# Patient Record
Sex: MALE | Race: WHITE | NOT HISPANIC OR LATINO | Employment: UNEMPLOYED | URBAN - METROPOLITAN AREA
[De-identification: names, ages, dates, MRNs, and addresses within clinical notes are randomized per-mention and may not be internally consistent; named-entity substitution may affect disease eponyms.]

---

## 2017-08-30 ENCOUNTER — HISTORICAL (OUTPATIENT)
Dept: ADMINISTRATIVE | Facility: HOSPITAL | Age: 57
End: 2017-08-30

## 2017-12-05 ENCOUNTER — HISTORICAL (OUTPATIENT)
Dept: ORTHOPEDICS | Facility: CLINIC | Age: 57
End: 2017-12-05

## 2022-04-10 ENCOUNTER — HISTORICAL (OUTPATIENT)
Dept: ADMINISTRATIVE | Facility: HOSPITAL | Age: 62
End: 2022-04-10

## 2022-04-26 VITALS
DIASTOLIC BLOOD PRESSURE: 88 MMHG | WEIGHT: 166.88 LBS | SYSTOLIC BLOOD PRESSURE: 128 MMHG | HEIGHT: 67 IN | BODY MASS INDEX: 26.19 KG/M2

## 2023-04-03 ENCOUNTER — HOSPITAL ENCOUNTER (EMERGENCY)
Facility: HOSPITAL | Age: 63
Discharge: HOME | End: 2023-04-03
Attending: EMERGENCY MEDICINE
Payer: MEDICAID

## 2023-04-03 VITALS
HEIGHT: 67 IN | WEIGHT: 160 LBS | RESPIRATION RATE: 18 BRPM | BODY MASS INDEX: 25.11 KG/M2 | TEMPERATURE: 99 F | OXYGEN SATURATION: 98 % | SYSTOLIC BLOOD PRESSURE: 140 MMHG | HEART RATE: 102 BPM | DIASTOLIC BLOOD PRESSURE: 86 MMHG

## 2023-04-03 DIAGNOSIS — M25.462 EFFUSION OF LEFT KNEE JOINT: Primary | ICD-10-CM

## 2023-04-03 DIAGNOSIS — M25.562 KNEE PAIN, LEFT: ICD-10-CM

## 2023-04-03 DIAGNOSIS — I10 BENIGN ESSENTIAL HTN: ICD-10-CM

## 2023-04-03 PROCEDURE — 63600175 PHARM REV CODE 636 W HCPCS: Performed by: EMERGENCY MEDICINE

## 2023-04-03 PROCEDURE — 96374 THER/PROPH/DIAG INJ IV PUSH: CPT

## 2023-04-03 PROCEDURE — 96375 TX/PRO/DX INJ NEW DRUG ADDON: CPT

## 2023-04-03 PROCEDURE — 99284 EMERGENCY DEPT VISIT MOD MDM: CPT | Mod: 25

## 2023-04-03 RX ORDER — KETOROLAC TROMETHAMINE 30 MG/ML
30 INJECTION, SOLUTION INTRAMUSCULAR; INTRAVENOUS
Status: DISCONTINUED | OUTPATIENT
Start: 2023-04-03 | End: 2023-04-03

## 2023-04-03 RX ORDER — HYDROMORPHONE HYDROCHLORIDE 2 MG/ML
1 INJECTION, SOLUTION INTRAMUSCULAR; INTRAVENOUS; SUBCUTANEOUS
Status: DISCONTINUED | OUTPATIENT
Start: 2023-04-03 | End: 2023-04-03

## 2023-04-03 RX ORDER — KETOROLAC TROMETHAMINE 30 MG/ML
30 INJECTION, SOLUTION INTRAMUSCULAR; INTRAVENOUS
Status: COMPLETED | OUTPATIENT
Start: 2023-04-03 | End: 2023-04-03

## 2023-04-03 RX ORDER — HYDROMORPHONE HYDROCHLORIDE 2 MG/ML
1 INJECTION, SOLUTION INTRAMUSCULAR; INTRAVENOUS; SUBCUTANEOUS
Status: COMPLETED | OUTPATIENT
Start: 2023-04-03 | End: 2023-04-03

## 2023-04-03 RX ORDER — HYDROCODONE BITARTRATE AND ACETAMINOPHEN 10; 325 MG/1; MG/1
1 TABLET ORAL EVERY 6 HOURS PRN
Qty: 12 TABLET | Refills: 0 | Status: ON HOLD | OUTPATIENT
Start: 2023-04-03 | End: 2023-05-23 | Stop reason: HOSPADM

## 2023-04-03 RX ORDER — KETOROLAC TROMETHAMINE 10 MG/1
10 TABLET, FILM COATED ORAL EVERY 6 HOURS PRN
Qty: 20 TABLET | Refills: 0 | Status: SHIPPED | OUTPATIENT
Start: 2023-04-03 | End: 2023-04-03 | Stop reason: SDUPTHER

## 2023-04-03 RX ORDER — KETOROLAC TROMETHAMINE 10 MG/1
10 TABLET, FILM COATED ORAL EVERY 6 HOURS PRN
Qty: 20 TABLET | Refills: 0 | Status: SHIPPED | OUTPATIENT
Start: 2023-04-03 | End: 2023-04-08

## 2023-04-03 RX ADMIN — KETOROLAC TROMETHAMINE 30 MG: 30 INJECTION, SOLUTION INTRAMUSCULAR; INTRAVENOUS at 04:04

## 2023-04-03 RX ADMIN — HYDROMORPHONE HYDROCHLORIDE 1 MG: 2 INJECTION INTRAMUSCULAR; INTRAVENOUS; SUBCUTANEOUS at 04:04

## 2023-04-03 NOTE — DISCHARGE INSTRUCTIONS
I spoke with the orthopedic surgeon, repeat procedures to remove the fluid are not recommended. I have placed a referral to orthopedic surgery to follow up with them. Do not take over the counter ibuprofen or naproxen with the prescribed toradol and make sure you take the toradol with food. Until you can see the orthopedic surgeon, call your primary care provider to schedule follow up and to prescribe any further pain medications

## 2023-04-03 NOTE — ED PROVIDER NOTES
"Encounter Date: 4/3/2023    SCRIBE #1 NOTE: I, Prerna Herron, am scribing for, and in the presence of,  Alexandra Moreno MD. I have scribed the following portions of the note - Other sections scribed: HPI,ROS,PE.     History     Chief Complaint   Patient presents with    Joint Swelling     Pt reports he had his knee drained twice at [facility] and had it drained last April 1st. Pt reports swelling has returned. Denies fever.     62-year-old male with past medical history of DM and neuropathy presents to ED c/o left knee swelling and pain onset since Wednesday. Pt reports ~4x weeks ago he woke to left knee feeling as if "it needed to be cracked" - states on Wednesday "it just started to swell." He denies direct injury. Pt was seen on 3/30 and 4/1 at [facility] for symptom, with fluid drainage, notes without infection. Pt reports he is on 7.5 Norco, Ibuprofen, and Naproxen without relief - states dilaudid injection helped some, but pain eventually returned. Pt also reports fluid collection builds within 24-hour time frame.     The history is provided by the patient. No  was used.   Leg Pain   There was no injury mechanism. The incident occurred several days ago. The pain is present in the left leg. The pain has been Constant since onset. Associated symptoms include inability to bear weight. The symptoms are aggravated by activity.   Review of patient's allergies indicates:  No Known Allergies  History reviewed. No pertinent past medical history.  History reviewed. No pertinent surgical history.  History reviewed. No pertinent family history.     Review of Systems   Constitutional:  Negative for fever.   Musculoskeletal:  Positive for arthralgias and joint swelling.        Left knee swelling and pain      Physical Exam     Initial Vitals [04/03/23 1126]   BP Pulse Resp Temp SpO2   120/89 (!) 123 20 97.6 °F (36.4 °C) 99 %      MAP       --         Physical Exam    Nursing note and vitals " reviewed.  Constitutional: No distress.   Patient non-toxic appearing   HENT:   Head: Normocephalic and atraumatic.   Eyes: Conjunctivae and EOM are normal. Pupils are equal, round, and reactive to light.   Neck: Trachea normal. Neck supple.   Normal range of motion.  Cardiovascular:  Normal rate and regular rhythm.           No murmur heard.  Pulses:       Dorsalis pedis pulses are 2+ on the right side and 2+ on the left side.        Posterior tibial pulses are 2+ on the right side and 2+ on the left side.   Pulmonary/Chest: Breath sounds normal. No respiratory distress. He exhibits no tenderness.   Abdominal: Abdomen is soft. Bowel sounds are normal. There is no abdominal tenderness.   Musculoskeletal:         General: Edema present.      Cervical back: Normal range of motion and neck supple.      Lumbar back: Normal. No tenderness. Normal range of motion.      Comments: Left knee with effusion compared to right, there is no overlying erythema,induration, or warmth. Good pulses present.      Neurological: He is alert and oriented to person, place, and time. He has normal strength. No cranial nerve deficit or sensory deficit.   Skin: Skin is warm and dry. Capillary refill takes less than 2 seconds. No rash and no abscess noted. No erythema. No pallor.   Psychiatric: He has a normal mood and affect. His behavior is normal. Judgment and thought content normal.       ED Course   Procedures  Labs Reviewed - No data to display       Imaging Results              X-Ray Knee 3 View Left (Final result)  Result time 04/03/23 15:17:58      Final result by Chapo Polanco MD (04/03/23 15:17:58)                   Impression:      1. No acute osseous abnormality is identified.  2. Moderate to large joint effusion.      Electronically signed by: Chapo Polanco MD  Date:    04/03/2023  Time:    15:17               Narrative:    EXAMINATION:  XR KNEE 3 VIEW LEFT    CLINICAL HISTORY:  Left knee pain, swelling.    TECHNIQUE:  Three  views of the left knee.    COMPARISON:  None    FINDINGS:  No acute displaced fracture, subluxation, or dislocation is identified.  There is superior pole patellar spurring.  There is moderate to large joint effusion. No radiopaque foreign bodies identified. No significant soft tissue swelling is identified.                                    X-Rays:   Independently Interpreted Readings:   Other Readings:  Knee xr with joint effusion, no acute fracture or dislocation  Medications   HYDROmorphone (PF) injection 1 mg (1 mg Intravenous Given 4/3/23 1615)   ketorolac injection 30 mg (30 mg Intravenous Given 4/3/23 1615)     Medical Decision Making:   History:   Old Medical Records: I decided to obtain old medical records.  Old Records Summarized: records from another hospital.       <> Summary of Records: fiona  Initial Assessment:   See hpi  Independently Interpreted Test(s):   I have ordered and independently interpreted X-rays - see prior notes.  Clinical Tests:   Radiological Study: Reviewed  Other:   I have discussed this case with another health care provider.   Medical Decision Making  The differential diagnosis includes, but is not limited to: fracture, disclocation, knee effusion, baker cyst  Xr orderd and reviewed  Discussed with ortho  Not a septic joint. Pain controlled, referralto rtho. No indication for repeat arthrocentesis  Likely an inflammatory effusion; however, has recurred rapidly, repeat arthrocentesis not indicated. Pt comfortable with plan    Problems Addressed:  Benign essential HTN: chronic illness or injury  Effusion of left knee joint: acute illness or injury that poses a threat to life or bodily functions  Knee pain, left: acute illness or injury that poses a threat to life or bodily functions    Amount and/or Complexity of Data Reviewed  External Data Reviewed: notes.  Radiology: independent interpretation performed. Decision-making details documented in ED Course.    Risk  OTC  drugs.  Prescription drug management.          Scribe Attestation:   Scribe #1: I performed the above scribed service and the documentation accurately describes the services I performed. I attest to the accuracy of the note.  Comments: Attending:   Physician Attestation Statement for Scribe #1: Alexandra KERR MD, personally performed the services described in this documentation. All medical record entries made by the scribe were at my direction and in my presence.  I have reviewed the chart and agree that the record reflects my personal performance and is accurate and complete.        Attending Attestation:           Physician Attestation for Scribe:  Physician Attestation Statement for Scribe #1: Alexandra KERR MD, reviewed documentation, as scribed by Prerna Herron in my presence, and it is both accurate and complete.           ED Course as of 04/03/23 2331   Mon Apr 03, 2023   1549 Discussed with Dr. Pineda. Joint effusions will always recur, repeat arthrocentesis is no longer indicated for effusions, recommend referral to [facility] ortho for f/u [BS]      ED Course User Index  [BS] Alexandra Moreno MD                 Clinical Impression:   Final diagnoses:  [M25.562] Knee pain, left  [M25.462] Effusion of left knee joint (Primary)  [I10] Benign essential HTN        ED Disposition Condition    Discharge Stable          ED Prescriptions       Medication Sig Dispense Start Date End Date Auth. Provider    HYDROcodone-acetaminophen (NORCO)  mg per tablet Take 1 tablet by mouth every 6 (six) hours as needed for Pain (severe pain only). 12 tablet 4/3/2023 -- Alexandra Moreno MD    ketorolac (TORADOL) 10 mg tablet  (Status: Discontinued) Take 1 tablet (10 mg total) by mouth every 6 (six) hours as needed for Pain (take with food or milk for moderate pain). 20 tablet 4/3/2023 4/3/2023 Alexandra Moreno MD    ketorolac (TORADOL) 10 mg tablet Take 1 tablet (10 mg total) by mouth every 6 (six) hours as needed  for Pain (take with food or milk for moderate pain). 20 tablet 4/3/2023 4/8/2023 Alexandra Moreno MD          Follow-up Information       Follow up With Specialties Details Why Contact Info    [facility]   will call to follow up in the orthopedic clinic [address] [facility] [address] [id number] [phone]      your primary care provider  Schedule an appointment as soon as possible for a visit       [facility] - Emergency Dept Emergency Medicine  As needed, If symptoms worsen [address] [facility] [id number] [phone]             Alexandra Moreno MD  04/03/23 3387

## 2023-04-10 DIAGNOSIS — M25.569 KNEE PAIN, UNSPECIFIED CHRONICITY, UNSPECIFIED LATERALITY: Primary | ICD-10-CM

## 2023-05-05 ENCOUNTER — HOSPITAL ENCOUNTER (INPATIENT)
Facility: HOSPITAL | Age: 63
LOS: 18 days | Discharge: HOSPICE | DRG: 853 | End: 2023-05-23
Attending: STUDENT IN AN ORGANIZED HEALTH CARE EDUCATION/TRAINING PROGRAM | Admitting: INTERNAL MEDICINE
Payer: MEDICAID

## 2023-05-05 DIAGNOSIS — I38 ENDOCARDITIS: ICD-10-CM

## 2023-05-05 DIAGNOSIS — I48.91 ATRIAL FIBRILLATION: ICD-10-CM

## 2023-05-05 DIAGNOSIS — R07.9 CHEST PAIN: ICD-10-CM

## 2023-05-05 DIAGNOSIS — R60.9 SWELLING: ICD-10-CM

## 2023-05-05 DIAGNOSIS — K92.1 HEMATOCHEZIA: ICD-10-CM

## 2023-05-05 DIAGNOSIS — A41.9 SEPSIS: ICD-10-CM

## 2023-05-05 DIAGNOSIS — M46.20 SPINAL ABSCESS: Primary | ICD-10-CM

## 2023-05-05 DIAGNOSIS — B95.61 STAPHYLOCOCCUS AUREUS BACTEREMIA: ICD-10-CM

## 2023-05-05 DIAGNOSIS — R00.0 TACHYCARDIA: ICD-10-CM

## 2023-05-05 DIAGNOSIS — I49.9 ABNORMAL HEART RHYTHM: ICD-10-CM

## 2023-05-05 DIAGNOSIS — R78.81 BACTEREMIA: ICD-10-CM

## 2023-05-05 DIAGNOSIS — K81.9 CHOLECYSTITIS: ICD-10-CM

## 2023-05-05 DIAGNOSIS — D62 ACUTE BLOOD LOSS ANEMIA: ICD-10-CM

## 2023-05-05 DIAGNOSIS — R78.81 STAPHYLOCOCCUS AUREUS BACTEREMIA: ICD-10-CM

## 2023-05-05 DIAGNOSIS — S31.000A WOUND OF SACRAL REGION, INITIAL ENCOUNTER: ICD-10-CM

## 2023-05-05 LAB
ALBUMIN SERPL-MCNC: 1.3 G/DL (ref 3.4–4.8)
ALBUMIN/GLOB SERPL: 0.4 RATIO (ref 1.1–2)
ALP SERPL-CCNC: 98 UNIT/L (ref 40–150)
ALT SERPL-CCNC: 11 UNIT/L (ref 0–55)
AST SERPL-CCNC: 19 UNIT/L (ref 5–34)
BASOPHILS # BLD AUTO: 0.07 X10(3)/MCL
BASOPHILS NFR BLD AUTO: 0.4 %
BILIRUBIN DIRECT+TOT PNL SERPL-MCNC: 1.3 MG/DL
BUN SERPL-MCNC: 32.9 MG/DL (ref 8.4–25.7)
CALCIUM SERPL-MCNC: 7.7 MG/DL (ref 8.8–10)
CHLORIDE SERPL-SCNC: 98 MMOL/L (ref 98–107)
CK SERPL-CCNC: 22 U/L (ref 30–200)
CO2 SERPL-SCNC: 26 MMOL/L (ref 23–31)
CREAT SERPL-MCNC: 0.75 MG/DL (ref 0.73–1.18)
EOSINOPHIL # BLD AUTO: 0.01 X10(3)/MCL (ref 0–0.9)
EOSINOPHIL NFR BLD AUTO: 0.1 %
ERYTHROCYTE [DISTWIDTH] IN BLOOD BY AUTOMATED COUNT: 16.8 % (ref 11.5–17)
GFR SERPLBLD CREATININE-BSD FMLA CKD-EPI: >60 MLS/MIN/1.73/M2
GLOBULIN SER-MCNC: 3.7 GM/DL (ref 2.4–3.5)
GLUCOSE SERPL-MCNC: 201 MG/DL (ref 82–115)
HCT VFR BLD AUTO: 26.7 % (ref 42–52)
HGB BLD-MCNC: 8.7 G/DL (ref 14–18)
IMM GRANULOCYTES # BLD AUTO: 0.12 X10(3)/MCL (ref 0–0.04)
IMM GRANULOCYTES NFR BLD AUTO: 0.7 %
LACTATE SERPL-SCNC: 2.2 MMOL/L (ref 0.5–2.2)
LYMPHOCYTES # BLD AUTO: 1.43 X10(3)/MCL (ref 0.6–4.6)
LYMPHOCYTES NFR BLD AUTO: 8.3 %
MCH RBC QN AUTO: 30.3 PG (ref 27–31)
MCHC RBC AUTO-ENTMCNC: 32.6 G/DL (ref 33–36)
MCV RBC AUTO: 93 FL (ref 80–94)
MONOCYTES # BLD AUTO: 0.56 X10(3)/MCL (ref 0.1–1.3)
MONOCYTES NFR BLD AUTO: 3.3 %
NEUTROPHILS # BLD AUTO: 14.96 X10(3)/MCL (ref 2.1–9.2)
NEUTROPHILS NFR BLD AUTO: 87.2 %
NRBC BLD AUTO-RTO: 0 %
PLATELET # BLD AUTO: 212 X10(3)/MCL (ref 130–400)
PMV BLD AUTO: 10.5 FL (ref 7.4–10.4)
POCT GLUCOSE: 229 MG/DL (ref 70–110)
POTASSIUM SERPL-SCNC: 3.8 MMOL/L (ref 3.5–5.1)
PROT SERPL-MCNC: 5 GM/DL (ref 5.8–7.6)
RBC # BLD AUTO: 2.87 X10(6)/MCL (ref 4.7–6.1)
SODIUM SERPL-SCNC: 133 MMOL/L (ref 136–145)
TROPONIN I SERPL-MCNC: <0.01 NG/ML (ref 0–0.04)
WBC # SPEC AUTO: 17.15 X10(3)/MCL (ref 4.5–11.5)

## 2023-05-05 PROCEDURE — 85025 COMPLETE CBC W/AUTO DIFF WBC: CPT | Performed by: PHYSICIAN ASSISTANT

## 2023-05-05 PROCEDURE — 96375 TX/PRO/DX INJ NEW DRUG ADDON: CPT

## 2023-05-05 PROCEDURE — 96374 THER/PROPH/DIAG INJ IV PUSH: CPT

## 2023-05-05 PROCEDURE — 93010 EKG 12-LEAD: ICD-10-PCS | Mod: ,,, | Performed by: INTERNAL MEDICINE

## 2023-05-05 PROCEDURE — 84484 ASSAY OF TROPONIN QUANT: CPT | Performed by: PHYSICIAN ASSISTANT

## 2023-05-05 PROCEDURE — 83605 ASSAY OF LACTIC ACID: CPT | Performed by: PHYSICIAN ASSISTANT

## 2023-05-05 PROCEDURE — 82550 ASSAY OF CK (CPK): CPT | Performed by: STUDENT IN AN ORGANIZED HEALTH CARE EDUCATION/TRAINING PROGRAM

## 2023-05-05 PROCEDURE — 93010 ELECTROCARDIOGRAM REPORT: CPT | Mod: ,,, | Performed by: INTERNAL MEDICINE

## 2023-05-05 PROCEDURE — 87184 SC STD DISK METHOD PER PLATE: CPT | Performed by: PHYSICIAN ASSISTANT

## 2023-05-05 PROCEDURE — 96361 HYDRATE IV INFUSION ADD-ON: CPT

## 2023-05-05 PROCEDURE — 25000003 PHARM REV CODE 250: Performed by: STUDENT IN AN ORGANIZED HEALTH CARE EDUCATION/TRAINING PROGRAM

## 2023-05-05 PROCEDURE — 80053 COMPREHEN METABOLIC PANEL: CPT | Performed by: PHYSICIAN ASSISTANT

## 2023-05-05 PROCEDURE — 63600175 PHARM REV CODE 636 W HCPCS: Performed by: STUDENT IN AN ORGANIZED HEALTH CARE EDUCATION/TRAINING PROGRAM

## 2023-05-05 PROCEDURE — 11000001 HC ACUTE MED/SURG PRIVATE ROOM

## 2023-05-05 PROCEDURE — 93005 ELECTROCARDIOGRAM TRACING: CPT

## 2023-05-05 PROCEDURE — 87154 CUL TYP ID BLD PTHGN 6+ TRGT: CPT | Performed by: PHYSICIAN ASSISTANT

## 2023-05-05 PROCEDURE — 99285 EMERGENCY DEPT VISIT HI MDM: CPT | Mod: 25

## 2023-05-05 PROCEDURE — 63600175 PHARM REV CODE 636 W HCPCS: Performed by: PHYSICIAN ASSISTANT

## 2023-05-05 RX ORDER — TALC
6 POWDER (GRAM) TOPICAL NIGHTLY PRN
Status: DISCONTINUED | OUTPATIENT
Start: 2023-05-06 | End: 2023-05-18

## 2023-05-05 RX ORDER — PROCHLORPERAZINE EDISYLATE 5 MG/ML
5 INJECTION INTRAMUSCULAR; INTRAVENOUS EVERY 6 HOURS PRN
Status: DISCONTINUED | OUTPATIENT
Start: 2023-05-06 | End: 2023-05-23 | Stop reason: HOSPADM

## 2023-05-05 RX ORDER — TIZANIDINE 4 MG/1
4 TABLET ORAL EVERY 8 HOURS PRN
Status: DISCONTINUED | OUTPATIENT
Start: 2023-05-06 | End: 2023-05-18

## 2023-05-05 RX ORDER — ALBUMIN HUMAN 250 G/1000ML
50 SOLUTION INTRAVENOUS ONCE
Status: COMPLETED | OUTPATIENT
Start: 2023-05-05 | End: 2023-05-06

## 2023-05-05 RX ORDER — LINEZOLID 2 MG/ML
600 INJECTION, SOLUTION INTRAVENOUS
Status: DISCONTINUED | OUTPATIENT
Start: 2023-05-05 | End: 2023-05-06

## 2023-05-05 RX ORDER — SODIUM CHLORIDE 0.9 % (FLUSH) 0.9 %
10 SYRINGE (ML) INJECTION
Status: DISCONTINUED | OUTPATIENT
Start: 2023-05-06 | End: 2023-05-23 | Stop reason: HOSPADM

## 2023-05-05 RX ORDER — ACETAMINOPHEN 500 MG
1000 TABLET ORAL EVERY 6 HOURS PRN
Status: DISCONTINUED | OUTPATIENT
Start: 2023-05-06 | End: 2023-05-08

## 2023-05-05 RX ORDER — AMOXICILLIN 250 MG
2 CAPSULE ORAL 2 TIMES DAILY PRN
Status: DISCONTINUED | OUTPATIENT
Start: 2023-05-06 | End: 2023-05-18

## 2023-05-05 RX ORDER — ENOXAPARIN SODIUM 100 MG/ML
40 INJECTION SUBCUTANEOUS EVERY 24 HOURS
Status: DISCONTINUED | OUTPATIENT
Start: 2023-05-06 | End: 2023-05-13

## 2023-05-05 RX ORDER — ONDANSETRON 2 MG/ML
4 INJECTION INTRAMUSCULAR; INTRAVENOUS
Status: COMPLETED | OUTPATIENT
Start: 2023-05-05 | End: 2023-05-05

## 2023-05-05 RX ORDER — SODIUM CHLORIDE, SODIUM LACTATE, POTASSIUM CHLORIDE, CALCIUM CHLORIDE 600; 310; 30; 20 MG/100ML; MG/100ML; MG/100ML; MG/100ML
INJECTION, SOLUTION INTRAVENOUS CONTINUOUS
Status: DISCONTINUED | OUTPATIENT
Start: 2023-05-06 | End: 2023-05-06

## 2023-05-05 RX ORDER — MORPHINE SULFATE 4 MG/ML
4 INJECTION, SOLUTION INTRAMUSCULAR; INTRAVENOUS
Status: COMPLETED | OUTPATIENT
Start: 2023-05-05 | End: 2023-05-05

## 2023-05-05 RX ORDER — OXYCODONE HYDROCHLORIDE 5 MG/1
5 TABLET ORAL EVERY 6 HOURS PRN
Status: DISCONTINUED | OUTPATIENT
Start: 2023-05-06 | End: 2023-05-06

## 2023-05-05 RX ORDER — LACTOBACILLUS ACIDOPHILUS 500MM CELL
1 CAPSULE ORAL
Status: DISCONTINUED | OUTPATIENT
Start: 2023-05-06 | End: 2023-05-18

## 2023-05-05 RX ORDER — ONDANSETRON 2 MG/ML
4 INJECTION INTRAMUSCULAR; INTRAVENOUS EVERY 4 HOURS PRN
Status: DISCONTINUED | OUTPATIENT
Start: 2023-05-06 | End: 2023-05-23 | Stop reason: HOSPADM

## 2023-05-05 RX ORDER — MAG HYDROX/ALUMINUM HYD/SIMETH 200-200-20
30 SUSPENSION, ORAL (FINAL DOSE FORM) ORAL 4 TIMES DAILY PRN
Status: DISCONTINUED | OUTPATIENT
Start: 2023-05-06 | End: 2023-05-18

## 2023-05-05 RX ORDER — ACETAMINOPHEN 325 MG/1
650 TABLET ORAL EVERY 4 HOURS PRN
Status: DISCONTINUED | OUTPATIENT
Start: 2023-05-06 | End: 2023-05-08

## 2023-05-05 RX ORDER — POLYETHYLENE GLYCOL 3350 17 G/17G
17 POWDER, FOR SOLUTION ORAL 2 TIMES DAILY PRN
Status: DISCONTINUED | OUTPATIENT
Start: 2023-05-06 | End: 2023-05-23 | Stop reason: HOSPADM

## 2023-05-05 RX ADMIN — ONDANSETRON 4 MG: 2 INJECTION INTRAMUSCULAR; INTRAVENOUS at 11:05

## 2023-05-05 RX ADMIN — SODIUM CHLORIDE, POTASSIUM CHLORIDE, SODIUM LACTATE AND CALCIUM CHLORIDE 1000 ML: 600; 310; 30; 20 INJECTION, SOLUTION INTRAVENOUS at 09:05

## 2023-05-05 RX ADMIN — MORPHINE SULFATE 4 MG: 4 INJECTION INTRAVENOUS at 11:05

## 2023-05-05 RX ADMIN — MEROPENEM 1 G: 1 INJECTION, POWDER, FOR SOLUTION INTRAVENOUS at 11:05

## 2023-05-05 RX ADMIN — LINEZOLID 600 MG: 600 INJECTION, SOLUTION INTRAVENOUS at 11:05

## 2023-05-05 NOTE — Clinical Note
Diagnosis: Tachycardia [470510]   Admitting Provider:: JOHNATHAN NEWTON [[id number]]   Future Attending Provider: JOHNATHAN NEWTON [[id number]]   Reason for IP Medical Treatment  (Clinical interventions that can only be accomplished in the IP setting? ) :: IV abx   I certify that Inpatient services for greater than or equal to 2 midnights are medically necessary:: Yes   Plans for Post-Acute care--if anticipated (pick the single best option):: A. No post acute care anticipated at this time

## 2023-05-06 LAB
ALBUMIN SERPL-MCNC: 2 G/DL (ref 3.4–4.8)
ALBUMIN/GLOB SERPL: 0.8 RATIO (ref 1.1–2)
ALP SERPL-CCNC: 80 UNIT/L (ref 40–150)
ALT SERPL-CCNC: 10 UNIT/L (ref 0–55)
AMPHET UR QL SCN: POSITIVE
APPEARANCE UR: ABNORMAL
AST SERPL-CCNC: 16 UNIT/L (ref 5–34)
AV INDEX (PROSTH): 0.9
AV MEAN GRADIENT: 3 MMHG
AV PEAK GRADIENT: 5 MMHG
AV VALVE AREA: 2.83 CM2
AV VELOCITY RATIO: 0.89
BACTERIA #/AREA URNS AUTO: ABNORMAL /HPF
BARBITURATE SCN PRESENT UR: NEGATIVE
BASOPHILS # BLD AUTO: 0.04 X10(3)/MCL
BASOPHILS NFR BLD AUTO: 0.4 %
BENZODIAZ UR QL SCN: NEGATIVE
BILIRUB UR QL STRIP.AUTO: NEGATIVE MG/DL
BILIRUBIN DIRECT+TOT PNL SERPL-MCNC: 1 MG/DL
BSA FOR ECHO PROCEDURE: 1.85 M2
BUN SERPL-MCNC: 30.2 MG/DL (ref 8.4–25.7)
CALCIUM SERPL-MCNC: 7.6 MG/DL (ref 8.8–10)
CANNABINOIDS UR QL SCN: NEGATIVE
CHLORIDE SERPL-SCNC: 98 MMOL/L (ref 98–107)
CO2 SERPL-SCNC: 27 MMOL/L (ref 23–31)
COCAINE UR QL SCN: NEGATIVE
COLOR UR AUTO: ABNORMAL
CREAT SERPL-MCNC: 0.76 MG/DL (ref 0.73–1.18)
CRP SERPL-MCNC: 76 MG/L
CV ECHO LV RWT: 0.41 CM
DOP CALC AO PEAK VEL: 1.08 M/S
DOP CALC AO VTI: 15.4 CM
DOP CALC LVOT AREA: 3.1 CM2
DOP CALC LVOT DIAMETER: 2 CM
DOP CALC LVOT PEAK VEL: 0.96 M/S
DOP CALC LVOT STROKE VOLUME: 43.65 CM3
DOP CALC MV VTI: 17 CM
DOP CALCLVOT PEAK VEL VTI: 13.9 CM
E WAVE DECELERATION TIME: 127 MSEC
E/A RATIO: 0.64
E/E' RATIO: 5.68 M/S
ECHO LV POSTERIOR WALL: 0.93 CM (ref 0.6–1.1)
EJECTION FRACTION: 52 %
EOSINOPHIL # BLD AUTO: 0.04 X10(3)/MCL (ref 0–0.9)
EOSINOPHIL NFR BLD AUTO: 0.4 %
ERYTHROCYTE [DISTWIDTH] IN BLOOD BY AUTOMATED COUNT: 17.1 % (ref 11.5–17)
ERYTHROCYTE [SEDIMENTATION RATE] IN BLOOD: 7 MM/HR (ref 0–15)
EST. AVERAGE GLUCOSE BLD GHB EST-MCNC: 151.3 MG/DL
FENTANYL UR QL SCN: NEGATIVE
FRACTIONAL SHORTENING: 31 % (ref 28–44)
GFR SERPLBLD CREATININE-BSD FMLA CKD-EPI: >60 MLS/MIN/1.73/M2
GLOBULIN SER-MCNC: 2.6 GM/DL (ref 2.4–3.5)
GLUCOSE SERPL-MCNC: 214 MG/DL (ref 82–115)
GLUCOSE UR QL STRIP.AUTO: NEGATIVE MG/DL
HBA1C MFR BLD: 6.9 %
HCT VFR BLD AUTO: 26.6 % (ref 42–52)
HGB BLD-MCNC: 8.4 G/DL (ref 14–18)
IMM GRANULOCYTES # BLD AUTO: 0.07 X10(3)/MCL (ref 0–0.04)
IMM GRANULOCYTES NFR BLD AUTO: 0.7 %
INTERVENTRICULAR SEPTUM: 0.9 CM (ref 0.6–1.1)
IRON SATN MFR SERPL: 21 % (ref 20–50)
IRON SERPL-MCNC: 16 UG/DL (ref 65–175)
KETONES UR QL STRIP.AUTO: NEGATIVE MG/DL
LACTATE SERPL-SCNC: 2.2 MMOL/L (ref 0.5–2.2)
LEFT ATRIUM SIZE: 3.2 CM
LEFT INTERNAL DIMENSION IN SYSTOLE: 3.15 CM (ref 2.1–4)
LEFT VENTRICLE DIASTOLIC VOLUME INDEX: 52.12 ML/M2
LEFT VENTRICLE DIASTOLIC VOLUME: 95.9 ML
LEFT VENTRICLE MASS INDEX: 76 G/M2
LEFT VENTRICLE SYSTOLIC VOLUME INDEX: 21.4 ML/M2
LEFT VENTRICLE SYSTOLIC VOLUME: 39.4 ML
LEFT VENTRICULAR INTERNAL DIMENSION IN DIASTOLE: 4.57 CM (ref 3.5–6)
LEFT VENTRICULAR MASS: 139.29 G
LEUKOCYTE ESTERASE UR QL STRIP.AUTO: ABNORMAL UNIT/L
LV LATERAL E/E' RATIO: 6.75 M/S
LV SEPTAL E/E' RATIO: 4.91 M/S
LVOT MG: 2 MMHG
LVOT MV: 0.62 CM/S
LYMPHOCYTES # BLD AUTO: 1.02 X10(3)/MCL (ref 0.6–4.6)
LYMPHOCYTES NFR BLD AUTO: 10.3 %
MAGNESIUM SERPL-MCNC: 2 MG/DL (ref 1.6–2.6)
MCH RBC QN AUTO: 30 PG (ref 27–31)
MCHC RBC AUTO-ENTMCNC: 31.6 G/DL (ref 33–36)
MCV RBC AUTO: 95 FL (ref 80–94)
MDMA UR QL SCN: NEGATIVE
MONOCYTES # BLD AUTO: 0.29 X10(3)/MCL (ref 0.1–1.3)
MONOCYTES NFR BLD AUTO: 2.9 %
MV MEAN GRADIENT: 3 MMHG
MV PEAK A VEL: 0.84 M/S
MV PEAK E VEL: 0.54 M/S
MV PEAK GRADIENT: 5 MMHG
MV STENOSIS PRESSURE HALF TIME: 40 MS
MV VALVE AREA BY CONTINUITY EQUATION: 2.57 CM2
MV VALVE AREA P 1/2 METHOD: 5.5 CM2
NEUTROPHILS # BLD AUTO: 8.43 X10(3)/MCL (ref 2.1–9.2)
NEUTROPHILS NFR BLD AUTO: 85.3 %
NITRITE UR QL STRIP.AUTO: NEGATIVE
NRBC BLD AUTO-RTO: 0 %
OPIATES UR QL SCN: POSITIVE
PCP UR QL: NEGATIVE
PH UR STRIP.AUTO: 5.5 [PH]
PH UR: 5.5 [PH] (ref 3–11)
PHOSPHATE SERPL-MCNC: 3.1 MG/DL (ref 2.3–4.7)
PISA TR MAX VEL: 1.65 M/S
PLATELET # BLD AUTO: 147 X10(3)/MCL (ref 130–400)
PMV BLD AUTO: 10.3 FL (ref 7.4–10.4)
POCT GLUCOSE: 221 MG/DL (ref 70–110)
POCT GLUCOSE: 233 MG/DL (ref 70–110)
POTASSIUM SERPL-SCNC: 3.6 MMOL/L (ref 3.5–5.1)
PROT SERPL-MCNC: 4.6 GM/DL (ref 5.8–7.6)
PROT UR QL STRIP.AUTO: ABNORMAL MG/DL
PV PEAK VELOCITY: 1.01 CM/S
RA PRESSURE: 8 MMHG
RBC # BLD AUTO: 2.8 X10(6)/MCL (ref 4.7–6.1)
RBC #/AREA URNS AUTO: ABNORMAL /HPF
RBC UR QL AUTO: ABNORMAL UNIT/L
SODIUM SERPL-SCNC: 135 MMOL/L (ref 136–145)
SP GR UR STRIP.AUTO: 1.01 (ref 1–1.03)
SPECIFIC GRAVITY, URINE AUTO (.000) (OHS): 1.01 (ref 1–1.03)
SQUAMOUS #/AREA URNS AUTO: <5 /HPF
TDI LATERAL: 0.08 M/S
TDI SEPTAL: 0.11 M/S
TDI: 0.1 M/S
TIBC SERPL-MCNC: 60 UG/DL (ref 69–240)
TIBC SERPL-MCNC: 76 UG/DL (ref 250–450)
TR MAX PG: 11 MMHG
TRANSFERRIN SERPL-MCNC: 58 MG/DL (ref 174–364)
TRICUSPID ANNULAR PLANE SYSTOLIC EXCURSION: 1.94 CM
TV REST PULMONARY ARTERY PRESSURE: 19 MMHG
UROBILINOGEN UR STRIP-ACNC: 0.2 MG/DL
WBC # SPEC AUTO: 9.89 X10(3)/MCL (ref 4.5–11.5)
WBC #/AREA URNS AUTO: ABNORMAL /HPF

## 2023-05-06 PROCEDURE — 63600175 PHARM REV CODE 636 W HCPCS: Performed by: STUDENT IN AN ORGANIZED HEALTH CARE EDUCATION/TRAINING PROGRAM

## 2023-05-06 PROCEDURE — P9047 ALBUMIN (HUMAN), 25%, 50ML: HCPCS | Mod: JZ,JG | Performed by: INTERNAL MEDICINE

## 2023-05-06 PROCEDURE — 84100 ASSAY OF PHOSPHORUS: CPT | Performed by: INTERNAL MEDICINE

## 2023-05-06 PROCEDURE — 83036 HEMOGLOBIN GLYCOSYLATED A1C: CPT | Performed by: INTERNAL MEDICINE

## 2023-05-06 PROCEDURE — 63600175 PHARM REV CODE 636 W HCPCS: Performed by: INTERNAL MEDICINE

## 2023-05-06 PROCEDURE — 80053 COMPREHEN METABOLIC PANEL: CPT | Performed by: INTERNAL MEDICINE

## 2023-05-06 PROCEDURE — 25500020 PHARM REV CODE 255: Performed by: INTERNAL MEDICINE

## 2023-05-06 PROCEDURE — 83550 IRON BINDING TEST: CPT | Performed by: INTERNAL MEDICINE

## 2023-05-06 PROCEDURE — 86140 C-REACTIVE PROTEIN: CPT | Performed by: INTERNAL MEDICINE

## 2023-05-06 PROCEDURE — 21400001 HC TELEMETRY ROOM

## 2023-05-06 PROCEDURE — 25000003 PHARM REV CODE 250: Performed by: INTERNAL MEDICINE

## 2023-05-06 PROCEDURE — 80307 DRUG TEST PRSMV CHEM ANLYZR: CPT | Performed by: NURSE PRACTITIONER

## 2023-05-06 PROCEDURE — 11000001 HC ACUTE MED/SURG PRIVATE ROOM

## 2023-05-06 PROCEDURE — 63600175 PHARM REV CODE 636 W HCPCS: Performed by: NURSE PRACTITIONER

## 2023-05-06 PROCEDURE — 25000003 PHARM REV CODE 250: Performed by: STUDENT IN AN ORGANIZED HEALTH CARE EDUCATION/TRAINING PROGRAM

## 2023-05-06 PROCEDURE — 87077 CULTURE AEROBIC IDENTIFY: CPT | Performed by: PHYSICIAN ASSISTANT

## 2023-05-06 PROCEDURE — 81001 URINALYSIS AUTO W/SCOPE: CPT | Performed by: PHYSICIAN ASSISTANT

## 2023-05-06 PROCEDURE — 85025 COMPLETE CBC W/AUTO DIFF WBC: CPT | Performed by: INTERNAL MEDICINE

## 2023-05-06 PROCEDURE — 83735 ASSAY OF MAGNESIUM: CPT | Performed by: INTERNAL MEDICINE

## 2023-05-06 PROCEDURE — 85651 RBC SED RATE NONAUTOMATED: CPT | Performed by: INTERNAL MEDICINE

## 2023-05-06 PROCEDURE — 87088 URINE BACTERIA CULTURE: CPT | Performed by: PHYSICIAN ASSISTANT

## 2023-05-06 RX ORDER — SPIRONOLACTONE 25 MG/1
25 TABLET ORAL 2 TIMES DAILY
Status: DISCONTINUED | OUTPATIENT
Start: 2023-05-06 | End: 2023-05-11

## 2023-05-06 RX ORDER — DEXTROSE 40 %
30 GEL (GRAM) ORAL
Status: DISCONTINUED | OUTPATIENT
Start: 2023-05-06 | End: 2023-05-06

## 2023-05-06 RX ORDER — INSULIN ASPART 100 [IU]/ML
1-10 INJECTION, SOLUTION INTRAVENOUS; SUBCUTANEOUS
Status: DISCONTINUED | OUTPATIENT
Start: 2023-05-06 | End: 2023-05-23 | Stop reason: HOSPADM

## 2023-05-06 RX ORDER — DEXTROSE 40 %
15 GEL (GRAM) ORAL
Status: DISCONTINUED | OUTPATIENT
Start: 2023-05-06 | End: 2023-05-06

## 2023-05-06 RX ORDER — IBUPROFEN 200 MG
16 TABLET ORAL
Status: DISCONTINUED | OUTPATIENT
Start: 2023-05-06 | End: 2023-05-18

## 2023-05-06 RX ORDER — IBUPROFEN 200 MG
24 TABLET ORAL
Status: DISCONTINUED | OUTPATIENT
Start: 2023-05-06 | End: 2023-05-18

## 2023-05-06 RX ORDER — GLUCAGON 1 MG
1 KIT INJECTION
Status: DISCONTINUED | OUTPATIENT
Start: 2023-05-06 | End: 2023-05-23 | Stop reason: HOSPADM

## 2023-05-06 RX ORDER — OXYCODONE HYDROCHLORIDE 5 MG/1
5 TABLET ORAL EVERY 4 HOURS PRN
Status: DISCONTINUED | OUTPATIENT
Start: 2023-05-06 | End: 2023-05-08

## 2023-05-06 RX ORDER — FUROSEMIDE 10 MG/ML
20 INJECTION INTRAMUSCULAR; INTRAVENOUS
Status: DISCONTINUED | OUTPATIENT
Start: 2023-05-06 | End: 2023-05-10

## 2023-05-06 RX ORDER — RIFAMPIN 300 MG/1
300 CAPSULE ORAL EVERY 12 HOURS
Status: DISCONTINUED | OUTPATIENT
Start: 2023-05-06 | End: 2023-05-16

## 2023-05-06 RX ADMIN — OXYCODONE HYDROCHLORIDE 5 MG: 5 TABLET ORAL at 04:05

## 2023-05-06 RX ADMIN — Medication 1 CAPSULE: at 11:05

## 2023-05-06 RX ADMIN — MEROPENEM 1 G: 1 INJECTION, POWDER, FOR SOLUTION INTRAVENOUS at 03:05

## 2023-05-06 RX ADMIN — OXYCODONE HYDROCHLORIDE 5 MG: 5 TABLET ORAL at 08:05

## 2023-05-06 RX ADMIN — RIFAMPIN 300 MG: 300 CAPSULE ORAL at 09:05

## 2023-05-06 RX ADMIN — INSULIN DETEMIR 12 UNITS: 100 INJECTION, SOLUTION SUBCUTANEOUS at 09:05

## 2023-05-06 RX ADMIN — ACETAMINOPHEN 325MG 650 MG: 325 TABLET ORAL at 04:05

## 2023-05-06 RX ADMIN — PIPERACILLIN AND TAZOBACTAM 4.5 G: 4; .5 INJECTION, POWDER, LYOPHILIZED, FOR SOLUTION INTRAVENOUS; PARENTERAL at 06:05

## 2023-05-06 RX ADMIN — ONDANSETRON 4 MG: 2 INJECTION INTRAMUSCULAR; INTRAVENOUS at 04:05

## 2023-05-06 RX ADMIN — Medication 1 CAPSULE: at 08:05

## 2023-05-06 RX ADMIN — OXYCODONE HYDROCHLORIDE 5 MG: 5 TABLET ORAL at 11:05

## 2023-05-06 RX ADMIN — IOPAMIDOL 100 ML: 755 INJECTION, SOLUTION INTRAVENOUS at 12:05

## 2023-05-06 RX ADMIN — Medication 1 CAPSULE: at 06:05

## 2023-05-06 RX ADMIN — SPIRONOLACTONE 25 MG: 25 TABLET ORAL at 09:05

## 2023-05-06 RX ADMIN — FUROSEMIDE 20 MG: 10 INJECTION, SOLUTION INTRAMUSCULAR; INTRAVENOUS at 06:05

## 2023-05-06 RX ADMIN — ENOXAPARIN SODIUM 40 MG: 40 INJECTION SUBCUTANEOUS at 06:05

## 2023-05-06 RX ADMIN — DAPTOMYCIN 435 MG: 500 INJECTION, POWDER, LYOPHILIZED, FOR SOLUTION INTRAVENOUS at 07:05

## 2023-05-06 RX ADMIN — INSULIN ASPART 2 UNITS: 100 INJECTION, SOLUTION INTRAVENOUS; SUBCUTANEOUS at 09:05

## 2023-05-06 RX ADMIN — MEROPENEM 1 G: 1 INJECTION, POWDER, FOR SOLUTION INTRAVENOUS at 08:05

## 2023-05-06 RX ADMIN — SODIUM CHLORIDE, POTASSIUM CHLORIDE, SODIUM LACTATE AND CALCIUM CHLORIDE: 600; 310; 30; 20 INJECTION, SOLUTION INTRAVENOUS at 12:05

## 2023-05-06 RX ADMIN — OXYCODONE HYDROCHLORIDE 5 MG: 5 TABLET ORAL at 03:05

## 2023-05-06 RX ADMIN — THERA TABS 1 TABLET: TAB at 11:05

## 2023-05-06 RX ADMIN — ALBUMIN (HUMAN) 50 G: 12.5 SOLUTION INTRAVENOUS at 12:05

## 2023-05-06 RX ADMIN — LINEZOLID 600 MG: 600 INJECTION, SOLUTION INTRAVENOUS at 11:05

## 2023-05-06 NOTE — H&P
[facility]  Hospital Medicine History & Physical Examination       Patient Name: Reji Plasencia  MRN: [id number]  Patient Class: IP- Inpatient   Admission Date: 05/06/2023   Admitting Service: Hospital Medicine   Length of Stay: 1  Attending Physician: Dr. Naidu  Primary Care Provider: Primary Doctor No  Face-to-Face encounter date: 05/06/2023  Code Status: Full  Chief Complaint: Back Pain ([place] from [facility] after being admitted for 3 wks for IV abx after washout for infection in L-spine. Dr. Chang did sx)    Source of Information: Patient. Medical Records      HISTORY OF PRESENT ILLNESS:   Reji Plasencia is a 62 y.o. 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 Of note, patient was hospitalized x3 weeks at [facility] with MRSA bacteremia, MRSA C-spine/L Jin osteomyelitis with bilateral psoas and paraspinous muscle abscesses as well as lumbar epidural abscess and underwent lumbar laminectomy with drainage of an epidural abscess on 04/21/2023 by Dr. Chang.  His hospital stay was complicated by development of cholecystitis for which he underwent cholecystostomy tube placement.  He felt he was not receiving adequate care and left [place] to come to [facility] for admission on 05/05/2023.  Per chart review he was being followed by ID and was on daptomycin day 2 and rifampin day 21 prior to leaving [place]; it was also noted the patient had a UDS on 4/27/23 that was positive for amphetamines suggestive that he was using while hospitalized. Although, patient stated he has been using IV drugs his whole life and last used prior to being hospitalized at [facility].     Initial ED VS include /78, , RR 18, SpO2 94%, temperature 99° F.  Labs were notable for hemoglobin 8.7,  hematocrit 26.7, WBC 17.15, sodium 133, BUN 32.9, glucose 201, CRP 76.  Urinalysis with 2+ protein, 2+ occult blood, 2+ leukocytes and 11-20 WBCs.  Blood cultures x2 and urine cultures pending.  ID was consulted in ED and recommended meropenem and Zyvox which the patient was started on.  Admitted to hospital medicine services for further medical management.    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  Occasional alcohol use.  IV drug use    ALLERGIES:   Patient has no known allergies.    HOME MEDICATIONS:     Prior to Admission medications    Medication Sig Start Date End Date Taking? Authorizing Provider   HYDROcodone-acetaminophen (NORCO)  mg per tablet Take 1 tablet by mouth every 6 (six) hours as needed for Pain (severe pain only). 4/3/23   Alexandra Moreno MD     ________________________________________________________________________  INPATIENT LIST OF MEDICATIONS     Current Facility-Administered Medications:     acetaminophen tablet 1,000 mg, 1,000 mg, Oral, Q6H PRN, Nery Sepulveda MD    acetaminophen tablet 650 mg, 650 mg, Oral, Q4H PRN, Nery Sepulveda MD, 650 mg at 05/06/23 0430    aluminum-magnesium hydroxide-simethicone 200-200-20 mg/5 mL suspension 30 mL, 30 mL, Oral, QID PRN, Nery Sepulveda MD    enoxaparin injection 40 mg, 40 mg, Subcutaneous, Daily, Nery Sepulveda MD    lactated ringers infusion, , Intravenous, Continuous, Nery Sepulveda MD, Last Rate: 100 mL/hr at 05/06/23 0038, New Bag at 05/06/23 0038    Lactobacillus  acidophilus capsule 1 capsule, 1 capsule, Oral, TID WM, Nery Sepulveda MD    linezolid 600 mg/300 mL IVPB 600 mg, 600 mg, Intravenous, Q12H, Nathan B Katiet MD CHRISTOPHER, Stopped at 05/06/23 0019    melatonin tablet 6 mg, 6 mg, Oral, Nightly PRN, Nery Sepulveda MD    meropenem (MERREM) 1 g in sodium chloride 0.9 % 100 mL IVPB (MB+), 1 g, Intravenous, Q8H, Nathan B Curepete WHITE MD, Last Rate: 100 mL/hr at 05/05/23 2345, 1 g at 05/05/23 2345    multivitamin tablet, 1 tablet, Oral, Daily, Nery Sepulveda MD    ondansetron injection 4 mg, 4 mg, Intravenous, Q4H PRN, Nery Sepulveda MD, 4 mg at 05/06/23 0434    oxyCODONE immediate release tablet 5 mg, 5 mg, Oral, Q6H PRN, Nery Sepulveda MD, 5 mg at 05/06/23 0431    polyethylene glycol packet 17 g, 17 g, Oral, BID PRN, Nery Sepulveda MD    prochlorperazine injection Soln 5 mg, 5 mg, Intravenous, Q6H PRN, Nery Sepulveda MD    senna-docusate 8.6-50 mg per tablet 2 tablet, 2 tablet, Oral, BID PRN, Nery Sepulveda MD    sodium chloride 0.9% flush 10 mL, 10 mL, Intravenous, PRN, Nery Sepulveda MD    tiZANidine tablet 4 mg, 4 mg, Oral, Q8H PRN, eNry Sepulveda MD    Current Outpatient Medications:     HYDROcodone-acetaminophen (NORCO)  mg per tablet, Take 1 tablet by mouth every 6 (six) hours as needed for Pain (severe pain only)., Disp: 12 tablet, Rfl: 0    Scheduled Meds:   enoxaparin  40 mg Subcutaneous Daily    Lactobacillus acidophilus  1 capsule Oral TID WM    linezolid  600 mg Intravenous Q12H    meropenem (MERREM) IVPB  1 g Intravenous Q8H    multivitamin  1 tablet Oral Daily     Continuous Infusions:   lactated ringers 100 mL/hr at 05/06/23 0038     PRN Meds:.acetaminophen, acetaminophen, aluminum-magnesium hydroxide-simethicone, melatonin, ondansetron, oxyCODONE, polyethylene glycol, prochlorperazine, senna-docusate 8.6-50 mg, sodium chloride 0.9%, tiZANidine    PHYSICAL EXAM:     VITAL SIGNS: 24 HRS MIN & MAX LAST   Temp  Min: 99 °F (37.2 °C)  Max: 99 °F (37.2 °C) 99 °F (37.2 °C)   BP  Min:  100/69  Max: 121/86 111/64   Pulse  Min: 108  Max: 140  110   Resp  Min: 14  Max: 34 (!) 34   SpO2  Min: 92 %  Max: 96 % 96 %       General appearance: Thin, chronically ill-appearing  male in no apparent distress.  HENT: Atraumatic head. Moist mucous membranes of oral cavity.  Eyes: Normal extraocular movements.   Neck: Supple.   Lungs: Clear to auscultation bilaterally. Tachypnea   Heart: Regular rate and rhythm. S1 and S2 present. No pedal edema.  Abdomen: Soft, non-distended, non-tender. RUQ Drain  Extremities:  1+ pitting pedal edema   Skin: No Rash.   Neuro:  Moving upper extremity without any difficulty, patient reports decreased range of motion lower extremity secondary to swelling   Psych/mental status: Appropriate mood and affect. Responds appropriately to questions.     LABS AND IMAGING:     Recent Labs   Lab 05/05/23 2109 05/06/23 0631   WBC 17.15* 9.89   RBC 2.87* 2.80*   HGB 8.7* 8.4*   HCT 26.7* 26.6*   MCV 93.0 95.0*   MCH 30.3 30.0   MCHC 32.6* 31.6*   RDW 16.8 17.1*    147   MPV 10.5* 10.3       Recent Labs   Lab 05/05/23 2109 05/06/23  0630   * 135*   K 3.8 3.6   CO2 26 27   BUN 32.9* 30.2*   CREATININE 0.75 0.76   CALCIUM 7.7* 7.6*   MG  --  2.00   ALBUMIN 1.3* 2.0*   ALKPHOS 98 80   ALT 11 10   AST 19 16   BILITOT 1.3 1.0       Microbiology Results (last 7 days)       Procedure Component Value Units Date/Time    Urine culture [[id number]] Collected: 05/06/23 0605    Order Status: Sent Specimen: Urine Updated: 05/06/23 0646    Blood culture #2 **CANNOT BE ORDERED STAT** [[id number]] Collected: 05/05/23 2109    Order Status: Resulted Specimen: Blood Updated: 05/05/23 2115    Blood culture #1 **CANNOT BE ORDERED STAT** [[id number]] Collected: 05/05/23 2109    Order Status: Resulted Specimen: Blood Updated: 05/05/23 2115             X-Ray Knee 3 View Left  Narrative: EXAMINATION:  XR KNEE 3 VIEW LEFT    CLINICAL HISTORY:  Left knee pain, swelling.    TECHNIQUE:  Three views of  the left knee.    COMPARISON:  None    FINDINGS:  No acute displaced fracture, subluxation, or dislocation is identified.  There is superior pole patellar spurring.  There is moderate to large joint effusion. No radiopaque foreign bodies identified. No significant soft tissue swelling is identified.  Impression: 1. No acute osseous abnormality is identified.  2. Moderate to large joint effusion.    Electronically signed by: Chapo Polanco MD  Date:    04/03/2023  Time:    15:17        ASSESSMENT & PLAN:     MRSA bacteremia  MRSA C-spine/L-spine osteomyelitis with bilateral psoas and paraspinal muscle abscesses and epidural abscess status post lumbar laminectomy and drainage of epidural abscess on 04/21/2023.  Acute cholecystitis status post paracentesis and cholecystostomy tube 4/19/23  DM2 with acute hyperglycemia  IV drug abuse   Normocytic anemia  Hyponatremia   Untreated hepatitis-C     Plan:  ID consulted in ED, appreciate assistance and recommendations   Continue IV antibiotics per ID recs   General Surgery consulted, appreciate assistance and recommendations   Follow blood cultures and urine culture   PRN analgesics   Accu-Cheks with insulin sliding scale   Labs in a.m.    VTE Prophylaxis: Lovenox     Discharge Planning and Disposition: TBD    I, Melissa Mohan, NP have reviewed and discussed the case with Dr. Naidu.  Please see the attending MD's addendum for further assessment and plan.    Melissa Mohan, AGACNP-BC  05/06/2023    _______________________________________________________________________________  MD Addendum:  IDr. Naidu assumed care of this patient today at around 10:30 a.m.  For the patient encounter, I performed the substantive portion of the visit, I reviewed the NP/PA documentation, treatment plan, and medical decision making.  I had face to face time with this patient     A. History:  62-year-old male with significant history of polysubstance abuse/IV drug abuse, type 2 diabetes  mellitus, untreated chronic hep C/cirrhosis.  Patient had a recent hospitalization to [facility] for MRSA bacteremia with MRSA C and L-spine osteomyelitis, diskitis, bilateral psoas and paraspinous muscle abscess as well as lumbar epidural abscess.  Patient was being treated in [facility] with close follow-up with neurosurgery, Infectious Disease.  Hospital stay also was even full for cholecystitis for which he underwent cholecystostomy tube placement.  Patient signed out AMA from [facility] on 05/05, went home, called ambulance and presented to the hospital.  Per chart review patient was being treated with daptomycin and rifampin in [facility].  While hospitalized his urine drug screen was positive for amphetamines and there were concerns that he was using it while hospitalized.  Patient was tachycardic in the ED.  Otherwise hemodynamics stable.  Complaining of bilateral lower extremity swelling which is interfering with movement.  Lab significant for leukocytosis, anemia, hyperglycemia.  UDS was positive for opiates, amphetamines.  Hospitalist team was consulted for admission.    B. Physical exam:  As above    C. Medical decision making:    Recent MRSA bacteremia   Recent MRSA cervical, lumbar spine osteomyelitis with bilateral psoas, paraspinal muscle abscess as well as lumbar epidural abscess status post drainage, laminectomy  Recent cholecystitis status post cholecystostomy tube placement   Sepsis secondary to all the above   Medical noncompliance   Polysubstance abuse/IV drug abuse   Decompensated cirrhosis with volume overload   Untreated chronic hep C   Type 2 diabetes mellitus with hyperglycemia  Anemia of chronic disease  Prophylaxis    Patient was receiving daptomycin, rifampin in [facility] for MRSA bacteremia with lumbar spinal infection   I will restart the same   Consult infectious disease   Consult Neurosurgery   I ordered CT L-spine-postoperative changes with  seroma versus new fluid collection   Await Neurosurgery recommendations  Patient has cholecystostomy tube in place   Consult General surgery for management   Zosyn pending General surgery recommendations  Repeated ultrasound abdomen-no acute events noted   Concern for volume overload related to decompensated cirrhosis   I will also obtain echocardiogram   Venous ultrasound bilateral lower extremity ordered   IV Lasix 20 mg b.i.d.  Aldactone 25 mg b.i.d.  Monitor strict Is&Os  Levemir, sliding scale for diabetes mellitus  Check A1c  Adjust Levemir doses based on CBG/A1c   Continue probiotic, multivitamin  Anemic, hemoglobin is more than 8   No overt bleeding   Check iron panel  DVT prophylaxis-Lovenox      All diagnosis and differential diagnosis have been reviewed; assessment and plan has been documented; I have personally reviewed the labs and test results that are presently available; I have reviewed the patients medication list; I have reviewed the consulting providers response and recommendations. I have reviewed or attempted to review medical records based upon their availability.    All of the patient and family questions have been addressed and answered. Patient's is agreeable to the above stated plan. I will continue to monitor closely and make adjustments to medical management as needed.      05/06/2023

## 2023-05-06 NOTE — NURSING
Nurses Note -- 4 Eyes      5/6/2023   5:16 PM      Skin assessed during: Admit      [] No Altered Skin Integrity Present    []Prevention Measures Documented      [x] Yes- Altered Skin Integrity Present or Discovered   [] LDA Added if Not in Epic (Describe Wound)   [x] New Altered Skin Integrity was Present on Admit and Documented in LDA   [] Wound Image Taken    Wound Care Consulted? No    Attending Nurse:  Yanet Lantigua RN     Second RN/Staff Member:  Charlene Richardson RN

## 2023-05-06 NOTE — ED PROVIDER NOTES
Encounter Date: 5/5/2023    SCRIBE #1 NOTE: I, Birdie Mcqueen am scribing for, and in the presence of,  Nathan Tafoya IV, MD. I have scribed the following portions of the note - Other sections scribed: HPI, ROS, PE.     History     Chief Complaint   Patient presents with    Back Pain     Orient'd from [facility] after being admitted for 3 wks for IV abx after washout for infection in L-spine. Dr. Chang did sx     62 year old male presents to the ED after leaving [facility] AMA for spinal infection. Pt was admitted for 3 weeks with IV antibiotics. He states that he left AMA because he was not receiving proper care from his nurses. He notes that he has a drain from his gallbladder and no others. He was supposed to have a PICC line placed but left before that happened.     Per chart review:  This is a 62-year-old male who is presenting here for evaluation after leav AMA today from [facility] .  Patient left after a three-week admission per chart review he has a history of hep C IV drug use.  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 Chang on 04/21.  Hospital stay was also complicated by cholecystitis patient is now status post cholecystostomy tube.  Patient was followed by infectious disease General surgery GI neurosurgery inpatient at [facility].  Patient did not feel like he was receiving adequate care so he left AMA and came here    The history is provided by the patient. No  was used.   Review of patient's allergies indicates:  No Known Allergies  No past medical history on file.  No past surgical history on file.  No family history on file.     Review of Systems   Constitutional:  Negative for chills and fever.   HENT:  Negative for congestion, rhinorrhea and sore throat.    Eyes:  Negative for visual disturbance.   Respiratory:  Negative for cough and shortness of breath.     Cardiovascular:  Negative for chest pain.   Gastrointestinal:  Negative for abdominal pain, nausea and vomiting.   Genitourinary:  Negative for dysuria and hematuria.   Musculoskeletal:  Negative for joint swelling.   Skin:  Negative for rash.   Neurological:  Negative for weakness.   Psychiatric/Behavioral:  Negative for confusion.    All other systems reviewed and are negative.    Physical Exam     Initial Vitals [05/05/23 1907]   BP Pulse Resp Temp SpO2   121/78 (!) 140 18 99 °F (37.2 °C) (!) 94 %      MAP       --         Physical Exam    Nursing note and vitals reviewed.  Constitutional: He is not diaphoretic. No distress.   Chronically ill-appearing    Cardiovascular:  Regular rhythm.   Tachycardia present.         Pulmonary/Chest: No respiratory distress.   Abdominal: Abdomen is soft. There is no abdominal tenderness.     Neurological: He is alert.   Skin:   Surgical drain to RUQ with serosanguinous output. Dressing in place to lower spine; clean, dry, and intact.    Psychiatric: He has a normal mood and affect.       ED Course   Procedures  Labs Reviewed   COMPREHENSIVE METABOLIC PANEL - Abnormal; Notable for the following components:       Result Value    Sodium Level 133 (*)     Glucose Level 201 (*)     Blood Urea Nitrogen 32.9 (*)     Calcium Level Total 7.7 (*)     Protein Total 5.0 (*)     Albumin Level 1.3 (*)     Globulin 3.7 (*)     Albumin/Globulin Ratio 0.4 (*)     All other components within normal limits   CBC WITH DIFFERENTIAL - Abnormal; Notable for the following components:    WBC 17.15 (*)     RBC 2.87 (*)     Hgb 8.7 (*)     Hct 26.7 (*)     MCHC 32.6 (*)     MPV 10.5 (*)     Neut # 14.96 (*)     IG# 0.12 (*)     All other components within normal limits   CK - Abnormal; Notable for the following components:    Creatine Kinase 22 (*)     All other components within normal limits   POCT GLUCOSE - Abnormal; Notable for the following components:    POCT Glucose 229 (*)     All other  components within normal limits   LACTIC ACID, PLASMA - Normal   TROPONIN I - Normal   LACTIC ACID, PLASMA - Normal   BLOOD CULTURE OLG   BLOOD CULTURE OLG   CBC W/ AUTO DIFFERENTIAL    Narrative:     The following orders were created for panel order CBC auto differential.  Procedure                               Abnormality         Status                     ---------                               -----------         ------                     CBC with Differential[[id number]]        Abnormal            Final result                 Please view results for these tests on the individual orders.   URINALYSIS, REFLEX TO URINE CULTURE   CBC W/ AUTO DIFFERENTIAL    Narrative:     The following orders were created for panel order CBC Auto Differential.  Procedure                               Abnormality         Status                     ---------                               -----------         ------                     CBC with Differential[[id number]]                                                         Please view results for these tests on the individual orders.   COMPREHENSIVE METABOLIC PANEL   MAGNESIUM   PHOSPHORUS   C-REACTIVE PROTEIN   SEDIMENTATION RATE   HEMOGLOBIN A1C   CBC WITH DIFFERENTIAL   POCT GLUCOSE MONITORING CONTINUOUS     EKG Readings: (Independently Interpreted)   Initial Reading: No STEMI. Rhythm: Sinus Tachycardia. Heart Rate: 134. Ectopy: PVCs (occasional). Conduction: Normal. ST Segments: Normal ST Segments. T Waves: Normal. Clinical Impression: Sinus Tachycardia   EKG performed at 21:07 on 5/5/23     Imaging Results    None          Medications   meropenem (MERREM) 1 g in sodium chloride 0.9 % 100 mL IVPB (MB+) (has no administration in time range)   linezolid 600 mg/300 mL IVPB 600 mg (0 mg Intravenous Stopped 5/6/23 0019)   sodium chloride 0.9% flush 10 mL (has no administration in time range)   melatonin tablet 6 mg (has no administration in time range)   ondansetron injection 4 mg (has  no administration in time range)   prochlorperazine injection Soln 5 mg (has no administration in time range)   polyethylene glycol packet 17 g (has no administration in time range)   senna-docusate 8.6-50 mg per tablet 2 tablet (has no administration in time range)   acetaminophen tablet 650 mg (has no administration in time range)   aluminum-magnesium hydroxide-simethicone 200-200-20 mg/5 mL suspension 30 mL (has no administration in time range)   acetaminophen tablet 1,000 mg (has no administration in time range)   lactated ringers infusion ( Intravenous New Bag 5/6/23 0038)   enoxaparin injection 40 mg (has no administration in time range)   Lactobacillus acidophilus capsule 1 capsule (has no administration in time range)   multivitamin tablet (has no administration in time range)   oxyCODONE immediate release tablet 5 mg (has no administration in time range)   tiZANidine tablet 4 mg (has no administration in time range)   lactated ringers bolus 1,000 mL (0 mLs Intravenous Stopped 5/5/23 2225)   lactated ringers bolus 1,000 mL (0 mLs Intravenous Stopped 5/5/23 2245)   morphine injection 4 mg (4 mg Intravenous Given 5/5/23 2303)   ondansetron injection 4 mg (4 mg Intravenous Given 5/5/23 2304)   albumin human 25% bottle 50 g (50 g Intravenous New Bag 5/6/23 0038)                Attending Attestation:           Physician Attestation for Scribe:  Physician Attestation Statement for Scribe #1: I, Nathan Tafoya IV, MD, reviewed documentation, as scribed by Birdie Mcqueen in my presence, and it is both accurate and complete.       Medical Decision Making  Problems Addressed:  Spinal abscess: chronic illness or injury  Tachycardia: acute illness or injury      ED assessment:    63 yo with recent admission to [facility] for spinal abscesses, bacteremia   Left AMA today  Was being follow by infectious disease Dr. Berman there - patient tachycardic here with leukocytosis - will resume abx per ID recommendations admit to  hospitalist  Patient stable, non toxic     ED management:   IV abx  IVF  Admission       Amount and/or Complexity of Data Reviewed  External data reviewed: notes from previous admissions, notes from previous ED visits, and prior labs  Summary of data reviewed: Per chart review:  This is a 62-year-old male who is presenting here for evaluation after leav AMA today from [facility] .  Patient left after a three-week admission per chart review he has a history of hep C IV drug use.  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 Chang on 04/21.  Hospital stay was also complicated by cholecystitis patient is now status post cholecystostomy tube.  Patient was followed by infectious disease General surgery GI neurosurgery inpatient at [facility].  Patient did not feel like he was receiving adequate care so he left AMA and came here  Risk and benefits of testing: discussed   Labs: ordered and reviewed  ECG/medicine tests: ordered and independent interpretation performed (see above or ED course)  Discussion of management or test interpretation with external provider(s): discussed with hospitalist physician and discussed with infectious disease consultant   Summary of discussion: see ED course     Risk  Decision regarding hospitalization     Critical Care  30-74 minutes     INathan MD personally performed the history, PE, MDM, and procedures as documented above and agree with the scribe's documentation.         ED Course as of 05/06/23 0354   Fri May 05, 2023   2029 Per chart review:  This is a 62-year-old male who is presenting here for evaluation after leav AMA today from [facility] .  Patient left after a three-week admission per chart review he has a history of hep C IV drug use.  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 Chang on 04/21.  Hospital stay was also complicated by cholecystitis patient is now status post cholecystostomy tube.  Patient was followed by infectious disease General surgery GI neurosurgery inpatient at [facility].  Patient did not feel like he was receiving adequate care so he left AMA and came here   [AC]   2230 Dr. Kaiser with infectious disease recommends Mirapenem and Zyvox [AC]   2300 Admitted to Dr. Sepulveda.  [TB]      ED Course User Index  [AC] Nathan Tafoya IV, MD  [TB] Birdie Mcqueen                 Clinical Impression:   Final diagnoses:  [R00.0] Tachycardia  [M46.20] Spinal abscess (Primary)  [R78.81] Bacteremia        ED Disposition Condition    Admit Stable                Nathan Tafoya IV, MD  05/06/23 8563

## 2023-05-06 NOTE — NURSING
Admission profile complete. Medication reconciliation complete; patient states that he does not take any medications at home and refuses to take anything. Audit C complete; no brief intervention needed. 4 Eyes will need to be completed by the admitting floor nurse.

## 2023-05-06 NOTE — FIRST PROVIDER EVALUATION
Medical screening examination initiated.  I have conducted a focused provider triage encounter, findings are as follows:    Brief history of present illness:  62-year-old male presents to ED for mind lesion of back pain.  Patient reports that he was recently admitted inpatient with surgery after an epidural abscess on 04/21/2023.  States that he left [facility] today at 11 a.m. Patient states feels like was not getting adequate care.     Vitals:    05/05/23 1907   BP: 121/78   Pulse: (!) 140   Resp: 18   Temp: 99 °F (37.2 °C)   TempSrc: Oral   SpO2: (!) 94%       Pertinent physical exam:  Patient awake and alert sitting on stretcher.     Brief workup plan:  labs, UA, IVF, EKG, lactic, blood cultures    Preliminary workup initiated; this workup will be continued and followed by the physician or advanced practice provider that is assigned to the patient when roomed.

## 2023-05-06 NOTE — PROGRESS NOTES
Pharmacist Renal Dose Adjustment Note    Reji Plasencia is a 62 y.o. male being treated with the medication zosyn    Patient Data:    Vital Signs (Most Recent):  Temp: 98.2 °F (36.8 °C) (05/06/23 1543)  Pulse: 101 (05/06/23 1543)  Resp: 16 (05/06/23 1552)  BP: 121/71 (05/06/23 1543)  SpO2: (!) 94 % (05/06/23 1543) Vital Signs (72h Range):  Temp:  [98.2 °F (36.8 °C)-99 °F (37.2 °C)]   Pulse:  [101-140]   Resp:  [14-34]   BP: ()/(60-86)   SpO2:  [92 %-96 %]      Recent Labs   Lab 05/05/23 2109 05/06/23  0630   CREATININE 0.75 0.76     Serum creatinine: 0.76 mg/dL 05/06/23 0630  Estimated creatinine clearance: 96.6 mL/min    Medication:zosyn dose: 3.375g frequency q8h will be changed to medication:zosyn dose:4.5g frequency:q8h over 4 hours.    Pharmacist's Name: Cari Hoyt  Pharmacist's Extension: [phone]

## 2023-05-07 LAB
ACINETOBACTER CALCOACETICUS-BAUMANNII COMPLEX (OHS): NOT DETECTED
BACTEROIDES FRAGILIS (OHS): NOT DETECTED
C AURIS DNA BLD POS QL NAA+NON-PROBE: NOT DETECTED
C GATTII+NEOFOR DNA CSF QL NAA+NON-PROBE: NOT DETECTED
CANDIDA ALBICANS (OHS): NOT DETECTED
CANDIDA GLABRATA (OHS): NOT DETECTED
CANDIDA KRUSEI (OHS): NOT DETECTED
CANDIDA PARAPSILOSIS (OHS): NOT DETECTED
CANDIDA TROPICALIS (OHS): NOT DETECTED
CTX-M (OHS): ABNORMAL
ENTEROBACTER CLOACAE COMPLEX (OHS): NOT DETECTED
ENTEROBACTERALES (OHS): NOT DETECTED
ENTEROCOCCUS FAECALIS (OHS): NOT DETECTED
ENTEROCOCCUS FAECIUM (OHS): NOT DETECTED
ESCHERICHIA COLI (OHS): NOT DETECTED
EST. AVERAGE GLUCOSE BLD GHB EST-MCNC: 205.9 MG/DL
GP B STREP DNA CSF QL NAA+NON-PROBE: NOT DETECTED
HAEM INFLU DNA CSF QL NAA+NON-PROBE: NOT DETECTED
HBA1C MFR BLD: 8.8 %
IMP (OHS): ABNORMAL
KLEBSIELLA AEROGENES (OHS): NOT DETECTED
KLEBSIELLA OXYTOCA (OHS): NOT DETECTED
KLEBSIELLA PNEUMONIAE GROUP (OHS): NOT DETECTED
KPC (OHS): ABNORMAL
L MONOCYTOG DNA CSF QL NAA+NON-PROBE: NOT DETECTED
MCR-1 (OHS): ABNORMAL
MECA/C (OHS): ABNORMAL
MECA/C AND MREJ (MRSA)(OHS): DETECTED
N MEN DNA CSF QL NAA+NON-PROBE: NOT DETECTED
NDM (OHS): ABNORMAL
OXA-48-LIKE (OHS): ABNORMAL
POCT GLUCOSE: 114 MG/DL (ref 70–110)
POCT GLUCOSE: 119 MG/DL (ref 70–110)
POCT GLUCOSE: 153 MG/DL (ref 70–110)
POCT GLUCOSE: 234 MG/DL (ref 70–110)
PROTEUS SPP. (OHS): NOT DETECTED
PSEUDOMONAS AERUGINOSA (OHS): NOT DETECTED
S ENT+BONG DNA STL QL NAA+NON-PROBE: NOT DETECTED
S PNEUM DNA CSF QL NAA+NON-PROBE: NOT DETECTED
SERRATIA MARCESCENS (OHS): NOT DETECTED
STAPHYLOCOCCUS AUREUS (OHS): DETECTED
STAPHYLOCOCCUS EPIDERMIDIS (OHS): NOT DETECTED
STAPHYLOCOCCUS LUGDUNENSIS (OHS): NOT DETECTED
STAPHYLOCOCCUS SPP. (OHS): DETECTED
STENOTROPHOMONAS MALTOPHILIA (OHS): NOT DETECTED
STREPTOCOCCUS PYOGENES (GROUP A)(OHS): NOT DETECTED
STREPTOCOCCUS SPP. (OHS): NOT DETECTED
VANA/B (OHS): ABNORMAL
VIM (OHS): ABNORMAL

## 2023-05-07 PROCEDURE — 25000003 PHARM REV CODE 250: Performed by: STUDENT IN AN ORGANIZED HEALTH CARE EDUCATION/TRAINING PROGRAM

## 2023-05-07 PROCEDURE — 25000003 PHARM REV CODE 250: Performed by: INTERNAL MEDICINE

## 2023-05-07 PROCEDURE — 63600175 PHARM REV CODE 636 W HCPCS: Performed by: STUDENT IN AN ORGANIZED HEALTH CARE EDUCATION/TRAINING PROGRAM

## 2023-05-07 PROCEDURE — 63600175 PHARM REV CODE 636 W HCPCS: Performed by: INTERNAL MEDICINE

## 2023-05-07 PROCEDURE — 21400001 HC TELEMETRY ROOM

## 2023-05-07 PROCEDURE — 83036 HEMOGLOBIN GLYCOSYLATED A1C: CPT | Performed by: INTERNAL MEDICINE

## 2023-05-07 PROCEDURE — 63600175 PHARM REV CODE 636 W HCPCS: Performed by: NURSE PRACTITIONER

## 2023-05-07 RX ORDER — THIAMINE HCL 100 MG
100 TABLET ORAL DAILY
Status: DISCONTINUED | OUTPATIENT
Start: 2023-05-07 | End: 2023-05-18

## 2023-05-07 RX ADMIN — OXYCODONE HYDROCHLORIDE 5 MG: 5 TABLET ORAL at 10:05

## 2023-05-07 RX ADMIN — OXYCODONE HYDROCHLORIDE 5 MG: 5 TABLET ORAL at 01:05

## 2023-05-07 RX ADMIN — Medication 1 CAPSULE: at 09:05

## 2023-05-07 RX ADMIN — DAPTOMYCIN 435 MG: 500 INJECTION, POWDER, LYOPHILIZED, FOR SOLUTION INTRAVENOUS at 06:05

## 2023-05-07 RX ADMIN — Medication 1 CAPSULE: at 11:05

## 2023-05-07 RX ADMIN — ACETAMINOPHEN 325MG 650 MG: 325 TABLET ORAL at 10:05

## 2023-05-07 RX ADMIN — FUROSEMIDE 20 MG: 10 INJECTION, SOLUTION INTRAMUSCULAR; INTRAVENOUS at 06:05

## 2023-05-07 RX ADMIN — ACETAMINOPHEN 1000 MG: 500 TABLET, FILM COATED ORAL at 01:05

## 2023-05-07 RX ADMIN — RIFAMPIN 300 MG: 300 CAPSULE ORAL at 09:05

## 2023-05-07 RX ADMIN — SPIRONOLACTONE 25 MG: 25 TABLET ORAL at 09:05

## 2023-05-07 RX ADMIN — OXYCODONE HYDROCHLORIDE 5 MG: 5 TABLET ORAL at 04:05

## 2023-05-07 RX ADMIN — OXYCODONE HYDROCHLORIDE 5 MG: 5 TABLET ORAL at 08:05

## 2023-05-07 RX ADMIN — OXYCODONE HYDROCHLORIDE 5 MG: 5 TABLET ORAL at 05:05

## 2023-05-07 RX ADMIN — THIAMINE HCL TAB 100 MG 100 MG: 100 TAB at 04:05

## 2023-05-07 RX ADMIN — INSULIN ASPART 2 UNITS: 100 INJECTION, SOLUTION INTRAVENOUS; SUBCUTANEOUS at 12:05

## 2023-05-07 RX ADMIN — PIPERACILLIN AND TAZOBACTAM 4.5 G: 4; .5 INJECTION, POWDER, LYOPHILIZED, FOR SOLUTION INTRAVENOUS; PARENTERAL at 01:05

## 2023-05-07 RX ADMIN — PIPERACILLIN AND TAZOBACTAM 4.5 G: 4; .5 INJECTION, POWDER, LYOPHILIZED, FOR SOLUTION INTRAVENOUS; PARENTERAL at 06:05

## 2023-05-07 RX ADMIN — ACETAMINOPHEN 325MG 650 MG: 325 TABLET ORAL at 04:05

## 2023-05-07 RX ADMIN — PIPERACILLIN AND TAZOBACTAM 4.5 G: 4; .5 INJECTION, POWDER, LYOPHILIZED, FOR SOLUTION INTRAVENOUS; PARENTERAL at 11:05

## 2023-05-07 RX ADMIN — Medication 1 CAPSULE: at 04:05

## 2023-05-07 RX ADMIN — RIFAMPIN 300 MG: 300 CAPSULE ORAL at 08:05

## 2023-05-07 RX ADMIN — ENOXAPARIN SODIUM 40 MG: 40 INJECTION SUBCUTANEOUS at 06:05

## 2023-05-07 RX ADMIN — FUROSEMIDE 20 MG: 10 INJECTION, SOLUTION INTRAMUSCULAR; INTRAVENOUS at 09:05

## 2023-05-07 RX ADMIN — SPIRONOLACTONE 25 MG: 25 TABLET ORAL at 08:05

## 2023-05-07 RX ADMIN — INSULIN DETEMIR 18 UNITS: 100 INJECTION, SOLUTION SUBCUTANEOUS at 08:05

## 2023-05-07 RX ADMIN — THERA TABS 1 TABLET: TAB at 09:05

## 2023-05-07 NOTE — PROGRESS NOTES
[facility] Hospital Medicine Progress Note        Chief Complaint: Inpatient Follow-up for sepsis    Subjective:  History:  62-year-old male with significant history of polysubstance abuse/IV drug abuse, type 2 diabetes mellitus, untreated chronic hep C/cirrhosis.  Patient had a recent hospitalization to [facility] for MRSA bacteremia with MRSA C and L-spine osteomyelitis, diskitis, bilateral psoas and paraspinous muscle abscess as well as lumbar epidural abscess.  Patient was being treated in [facility] with close follow-up with neurosurgery, Infectious Disease.  Hospital stay also was even full for cholecystitis for which he underwent cholecystostomy tube placement.  Patient signed out AMA from [facility] on 05/05, went home, called ambulance and presented to the hospital.  Per chart review patient was being treated with daptomycin and rifampin in [facility].  While hospitalized his urine drug screen was positive for amphetamines and there were concerns that he was using it while hospitalized.  Patient was tachycardic in the ED.  Otherwise hemodynamics stable.  Complaining of bilateral lower extremity swelling which is interfering with movement.  Lab significant for leukocytosis, anemia, hyperglycemia.  UDS was positive for opiates, amphetamines.  Hospitalist team was consulted for admission.    Patient does not have any acute complaints, he is afebrile vitals stable cultures are growing staph.       General appearance: Thin, chronically ill-appearing  male in no apparent distress.  HENT: Atraumatic head. Moist mucous membranes of oral cavity.  Eyes: Normal extraocular movements.   Neck: Supple.   Lungs: Clear to auscultation bilaterally. Tachypnea   Heart: Regular rate and rhythm. S1 and S2 present. No pedal edema.  Abdomen: Soft, non-distended, non-tender. RUQ Drain  Extremities:  1+ pitting pedal edema   Skin: No Rash.   Neuro:  Moving upper  extremity without any difficulty, patient reports decreased range of motion lower extremity secondary to swelling   Psych/mental status: Appropriate mood and affect. Responds appropriately to questions    Recent MRSA bacteremia   Recent MRSA cervical, lumbar spine osteomyelitis with bilateral psoas, paraspinal muscle abscess as well as lumbar epidural abscess status post drainage, laminectomy  Recent cholecystitis status post cholecystostomy tube placement   Sepsis secondary to all the above   Medical noncompliance   Polysubstance abuse/IV drug abuse   Decompensated cirrhosis with volume overload   Untreated chronic hep C   Type 2 diabetes mellitus with hyperglycemia  Anemia of chronic disease  Prophylaxis     Patient was receiving daptomycin, rifampin in outlying facility for MRSA bacteremia with lumbar spinal infection   Consult infectious disease   Consult Neurosurgery   MRI was  ordered by Neurosurgery however since we do not have MRI tech in the hospital will not be happening today.  Patient has cholecystostomy tube in place   Consult General surgery  Zosyn pending General surgery recommendations  Repeated ultrasound abdomen-no acute events noted   Concern for volume overload related to decompensated cirrhosis   Pending echocardiogram   Venous ultrasound bilateral lower extremity.  IV Lasix 20 mg b.i.d.  Aldactone 25 mg b.i.d.  Monitor strict Is&Os  Levemir, sliding scale for diabetes mellitus  Continue probiotic, multivitami ,  Anemic, hemoglobin is more than 8   No overt bleeding   Check iron panel  DVT prophylaxis-Lovenox    VITAL SIGNS: 24 HRS MIN & MAX LAST   Temp  Min: 98 °F (36.7 °C)  Max: 98.4 °F (36.9 °C) 98.4 °F (36.9 °C)   BP  Min: 111/68  Max: 135/72 135/72   Pulse  Min: 94  Max: 115  94   Resp  Min: 16  Max: 20 20   SpO2  Min: 92 %  Max: 97 % 97 %       Labs, Microbiology and Imaging were Reviewed.      Microbiology Results (last 7 days)       Procedure Component Value Units Date/Time    Blood  culture #2 **CANNOT BE ORDERED STAT** [[id number]]  (Abnormal) Collected: 05/05/23 2109    Order Status: Completed Specimen: Blood Updated: 05/07/23 0626     GRAM STAIN Gram Positive Cocci, probable Staphylococcus      Seen in gram stain of broth only      1 of 2 Anaerobic bottles positive    Blood culture #1 **CANNOT BE ORDERED STAT** [[id number]]  (Abnormal) Collected: 05/05/23 2109    Order Status: Completed Specimen: Blood Updated: 05/07/23 0320     CULTURE, BLOOD ([facility]) No Growth At 24 Hours     GRAM STAIN Gram Positive Cocci, probable Staphylococcus      Seen in gram stain of broth only      2 of 2 bottles positive    BCID2 Panel [[id number]]  (Abnormal) Collected: 05/05/23 2109    Order Status: Completed Specimen: Blood Updated: 05/07/23 0142     CTX-M (ESBL ) N/A     Comment: Note: Antimicrobial resistance can occur via multiple mechanisms. A Not Detected result for antimicrobial resistance gene(s) does not indicate antimicrobial susceptibility. Subculturing is required for species identification and susceptibility testing of   isolates.        IMP (Cabapenemase ) N/A     Comment: Note: Antimicrobial resistance can occur via multiple mechanisms. A Not Detected result for antimicrobial resistance gene(s) does not indicate antimicrobial susceptibility. Subculturing is required for species identification and susceptibility testing of   isolates.        KPC resistance gene (Carbapenemase ) N/A     Comment: Note: Antimicrobial resistance can occur via multiple mechanisms. A Not Detected result for antimicrobial resistance gene(s) does not indicate antimicrobial susceptibility. Subculturing is required for species identification and susceptibility testing of   isolates.        mcr-1 N/A     Comment: Note: Antimicrobial resistance can occur via multiple mechanisms. A Not Detected result for antimicrobial resistance gene(s) does not indicate antimicrobial susceptibility. Subculturing is required  for species identification and susceptibility testing of   isolates.        mecA ID N/A     Comment: Note: Antimicrobial resistance can occur via multiple mechanisms. A Not Detected result for antimicrobial resistance gene(s) does not indicate antimicrobial susceptibility. Subculturing is required for species identification and susceptibility testing of   isolates.        mecA/C and MREJ (MRSA) gene Detected     Comment: Note: Antimicrobial resistance can occur via multiple mechanisms. A Not Detected result for antimicrobial resistance gene(s) does not indicate antimicrobial susceptibility. Subculturing is required for species identification and susceptibility testing of   isolates.        NDM (Carbapenemase ) N/A     Comment: Note: Antimicrobial resistance can occur via multiple mechanisms. A Not Detected result for antimicrobial resistance gene(s) does not indicate antimicrobial susceptibility. Subculturing is required for species identification and susceptibility testing of   isolates.        OXA-48-like (Carbapenemase ) N/A     Comment: Note: Antimicrobial resistance can occur via multiple mechanisms. A Not Detected result for antimicrobial resistance gene(s) does not indicate antimicrobial susceptibility. Subculturing is required for species identification and susceptibility testing of   isolates.        Kenia/B (VRE gene) N/A     Comment: Note: Antimicrobial resistance can occur via multiple mechanisms. A Not Detected result for antimicrobial resistance gene(s) does not indicate antimicrobial susceptibility. Subculturing is required for species identification and susceptibility testing of   isolates.        VIM (Carbapenemase ) N/A     Comment: Note: Antimicrobial resistance can occur via multiple mechanisms. A Not Detected result for antimicrobial resistance gene(s) does not indicate antimicrobial susceptibility. Subculturing is required for species identification and susceptibility  testing of   isolates.        Enterococcus faecalis Not Detected     Enterococcus faecium Not Detected     Listeria monocytogenes Not Detected     Staphylococcus spp. Detected     Staphylococcus aureus Detected     Staphylococcus epidermidis Not Detected     Staphylococcus lugdunensis Not Detected     Streptococcus spp. Not Detected     Streptococcus agalactiae (Group B) Not Detected     Streptococcus pneumoniae Not Detected     Streptococcus pyogenes (Group A) Not Detected     Acinetobacter calcoaceticus/baumannii complex Not Detected     Bacteroides fragilis Not Detected     Enterobacterales Not Detected     Enterobacter cloacae complex Not Detected     Escherichia coli Not Detected     Klebsiella aerogenes Not Detected     Klebsiella oxytoca Not Detected     Klebsiella pneumoniae group Not Detected     Proteus spp. Not Detected     Salmonella spp. Not Detected     Serratia marcescens Not Detected     Haemophilus influenzae Not Detected     Neisseria meningitidis Not Detected     Pseudomonas aeruginosa Not Detected     Stenotrophomonas maltophilia Not Detected     Candida albicans Not Detected     Candida auris Not Detected     Candida glabrata Not Detected     Candida krusei Not Detected     Candida parapsilosis Not Detected     Candida tropicalis Not Detected     Cryptococcus neoformans/gattii Not Detected    Narrative:      The [employer] BCID2 Panel is a multiplexed nucleic acid test intended for the use with [employer]® 2.0 or [employer]® [employer] for the simultaneous qualitative detection and identification of multiple bacterial and yeast nucleic acids and select genetic determinants associated with antimicrobial resistance.  The [employer] BCID2 Panel test is performed directly on blood culture samples identified as positive by a continuous monitoring blood culture system.  Results are intended to be interpreted in conjunction with Gram stain results.    Urine culture [[id number]] Collected:  05/06/23 0605    Order Status: Sent Specimen: Urine Updated: 05/06/23 0646               Medications   DAPTOmycin (CUBICIN) IV (PEDS and ADULTS)  6 mg/kg Intravenous Q24H    enoxaparin  40 mg Subcutaneous Daily    furosemide (LASIX) injection  20 mg Intravenous Q12H    insulin detemir U-100  12 Units Subcutaneous QHS    Lactobacillus acidophilus  1 capsule Oral TID WM    multivitamin  1 tablet Oral Daily    piperacillin-tazobactam (ZOSYN) IVPB  4.5 g Intravenous Q8H    rifAMpin  300 mg Oral Q12H    spironolactone  25 mg Oral BID        acetaminophen, acetaminophen, aluminum-magnesium hydroxide-simethicone, dextrose 10%, dextrose 10%, glucagon (human recombinant), glucose, glucose, insulin aspart U-100, melatonin, ondansetron, oxyCODONE, polyethylene glycol, prochlorperazine, senna-docusate 8.6-50 mg, sodium chloride 0.9%, tiZANidine     Radiology:  Echo  · TDS due to lung interference.  · The left ventricle is normal in size with low normal systolic function.  · The estimated ejection fraction is 50-55%.  · Indeterminate left ventricular diastolic function.  · Mild right ventricular enlargement with low normal right ventricular   systolic function.  · Mild mitral regurgitation.  · Intermediate central venous pressure (8 mmHg).  · The estimated PA systolic pressure is 19 mmHg.     X-Ray Chest 1 View  Narrative: EXAMINATION:  XR CHEST 1 VIEW    CLINICAL HISTORY:  leukocytosis;    TECHNIQUE:  One view    COMPARISON:  None available.    FINDINGS:  Cardiopericardial silhouette is within normal limits.  There is right mid to lower lung zone oblong configuration opacity which may represent a mass lesion versus fluid loculation within the interlobar fissure.  There are right mid to lower lung zone infiltrates.  Left lower lung atelectatic changes.  There also bilateral dependent small amount of pleural effusions.  No pulmonary edema.  No pneumothorax.  Impression: As above.    Electronically signed by: Roland  Pierce  Date:    05/06/2023  Time:    18:22  CT Lumbar Spine W Wo Contrast  Narrative: EXAMINATION:  CT LUMBAR SPINE W WO CONTRAST    CLINICAL HISTORY:  Recent lumbar spine infection status post surgery;    TECHNIQUE:  Multidetector axial images were performed of the lumbar spine without contrast and the images were reformatted.    Dose length product of 2716 mGycm. Automated exposure control was utilized to minimize radiation dose.    COMPARISON:  None available    FINDINGS:  Patient is status post wide decompression laminectomies from L1 to L5.  There are extensive posterior paraspinal soft inflammations and fluid collection containing several air locules.  The fluid collection with peripheral thin enhancement broadly abuts the thecal sac is from the superior endplate of L2 the inferior endplate of L5 with craniocaudal span of 8.5 cm and shows maximum anterior-posterior diameter of 2.5 cm and transverse diameter of 3.8 cm.  There is also left intrathecal small air locule on image 83 series 3.  These findings may represent postsurgical seroma or hematoma without exclusion of infected collection.  There are no lumbar vertebrae disc spaces irregularity or osteolytic destructive changes.  Several Schmorl nodes are seen involving lumbar vertebrae and the most conspicuous is of L3.  No acute fracture or malalignment.  Disc segmental analysis is given below:    At L1-L2, disc height is preserved.  Bilateral facet arthropathy without significant central canal stenosis.  There are no narrowings of the neural foramen.    At L2-L3, there is osteophyte disc complex which indents the ventral thecal sac.  Thecal sac is patent with decompression laminectomies.  There is mild narrowing of the right neural foramen caused by uncinate arthropathy and there is moderate narrowing of the left neural foramen.    At L3-L4, there is broad osteophyte disc complex which indents the ventral thecal sac.  Right paracentral spur like growth  from vertebral body effaces the right lateral recess with compression upon the right exiting nerve root.  There also bilateral moderate spondylotic narrowings of the neural foramen.    At L4-L5, there is broad disc bulge which indents the ventral thecal sac.  Central canal is not stenosed with decompression laminectomies.  There is bilateral uncovertebral and facet arthropathy which encroaches onto the neural foramen.  There is mild narrowing of the right neural foramen and moderate narrowing of the left neural canal.    At L5-S1, there is broad posterior vertebral spondylosis.  Central canal is not stenosed.  There are no significant narrowings of the neural foramen  Impression: 1. Multiple posterior lumbar decompression laminectomies.  Posterior paraspinal fluid collection.  This may represent postsurgical seroma or hematoma without exclusion of infected collection.    2. Lumbar degenerative disc disease and spondylosis level by level discussed above.    Electronically signed by: Roland Pierce  Date:    05/06/2023  Time:    13:31  US Abdomen Limited  Narrative: EXAMINATION:  US ABDOMEN LIMITED    CLINICAL HISTORY:  Cholecystostomy tube placement April 19, 2023    TECHNIQUE:  Multiple real-time transverse and longitudinal sections were performed of the right abdomen by the sonographer. Select images were submitted for review.    COMPARISON:  None available    FINDINGS:  Liver shows lobular contour and coarsened parenchymal texture.  There is no delineation of discrete hepatic cystic or solid mass. Hepatic maximum diameter of 14.3 cm.  There is small amount of free fluid around the liver consistent with ascites.  There is unremarkable hepatopedal flow within the portal vein.  Pancreas is diffusely echogenic.  Incidental note is made of right-sided pleural effusion.    Gallbladder could not be visualized.  A cholecystostomy tube is in place.  Common bile duct caliber of 2.0 mm is within normal limits for the age.  Sonographer reported negative Yusuf's sign.    Normally located right kidney length measures 11.2 x 5.0 x 5.6 cm. Right renal corticomedullary differentiation is unremarkable. No evidence of hydronephrosis.  Impression: 1.  Hepatic lobular contour and coarsened parenchyma.    2.  Gallbladder could not be visualized with a cholecystostomy tube in place.    3.  Small ascites and right pleural effusion.    Electronically signed by: Roland Pierce  Date:    05/06/2023  Time:    11:31          Assessment/Plan:      All diagnosis and differential diagnosis have been reviewed; assessment and plan has been documented; I have personally reviewed the labs and test results that are presently available; I have reviewed the patients medication list; I have reviewed the consulting providers response and recommendations. I have reviewed or attempted to review medical records based upon their availability.       Darnell Franco MD   05/07/2023

## 2023-05-07 NOTE — CONSULTS
Infectious Diseases Consultation       Inpatient consult to Infectious Diseases  Consult performed by: Katlyn Berman MD  Consult ordered by: Nery Sepulveda MD      HPI:   62-year-old male with past medical history of DM type 2, untreated hepatitis-C, decompensated liver cirrhosis, IV drug use, known to my service from his hospitalization at our [facility] before he left [place] on 05/05/2023 and presented to this facility [facility] later the same day, reporting not being satisfied with the care over at our [facility].  While he was there we were treating him for MRSA bacteremia, with associated C-spine-L-spine osteomyelitis/diskitis/bilateral psoas, paraspinal muscle and epidural abscesses.  He is status post lumbar laminectomy with drainage of epidural abscess by Neurosurgery on 04/21.  He also was found to have acute cholecystitis and status post cholecystostomy tube placement.  He was on antibiotic coverage with vancomycin, rifampin for MRSA and had completed a course of Zosyn which was to cover for cholecystitis.  He however continued to have persistent bacteremia with multiple repeated positive blood cultures, and hence was placed on daptomycin with discontinuation of vancomycin.  On presentation he is noted to have no fevers but did have leukocytosis of 17.1 which is normalized.  CRP 76, ESR 7, anemic with low albumin.  Urine toxicology test positive for opiates and amphetamines.  Blood cultures from 05/05 with MRSA per BCID panel.  Urine culture with [phone] colonies of yeast.  He is currently on antibiotic coverage with daptomycin, rifampin and Zosyn.    Past Medical and Surgical History  Allergies :   Patient has no known allergies.    Medical :  DM2, untreated hepatitis-C, cirrhosis, IV drug use, MRSA bacteremia, MRSA C-Spine / L-Spine osteomyelitis/discitis with B/L psoas and paraspinous muscle abscesses as well as lumbar epidural  abscess    Surgical :  Lumbar laminectomy and drainage of epidural abscess  Cholecystostomy tube   MRCP  Paracentesis     Family History  Reviewed and noncontributory    Social History  He  smokes half a pack of cigarettes.  Occasional alcohol use.  IV drug use    Review of Systems   Constitutional:  Positive for malaise/fatigue.   HENT: Negative.     Respiratory: Negative.     Gastrointestinal:  Positive for abdominal pain.   Genitourinary: Negative.    Musculoskeletal:  Positive for back pain.   Neurological:  Positive for weakness.   Endo/Heme/Allergies: Negative.    Psychiatric/Behavioral: Negative.     All other Systems review done and negative.    Objective   Physical Exam  Vitals reviewed.   Constitutional:       General: He is not in acute distress.     Appearance: He is not toxic-appearing.      Comments: Significant other at the bedside   HENT:      Head: Normocephalic and atraumatic.   Eyes:      Pupils: Pupils are equal, round, and reactive to light.   Cardiovascular:      Rate and Rhythm: Normal rate and regular rhythm.      Heart sounds: Normal heart sounds.   Pulmonary:      Effort: Pulmonary effort is normal. No respiratory distress.      Breath sounds: Normal breath sounds.   Abdominal:      General: Bowel sounds are normal. There is no distension.      Palpations: Abdomen is soft.      Tenderness: There is no abdominal tenderness.      Comments: RUQ cholecystotomy tube noted with bilious content   Genitourinary:     Comments: No suprapubic tenderness  Musculoskeletal:         General: No deformity.      Cervical back: Neck supple.      Right lower leg: Edema present.      Left lower leg: Edema present.   Skin:     Findings: No erythema or rash.   Neurological:      Mental Status: He is alert and oriented to person, place, and time.   Psychiatric:      Comments: Calm and cooperative     VITAL SIGNS: 24 HR MIN & MAX LAST    Temp  Min: 98 °F (36.7 °C)  Max: 98.4 °F (36.9 °C)  98 °F (36.7 °C)        BP  " Min: 111/68  Max: 135/72  112/62     Pulse  Min: 94  Max: 117  (!) 117     Resp  Min: 16  Max: 20  16    SpO2  Min: 92 %  Max: 97 %  95 %      HT: 5' 7" (170.2 cm)  WT: 72.6 kg (160 lb)  BMI: 25.1     Recent Results (from the past 24 hour(s))   Echo    Collection Time: 05/06/23  5:22 PM   Result Value Ref Range    BSA 1.85 m2    TDI SEPTAL 0.11 m/s    LV LATERAL E/E' RATIO 6.75 m/s    LV SEPTAL E/E' RATIO 4.91 m/s    Right Atrial Pressure (from IVC) 8 mmHg    EF 52 %    Left Ventricular Outflow Tract Mean Velocity 0.62 cm/s    Left Ventricular Outflow Tract Mean Gradient 2.00 mmHg    TDI LATERAL 0.08 m/s    PV PEAK VELOCITY 1.01 cm/s    LVIDd 4.57 3.5 - 6.0 cm    IVS 0.90 0.6 - 1.1 cm    Posterior Wall 0.93 0.6 - 1.1 cm    LVIDs 3.15 2.1 - 4.0 cm    FS 31 28 - 44 %    LV mass 139.29 g    LA size 3.20 cm    TAPSE 1.94 cm    Left Ventricle Relative Wall Thickness 0.41 cm    AV mean gradient 3 mmHg    AV valve area 2.83 cm2    AV Velocity Ratio 0.89     AV index (prosthetic) 0.90     MV mean gradient 3 mmHg    MV valve area p 1/2 method 5.50 cm2    MV valve area by continuity eq 2.57 cm2    E/A ratio 0.64     Mean e' 0.10 m/s    E wave deceleration time 127.00 msec    LVOT diameter 2.00 cm    LVOT area 3.1 cm2    LVOT peak judah 0.96 m/s    LVOT peak VTI 13.90 cm    Ao peak judah 1.08 m/s    Ao VTI 15.4 cm    LVOT stroke volume 43.65 cm3    AV peak gradient 5 mmHg    MV peak gradient 5 mmHg    TV rest pulmonary artery pressure 19 mmHg    E/E' ratio 5.68 m/s    MV Peak E Judah 0.54 m/s    TR Max Judah 1.65 m/s    MV VTI 17.0 cm    MV stenosis pressure 1/2 time 40.00 ms    MV Peak A Judah 0.84 m/s    LV Systolic Volume 39.40 mL    LV Systolic Volume Index 21.4 mL/m2    LV Diastolic Volume 95.90 mL    LV Diastolic Volume Index 52.12 mL/m2    LV Mass Index 76 g/m2    Triscuspid Valve Regurgitation Peak Gradient 11 mmHg   POCT glucose    Collection Time: 05/06/23  9:21 PM   Result Value Ref Range    POCT Glucose 234 (H) 70 - 110 " mg/dL   Hemoglobin A1C    Collection Time: 05/07/23  3:45 AM   Result Value Ref Range    Hemoglobin A1c 8.8 (H) <=7.0 %    Estimated Average Glucose 205.9 mg/dL   POCT glucose    Collection Time: 05/07/23 11:32 AM   Result Value Ref Range    POCT Glucose 153 (H) 70 - 110 mg/dL       Imaging  Imaging Results              CT Lumbar Spine W Wo Contrast (Final result)  Result time 05/06/23 13:31:53      Final result by Roland Pierce MD (05/06/23 13:31:53)                   Impression:      1. Multiple posterior lumbar decompression laminectomies.  Posterior paraspinal fluid collection.  This may represent postsurgical seroma or hematoma without exclusion of infected collection.    2. Lumbar degenerative disc disease and spondylosis level by level discussed above.      Electronically signed by: Roland Pierce  Date:    05/06/2023  Time:    13:31               Narrative:    EXAMINATION:  CT LUMBAR SPINE W WO CONTRAST    CLINICAL HISTORY:  Recent lumbar spine infection status post surgery;    TECHNIQUE:  Multidetector axial images were performed of the lumbar spine without contrast and the images were reformatted.    Dose length product of 2716 mGycm. Automated exposure control was utilized to minimize radiation dose.    COMPARISON:  None available    FINDINGS:  Patient is status post wide decompression laminectomies from L1 to L5.  There are extensive posterior paraspinal soft inflammations and fluid collection containing several air locules.  The fluid collection with peripheral thin enhancement broadly abuts the thecal sac is from the superior endplate of L2 the inferior endplate of L5 with craniocaudal span of 8.5 cm and shows maximum anterior-posterior diameter of 2.5 cm and transverse diameter of 3.8 cm.  There is also left intrathecal small air locule on image 83 series 3.  These findings may represent postsurgical seroma or hematoma without exclusion of infected collection.  There are no lumbar vertebrae disc  spaces irregularity or osteolytic destructive changes.  Several Schmorl nodes are seen involving lumbar vertebrae and the most conspicuous is of L3.  No acute fracture or malalignment.  Disc segmental analysis is given below:    At L1-L2, disc height is preserved.  Bilateral facet arthropathy without significant central canal stenosis.  There are no narrowings of the neural foramen.    At L2-L3, there is osteophyte disc complex which indents the ventral thecal sac.  Thecal sac is patent with decompression laminectomies.  There is mild narrowing of the right neural foramen caused by uncinate arthropathy and there is moderate narrowing of the left neural foramen.    At L3-L4, there is broad osteophyte disc complex which indents the ventral thecal sac.  Right paracentral spur like growth from vertebral body effaces the right lateral recess with compression upon the right exiting nerve root.  There also bilateral moderate spondylotic narrowings of the neural foramen.    At L4-L5, there is broad disc bulge which indents the ventral thecal sac.  Central canal is not stenosed with decompression laminectomies.  There is bilateral uncovertebral and facet arthropathy which encroaches onto the neural foramen.  There is mild narrowing of the right neural foramen and moderate narrowing of the left neural canal.    At L5-S1, there is broad posterior vertebral spondylosis.  Central canal is not stenosed.  There are no significant narrowings of the neural foramen                                       US Abdomen Limited (Final result)  Result time 05/06/23 11:31:12      Final result by Roland Pierce MD (05/06/23 11:31:12)                   Impression:      1.  Hepatic lobular contour and coarsened parenchyma.    2.  Gallbladder could not be visualized with a cholecystostomy tube in place.    3.  Small ascites and right pleural effusion.      Electronically signed by: Roland Pierce  Date:    05/06/2023  Time:    11:31                Narrative:    EXAMINATION:  US ABDOMEN LIMITED    CLINICAL HISTORY:  Cholecystostomy tube placement April 19, 2023    TECHNIQUE:  Multiple real-time transverse and longitudinal sections were performed of the right abdomen by the sonographer. Select images were submitted for review.    COMPARISON:  None available    FINDINGS:  Liver shows lobular contour and coarsened parenchymal texture.  There is no delineation of discrete hepatic cystic or solid mass. Hepatic maximum diameter of 14.3 cm.  There is small amount of free fluid around the liver consistent with ascites.  There is unremarkable hepatopedal flow within the portal vein.  Pancreas is diffusely echogenic.  Incidental note is made of right-sided pleural effusion.    Gallbladder could not be visualized.  A cholecystostomy tube is in place.  Common bile duct caliber of 2.0 mm is within normal limits for the age. Sonographer reported negative Yusuf's sign.    Normally located right kidney length measures 11.2 x 5.0 x 5.6 cm. Right renal corticomedullary differentiation is unremarkable. No evidence of hydronephrosis.                                       Impression  1. MRSA bacteremia  2. L-spine/C-spine MRSA osteomyelitis/diskitis/psoas/paraspinal/epidural abscesses  3.  Cholecystitis status post cholecystotomy tube   4. Candiduria  5. Elevated CRP  6.  Chronic hepatitis-C   7.  Decompensated liver cirrhosis   8.  Polysubstance abuse/IVDU   9.  Diabetes type 2  10.  Anemia   11. Protein calorie malnutrition      Recommendations  I agree with the management of this patient.  History of IVDU with chronic hepatitis-C and decompensated liver cirrhosis, as well as with pain management issues and medical noncompliance.  He does have MRSA bacteremia which is risk factor being IVDU complicated with seeding L-spine and C-spine, with resultant multifocal abscesses including psoas muscle, paraspinal muscle and epidural abscesses as well as diskitis and osteomyelitis.  We  will continue current antibiotic coverage with daptomycin and rifampin.  We will continue serial blood cultures to document clearance of bacteremia. He will need long-term IV antibiotics, about 6-8 weeks with likely subsequent transitioning to oral anti MRSA coverage, following inflammatory markers.  He has been seen by surgery team with inputs noted including recommendations to keep cholecystotomy tube in place for 4-6 weeks with outpatient surgery follow-up.  Leukocytosis have resolved and no fevers.  We probably can discontinue Zosyn,  if continues to do well, has gotten a reasonable course.  Guarded long-term prognosis.  He has been counseled on need to quit polysubstance abuse.  Case is discussed with patient and significant other at the bedside, questions solicited and answered.  I have also discussed the case with nursing staff.  I would like to thank the team very much for the opportunity to assist in the care of this patient.

## 2023-05-07 NOTE — CONSULTS
Acute Care Surgery   History and Physical    Patient Name: Reji Plasencia  YOB: 1960  Date: 05/07/2023 11:56 AM  Date of Admission: 5/5/2023  HD#2  POD#* No surgery found *    PRESENTING HISTORY   Chief Complaint/Reason for Admission: Cholecystostomy tube     History of Present Illness:Reji Plasencia is a 62 y.o. male with a PMHx of DM2, untreated hepatitis-C, cirrhosis, IV drug use, MRSA bacteremia, MRSA C-Spine / L-Spine osteomyelitis/discitis with B/L psoas and paraspinous muscle abscesses as well as lumbar epidural abscess who presented to [facility] on 5/5/2023 after leaving AMA from [facility]. While at [facility] he developed cholecystitis and had a percutaneous cholecystostomy tube placed. General surgery was consulted upon admission here to [facility] for recommendations regarding cholecystostomy tube.     Upon evaluation the patient is awake, alert, NAD. He cholecystotomy tube is draining bile. He has no abdominal pain       Review of Systems:  12 point ROS negative except as stated in HPI    PAST HISTORY:   Past medical history:  No past medical history on file.    Past surgical history:  No past surgical history on file.    Family history:  No family history on file.    Social history:  Social History     Socioeconomic History    Marital status: Single     Social History     Tobacco Use   Smoking Status Not on file   Smokeless Tobacco Not on file      Social History     Substance and Sexual Activity   Alcohol Use None        MEDICATIONS & ALLERGIES:     No current facility-administered medications on file prior to encounter.     Current Outpatient Medications on File Prior to Encounter   Medication Sig    HYDROcodone-acetaminophen (NORCO)  mg per tablet Take 1 tablet by mouth every 6 (six) hours as needed for Pain (severe pain only).       Allergies: Review of patient's allergies indicates:  No Known Allergies    Scheduled Meds:   DAPTOmycin (CUBICIN) IV (PEDS and ADULTS)  6 mg/kg Intravenous Q24H     "enoxaparin  40 mg Subcutaneous Daily    furosemide (LASIX) injection  20 mg Intravenous Q12H    insulin detemir U-100  12 Units Subcutaneous QHS    Lactobacillus acidophilus  1 capsule Oral TID WM    multivitamin  1 tablet Oral Daily    piperacillin-tazobactam (ZOSYN) IVPB  4.5 g Intravenous Q8H    rifAMpin  300 mg Oral Q12H    spironolactone  25 mg Oral BID       Continuous Infusions:    PRN Meds:acetaminophen, acetaminophen, aluminum-magnesium hydroxide-simethicone, dextrose 10%, dextrose 10%, glucagon (human recombinant), glucose, glucose, insulin aspart U-100, melatonin, ondansetron, oxyCODONE, polyethylene glycol, prochlorperazine, senna-docusate 8.6-50 mg, sodium chloride 0.9%, tiZANidine    OBJECTIVE:   Vital Signs:  VITAL SIGNS: 24 HR MIN & MAX LAST   Temp  Min: 98 °F (36.7 °C)  Max: 98.3 °F (36.8 °C)  98.1 °F (36.7 °C)   BP  Min: 111/68  Max: 129/74  128/76    Pulse  Min: 101  Max: 115  (!) 115    Resp  Min: 16  Max: 20  16    SpO2  Min: 92 %  Max: 97 %  (!) 93 %      HT: 5' 7" (170.2 cm)  WT: 72.6 kg (160 lb)  BMI: 25.1     Intake/output:  Intake/Output - Last 3 Shifts         05/05 0700  05/06 0659 05/06 0700  05/07 0659 05/07 0700  05/08 0659    P.O.  240     Total Intake(mL/kg)  240 (3.3)     Urine (mL/kg/hr)  1600 (0.9)     Other  100     Stool  0     Total Output  1700     Net  -1460            Stool Occurrence  1 x             Intake/Output Summary (Last 24 hours) at 5/7/2023 1156  Last data filed at 5/7/2023 0514  Gross per 24 hour   Intake 240 ml   Output 1700 ml   Net -1460 ml         Physical Exam:  General: Well developed, well nourished, no acute distress  HEENT: Normocephalic, atraumatic, PERRL  CV: RRR  Resp: NWOB  GI:  Abdomen soft, non-tender, non-distended, no guarding. Cholecystostomy tube in place with bilious fluid in bag   :  Deferred  MSK: No muscle atrophy, cyanosis, peripheral edema, moving all extremities spontaneously  Skin/wounds:  No rashes, ulcers, erythema  Neuro:  CNII-XII " grossly intact, alert and oriented to person, place, and time    Labs:  Troponin:  Recent Labs     05/05/23 2109   TROPONINI <0.010     CBC:  Recent Labs     05/05/23 2109 05/06/23 0631   WBC 17.15* 9.89   RBC 2.87* 2.80*   HGB 8.7* 8.4*   HCT 26.7* 26.6*    147   MCV 93.0 95.0*   MCH 30.3 30.0   MCHC 32.6* 31.6*     CMP:  Recent Labs     05/05/23 2109 05/06/23 0630   CALCIUM 7.7* 7.6*   ALBUMIN 1.3* 2.0*   * 135*   K 3.8 3.6   CO2 26 27   BUN 32.9* 30.2*   CREATININE 0.75 0.76   ALKPHOS 98 80   ALT 11 10   AST 19 16   BILITOT 1.3 1.0     Lactic Acid:  No results for input(s): LACTATE in the last 72 hours.  ETOH:  No results for input(s): ETHANOL in the last 72 hours.   Urine Drug Screen:  Recent Labs     05/06/23 0605   COCAINE Negative   OPIATE Positive*   FENTANYL Negative   MDMA Negative      ABG:  No results for input(s): PH, PO2, PCO2, HCO3, BE in the last 168 hours.     Diagnostic Results:  X-Ray Chest 1 View   Final Result      As above.         Electronically signed by: Roland Pierce   Date:    05/06/2023   Time:    18:22      CT Lumbar Spine W Wo Contrast   Final Result      1. Multiple posterior lumbar decompression laminectomies.  Posterior paraspinal fluid collection.  This may represent postsurgical seroma or hematoma without exclusion of infected collection.      2. Lumbar degenerative disc disease and spondylosis level by level discussed above.         Electronically signed by: Roland Pierce   Date:    05/06/2023   Time:    13:31      US Abdomen Limited   Final Result      1.  Hepatic lobular contour and coarsened parenchyma.      2.  Gallbladder could not be visualized with a cholecystostomy tube in place.      3.  Small ascites and right pleural effusion.         Electronically signed by: Roland Pierce   Date:    05/06/2023   Time:    11:31      MRI Lumbar Spine W WO Cont    (Results Pending)       ASSESSMENT & PLAN:    Reji Plasencia is a 62 y.o. male with a PMHx of DM2, untreated  hepatitis-C, cirrhosis, IV drug use, MRSA bacteremia, MRSA C-Spine / L-Spine osteomyelitis/discitis with B/L psoas and paraspinous muscle abscesses as well as lumbar epidural abscess who presented to [facility] on 5/5/2023 after leaving AMA from [facility]. While at [facility] he developed cholecystitis and had a percutaneous cholecystostomy tube placed. General surgery was consulted upon admission here to [facility] for recommendations regarding cholecystostomy tube.     - vitals, las, and images reviewed   - Cholecystotomy tube in place and patent with bile drainage in bag.   - Bilirubin and LFTs within normal limits   - Would recommend having nursing staff flush tube q shift with 10cc sterile saline   - Patient can follow up with general surgery team in 4-6 weeks for evaluation with cholangiogram. Determination of tube removal can be made at that time.   - Antibiotic therapy per primary team     Shilpi Persaud MD   [employer] General Surgery PGY4

## 2023-05-07 NOTE — CONSULTS
[facility]                       [address] [place] [id number]    PATIENT NAME:       REJI LPASENCIA  YOB: 1960  CSN:                [id number]   MRN:                [id number]  ADMIT DATE:         05/05/2023 19:57:00  PHYSICIAN:          Ramón Rosa MD                            CONSULTATION    DATE OF CONSULT:      Thank you Dr. Sepulveda for letting me see this complicated patient, patient of Dr. Chang, who did a surgery about 10 days ago.  The patient did not like the   hospital, so he left.  In fact the patient felt like his legs improved after   surgery, subsequently went home by himself and called another ambulance, came to   general and got admitted.  I am asked to see him for the previous surgery of   lumbar issues.  When I saw him, he was awake, alert, appropriate.  He could not   move his legs well.  I turned him around and took the dressing off his wound.    Dr. Chang had closed it with nylon.  There was a bed sore lower down.    I looked at the CT that shows fluid collection, postop changes.  He has not had   an MRI done.    His lab work shows elevated white count.  His sed rate does not seem elevated   though.    IMPRESSION:  Reji Plasencia is status post surgery by Dr. Chang.  Infectious   Disease has been consulted.  We will follow lab work.  We will also get an MRI   of his back and proceed from there.  I will continue to follow.  I let also Dr. Chang know about him and also he needs wound care for his bed sore.        ______________________________  Ramón Rosa MD    IM/AQS  DD:  05/07/2023  Time:  07:34AM  DT:  05/07/2023  Time:  08:30AM  Job #:  [id number]      CONSULTATION

## 2023-05-08 LAB
ALBUMIN SERPL-MCNC: 1.4 G/DL (ref 3.4–4.8)
ALBUMIN/GLOB SERPL: 0.4 RATIO (ref 1.1–2)
ALP SERPL-CCNC: 83 UNIT/L (ref 40–150)
ALT SERPL-CCNC: 17 UNIT/L (ref 0–55)
AST SERPL-CCNC: 59 UNIT/L (ref 5–34)
BACTERIA UR CULT: ABNORMAL
BASOPHILS # BLD AUTO: 0.07 X10(3)/MCL
BASOPHILS NFR BLD AUTO: 0.5 %
BILIRUBIN DIRECT+TOT PNL SERPL-MCNC: 0.8 MG/DL
BUN SERPL-MCNC: 18.6 MG/DL (ref 8.4–25.7)
CALCIUM SERPL-MCNC: 7 MG/DL (ref 8.8–10)
CHLORIDE SERPL-SCNC: 100 MMOL/L (ref 98–107)
CO2 SERPL-SCNC: 29 MMOL/L (ref 23–31)
CREAT SERPL-MCNC: 0.76 MG/DL (ref 0.73–1.18)
EOSINOPHIL # BLD AUTO: 0.04 X10(3)/MCL (ref 0–0.9)
EOSINOPHIL NFR BLD AUTO: 0.3 %
ERYTHROCYTE [DISTWIDTH] IN BLOOD BY AUTOMATED COUNT: 16.6 % (ref 11.5–17)
FERRITIN SERPL-MCNC: 889.37 NG/ML (ref 21.81–274.66)
GFR SERPLBLD CREATININE-BSD FMLA CKD-EPI: >60 MLS/MIN/1.73/M2
GLOBULIN SER-MCNC: 3.3 GM/DL (ref 2.4–3.5)
GLUCOSE SERPL-MCNC: 48 MG/DL (ref 82–115)
HCT VFR BLD AUTO: 27.9 % (ref 42–52)
HGB BLD-MCNC: 8.8 G/DL (ref 14–18)
IMM GRANULOCYTES # BLD AUTO: 0.08 X10(3)/MCL (ref 0–0.04)
IMM GRANULOCYTES NFR BLD AUTO: 0.6 %
LYMPHOCYTES # BLD AUTO: 0.88 X10(3)/MCL (ref 0.6–4.6)
LYMPHOCYTES NFR BLD AUTO: 6.3 %
MCH RBC QN AUTO: 30.3 PG (ref 27–31)
MCHC RBC AUTO-ENTMCNC: 31.5 G/DL (ref 33–36)
MCV RBC AUTO: 96.2 FL (ref 80–94)
MONOCYTES # BLD AUTO: 0.47 X10(3)/MCL (ref 0.1–1.3)
MONOCYTES NFR BLD AUTO: 3.3 %
NEUTROPHILS # BLD AUTO: 12.52 X10(3)/MCL (ref 2.1–9.2)
NEUTROPHILS NFR BLD AUTO: 89 %
NRBC BLD AUTO-RTO: 0 %
PLATELET # BLD AUTO: 177 X10(3)/MCL (ref 130–400)
PMV BLD AUTO: 10.2 FL (ref 7.4–10.4)
POCT GLUCOSE: 57 MG/DL (ref 70–110)
POCT GLUCOSE: 62 MG/DL (ref 70–110)
POCT GLUCOSE: 79 MG/DL (ref 70–110)
POCT GLUCOSE: 79 MG/DL (ref 70–110)
POCT GLUCOSE: 97 MG/DL (ref 70–110)
POTASSIUM SERPL-SCNC: 4.8 MMOL/L (ref 3.5–5.1)
PROT SERPL-MCNC: 4.7 GM/DL (ref 5.8–7.6)
RBC # BLD AUTO: 2.9 X10(6)/MCL (ref 4.7–6.1)
SODIUM SERPL-SCNC: 135 MMOL/L (ref 136–145)
WBC # SPEC AUTO: 14.06 X10(3)/MCL (ref 4.5–11.5)

## 2023-05-08 PROCEDURE — 25000003 PHARM REV CODE 250: Performed by: INTERNAL MEDICINE

## 2023-05-08 PROCEDURE — 63600175 PHARM REV CODE 636 W HCPCS: Performed by: INTERNAL MEDICINE

## 2023-05-08 PROCEDURE — 27000207 HC ISOLATION

## 2023-05-08 PROCEDURE — 25000003 PHARM REV CODE 250: Performed by: STUDENT IN AN ORGANIZED HEALTH CARE EDUCATION/TRAINING PROGRAM

## 2023-05-08 PROCEDURE — 80053 COMPREHEN METABOLIC PANEL: CPT | Performed by: STUDENT IN AN ORGANIZED HEALTH CARE EDUCATION/TRAINING PROGRAM

## 2023-05-08 PROCEDURE — 85025 COMPLETE CBC W/AUTO DIFF WBC: CPT | Performed by: STUDENT IN AN ORGANIZED HEALTH CARE EDUCATION/TRAINING PROGRAM

## 2023-05-08 PROCEDURE — 21400001 HC TELEMETRY ROOM

## 2023-05-08 PROCEDURE — 82728 ASSAY OF FERRITIN: CPT | Performed by: STUDENT IN AN ORGANIZED HEALTH CARE EDUCATION/TRAINING PROGRAM

## 2023-05-08 RX ORDER — HYDROCODONE BITARTRATE AND ACETAMINOPHEN 7.5; 325 MG/1; MG/1
1 TABLET ORAL EVERY 4 HOURS PRN
Status: DISCONTINUED | OUTPATIENT
Start: 2023-05-08 | End: 2023-05-15

## 2023-05-08 RX ADMIN — HYDROCODONE BITARTRATE AND ACETAMINOPHEN 1 TABLET: 7.5; 325 TABLET ORAL at 03:05

## 2023-05-08 RX ADMIN — PIPERACILLIN AND TAZOBACTAM 4.5 G: 4; .5 INJECTION, POWDER, LYOPHILIZED, FOR SOLUTION INTRAVENOUS; PARENTERAL at 10:05

## 2023-05-08 RX ADMIN — OXYCODONE HYDROCHLORIDE 5 MG: 5 TABLET ORAL at 10:05

## 2023-05-08 RX ADMIN — HYDROCODONE BITARTRATE AND ACETAMINOPHEN 1 TABLET: 7.5; 325 TABLET ORAL at 07:05

## 2023-05-08 RX ADMIN — SPIRONOLACTONE 25 MG: 25 TABLET ORAL at 10:05

## 2023-05-08 RX ADMIN — Medication 1 CAPSULE: at 10:05

## 2023-05-08 RX ADMIN — PIPERACILLIN AND TAZOBACTAM 4.5 G: 4; .5 INJECTION, POWDER, LYOPHILIZED, FOR SOLUTION INTRAVENOUS; PARENTERAL at 06:05

## 2023-05-08 RX ADMIN — FUROSEMIDE 20 MG: 10 INJECTION, SOLUTION INTRAMUSCULAR; INTRAVENOUS at 06:05

## 2023-05-08 RX ADMIN — OXYCODONE HYDROCHLORIDE 5 MG: 5 TABLET ORAL at 06:05

## 2023-05-08 RX ADMIN — ENOXAPARIN SODIUM 40 MG: 40 INJECTION SUBCUTANEOUS at 06:05

## 2023-05-08 RX ADMIN — OXYCODONE HYDROCHLORIDE 5 MG: 5 TABLET ORAL at 02:05

## 2023-05-08 RX ADMIN — Medication 24 G: at 05:05

## 2023-05-08 RX ADMIN — PIPERACILLIN AND TAZOBACTAM 4.5 G: 4; .5 INJECTION, POWDER, LYOPHILIZED, FOR SOLUTION INTRAVENOUS; PARENTERAL at 02:05

## 2023-05-08 RX ADMIN — FUROSEMIDE 20 MG: 10 INJECTION, SOLUTION INTRAMUSCULAR; INTRAVENOUS at 05:05

## 2023-05-08 RX ADMIN — RIFAMPIN 300 MG: 300 CAPSULE ORAL at 10:05

## 2023-05-08 RX ADMIN — THERA TABS 1 TABLET: TAB at 10:05

## 2023-05-08 RX ADMIN — DAPTOMYCIN 435 MG: 500 INJECTION, POWDER, LYOPHILIZED, FOR SOLUTION INTRAVENOUS at 08:05

## 2023-05-08 RX ADMIN — Medication 1 CAPSULE: at 11:05

## 2023-05-08 RX ADMIN — THIAMINE HCL TAB 100 MG 100 MG: 100 TAB at 10:05

## 2023-05-08 RX ADMIN — Medication 1 CAPSULE: at 06:05

## 2023-05-08 NOTE — PLAN OF CARE
05/08/23 1351   Discharge Assessment   Assessment Type Discharge Planning Assessment   Confirmed/corrected address, phone number and insurance Yes   Confirmed Demographics Correct on Facesheet   Source of Information patient   When was your last doctors appointment?   (Pt does not have a PCP)   Reason For Admission sepsis   People in Home child(brooke), dependent   Do you expect to return to your current living situation? Yes   Do you have help at home or someone to help you manage your care at home? No   Current cognitive status: Alert/Oriented;No Deficits   Walking or Climbing Stairs ambulation difficulty, requires equipment   Mobility Management uses wheelchair   Equipment Currently Used at Home wheelchair   Do you currently have service(s) that help you manage your care at home? No   Do you take prescription medications? No   Who is going to help you get home at discharge? Pt states he has family members that can assist   How do you get to doctors appointments? car, drives self   Are you on dialysis? No   Do you take coumadin? No   Discharge Plan A Long-term acute care facility (LTAC)   Discharge Plan B Long-term acute care facility (LTAC)   Discharge Plan discussed with: Patient   Discharge Barriers Identified Substance Abuse     Pt states he lives in a mobile home with 6 steps to enter with no railings present. He lives with his 2 sons ages 15 and 12. He states he has a cousin who is taking care of his 2 dependent children. He states he uses a wheelchair to get around. He states he is able to drive. He is not active with a home health agency. He states he has a lot of family members to help him. Will follow for discharge needs.

## 2023-05-08 NOTE — PROGRESS NOTES
[facility] - 8th Floor Med Surg  Wound Care    Patient Name:  Rjei Plasencia   MRN:  [id number]  Date: 5/8/2023  Diagnosis: Sepsis    History:     No past medical history on file.    Social History     Socioeconomic History    Marital status: Single       Precautions:     Allergies as of 05/05/2023    (No Known Allergies)       WO Assessment Details/Treatment        05/08/23 1042        Altered Skin Integrity 05/06/23 1450 medial Sacral spine   Date First Assessed/Time First Assessed: 05/06/23 1450   Altered Skin Integrity Present on Admission - Did Patient arrive to the hospital with altered skin?: yes  Orientation: medial  Location: Sacral spine   Wound Image    Description of Altered Skin Integrity Full thickness tissue loss. Base is covered by slough and/or eschar in the wound bed   Dressing Appearance Dry;Intact;Clean   Drainage Amount Small   Drainage Characteristics/Odor Serosanguineous   Appearance Eschar;Slough   Tissue loss description Full thickness   Black (%), Wound Tissue Color 100 %   Periwound Area Redness;Warm   Wound Edges Defined   Wound Length (cm) 4 cm   Wound Width (cm) 3.5 cm   Wound Surface Area (cm^2) 14 cm^2   Care Cleansed with:;Antimicrobial agent   Dressing Applied;Gauze, wet to moist        Incision/Site 05/06/23 0450 Thoracic spine medial   Date First Assessed/Time First Assessed: 05/06/23 0450   Present Prior to Hospital Arrival?: Yes  Location: Thoracic spine  Orientation: medial  Additional Comments: from lumbar laminectomy at [facility]   Wound Image    Dressing Appearance Dry;Intact;Clean   Drainage Amount Small   Drainage Characteristics/Odor Serous     WOCN consulted for sacral. Treatment recommendations for surgery eval of sacral ulcer discussed with Nurse Cooley and put into place. Sacrum: Cleanse with vashe. Apply vashe moistened 4 x 4, cover with abd pad, secure with medipore tape. Change BID.  Keep areas clean and dry, no adult briefs while in bed. Nursing to  continue with turning every two hours, wedge and floating heels. Repositioned on left side with wedge, head of bed elevated, bed in lowest position, call bell within reach. ALISON mattress ordered. Will follow up.    05/08/2023

## 2023-05-08 NOTE — PLAN OF CARE
Problem: Adult Inpatient Plan of Care  Goal: Plan of Care Review  Outcome: Ongoing, Progressing  Goal: Patient-Specific Goal (Individualized)  Outcome: Ongoing, Progressing  Goal: Absence of Hospital-Acquired Illness or Injury  Outcome: Ongoing, Progressing  Goal: Optimal Comfort and Wellbeing  Outcome: Ongoing, Progressing  Goal: Readiness for Transition of Care  Outcome: Ongoing, Progressing     Problem: Skin Injury Risk Increased  Goal: Skin Health and Integrity  Outcome: Ongoing, Progressing     Problem: Adjustment to Illness (Sepsis/Septic Shock)  Goal: Optimal Coping  Outcome: Ongoing, Progressing     Problem: Bleeding (Sepsis/Septic Shock)  Goal: Absence of Bleeding  Outcome: Ongoing, Progressing     Problem: Glycemic Control Impaired (Sepsis/Septic Shock)  Goal: Blood Glucose Level Within Desired Range  Outcome: Ongoing, Progressing     Problem: Infection Progression (Sepsis/Septic Shock)  Goal: Absence of Infection Signs and Symptoms  Outcome: Ongoing, Progressing     Problem: Nutrition Impaired (Sepsis/Septic Shock)  Goal: Optimal Nutrition Intake  Outcome: Ongoing, Progressing     Problem: Impaired Wound Healing  Goal: Optimal Wound Healing  Outcome: Ongoing, Progressing     Problem: Fall Injury Risk  Goal: Absence of Fall and Fall-Related Injury  Outcome: Ongoing, Progressing

## 2023-05-08 NOTE — PROGRESS NOTES
[facility] - 8th Floor Med Surg  Neurosurgery  Progress Note    Subjective:     Interval History: No acute events overnight. His leg complaints are about the same. His weakness is unchanged. Awaiting completion of MRI. ID has not documented recommendations so far.    History of Present Illness: 62-year-old male with significant history of polysubstance abuse/IV drug abuse, type 2 diabetes mellitus, untreated chronic hep C/cirrhosis.  Patient had a recent hospitalization to [facility] for MRSA bacteremia with MRSA C and L-spine osteomyelitis, diskitis, bilateral psoas and paraspinous muscle abscess as well as lumbar epidural abscess.  Patient was being treated in [facility] with close follow-up with neurosurgery, Infectious Disease. Patient underwent lumbar laminectomy with I&D with Dr. Chang on 4/21. Hospital stay also was eventful for cholecystitis for which he underwent cholecystostomy tube placement.  Patient signed out AMA from [facility] on 05/05, went home, called ambulance and presented to the hospital.  Per chart review patient was being treated with daptomycin and rifampin in [facility].  While hospitalized his urine drug screen was positive for amphetamines and there were concerns that he was using it while hospitalized.  Patient was tachycardic in the ED.  Otherwise hemodynamics stable.  Complaining of bilateral lower extremity swelling which is interfering with movement.  Lab significant for leukocytosis, anemia, hyperglycemia.  UDS was positive for opiates, amphetamines.  Hospitalist team was consulted for admission. Dr. Rosa was consulted for evaluation and treatment recommendations regarding his recent lumbar surgery.    Post-Op Info:  * No surgery found *          Medications:  Continuous Infusions:  Scheduled Meds:   DAPTOmycin (CUBICIN) IV (PEDS and ADULTS)  6 mg/kg Intravenous Q24H    enoxaparin  40 mg Subcutaneous Daily    furosemide (LASIX) injection   20 mg Intravenous Q12H    insulin detemir U-100  12 Units Subcutaneous QHS    Lactobacillus acidophilus  1 capsule Oral TID WM    multivitamin  1 tablet Oral Daily    piperacillin-tazobactam (ZOSYN) IVPB  4.5 g Intravenous Q8H    rifAMpin  300 mg Oral Q12H    spironolactone  25 mg Oral BID    thiamine  100 mg Oral Daily     PRN Meds:aluminum-magnesium hydroxide-simethicone, dextrose 10%, dextrose 10%, glucagon (human recombinant), glucose, glucose, HYDROcodone-acetaminophen, insulin aspart U-100, melatonin, ondansetron, polyethylene glycol, prochlorperazine, senna-docusate 8.6-50 mg, sodium chloride 0.9%, tiZANidine     Review of Systems  Objective:     Weight: 72.6 kg (160 lb)  Body mass index is 25.06 kg/m².  Vital Signs (Most Recent):  Temp: 98.5 °F (36.9 °C) (05/08/23 1105)  Pulse: (!) 113 (05/08/23 1105)  Resp: 17 (05/08/23 1105)  BP: 114/69 (05/08/23 1105)  SpO2: (!) 93 % (05/08/23 1105) Vital Signs (24h Range):  Temp:  [98 °F (36.7 °C)-98.9 °F (37.2 °C)] 98.5 °F (36.9 °C)  Pulse:  [102-133] 113  Resp:  [14-18] 17  SpO2:  [90 %-95 %] 93 %  BP: (112-131)/(58-80) 114/69                              Drain/Device    Right lower flank (Active)   Insertion Site clean and dry 05/07/23 2054   Drainage Characteristics/Odor Brown 05/07/23 2054   Drainage Amount Large 05/07/23 2054   General Output (mL) 80 05/08/23 0623       Neurosurgery Physical Exam  AFVSS  PERRL, EOMI  Full strength bilateral UE  LE weakness unchanged from previous  Notable serosanguineous drainage from lumbar incision.  Sacral wound unchanged    Significant Labs:  Recent Labs   Lab 05/08/23  0355   *   K 4.8   CO2 29   BUN 18.6   CREATININE 0.76   CALCIUM 7.0*     Recent Labs   Lab 05/08/23  0625   WBC 14.06*   HGB 8.8*   HCT 27.9*        No results for input(s): LABPT, INR, APTT in the last 48 hours.  Microbiology Results (last 7 days)       Procedure Component Value Units Date/Time    Urine culture [[id number]]  (Abnormal) Collected:  05/06/23 0605    Order Status: Completed Specimen: Urine Updated: 05/08/23 0910     Urine Culture 10,000 - 25,000 colonies/ml Candida albicans    Blood culture #2 **CANNOT BE ORDERED STAT** [[id number]]  (Abnormal) Collected: 05/05/23 2109    Order Status: Completed Specimen: Blood Updated: 05/08/23 0808     CULTURE, BLOOD ([facility]) Staphylococcus aureus     GRAM STAIN Gram Positive Cocci, probable Staphylococcus      Seen in gram stain of broth only      1 of 2 Anaerobic bottles positive    Blood culture #1 **CANNOT BE ORDERED STAT** [[id number]]  (Abnormal) Collected: 05/05/23 2109    Order Status: Completed Specimen: Blood Updated: 05/08/23 0806     CULTURE, BLOOD ([facility]) Staphylococcus aureus     GRAM STAIN Gram Positive Cocci, probable Staphylococcus      Seen in gram stain of broth only      2 of 2 bottles positive    BCID2 Panel [[id number]]  (Abnormal) Collected: 05/05/23 2109    Order Status: Completed Specimen: Blood Updated: 05/07/23 0142     CTX-M (ESBL ) N/A     Comment: Note: Antimicrobial resistance can occur via multiple mechanisms. A Not Detected result for antimicrobial resistance gene(s) does not indicate antimicrobial susceptibility. Subculturing is required for species identification and susceptibility testing of   isolates.        IMP (Cabapenemase ) N/A     Comment: Note: Antimicrobial resistance can occur via multiple mechanisms. A Not Detected result for antimicrobial resistance gene(s) does not indicate antimicrobial susceptibility. Subculturing is required for species identification and susceptibility testing of   isolates.        KPC resistance gene (Carbapenemase ) N/A     Comment: Note: Antimicrobial resistance can occur via multiple mechanisms. A Not Detected result for antimicrobial resistance gene(s) does not indicate antimicrobial susceptibility. Subculturing is required for species identification and susceptibility testing of   isolates.        mcr-1 N/A     Comment:  Note: Antimicrobial resistance can occur via multiple mechanisms. A Not Detected result for antimicrobial resistance gene(s) does not indicate antimicrobial susceptibility. Subculturing is required for species identification and susceptibility testing of   isolates.        mecA ID N/A     Comment: Note: Antimicrobial resistance can occur via multiple mechanisms. A Not Detected result for antimicrobial resistance gene(s) does not indicate antimicrobial susceptibility. Subculturing is required for species identification and susceptibility testing of   isolates.        mecA/C and MREJ (MRSA) gene Detected     Comment: Note: Antimicrobial resistance can occur via multiple mechanisms. A Not Detected result for antimicrobial resistance gene(s) does not indicate antimicrobial susceptibility. Subculturing is required for species identification and susceptibility testing of   isolates.        NDM (Carbapenemase ) N/A     Comment: Note: Antimicrobial resistance can occur via multiple mechanisms. A Not Detected result for antimicrobial resistance gene(s) does not indicate antimicrobial susceptibility. Subculturing is required for species identification and susceptibility testing of   isolates.        OXA-48-like (Carbapenemase ) N/A     Comment: Note: Antimicrobial resistance can occur via multiple mechanisms. A Not Detected result for antimicrobial resistance gene(s) does not indicate antimicrobial susceptibility. Subculturing is required for species identification and susceptibility testing of   isolates.        Kenia/B (VRE gene) N/A     Comment: Note: Antimicrobial resistance can occur via multiple mechanisms. A Not Detected result for antimicrobial resistance gene(s) does not indicate antimicrobial susceptibility. Subculturing is required for species identification and susceptibility testing of   isolates.        VIM (Carbapenemase ) N/A     Comment: Note: Antimicrobial resistance can occur via  multiple mechanisms. A Not Detected result for antimicrobial resistance gene(s) does not indicate antimicrobial susceptibility. Subculturing is required for species identification and susceptibility testing of   isolates.        Enterococcus faecalis Not Detected     Enterococcus faecium Not Detected     Listeria monocytogenes Not Detected     Staphylococcus spp. Detected     Staphylococcus aureus Detected     Staphylococcus epidermidis Not Detected     Staphylococcus lugdunensis Not Detected     Streptococcus spp. Not Detected     Streptococcus agalactiae (Group B) Not Detected     Streptococcus pneumoniae Not Detected     Streptococcus pyogenes (Group A) Not Detected     Acinetobacter calcoaceticus/baumannii complex Not Detected     Bacteroides fragilis Not Detected     Enterobacterales Not Detected     Enterobacter cloacae complex Not Detected     Escherichia coli Not Detected     Klebsiella aerogenes Not Detected     Klebsiella oxytoca Not Detected     Klebsiella pneumoniae group Not Detected     Proteus spp. Not Detected     Salmonella spp. Not Detected     Serratia marcescens Not Detected     Haemophilus influenzae Not Detected     Neisseria meningitidis Not Detected     Pseudomonas aeruginosa Not Detected     Stenotrophomonas maltophilia Not Detected     Candida albicans Not Detected     Candida auris Not Detected     Candida glabrata Not Detected     Candida krusei Not Detected     Candida parapsilosis Not Detected     Candida tropicalis Not Detected     Cryptococcus neoformans/gattii Not Detected    Narrative:      The [employer] BCID2 Panel is a multiplexed nucleic acid test intended for the use with [employer]® 2.0 or [employer]® [employer] for the simultaneous qualitative detection and identification of multiple bacterial and yeast nucleic acids and select genetic determinants associated with antimicrobial resistance.  The [employer] BCID2 Panel test is performed directly on blood culture  samples identified as positive by a continuous monitoring blood culture system.  Results are intended to be interpreted in conjunction with Gram stain results.            Significant Diagnostics:      Assessment/Plan:     Active Diagnoses:    Diagnosis Date Noted POA    PRINCIPAL PROBLEM:  Sepsis [A41.9] 05/05/2023 Yes    Staphylococcus aureus bacteremia [R78.81, B95.61] 05/05/2023 Unknown      Problems Resolved During this Admission:     Motor exam unchanged. Some drainage from lumbar incision.  I will order a wound vac for his lumbar incision.  MRI pending  ID consult pending  Continue PT/OT  Wound care for sacral wound and application of wound vac  Further recs to follow per Dr. Rosa once MRI complete    DILCIA Nolasco  Neurosurgery  [facility] - 8th Floor Med Surg

## 2023-05-08 NOTE — PROGRESS NOTES
[facility] Hospital Medicine Progress Note        Chief Complaint: Inpatient Follow-up for sepsis    Subjective:  History:  62-year-old male with significant history of polysubstance abuse/IV drug abuse, type 2 diabetes mellitus, untreated chronic hep C/cirrhosis.  Patient had a recent hospitalization to [facility] for MRSA bacteremia with MRSA C and L-spine osteomyelitis, diskitis, bilateral psoas and paraspinous muscle abscess as well as lumbar epidural abscess.  Patient was being treated in [facility] with close follow-up with neurosurgery, Infectious Disease.  Hospital stay also was even full for cholecystitis for which he underwent cholecystostomy tube placement.  Patient signed out AMA from [facility] on 05/05, went home, called ambulance and presented to the hospital.  Per chart review patient was being treated with daptomycin and rifampin in [facility].  While hospitalized his urine drug screen was positive for amphetamines and there were concerns that he was using it while hospitalized.  Patient was tachycardic in the ED.  Otherwise hemodynamics stable.  Complaining of bilateral lower extremity swelling which is interfering with movement.  Lab significant for leukocytosis, anemia, hyperglycemia.  UDS was positive for opiates, amphetamines.  Hospitalist team was consulted for admission.    Seen and examined the patient.  Afebrile vitals stable and hemodynamically stable,   He was having hypoglycemia in the a.m. was given apple juice and sugar tablets.    General appearance: Thin, chronically ill-appearing  male in no apparent distress.  HENT: Atraumatic head. Moist mucous membranes of oral cavity.  Eyes: Normal extraocular movements.   Neck: Supple.   Lungs: Clear to auscultation bilaterally. Tachypnea   Heart: Regular rate and rhythm. S1 and S2 present. No pedal edema.  Abdomen: Soft, non-distended, non-tender. RUQ Drain  Extremities:  1+  pitting pedal edema   Skin: No Rash.   Neuro:  Moving upper extremity without any difficulty, patient reports decreased range of motion lower extremity secondary to swelling   Psych/mental status: Appropriate mood and affect. Responds appropriately to questions    Recent MRSA bacteremia   Recent MRSA cervical, lumbar spine osteomyelitis with bilateral psoas, paraspinal muscle abscess as well as lumbar epidural abscess status post drainage, laminectomy  Recent cholecystitis status post cholecystostomy tube placement   Sepsis secondary to all the above   Medical noncompliance   Polysubstance abuse/IV drug abuse   Decompensated cirrhosis with volume overload   Untreated chronic hep C   Type 2 diabetes mellitus with hyperglycemia  Anemia of chronic disease  Prophylaxis       - will decrease the insulin detemir to 12 units again giving that he could not tolerate increase.    -waiting for MRI.  Patient was receiving daptomycin, rifampin in outlying facility for MRSA bacteremia with lumbar spinal infection   Consult infectious disease   Consult Neurosurgery   Patient has cholecystostomy tube in place   Consult General surgery  Zosyn pending General surgery recommendations  Repeated ultrasound abdomen-no acute events noted   Concern for volume overload related to decompensated cirrhosis   Pending echocardiogram   Venous ultrasound bilateral lower extremity.  IV Lasix 20 mg b.i.d.  Aldactone 25 mg b.i.d.  Monitor strict Is&Os  Levemir, sliding scale for diabetes mellitus  Continue probiotic, multivitami ,  Anemic, hemoglobin is more than 8   No overt bleeding   Check iron panel  DVT prophylaxis-Lovenox    VITAL SIGNS: 24 HRS MIN & MAX LAST   Temp  Min: 98 °F (36.7 °C)  Max: 98.9 °F (37.2 °C) 98.5 °F (36.9 °C)   BP  Min: 112/62  Max: 135/72 114/69   Pulse  Min: 94  Max: 133  (!) 113   Resp  Min: 14  Max: 20 17   SpO2  Min: 90 %  Max: 97 % (!) 93 %       Labs, Microbiology and Imaging were Reviewed.      Microbiology Results  (last 7 days)       Procedure Component Value Units Date/Time    Urine culture [[id number]]  (Abnormal) Collected: 05/06/23 0605    Order Status: Completed Specimen: Urine Updated: 05/08/23 0910     Urine Culture 10,000 - 25,000 colonies/ml Candida albicans    Blood culture #2 **CANNOT BE ORDERED STAT** [[id number]]  (Abnormal) Collected: 05/05/23 2109    Order Status: Completed Specimen: Blood Updated: 05/08/23 0808     CULTURE, BLOOD ([facility]) Staphylococcus aureus     GRAM STAIN Gram Positive Cocci, probable Staphylococcus      Seen in gram stain of broth only      1 of 2 Anaerobic bottles positive    Blood culture #1 **CANNOT BE ORDERED STAT** [[id number]]  (Abnormal) Collected: 05/05/23 2109    Order Status: Completed Specimen: Blood Updated: 05/08/23 0806     CULTURE, BLOOD ([facility]) Staphylococcus aureus     GRAM STAIN Gram Positive Cocci, probable Staphylococcus      Seen in gram stain of broth only      2 of 2 bottles positive    BCID2 Panel [[id number]]  (Abnormal) Collected: 05/05/23 2109    Order Status: Completed Specimen: Blood Updated: 05/07/23 0142     CTX-M (ESBL ) N/A     Comment: Note: Antimicrobial resistance can occur via multiple mechanisms. A Not Detected result for antimicrobial resistance gene(s) does not indicate antimicrobial susceptibility. Subculturing is required for species identification and susceptibility testing of   isolates.        IMP (Cabapenemase ) N/A     Comment: Note: Antimicrobial resistance can occur via multiple mechanisms. A Not Detected result for antimicrobial resistance gene(s) does not indicate antimicrobial susceptibility. Subculturing is required for species identification and susceptibility testing of   isolates.        KPC resistance gene (Carbapenemase ) N/A     Comment: Note: Antimicrobial resistance can occur via multiple mechanisms. A Not Detected result for antimicrobial resistance gene(s) does not indicate antimicrobial susceptibility.  Subculturing is required for species identification and susceptibility testing of   isolates.        mcr-1 N/A     Comment: Note: Antimicrobial resistance can occur via multiple mechanisms. A Not Detected result for antimicrobial resistance gene(s) does not indicate antimicrobial susceptibility. Subculturing is required for species identification and susceptibility testing of   isolates.        mecA ID N/A     Comment: Note: Antimicrobial resistance can occur via multiple mechanisms. A Not Detected result for antimicrobial resistance gene(s) does not indicate antimicrobial susceptibility. Subculturing is required for species identification and susceptibility testing of   isolates.        mecA/C and MREJ (MRSA) gene Detected     Comment: Note: Antimicrobial resistance can occur via multiple mechanisms. A Not Detected result for antimicrobial resistance gene(s) does not indicate antimicrobial susceptibility. Subculturing is required for species identification and susceptibility testing of   isolates.        NDM (Carbapenemase ) N/A     Comment: Note: Antimicrobial resistance can occur via multiple mechanisms. A Not Detected result for antimicrobial resistance gene(s) does not indicate antimicrobial susceptibility. Subculturing is required for species identification and susceptibility testing of   isolates.        OXA-48-like (Carbapenemase ) N/A     Comment: Note: Antimicrobial resistance can occur via multiple mechanisms. A Not Detected result for antimicrobial resistance gene(s) does not indicate antimicrobial susceptibility. Subculturing is required for species identification and susceptibility testing of   isolates.        Kenia/B (VRE gene) N/A     Comment: Note: Antimicrobial resistance can occur via multiple mechanisms. A Not Detected result for antimicrobial resistance gene(s) does not indicate antimicrobial susceptibility. Subculturing is required for species identification and susceptibility  testing of   isolates.        VIM (Carbapenemase ) N/A     Comment: Note: Antimicrobial resistance can occur via multiple mechanisms. A Not Detected result for antimicrobial resistance gene(s) does not indicate antimicrobial susceptibility. Subculturing is required for species identification and susceptibility testing of   isolates.        Enterococcus faecalis Not Detected     Enterococcus faecium Not Detected     Listeria monocytogenes Not Detected     Staphylococcus spp. Detected     Staphylococcus aureus Detected     Staphylococcus epidermidis Not Detected     Staphylococcus lugdunensis Not Detected     Streptococcus spp. Not Detected     Streptococcus agalactiae (Group B) Not Detected     Streptococcus pneumoniae Not Detected     Streptococcus pyogenes (Group A) Not Detected     Acinetobacter calcoaceticus/baumannii complex Not Detected     Bacteroides fragilis Not Detected     Enterobacterales Not Detected     Enterobacter cloacae complex Not Detected     Escherichia coli Not Detected     Klebsiella aerogenes Not Detected     Klebsiella oxytoca Not Detected     Klebsiella pneumoniae group Not Detected     Proteus spp. Not Detected     Salmonella spp. Not Detected     Serratia marcescens Not Detected     Haemophilus influenzae Not Detected     Neisseria meningitidis Not Detected     Pseudomonas aeruginosa Not Detected     Stenotrophomonas maltophilia Not Detected     Candida albicans Not Detected     Candida auris Not Detected     Candida glabrata Not Detected     Candida krusei Not Detected     Candida parapsilosis Not Detected     Candida tropicalis Not Detected     Cryptococcus neoformans/gattii Not Detected    Narrative:      The [email or web address] BCID2 Panel is a multiplexed nucleic acid test intended for the use with [employer]® 2.0 or [employer]® [employer] Systems for the simultaneous qualitative detection and identification of multiple bacterial and yeast nucleic acids and select genetic  determinants associated with antimicrobial resistance.  The [employer] BCID2 Panel test is performed directly on blood culture samples identified as positive by a continuous monitoring blood culture system.  Results are intended to be interpreted in conjunction with Gram stain results.               Medications   DAPTOmycin (CUBICIN) IV (PEDS and ADULTS)  6 mg/kg Intravenous Q24H    enoxaparin  40 mg Subcutaneous Daily    furosemide (LASIX) injection  20 mg Intravenous Q12H    insulin detemir U-100  18 Units Subcutaneous QHS    Lactobacillus acidophilus  1 capsule Oral TID WM    multivitamin  1 tablet Oral Daily    piperacillin-tazobactam (ZOSYN) IVPB  4.5 g Intravenous Q8H    rifAMpin  300 mg Oral Q12H    spironolactone  25 mg Oral BID    thiamine  100 mg Oral Daily        acetaminophen, acetaminophen, aluminum-magnesium hydroxide-simethicone, dextrose 10%, dextrose 10%, glucagon (human recombinant), glucose, glucose, insulin aspart U-100, melatonin, ondansetron, oxyCODONE, polyethylene glycol, prochlorperazine, senna-docusate 8.6-50 mg, sodium chloride 0.9%, tiZANidine     Radiology:  CV Ultrasound doppler venous legs bilat  There was no evidence of deep or superficial vein thrombosis in bilateral   lower extremities.  A fluid collection measuring approximately 9 x 4 x 2 cm was identified in   the left proximal through mid calf.  There was no venous or arterial flow to this structure.          Assessment/Plan:      All diagnosis and differential diagnosis have been reviewed; assessment and plan has been documented; I have personally reviewed the labs and test results that are presently available; I have reviewed the patients medication list; I have reviewed the consulting providers response and recommendations. I have reviewed or attempted to review medical records based upon their availability.       Darnell Franco MD   05/08/2023

## 2023-05-09 LAB
ALBUMIN SERPL-MCNC: 1.3 G/DL (ref 3.4–4.8)
ALBUMIN/GLOB SERPL: 0.4 RATIO (ref 1.1–2)
ALP SERPL-CCNC: 82 UNIT/L (ref 40–150)
ALT SERPL-CCNC: 11 UNIT/L (ref 0–55)
AST SERPL-CCNC: 29 UNIT/L (ref 5–34)
BACTERIA BLD CULT: ABNORMAL
BASOPHILS # BLD AUTO: 0.09 X10(3)/MCL
BASOPHILS NFR BLD AUTO: 0.7 %
BILIRUBIN DIRECT+TOT PNL SERPL-MCNC: 1.1 MG/DL
BUN SERPL-MCNC: 16.4 MG/DL (ref 8.4–25.7)
CALCIUM SERPL-MCNC: 7.3 MG/DL (ref 8.8–10)
CHLORIDE SERPL-SCNC: 99 MMOL/L (ref 98–107)
CO2 SERPL-SCNC: 27 MMOL/L (ref 23–31)
CREAT SERPL-MCNC: 0.75 MG/DL (ref 0.73–1.18)
EOSINOPHIL # BLD AUTO: 0.08 X10(3)/MCL (ref 0–0.9)
EOSINOPHIL NFR BLD AUTO: 0.6 %
ERYTHROCYTE [DISTWIDTH] IN BLOOD BY AUTOMATED COUNT: 16.2 % (ref 11.5–17)
GFR SERPLBLD CREATININE-BSD FMLA CKD-EPI: >60 MLS/MIN/1.73/M2
GLOBULIN SER-MCNC: 3.5 GM/DL (ref 2.4–3.5)
GLUCOSE SERPL-MCNC: 103 MG/DL (ref 82–115)
GRAM STN SPEC: ABNORMAL
HCT VFR BLD AUTO: 27.8 % (ref 42–52)
HGB BLD-MCNC: 8.7 G/DL (ref 14–18)
IMM GRANULOCYTES # BLD AUTO: 0.09 X10(3)/MCL (ref 0–0.04)
IMM GRANULOCYTES NFR BLD AUTO: 0.7 %
LYMPHOCYTES # BLD AUTO: 1.28 X10(3)/MCL (ref 0.6–4.6)
LYMPHOCYTES NFR BLD AUTO: 9.3 %
MAGNESIUM SERPL-MCNC: 1.8 MG/DL (ref 1.6–2.6)
MCH RBC QN AUTO: 29.5 PG (ref 27–31)
MCHC RBC AUTO-ENTMCNC: 31.3 G/DL (ref 33–36)
MCV RBC AUTO: 94.2 FL (ref 80–94)
MONOCYTES # BLD AUTO: 0.41 X10(3)/MCL (ref 0.1–1.3)
MONOCYTES NFR BLD AUTO: 3 %
NEUTROPHILS # BLD AUTO: 11.81 X10(3)/MCL (ref 2.1–9.2)
NEUTROPHILS NFR BLD AUTO: 85.7 %
NRBC BLD AUTO-RTO: 0 %
PLATELET # BLD AUTO: 161 X10(3)/MCL (ref 130–400)
PMV BLD AUTO: 10.4 FL (ref 7.4–10.4)
POCT GLUCOSE: 103 MG/DL (ref 70–110)
POCT GLUCOSE: 172 MG/DL (ref 70–110)
POCT GLUCOSE: 267 MG/DL (ref 70–110)
POTASSIUM SERPL-SCNC: 3.6 MMOL/L (ref 3.5–5.1)
PROT SERPL-MCNC: 4.8 GM/DL (ref 5.8–7.6)
RBC # BLD AUTO: 2.95 X10(6)/MCL (ref 4.7–6.1)
SODIUM SERPL-SCNC: 135 MMOL/L (ref 136–145)
WBC # SPEC AUTO: 13.76 X10(3)/MCL (ref 4.5–11.5)

## 2023-05-09 PROCEDURE — 93010 ELECTROCARDIOGRAM REPORT: CPT | Mod: ,,, | Performed by: STUDENT IN AN ORGANIZED HEALTH CARE EDUCATION/TRAINING PROGRAM

## 2023-05-09 PROCEDURE — 97605 NEG PRS WND THER DME<=50SQCM: CPT

## 2023-05-09 PROCEDURE — 87040 BLOOD CULTURE FOR BACTERIA: CPT | Performed by: NURSE PRACTITIONER

## 2023-05-09 PROCEDURE — 25000003 PHARM REV CODE 250: Performed by: INTERNAL MEDICINE

## 2023-05-09 PROCEDURE — 27000207 HC ISOLATION

## 2023-05-09 PROCEDURE — 63600175 PHARM REV CODE 636 W HCPCS: Performed by: INTERNAL MEDICINE

## 2023-05-09 PROCEDURE — 93010 EKG 12-LEAD: ICD-10-PCS | Mod: ,,, | Performed by: STUDENT IN AN ORGANIZED HEALTH CARE EDUCATION/TRAINING PROGRAM

## 2023-05-09 PROCEDURE — 25000003 PHARM REV CODE 250: Performed by: STUDENT IN AN ORGANIZED HEALTH CARE EDUCATION/TRAINING PROGRAM

## 2023-05-09 PROCEDURE — 93005 ELECTROCARDIOGRAM TRACING: CPT

## 2023-05-09 PROCEDURE — 80053 COMPREHEN METABOLIC PANEL: CPT | Performed by: STUDENT IN AN ORGANIZED HEALTH CARE EDUCATION/TRAINING PROGRAM

## 2023-05-09 PROCEDURE — 21400001 HC TELEMETRY ROOM

## 2023-05-09 PROCEDURE — 85025 COMPLETE CBC W/AUTO DIFF WBC: CPT | Performed by: STUDENT IN AN ORGANIZED HEALTH CARE EDUCATION/TRAINING PROGRAM

## 2023-05-09 PROCEDURE — 83735 ASSAY OF MAGNESIUM: CPT | Performed by: STUDENT IN AN ORGANIZED HEALTH CARE EDUCATION/TRAINING PROGRAM

## 2023-05-09 PROCEDURE — 63600175 PHARM REV CODE 636 W HCPCS: Performed by: STUDENT IN AN ORGANIZED HEALTH CARE EDUCATION/TRAINING PROGRAM

## 2023-05-09 RX ADMIN — HYDROCODONE BITARTRATE AND ACETAMINOPHEN 1 TABLET: 7.5; 325 TABLET ORAL at 01:05

## 2023-05-09 RX ADMIN — INSULIN DETEMIR 12 UNITS: 100 INJECTION, SOLUTION SUBCUTANEOUS at 09:05

## 2023-05-09 RX ADMIN — FUROSEMIDE 20 MG: 10 INJECTION, SOLUTION INTRAMUSCULAR; INTRAVENOUS at 05:05

## 2023-05-09 RX ADMIN — THERA TABS 1 TABLET: TAB at 09:05

## 2023-05-09 RX ADMIN — SPIRONOLACTONE 25 MG: 25 TABLET ORAL at 09:05

## 2023-05-09 RX ADMIN — HYDROCODONE BITARTRATE AND ACETAMINOPHEN 1 TABLET: 7.5; 325 TABLET ORAL at 11:05

## 2023-05-09 RX ADMIN — RIFAMPIN 300 MG: 300 CAPSULE ORAL at 09:05

## 2023-05-09 RX ADMIN — Medication 1 CAPSULE: at 09:05

## 2023-05-09 RX ADMIN — FUROSEMIDE 20 MG: 10 INJECTION, SOLUTION INTRAMUSCULAR; INTRAVENOUS at 06:05

## 2023-05-09 RX ADMIN — PIPERACILLIN AND TAZOBACTAM 4.5 G: 4; .5 INJECTION, POWDER, LYOPHILIZED, FOR SOLUTION INTRAVENOUS; PARENTERAL at 09:05

## 2023-05-09 RX ADMIN — HYDROCODONE BITARTRATE AND ACETAMINOPHEN 1 TABLET: 7.5; 325 TABLET ORAL at 09:05

## 2023-05-09 RX ADMIN — DAPTOMYCIN 435 MG: 500 INJECTION, POWDER, LYOPHILIZED, FOR SOLUTION INTRAVENOUS at 05:05

## 2023-05-09 RX ADMIN — HYDROCODONE BITARTRATE AND ACETAMINOPHEN 1 TABLET: 7.5; 325 TABLET ORAL at 02:05

## 2023-05-09 RX ADMIN — PIPERACILLIN AND TAZOBACTAM 4.5 G: 4; .5 INJECTION, POWDER, LYOPHILIZED, FOR SOLUTION INTRAVENOUS; PARENTERAL at 05:05

## 2023-05-09 RX ADMIN — THIAMINE HCL TAB 100 MG 100 MG: 100 TAB at 09:05

## 2023-05-09 RX ADMIN — HYDROCODONE BITARTRATE AND ACETAMINOPHEN 1 TABLET: 7.5; 325 TABLET ORAL at 06:05

## 2023-05-09 RX ADMIN — HYDROCODONE BITARTRATE AND ACETAMINOPHEN 1 TABLET: 7.5; 325 TABLET ORAL at 05:05

## 2023-05-09 RX ADMIN — PIPERACILLIN AND TAZOBACTAM 4.5 G: 4; .5 INJECTION, POWDER, LYOPHILIZED, FOR SOLUTION INTRAVENOUS; PARENTERAL at 01:05

## 2023-05-09 RX ADMIN — ENOXAPARIN SODIUM 40 MG: 40 INJECTION SUBCUTANEOUS at 05:05

## 2023-05-09 RX ADMIN — Medication 1 CAPSULE: at 05:05

## 2023-05-09 NOTE — PROGRESS NOTES
[facility] - 8th Floor Med Surg  Neurosurgery  Progress Note    Subjective:     Interval History: No acute events overnight.  Receiving a bed bath at this time.  Unchanged exam.  No complaints of new weakness.  Awaiting completion of MRI and Infectious Disease recommendations.    62-year-old male with significant history of polysubstance abuse/IV drug abuse, type 2 diabetes mellitus, untreated chronic hep C/cirrhosis.  Patient had a recent hospitalization to [facility] for MRSA bacteremia with MRSA C and L-spine osteomyelitis, diskitis, bilateral psoas and paraspinous muscle abscess as well as lumbar epidural abscess.  Patient was being treated in [facility] with close follow-up with neurosurgery, Infectious Disease. Patient underwent lumbar laminectomy with I&D with Dr. Chang on 4/21. Hospital stay also was eventful for cholecystitis for which he underwent cholecystostomy tube placement.  Patient signed out AMA from [facility] on 05/05, went home, called ambulance and presented to the hospital.  Per chart review patient was being treated with daptomycin and rifampin in [facility].  While hospitalized his urine drug screen was positive for amphetamines and there were concerns that he was using it while hospitalized.  Patient was tachycardic in the ED.  Otherwise hemodynamics stable.  Complaining of bilateral lower extremity swelling which is interfering with movement.  Lab significant for leukocytosis, anemia, hyperglycemia.  UDS was positive for opiates, amphetamines.  Hospitalist team was consulted for admission. Dr. Rosa was consulted for evaluation and treatment recommendations regarding his recent lumbar surgery.    CT lumbar spine with and without contrast 05/06/2023:  FINDINGS:   Patient is status post wide decompression laminectomies from L1 to L5.  There are extensive posterior paraspinal soft inflammations and fluid collection containing several air locules.   The fluid collection with peripheral thin enhancement broadly abuts the thecal sac is from the superior endplate of L2 the inferior endplate of L5 with craniocaudal span of 8.5 cm and shows maximum anterior-posterior diameter of 2.5 cm and transverse diameter of 3.8 cm.  There is also left intrathecal small air locule on image 83 series 3.  These findings may represent postsurgical seroma or hematoma without exclusion of infected collection.  There are no lumbar vertebrae disc spaces irregularity or osteolytic destructive changes.  Several Schmorl nodes are seen involving lumbar vertebrae and the most conspicuous is of L3.  No acute fracture or malalignment.  Disc segmental analysis is given below:     At L1-L2, disc height is preserved.  Bilateral facet arthropathy without significant central canal stenosis.  There are no narrowings of the neural foramen.     At L2-L3, there is osteophyte disc complex which indents the ventral thecal sac.  Thecal sac is patent with decompression laminectomies.  There is mild narrowing of the right neural foramen caused by uncinate arthropathy and there is moderate narrowing of the left neural foramen.     At L3-L4, there is broad osteophyte disc complex which indents the ventral thecal sac.  Right paracentral spur like growth from vertebral body effaces the right lateral recess with compression upon the right exiting nerve root.  There also bilateral moderate spondylotic narrowings of the neural foramen.     At L4-L5, there is broad disc bulge which indents the ventral thecal sac.  Central canal is not stenosed with decompression laminectomies.  There is bilateral uncovertebral and facet arthropathy which encroaches onto the neural foramen.  There is mild narrowing of the right neural foramen and moderate narrowing of the left neural canal.     At L5-S1, there is broad posterior vertebral spondylosis.  Central canal is not stenosed.  There are no significant narrowings of the  neural foramen    Impression:       1. Multiple posterior lumbar decompression laminectomies.  Posterior paraspinal fluid collection.  This may represent postsurgical seroma or hematoma without exclusion of infected collection.     2. Lumbar degenerative disc disease and spondylosis level by level discussed above.          Post-Op Info:  Procedure(s) (LRB):  DEBRIDEMENT, WOUND, SACRUM (N/A)          Medications:  Continuous Infusions:  Scheduled Meds:   DAPTOmycin (CUBICIN) IV (PEDS and ADULTS)  6 mg/kg Intravenous Q24H    enoxaparin  40 mg Subcutaneous Daily    furosemide (LASIX) injection  20 mg Intravenous Q12H    insulin detemir U-100  12 Units Subcutaneous QHS    Lactobacillus acidophilus  1 capsule Oral TID WM    multivitamin  1 tablet Oral Daily    piperacillin-tazobactam (ZOSYN) IVPB  4.5 g Intravenous Q8H    rifAMpin  300 mg Oral Q12H    spironolactone  25 mg Oral BID    thiamine  100 mg Oral Daily     PRN Meds:aluminum-magnesium hydroxide-simethicone, dextrose 10%, dextrose 10%, glucagon (human recombinant), glucose, glucose, HYDROcodone-acetaminophen, insulin aspart U-100, melatonin, ondansetron, polyethylene glycol, prochlorperazine, senna-docusate 8.6-50 mg, sodium chloride 0.9%, tiZANidine     Review of Systems  Objective:     Weight: 72.6 kg (160 lb)  Body mass index is 25.06 kg/m².  Vital Signs (Most Recent):  Temp: 98.3 °F (36.8 °C) (05/09/23 0753)  Pulse: (!) 114 (05/09/23 0753)  Resp: 18 (05/09/23 0954)  BP: 122/80 (05/09/23 0954)  SpO2: (!) 91 % (05/09/23 0753) Vital Signs (24h Range):  Temp:  [98.1 °F (36.7 °C)-100.6 °F (38.1 °C)] 98.3 °F (36.8 °C)  Pulse:  [108-116] 114  Resp:  [16-27] 18  SpO2:  [91 %-94 %] 91 %  BP: (122-133)/(75-83) 122/80                              Drain/Device    Right lower flank (Active)   Insertion Site clean and dry 05/07/23 2054   Drainage Characteristics/Odor Brown 05/07/23 2054   Drainage Amount Large 05/07/23 2054   General Output (mL) 80 05/08/23 0623        Neurosurgery Physical Exam  AFVSS  PERRLA bilateral.  No focal neurological deficits.  Full strength bilateral UE  LE weakness unchanged from previous  Serous drainage from lumbar incision.  Sacral wound unchanged    Significant Labs:  Recent Labs   Lab 05/08/23  0355 05/09/23 0358   * 135*   K 4.8 3.6   CO2 29 27   BUN 18.6 16.4   CREATININE 0.76 0.75   CALCIUM 7.0* 7.3*   MG  --  1.80       Recent Labs   Lab 05/08/23 0625 05/09/23 0358   WBC 14.06* 13.76*   HGB 8.8* 8.7*   HCT 27.9* 27.8*    161       No results for input(s): LABPT, INR, APTT in the last 48 hours.  Microbiology Results (last 7 days)       Procedure Component Value Units Date/Time    Blood culture #1 **CANNOT BE ORDERED STAT** [[id number]]  (Abnormal)  (Susceptibility) Collected: 05/05/23 2109    Order Status: Resulted Specimen: Blood Updated: 05/09/23 0717     CULTURE, BLOOD ([facility]) Methicillin resistant Staphylococcus aureus     GRAM STAIN Gram Positive Cocci, probable Staphylococcus      Seen in gram stain of broth only      2 of 2 bottles positive    Blood culture #2 **CANNOT BE ORDERED STAT** [[id number]]  (Abnormal)  (Susceptibility) Collected: 05/05/23 2109    Order Status: Completed Specimen: Blood Updated: 05/09/23 0716     CULTURE, BLOOD ([facility]) Methicillin resistant Staphylococcus aureus     GRAM STAIN Gram Positive Cocci, probable Staphylococcus      Seen in gram stain of broth only      1 of 2 Anaerobic bottles positive    Urine culture [[id number]]  (Abnormal) Collected: 05/06/23 0605    Order Status: Completed Specimen: Urine Updated: 05/08/23 0910     Urine Culture 10,000 - 25,000 colonies/ml Candida albicans    BCID2 Panel [[id number]]  (Abnormal) Collected: 05/05/23 2109    Order Status: Completed Specimen: Blood Updated: 05/07/23 0142     CTX-M (ESBL ) N/A     Comment: Note: Antimicrobial resistance can occur via multiple mechanisms. A Not Detected result for antimicrobial resistance gene(s) does not  indicate antimicrobial susceptibility. Subculturing is required for species identification and susceptibility testing of   isolates.        IMP (Cabapenemase ) N/A     Comment: Note: Antimicrobial resistance can occur via multiple mechanisms. A Not Detected result for antimicrobial resistance gene(s) does not indicate antimicrobial susceptibility. Subculturing is required for species identification and susceptibility testing of   isolates.        KPC resistance gene (Carbapenemase ) N/A     Comment: Note: Antimicrobial resistance can occur via multiple mechanisms. A Not Detected result for antimicrobial resistance gene(s) does not indicate antimicrobial susceptibility. Subculturing is required for species identification and susceptibility testing of   isolates.        mcr-1 N/A     Comment: Note: Antimicrobial resistance can occur via multiple mechanisms. A Not Detected result for antimicrobial resistance gene(s) does not indicate antimicrobial susceptibility. Subculturing is required for species identification and susceptibility testing of   isolates.        mecA ID N/A     Comment: Note: Antimicrobial resistance can occur via multiple mechanisms. A Not Detected result for antimicrobial resistance gene(s) does not indicate antimicrobial susceptibility. Subculturing is required for species identification and susceptibility testing of   isolates.        mecA/C and MREJ (MRSA) gene Detected     Comment: Note: Antimicrobial resistance can occur via multiple mechanisms. A Not Detected result for antimicrobial resistance gene(s) does not indicate antimicrobial susceptibility. Subculturing is required for species identification and susceptibility testing of   isolates.        NDM (Carbapenemase ) N/A     Comment: Note: Antimicrobial resistance can occur via multiple mechanisms. A Not Detected result for antimicrobial resistance gene(s) does not indicate antimicrobial susceptibility. Subculturing is  required for species identification and susceptibility testing of   isolates.        OXA-48-like (Carbapenemase ) N/A     Comment: Note: Antimicrobial resistance can occur via multiple mechanisms. A Not Detected result for antimicrobial resistance gene(s) does not indicate antimicrobial susceptibility. Subculturing is required for species identification and susceptibility testing of   isolates.        Kenia/B (VRE gene) N/A     Comment: Note: Antimicrobial resistance can occur via multiple mechanisms. A Not Detected result for antimicrobial resistance gene(s) does not indicate antimicrobial susceptibility. Subculturing is required for species identification and susceptibility testing of   isolates.        VIM (Carbapenemase ) N/A     Comment: Note: Antimicrobial resistance can occur via multiple mechanisms. A Not Detected result for antimicrobial resistance gene(s) does not indicate antimicrobial susceptibility. Subculturing is required for species identification and susceptibility testing of   isolates.        Enterococcus faecalis Not Detected     Enterococcus faecium Not Detected     Listeria monocytogenes Not Detected     Staphylococcus spp. Detected     Staphylococcus aureus Detected     Staphylococcus epidermidis Not Detected     Staphylococcus lugdunensis Not Detected     Streptococcus spp. Not Detected     Streptococcus agalactiae (Group B) Not Detected     Streptococcus pneumoniae Not Detected     Streptococcus pyogenes (Group A) Not Detected     Acinetobacter calcoaceticus/baumannii complex Not Detected     Bacteroides fragilis Not Detected     Enterobacterales Not Detected     Enterobacter cloacae complex Not Detected     Escherichia coli Not Detected     Klebsiella aerogenes Not Detected     Klebsiella oxytoca Not Detected     Klebsiella pneumoniae group Not Detected     Proteus spp. Not Detected     Salmonella spp. Not Detected     Serratia marcescens Not Detected     Haemophilus  influenzae Not Detected     Neisseria meningitidis Not Detected     Pseudomonas aeruginosa Not Detected     Stenotrophomonas maltophilia Not Detected     Candida albicans Not Detected     Candida auris Not Detected     Candida glabrata Not Detected     Candida krusei Not Detected     Candida parapsilosis Not Detected     Candida tropicalis Not Detected     Cryptococcus neoformans/gattii Not Detected    Narrative:      The [employer] BCID2 Panel is a multiplexed nucleic acid test intended for the use with BlueYield® 2.0 or BlueYield® [employer] Systems for the simultaneous qualitative detection and identification of multiple bacterial and yeast nucleic acids and select genetic determinants associated with antimicrobial resistance.  The [employer] BCID2 Panel test is performed directly on blood culture samples identified as positive by a continuous monitoring blood culture system.  Results are intended to be interpreted in conjunction with Gram stain results.            Significant Diagnostics:      Assessment/Plan:    Await MRI   Await ID consultation   PTOT  Pain control  SCDs   Debridement of sacral wound in the operating room on Wednesday.\  Continue supportive measures   Further recommendations once MRI complete.       Active Diagnoses:    Diagnosis Date Noted POA    PRINCIPAL PROBLEM:  Sepsis [A41.9] 05/05/2023 Yes    Staphylococcus aureus bacteremia [R78.81, B95.61] 05/05/2023 Unknown      Problems Resolved During this Admission:         Cheryl Aly [facility]-BC  Neurosurgery  [facility] - 8th Floor Med Surg

## 2023-05-09 NOTE — PROGRESS NOTES
[facility] Hospital Medicine Progress Note        Chief Complaint: Inpatient Follow-up for sepsis    Subjective:  History:  62-year-old male with significant history of polysubstance abuse/IV drug abuse, type 2 diabetes mellitus, untreated chronic hep C/cirrhosis.  Patient had a recent hospitalization to [facility] for MRSA bacteremia with MRSA C and L-spine osteomyelitis, diskitis, bilateral psoas and paraspinous muscle abscess as well as lumbar epidural abscess.  Patient was being treated in [facility] with close follow-up with neurosurgery, Infectious Disease.  Hospital stay also was even full for cholecystitis for which he underwent cholecystostomy tube placement.  Patient signed out AMA from [facility] on 05/05, went home, called ambulance and presented to the hospital.  Per chart review patient was being treated with daptomycin and rifampin in [facility].  While hospitalized his urine drug screen was positive for amphetamines and there were concerns that he was using it while hospitalized.  Patient was tachycardic in the ED.  Otherwise hemodynamics stable.  Complaining of bilateral lower extremity swelling which is interfering with movement.  Lab significant for leukocytosis, anemia, hyperglycemia.  UDS was positive for opiates, amphetamines.  Hospitalist team was consulted for admission.    Sugars are better today, patient is doing well on antibiotics pending debridement.    General appearance: Thin, chronically ill-appearing  male in no apparent distress.  HENT: Atraumatic head. Moist mucous membranes of oral cavity.  Eyes: Normal extraocular movements.   Neck: Supple.   Lungs: Clear to auscultation bilaterally. Tachypnea   Heart: Regular rate and rhythm. S1 and S2 present. No pedal edema.  Abdomen: Soft, non-distended, non-tender. RUQ Drain  Extremities:  1+ pitting pedal edema   Skin: No Rash.   Neuro:  Moving upper extremity without any  difficulty, patient reports decreased range of motion lower extremity secondary to swelling   Psych/mental status: Appropriate mood and affect. Responds appropriately to questions    Recent MRSA bacteremia   Recent MRSA cervical, lumbar spine osteomyelitis with bilateral psoas, paraspinal muscle abscess as well as lumbar epidural abscess status post drainage, laminectomy  Recent cholecystitis status post cholecystostomy tube placement   Sepsis secondary to all the above   Medical noncompliance   Polysubstance abuse/IV drug abuse   Decompensated cirrhosis with volume overload   Untreated chronic hep C   Type 2 diabetes mellitus with hyperglycemia  Anemia of chronic disease  Prophylaxis       -Will decrease the insulin detemir to 12 units again giving that he could not tolerate increase.    -Waiting for MRI.  Patient was receiving daptomycin, rifampin in outlying facility for MRSA bacteremia with lumbar spinal infection   - Plan for debridement in Wednesday by surgical team.  Patient has cholecystostomy tube in place   Zosyn pending General surgery recommendations  Repeated ultrasound abdomen-no acute events noted   Concern for volume overload related to decompensated cirrhosis   Pending echocardiogram   Venous ultrasound bilateral lower extremity.  IV Lasix 20 mg b.i.d.  Aldactone 25 mg b.i.d.  Monitor strict Is&Os  Levemir, sliding scale for diabetes mellitus  Continue probiotic, multivitami ,  Anemic, hemoglobin is more than 8   No overt bleeding   Check iron panel  DVT prophylaxis-Lovenox    VITAL SIGNS: 24 HRS MIN & MAX LAST   Temp  Min: 98.1 °F (36.7 °C)  Max: 100.6 °F (38.1 °C) 98.4 °F (36.9 °C)   BP  Min: 122/80  Max: 133/81 131/88   Pulse  Min: 108  Max: 122  (!) 122   Resp  Min: 16  Max: 27 20   SpO2  Min: 91 %  Max: 95 % 95 %       Labs, Microbiology and Imaging were Reviewed.      Microbiology Results (last 7 days)       Procedure Component Value Units Date/Time    Blood culture #1 **CANNOT BE ORDERED  STAT** [[id number]]  (Abnormal)  (Susceptibility) Collected: 05/05/23 2109    Order Status: Resulted Specimen: Blood Updated: 05/09/23 0717     CULTURE, BLOOD ([facility]) Methicillin resistant Staphylococcus aureus     GRAM STAIN Gram Positive Cocci, probable Staphylococcus      Seen in gram stain of broth only      2 of 2 bottles positive    Blood culture #2 **CANNOT BE ORDERED STAT** [[id number]]  (Abnormal)  (Susceptibility) Collected: 05/05/23 2109    Order Status: Completed Specimen: Blood Updated: 05/09/23 0716     CULTURE, BLOOD ([facility]) Methicillin resistant Staphylococcus aureus     GRAM STAIN Gram Positive Cocci, probable Staphylococcus      Seen in gram stain of broth only      1 of 2 Anaerobic bottles positive    Urine culture [[id number]]  (Abnormal) Collected: 05/06/23 0605    Order Status: Completed Specimen: Urine Updated: 05/08/23 0910     Urine Culture 10,000 - 25,000 colonies/ml Candida albicans    BCID2 Panel [[id number]]  (Abnormal) Collected: 05/05/23 2109    Order Status: Completed Specimen: Blood Updated: 05/07/23 0142     CTX-M (ESBL ) N/A     Comment: Note: Antimicrobial resistance can occur via multiple mechanisms. A Not Detected result for antimicrobial resistance gene(s) does not indicate antimicrobial susceptibility. Subculturing is required for species identification and susceptibility testing of   isolates.        IMP (Cabapenemase ) N/A     Comment: Note: Antimicrobial resistance can occur via multiple mechanisms. A Not Detected result for antimicrobial resistance gene(s) does not indicate antimicrobial susceptibility. Subculturing is required for species identification and susceptibility testing of   isolates.        KPC resistance gene (Carbapenemase ) N/A     Comment: Note: Antimicrobial resistance can occur via multiple mechanisms. A Not Detected result for antimicrobial resistance gene(s) does not indicate antimicrobial susceptibility. Subculturing is required for  species identification and susceptibility testing of   isolates.        mcr-1 N/A     Comment: Note: Antimicrobial resistance can occur via multiple mechanisms. A Not Detected result for antimicrobial resistance gene(s) does not indicate antimicrobial susceptibility. Subculturing is required for species identification and susceptibility testing of   isolates.        mecA ID N/A     Comment: Note: Antimicrobial resistance can occur via multiple mechanisms. A Not Detected result for antimicrobial resistance gene(s) does not indicate antimicrobial susceptibility. Subculturing is required for species identification and susceptibility testing of   isolates.        mecA/C and MREJ (MRSA) gene Detected     Comment: Note: Antimicrobial resistance can occur via multiple mechanisms. A Not Detected result for antimicrobial resistance gene(s) does not indicate antimicrobial susceptibility. Subculturing is required for species identification and susceptibility testing of   isolates.        NDM (Carbapenemase ) N/A     Comment: Note: Antimicrobial resistance can occur via multiple mechanisms. A Not Detected result for antimicrobial resistance gene(s) does not indicate antimicrobial susceptibility. Subculturing is required for species identification and susceptibility testing of   isolates.        OXA-48-like (Carbapenemase ) N/A     Comment: Note: Antimicrobial resistance can occur via multiple mechanisms. A Not Detected result for antimicrobial resistance gene(s) does not indicate antimicrobial susceptibility. Subculturing is required for species identification and susceptibility testing of   isolates.        Kenia/B (VRE gene) N/A     Comment: Note: Antimicrobial resistance can occur via multiple mechanisms. A Not Detected result for antimicrobial resistance gene(s) does not indicate antimicrobial susceptibility. Subculturing is required for species identification and susceptibility testing of   isolates.         VIM (Carbapenemase ) N/A     Comment: Note: Antimicrobial resistance can occur via multiple mechanisms. A Not Detected result for antimicrobial resistance gene(s) does not indicate antimicrobial susceptibility. Subculturing is required for species identification and susceptibility testing of   isolates.        Enterococcus faecalis Not Detected     Enterococcus faecium Not Detected     Listeria monocytogenes Not Detected     Staphylococcus spp. Detected     Staphylococcus aureus Detected     Staphylococcus epidermidis Not Detected     Staphylococcus lugdunensis Not Detected     Streptococcus spp. Not Detected     Streptococcus agalactiae (Group B) Not Detected     Streptococcus pneumoniae Not Detected     Streptococcus pyogenes (Group A) Not Detected     Acinetobacter calcoaceticus/baumannii complex Not Detected     Bacteroides fragilis Not Detected     Enterobacterales Not Detected     Enterobacter cloacae complex Not Detected     Escherichia coli Not Detected     Klebsiella aerogenes Not Detected     Klebsiella oxytoca Not Detected     Klebsiella pneumoniae group Not Detected     Proteus spp. Not Detected     Salmonella spp. Not Detected     Serratia marcescens Not Detected     Haemophilus influenzae Not Detected     Neisseria meningitidis Not Detected     Pseudomonas aeruginosa Not Detected     Stenotrophomonas maltophilia Not Detected     Candida albicans Not Detected     Candida auris Not Detected     Candida glabrata Not Detected     Candida krusei Not Detected     Candida parapsilosis Not Detected     Candida tropicalis Not Detected     Cryptococcus neoformans/gattii Not Detected    Narrative:      The [employer] BCID2 Panel is a multiplexed nucleic acid test intended for the use with [employer]® 2.0 or [employer]ol[employer]® [employer] Systems for the simultaneous qualitative detection and identification of multiple bacterial and yeast nucleic acids and select genetic determinants associated with  antimicrobial resistance.  The [employer] BCID2 Panel test is performed directly on blood culture samples identified as positive by a continuous monitoring blood culture system.  Results are intended to be interpreted in conjunction with Gram stain results.               Medications   DAPTOmycin (CUBICIN) IV (PEDS and ADULTS)  6 mg/kg Intravenous Q24H    enoxaparin  40 mg Subcutaneous Daily    furosemide (LASIX) injection  20 mg Intravenous Q12H    insulin detemir U-100  12 Units Subcutaneous QHS    Lactobacillus acidophilus  1 capsule Oral TID WM    multivitamin  1 tablet Oral Daily    piperacillin-tazobactam (ZOSYN) IVPB  4.5 g Intravenous Q8H    rifAMpin  300 mg Oral Q12H    spironolactone  25 mg Oral BID    thiamine  100 mg Oral Daily        aluminum-magnesium hydroxide-simethicone, dextrose 10%, dextrose 10%, glucagon (human recombinant), glucose, glucose, HYDROcodone-acetaminophen, insulin aspart U-100, melatonin, ondansetron, polyethylene glycol, prochlorperazine, senna-docusate 8.6-50 mg, sodium chloride 0.9%, tiZANidine     Radiology:  CV Ultrasound doppler venous legs bilat  There was no evidence of deep or superficial vein thrombosis in bilateral   lower extremities.  A fluid collection measuring approximately 9 x 4 x 2 cm was identified in   the left proximal through mid calf.  There was no venous or arterial flow to this structure.          Assessment/Plan:      All diagnosis and differential diagnosis have been reviewed; assessment and plan has been documented; I have personally reviewed the labs and test results that are presently available; I have reviewed the patients medication list; I have reviewed the consulting providers response and recommendations. I have reviewed or attempted to review medical records based upon their availability.       Darnell Franco MD   05/09/2023

## 2023-05-09 NOTE — PROGRESS NOTES
[facility] - 8th Floor Med Surg  Wound Care    Patient Name:  Reji Plasencia   MRN:  [id number]  Date: 5/9/2023  Diagnosis: Sepsis    History:     No past medical history on file.    Social History     Socioeconomic History    Marital status: Single       Precautions:     Allergies as of 05/05/2023    (No Known Allergies)       WO Assessment Details/Treatment        05/09/23 1113        Incision/Site 05/06/23 0450 Thoracic spine medial   Date First Assessed/Time First Assessed: 05/06/23 0450   Present Prior to Hospital Arrival?: Yes  Location: Thoracic spine  Orientation: medial  Additional Comments: from lumbar laminectomy at [facility]   Wound Image    Dressing Appearance Dry;Intact;Clean   Drainage Amount Small   Drainage Characteristics/Odor Serous   Appearance Sutures intact;Moist   Periwound Area Redness   Wound Edges Irregular   Wound Length (cm) 16 cm   Wound Width (cm) 0.2 cm   Wound Surface Area (cm^2) 3.2 cm^2   Care Cleansed with:;Antimicrobial agent   Dressing Applied        Negative Pressure Wound Therapy  05/09/23 1000   Placement Date/Time: 05/09/23 1000   Location: Thoracic spine   NPWT Type Incision Management   Therapy Setting NPWT Continuous therapy   Pressure Setting NPWT 125 mmHg   Sponges Inserted NPWT Black     WOCN consulted for wound vac placement to thoracic incision by Neuro. Patient tolerated well no distress noted. Excellent seal noted at 125 mm /hg continuous. Will follow up.     05/09/2023

## 2023-05-09 NOTE — PLAN OF CARE
Problem: Adult Inpatient Plan of Care  Goal: Plan of Care Review  Outcome: Ongoing, Progressing  Goal: Patient-Specific Goal (Individualized)  Outcome: Ongoing, Progressing  Goal: Absence of Hospital-Acquired Illness or Injury  Outcome: Ongoing, Progressing  Goal: Optimal Comfort and Wellbeing  Outcome: Ongoing, Progressing  Goal: Readiness for Transition of Care  Outcome: Ongoing, Progressing     Problem: Skin Injury Risk Increased  Goal: Skin Health and Integrity  Outcome: Ongoing, Progressing     Problem: Adjustment to Illness (Sepsis/Septic Shock)  Goal: Optimal Coping  Outcome: Ongoing, Progressing     Problem: Bleeding (Sepsis/Septic Shock)  Goal: Absence of Bleeding  Outcome: Ongoing, Progressing     Problem: Glycemic Control Impaired (Sepsis/Septic Shock)  Goal: Blood Glucose Level Within Desired Range  Outcome: Ongoing, Progressing     Problem: Infection Progression (Sepsis/Septic Shock)  Goal: Absence of Infection Signs and Symptoms  Outcome: Ongoing, Progressing     Problem: Nutrition Impaired (Sepsis/Septic Shock)  Goal: Optimal Nutrition Intake  Outcome: Ongoing, Progressing     Problem: Impaired Wound Healing  Goal: Optimal Wound Healing  Outcome: Ongoing, Progressing     Problem: Fall Injury Risk  Goal: Absence of Fall and Fall-Related Injury  Outcome: Ongoing, Progressing     Problem: Infection  Goal: Absence of Infection Signs and Symptoms  Outcome: Ongoing, Progressing

## 2023-05-09 NOTE — PROGRESS NOTES
Plan for sacral decubitus debridement on 5/10  Continue lovenox perioperatively  Reviewed Meenu tube issue.    Needs to keep in place for 4 more weeks.  Can follow up with physicians from [facility] regarding that upon discharge.    NPO p MN for surgery

## 2023-05-09 NOTE — CONSULTS
Acute Care Surgery   History and Physical    Patient Name: Reji Plasencia  YOB: 1960  Date: 05/08/2023 7:17 PM  Date of Admission: 5/5/2023  HD#3  POD#* No surgery date entered *    PRESENTING HISTORY   Chief Complaint/Reason for Admission: Sepsis    History of Present Illness:Reji Plasencia is a 62 y.o. male with a PMHx of DM2, untreated hepatitis-C, cirrhosis, IV drug use, MRSA bacteremia, MRSA C-Spine / L-Spine osteomyelitis/discitis with B/L psoas and paraspinous muscle abscesses as well as lumbar epidural abscess who presented to [facility] on 5/5/2023 after leaving AMA from [facility]. While at [facility] he developed cholecystitis and had a percutaneous cholecystostomy tube placed. General surgery consulted yesterday for cholecystostomy tube recs and now consulted today for sacral decubitus wound. Per patient he developed the wound while at [facility]. He does have sensation in the area.        Review of Systems:  12 point ROS negative except as stated in HPI    PAST HISTORY:     Social history:  Social History     Socioeconomic History    Marital status: Single     Social History     Tobacco Use   Smoking Status Not on file   Smokeless Tobacco Not on file      Social History     Substance and Sexual Activity   Alcohol Use None        MEDICATIONS & ALLERGIES:     No current facility-administered medications on file prior to encounter.     Current Outpatient Medications on File Prior to Encounter   Medication Sig    HYDROcodone-acetaminophen (NORCO)  mg per tablet Take 1 tablet by mouth every 6 (six) hours as needed for Pain (severe pain only).       Allergies: Review of patient's allergies indicates:  No Known Allergies    Scheduled Meds:   DAPTOmycin (CUBICIN) IV (PEDS and ADULTS)  6 mg/kg Intravenous Q24H    enoxaparin  40 mg Subcutaneous Daily    furosemide (LASIX) injection  20 mg Intravenous Q12H    insulin detemir U-100  12 Units Subcutaneous QHS    Lactobacillus acidophilus  1 capsule Oral TID  "WM    multivitamin  1 tablet Oral Daily    piperacillin-tazobactam (ZOSYN) IVPB  4.5 g Intravenous Q8H    rifAMpin  300 mg Oral Q12H    spironolactone  25 mg Oral BID    thiamine  100 mg Oral Daily       Continuous Infusions:    PRN Meds:aluminum-magnesium hydroxide-simethicone, dextrose 10%, dextrose 10%, glucagon (human recombinant), glucose, glucose, HYDROcodone-acetaminophen, insulin aspart U-100, melatonin, ondansetron, polyethylene glycol, prochlorperazine, senna-docusate 8.6-50 mg, sodium chloride 0.9%, tiZANidine    OBJECTIVE:   Vital Signs:  VITAL SIGNS: 24 HR MIN & MAX LAST   Temp  Min: 98.1 °F (36.7 °C)  Max: 100.6 °F (38.1 °C)  (!) 100.6 °F (38.1 °C)   BP  Min: 114/69  Max: 131/81  131/81    Pulse  Min: 102  Max: 133  110    Resp  Min: 14  Max: 27  (!) 27    SpO2  Min: 90 %  Max: 95 %  (!) 92 %      HT: 5' 7" (170.2 cm)  WT: 72.6 kg (160 lb)  BMI: 25.1     Intake/output:  Intake/Output - Last 3 Shifts         05/06 0700  05/07 0659 05/07 0700  05/08 0659 05/08 0700  05/09 0659    P.O. 240 1080     Total Intake(mL/kg) 240 (3.3) 1080 (14.9)     Urine (mL/kg/hr) 1600 (0.9) 1550 (0.9)     Other 100 180     Stool 0 0     Total Output 1700 1730     Net -1460 -650            Urine Occurrence  2 x     Stool Occurrence 1 x 1 x             Intake/Output Summary (Last 24 hours) at 5/8/2023 1917  Last data filed at 5/8/2023 0623  Gross per 24 hour   Intake 480 ml   Output 180 ml   Net 300 ml       General: Well developed, well nourished, no acute distress  HEENT: Normocephalic, atraumatic, PERRL  CV: RRR  Resp: NWOB  GI:  Abdomen soft, non-tender, non-distended, no guarding. Cholecystostomy tube in place with bilious fluid in bag   :  Deferred  MSK: No muscle atrophy, cyanosis, peripheral edema, moving all extremities spontaneously  Skin/wounds:  there is approximately a 4cm x 3cm sacral wound with eschar   Neuro:  CNII-XII grossly intact, alert and oriented to person, place, and time   "       Labs:  Troponin:  Recent Labs     05/05/23 2109   TROPONINI <0.010     CBC:  Recent Labs     05/05/23 2109 05/06/23  0631 05/08/23  0625   WBC 17.15* 9.89 14.06*   RBC 2.87* 2.80* 2.90*   HGB 8.7* 8.4* 8.8*   HCT 26.7* 26.6* 27.9*    147 177   MCV 93.0 95.0* 96.2*   MCH 30.3 30.0 30.3   MCHC 32.6* 31.6* 31.5*     CMP:  Recent Labs     05/05/23 2109 05/06/23 0630 05/08/23  0355   CALCIUM 7.7* 7.6* 7.0*   ALBUMIN 1.3* 2.0* 1.4*   * 135* 135*   K 3.8 3.6 4.8   CO2 26 27 29   BUN 32.9* 30.2* 18.6   CREATININE 0.75 0.76 0.76   ALKPHOS 98 80 83   ALT 11 10 17   AST 19 16 59*   BILITOT 1.3 1.0 0.8     Lactic Acid:  No results for input(s): LACTATE in the last 72 hours.  ETOH:  No results for input(s): ETHANOL in the last 72 hours.   Urine Drug Screen:  Recent Labs     05/06/23 0605   COCAINE Negative   OPIATE Positive*   FENTANYL Negative   MDMA Negative      ABG:  No results for input(s): PH, PO2, PCO2, HCO3, BE in the last 168 hours.     Diagnostic Results:  X-Ray Chest 1 View   Final Result      As above.         Electronically signed by: Roland Pierce   Date:    05/06/2023   Time:    18:22      CT Lumbar Spine W Wo Contrast   Final Result      1. Multiple posterior lumbar decompression laminectomies.  Posterior paraspinal fluid collection.  This may represent postsurgical seroma or hematoma without exclusion of infected collection.      2. Lumbar degenerative disc disease and spondylosis level by level discussed above.         Electronically signed by: Roland Piecre   Date:    05/06/2023   Time:    13:31      US Abdomen Limited   Final Result      1.  Hepatic lobular contour and coarsened parenchyma.      2.  Gallbladder could not be visualized with a cholecystostomy tube in place.      3.  Small ascites and right pleural effusion.         Electronically signed by: Roland Pierce   Date:    05/06/2023   Time:    11:31      MRI Lumbar Spine W WO Cont    (Results Pending)       ASSESSMENT & PLAN:     Reji Plasencia is a 62 y.o. male with a PMHx of DM2, untreated hepatitis-C, cirrhosis, IV drug use, MRSA bacteremia, MRSA C-Spine / L-Spine osteomyelitis/discitis with B/L psoas and paraspinous muscle abscesses as well as lumbar epidural abscess who presented to [facility] on 5/5/2023 after leaving AMA from [facility]. While at [facility] he developed cholecystitis and had a percutaneous cholecystostomy tube placed. General surgery consulted yesterday for cholecystostomy tube recs and now consulted today for sacral decubitus wound.     - vitals, labs, and images reviewed   - Will plan for debridement of sacral wound in the operating room Wednesday   - NPO at midnight Tuesday night   - remainder of care per primary team     Shilpi Persaud MD   [employer] General Surgery PGY4

## 2023-05-10 ENCOUNTER — ANESTHESIA (OUTPATIENT)
Dept: SURGERY | Facility: HOSPITAL | Age: 63
DRG: 853 | End: 2023-05-10
Payer: MEDICAID

## 2023-05-10 ENCOUNTER — ANESTHESIA EVENT (OUTPATIENT)
Dept: SURGERY | Facility: HOSPITAL | Age: 63
DRG: 853 | End: 2023-05-10
Payer: MEDICAID

## 2023-05-10 LAB
ALBUMIN SERPL-MCNC: 1.4 G/DL (ref 3.4–4.8)
ALBUMIN/GLOB SERPL: 0.4 RATIO (ref 1.1–2)
ALP SERPL-CCNC: 86 UNIT/L (ref 40–150)
ALT SERPL-CCNC: 10 UNIT/L (ref 0–55)
AST SERPL-CCNC: 23 UNIT/L (ref 5–34)
BACTERIA BLD CULT: ABNORMAL
BASOPHILS # BLD AUTO: 0.08 X10(3)/MCL
BASOPHILS NFR BLD AUTO: 0.6 %
BILIRUBIN DIRECT+TOT PNL SERPL-MCNC: 0.9 MG/DL
BUN SERPL-MCNC: 19.5 MG/DL (ref 8.4–25.7)
CALCIUM SERPL-MCNC: 7.3 MG/DL (ref 8.8–10)
CHLORIDE SERPL-SCNC: 100 MMOL/L (ref 98–107)
CO2 SERPL-SCNC: 29 MMOL/L (ref 23–31)
CREAT SERPL-MCNC: 1.07 MG/DL (ref 0.73–1.18)
EOSINOPHIL # BLD AUTO: 0.2 X10(3)/MCL (ref 0–0.9)
EOSINOPHIL NFR BLD AUTO: 1.6 %
ERYTHROCYTE [DISTWIDTH] IN BLOOD BY AUTOMATED COUNT: 16.1 % (ref 11.5–17)
GFR SERPLBLD CREATININE-BSD FMLA CKD-EPI: >60 MLS/MIN/1.73/M2
GLOBULIN SER-MCNC: 3.3 GM/DL (ref 2.4–3.5)
GLUCOSE SERPL-MCNC: 85 MG/DL (ref 82–115)
GRAM STN SPEC: ABNORMAL
HCT VFR BLD AUTO: 27.7 % (ref 42–52)
HGB BLD-MCNC: 8.7 G/DL (ref 14–18)
IMM GRANULOCYTES # BLD AUTO: 0.11 X10(3)/MCL (ref 0–0.04)
IMM GRANULOCYTES NFR BLD AUTO: 0.9 %
INR BLD: 1.22 (ref 0–1.3)
LYMPHOCYTES # BLD AUTO: 1.53 X10(3)/MCL (ref 0.6–4.6)
LYMPHOCYTES NFR BLD AUTO: 12.4 %
MCH RBC QN AUTO: 29.8 PG (ref 27–31)
MCHC RBC AUTO-ENTMCNC: 31.4 G/DL (ref 33–36)
MCV RBC AUTO: 94.9 FL (ref 80–94)
MONOCYTES # BLD AUTO: 0.47 X10(3)/MCL (ref 0.1–1.3)
MONOCYTES NFR BLD AUTO: 3.8 %
NEUTROPHILS # BLD AUTO: 9.94 X10(3)/MCL (ref 2.1–9.2)
NEUTROPHILS NFR BLD AUTO: 80.7 %
NRBC BLD AUTO-RTO: 0 %
PLATELET # BLD AUTO: 198 X10(3)/MCL (ref 130–400)
PMV BLD AUTO: 10.2 FL (ref 7.4–10.4)
POCT GLUCOSE: 289 MG/DL (ref 70–110)
POCT GLUCOSE: 62 MG/DL (ref 70–110)
POCT GLUCOSE: 78 MG/DL (ref 70–110)
POCT GLUCOSE: 83 MG/DL (ref 70–110)
POCT GLUCOSE: 92 MG/DL (ref 70–110)
POCT GLUCOSE: 93 MG/DL (ref 70–110)
POTASSIUM SERPL-SCNC: 3.4 MMOL/L (ref 3.5–5.1)
PROT SERPL-MCNC: 4.7 GM/DL (ref 5.8–7.6)
PROTHROMBIN TIME: 15.3 SECONDS (ref 12.5–14.5)
RBC # BLD AUTO: 2.92 X10(6)/MCL (ref 4.7–6.1)
SODIUM SERPL-SCNC: 137 MMOL/L (ref 136–145)
WBC # SPEC AUTO: 12.33 X10(3)/MCL (ref 4.5–11.5)

## 2023-05-10 PROCEDURE — D9220A PRA ANESTHESIA: ICD-10-PCS | Mod: ANES,,, | Performed by: ANESTHESIOLOGY

## 2023-05-10 PROCEDURE — 25000003 PHARM REV CODE 250: Performed by: INTERNAL MEDICINE

## 2023-05-10 PROCEDURE — 27000221 HC OXYGEN, UP TO 24 HOURS

## 2023-05-10 PROCEDURE — 21400001 HC TELEMETRY ROOM

## 2023-05-10 PROCEDURE — 36000707: Performed by: SURGERY

## 2023-05-10 PROCEDURE — 37000009 HC ANESTHESIA EA ADD 15 MINS: Performed by: SURGERY

## 2023-05-10 PROCEDURE — A4216 STERILE WATER/SALINE, 10 ML: HCPCS | Performed by: STUDENT IN AN ORGANIZED HEALTH CARE EDUCATION/TRAINING PROGRAM

## 2023-05-10 PROCEDURE — 25000003 PHARM REV CODE 250: Performed by: STUDENT IN AN ORGANIZED HEALTH CARE EDUCATION/TRAINING PROGRAM

## 2023-05-10 PROCEDURE — 63600175 PHARM REV CODE 636 W HCPCS

## 2023-05-10 PROCEDURE — 80053 COMPREHEN METABOLIC PANEL: CPT | Performed by: STUDENT IN AN ORGANIZED HEALTH CARE EDUCATION/TRAINING PROGRAM

## 2023-05-10 PROCEDURE — 25000003 PHARM REV CODE 250

## 2023-05-10 PROCEDURE — 85025 COMPLETE CBC W/AUTO DIFF WBC: CPT | Performed by: STUDENT IN AN ORGANIZED HEALTH CARE EDUCATION/TRAINING PROGRAM

## 2023-05-10 PROCEDURE — D9220A PRA ANESTHESIA: ICD-10-PCS | Mod: CRNA,,,

## 2023-05-10 PROCEDURE — D9220A PRA ANESTHESIA: Mod: ANES,,, | Performed by: ANESTHESIOLOGY

## 2023-05-10 PROCEDURE — 63600175 PHARM REV CODE 636 W HCPCS: Performed by: STUDENT IN AN ORGANIZED HEALTH CARE EDUCATION/TRAINING PROGRAM

## 2023-05-10 PROCEDURE — 71000033 HC RECOVERY, INTIAL HOUR: Performed by: SURGERY

## 2023-05-10 PROCEDURE — 88304 TISSUE EXAM BY PATHOLOGIST: CPT | Performed by: SURGERY

## 2023-05-10 PROCEDURE — C1751 CATH, INF, PER/CENT/MIDLINE: HCPCS

## 2023-05-10 PROCEDURE — 63600175 PHARM REV CODE 636 W HCPCS: Performed by: INTERNAL MEDICINE

## 2023-05-10 PROCEDURE — 85610 PROTHROMBIN TIME: CPT | Performed by: STUDENT IN AN ORGANIZED HEALTH CARE EDUCATION/TRAINING PROGRAM

## 2023-05-10 PROCEDURE — 36000706: Performed by: SURGERY

## 2023-05-10 PROCEDURE — 63600175 PHARM REV CODE 636 W HCPCS: Performed by: ANESTHESIOLOGY

## 2023-05-10 PROCEDURE — 37000008 HC ANESTHESIA 1ST 15 MINUTES: Performed by: SURGERY

## 2023-05-10 PROCEDURE — D9220A PRA ANESTHESIA: Mod: CRNA,,,

## 2023-05-10 PROCEDURE — 36569 INSJ PICC 5 YR+ W/O IMAGING: CPT

## 2023-05-10 PROCEDURE — 27000207 HC ISOLATION

## 2023-05-10 RX ORDER — FUROSEMIDE 40 MG/1
40 TABLET ORAL DAILY
Status: DISCONTINUED | OUTPATIENT
Start: 2023-05-10 | End: 2023-05-11

## 2023-05-10 RX ORDER — PROPOFOL 10 MG/ML
VIAL (ML) INTRAVENOUS
Status: DISCONTINUED | OUTPATIENT
Start: 2023-05-10 | End: 2023-05-10

## 2023-05-10 RX ORDER — HYDROMORPHONE HYDROCHLORIDE 2 MG/ML
0.4 INJECTION, SOLUTION INTRAMUSCULAR; INTRAVENOUS; SUBCUTANEOUS EVERY 5 MIN PRN
Status: DISCONTINUED | OUTPATIENT
Start: 2023-05-10 | End: 2023-05-18

## 2023-05-10 RX ORDER — KETOROLAC TROMETHAMINE 30 MG/ML
INJECTION, SOLUTION INTRAMUSCULAR; INTRAVENOUS
Status: DISCONTINUED | OUTPATIENT
Start: 2023-05-10 | End: 2023-05-10

## 2023-05-10 RX ORDER — SODIUM CHLORIDE, SODIUM GLUCONATE, SODIUM ACETATE, POTASSIUM CHLORIDE AND MAGNESIUM CHLORIDE 30; 37; 368; 526; 502 MG/100ML; MG/100ML; MG/100ML; MG/100ML; MG/100ML
INJECTION, SOLUTION INTRAVENOUS CONTINUOUS
Status: CANCELLED | OUTPATIENT
Start: 2023-05-10 | End: 2023-06-09

## 2023-05-10 RX ORDER — LIDOCAINE HYDROCHLORIDE 20 MG/ML
INJECTION, SOLUTION EPIDURAL; INFILTRATION; INTRACAUDAL; PERINEURAL
Status: DISCONTINUED | OUTPATIENT
Start: 2023-05-10 | End: 2023-05-10

## 2023-05-10 RX ORDER — FENTANYL CITRATE 50 UG/ML
INJECTION, SOLUTION INTRAMUSCULAR; INTRAVENOUS
Status: DISCONTINUED | OUTPATIENT
Start: 2023-05-10 | End: 2023-05-10

## 2023-05-10 RX ORDER — ONDANSETRON 2 MG/ML
4 INJECTION INTRAMUSCULAR; INTRAVENOUS DAILY PRN
Status: DISCONTINUED | OUTPATIENT
Start: 2023-05-10 | End: 2023-05-18

## 2023-05-10 RX ORDER — PROCHLORPERAZINE EDISYLATE 5 MG/ML
5 INJECTION INTRAMUSCULAR; INTRAVENOUS EVERY 30 MIN PRN
Status: DISCONTINUED | OUTPATIENT
Start: 2023-05-10 | End: 2023-05-18

## 2023-05-10 RX ORDER — SODIUM CHLORIDE 0.9 % (FLUSH) 0.9 %
10 SYRINGE (ML) INJECTION EVERY 6 HOURS
Status: DISCONTINUED | OUTPATIENT
Start: 2023-05-10 | End: 2023-05-23 | Stop reason: HOSPADM

## 2023-05-10 RX ORDER — SODIUM CHLORIDE, SODIUM LACTATE, POTASSIUM CHLORIDE, CALCIUM CHLORIDE 600; 310; 30; 20 MG/100ML; MG/100ML; MG/100ML; MG/100ML
INJECTION, SOLUTION INTRAVENOUS CONTINUOUS
Status: CANCELLED | OUTPATIENT
Start: 2023-05-10

## 2023-05-10 RX ORDER — PHENYLEPHRINE HYDROCHLORIDE 10 MG/ML
INJECTION INTRAVENOUS
Status: DISCONTINUED | OUTPATIENT
Start: 2023-05-10 | End: 2023-05-10

## 2023-05-10 RX ORDER — SODIUM CHLORIDE 0.9 % (FLUSH) 0.9 %
10 SYRINGE (ML) INJECTION
Status: DISCONTINUED | OUTPATIENT
Start: 2023-05-10 | End: 2023-05-23 | Stop reason: HOSPADM

## 2023-05-10 RX ORDER — OXYCODONE HYDROCHLORIDE 5 MG/1
5 TABLET ORAL EVERY 4 HOURS PRN
Status: CANCELLED | OUTPATIENT
Start: 2023-05-10

## 2023-05-10 RX ORDER — MIDAZOLAM HYDROCHLORIDE 1 MG/ML
INJECTION INTRAMUSCULAR; INTRAVENOUS
Status: DISCONTINUED | OUTPATIENT
Start: 2023-05-10 | End: 2023-05-10

## 2023-05-10 RX ORDER — ONDANSETRON 2 MG/ML
INJECTION INTRAMUSCULAR; INTRAVENOUS
Status: DISCONTINUED | OUTPATIENT
Start: 2023-05-10 | End: 2023-05-10

## 2023-05-10 RX ORDER — ROCURONIUM BROMIDE 10 MG/ML
INJECTION, SOLUTION INTRAVENOUS
Status: DISCONTINUED | OUTPATIENT
Start: 2023-05-10 | End: 2023-05-10

## 2023-05-10 RX ORDER — HYDROCODONE BITARTRATE AND ACETAMINOPHEN 5; 325 MG/1; MG/1
1 TABLET ORAL
Status: CANCELLED | OUTPATIENT
Start: 2023-05-10

## 2023-05-10 RX ORDER — MIDAZOLAM HYDROCHLORIDE 1 MG/ML
2 INJECTION INTRAMUSCULAR; INTRAVENOUS ONCE AS NEEDED
Status: CANCELLED | OUTPATIENT
Start: 2023-05-10 | End: 2034-10-06

## 2023-05-10 RX ORDER — MEPERIDINE HYDROCHLORIDE 25 MG/ML
6.25 INJECTION INTRAMUSCULAR; INTRAVENOUS; SUBCUTANEOUS ONCE AS NEEDED
Status: ACTIVE | OUTPATIENT
Start: 2023-05-10 | End: 2023-05-11

## 2023-05-10 RX ADMIN — PHENYLEPHRINE HYDROCHLORIDE 100 MCG: 10 INJECTION INTRAVENOUS at 03:05

## 2023-05-10 RX ADMIN — FENTANYL CITRATE 50 MCG: 50 INJECTION, SOLUTION INTRAMUSCULAR; INTRAVENOUS at 03:05

## 2023-05-10 RX ADMIN — RIFAMPIN 300 MG: 300 CAPSULE ORAL at 08:05

## 2023-05-10 RX ADMIN — HYDROCODONE BITARTRATE AND ACETAMINOPHEN 1 TABLET: 7.5; 325 TABLET ORAL at 04:05

## 2023-05-10 RX ADMIN — ONDANSETRON 4 MG: 2 INJECTION INTRAMUSCULAR; INTRAVENOUS at 03:05

## 2023-05-10 RX ADMIN — PIPERACILLIN AND TAZOBACTAM 4.5 G: 4; .5 INJECTION, POWDER, LYOPHILIZED, FOR SOLUTION INTRAVENOUS; PARENTERAL at 10:05

## 2023-05-10 RX ADMIN — HYDROCODONE BITARTRATE AND ACETAMINOPHEN 1 TABLET: 7.5; 325 TABLET ORAL at 10:05

## 2023-05-10 RX ADMIN — HYDROCODONE BITARTRATE AND ACETAMINOPHEN 1 TABLET: 7.5; 325 TABLET ORAL at 05:05

## 2023-05-10 RX ADMIN — SPIRONOLACTONE 25 MG: 25 TABLET ORAL at 08:05

## 2023-05-10 RX ADMIN — FENTANYL CITRATE 50 MCG: 50 INJECTION, SOLUTION INTRAMUSCULAR; INTRAVENOUS at 02:05

## 2023-05-10 RX ADMIN — THERA TABS 1 TABLET: TAB at 08:05

## 2023-05-10 RX ADMIN — Medication 1 CAPSULE: at 05:05

## 2023-05-10 RX ADMIN — DAPTOMYCIN 435 MG: 500 INJECTION, POWDER, LYOPHILIZED, FOR SOLUTION INTRAVENOUS at 06:05

## 2023-05-10 RX ADMIN — KETOROLAC TROMETHAMINE 30 MG: 30 INJECTION, SOLUTION INTRAMUSCULAR; INTRAVENOUS at 03:05

## 2023-05-10 RX ADMIN — FUROSEMIDE 40 MG: 40 TABLET ORAL at 10:05

## 2023-05-10 RX ADMIN — PIPERACILLIN AND TAZOBACTAM 4.5 G: 4; .5 INJECTION, POWDER, LYOPHILIZED, FOR SOLUTION INTRAVENOUS; PARENTERAL at 01:05

## 2023-05-10 RX ADMIN — HYDROCODONE BITARTRATE AND ACETAMINOPHEN 1 TABLET: 7.5; 325 TABLET ORAL at 09:05

## 2023-05-10 RX ADMIN — PHENYLEPHRINE HYDROCHLORIDE 100 MCG: 10 INJECTION INTRAVENOUS at 02:05

## 2023-05-10 RX ADMIN — SUGAMMADEX 200 MG: 100 INJECTION, SOLUTION INTRAVENOUS at 03:05

## 2023-05-10 RX ADMIN — INSULIN DETEMIR 12 UNITS: 100 INJECTION, SOLUTION SUBCUTANEOUS at 10:05

## 2023-05-10 RX ADMIN — ENOXAPARIN SODIUM 40 MG: 40 INJECTION SUBCUTANEOUS at 05:05

## 2023-05-10 RX ADMIN — MIDAZOLAM HYDROCHLORIDE 2 MG: 1 INJECTION, SOLUTION INTRAMUSCULAR; INTRAVENOUS at 02:05

## 2023-05-10 RX ADMIN — SODIUM CHLORIDE: 0.9 INJECTION, SOLUTION INTRAVENOUS at 02:05

## 2023-05-10 RX ADMIN — SODIUM CHLORIDE, PRESERVATIVE FREE 10 ML: 5 INJECTION INTRAVENOUS at 06:05

## 2023-05-10 RX ADMIN — LIDOCAINE HYDROCHLORIDE 80 MG: 20 INJECTION, SOLUTION EPIDURAL; INFILTRATION; INTRACAUDAL; PERINEURAL at 02:05

## 2023-05-10 RX ADMIN — PROPOFOL 100 MG: 10 INJECTION, EMULSION INTRAVENOUS at 02:05

## 2023-05-10 RX ADMIN — FUROSEMIDE 20 MG: 10 INJECTION, SOLUTION INTRAMUSCULAR; INTRAVENOUS at 08:05

## 2023-05-10 RX ADMIN — SODIUM CHLORIDE, PRESERVATIVE FREE 10 ML: 5 INJECTION INTRAVENOUS at 08:05

## 2023-05-10 RX ADMIN — THIAMINE HCL TAB 100 MG 100 MG: 100 TAB at 08:05

## 2023-05-10 RX ADMIN — PIPERACILLIN AND TAZOBACTAM 4.5 G: 4; .5 INJECTION, POWDER, LYOPHILIZED, FOR SOLUTION INTRAVENOUS; PARENTERAL at 06:05

## 2023-05-10 RX ADMIN — Medication 1 CAPSULE: at 08:05

## 2023-05-10 RX ADMIN — HYDROMORPHONE HYDROCHLORIDE 0.4 MG: 2 INJECTION, SOLUTION INTRAMUSCULAR; INTRAVENOUS; SUBCUTANEOUS at 04:05

## 2023-05-10 RX ADMIN — ROCURONIUM BROMIDE 50 MG: 10 SOLUTION INTRAVENOUS at 02:05

## 2023-05-10 NOTE — TRANSFER OF CARE
"Anesthesia Transfer of Care Note    Patient: Reji Plasencia    Procedure(s) Performed: Procedure(s) (LRB):  DEBRIDEMENT, WOUND, SACRUM (N/A)    Patient location: PACU    Anesthesia Type: general    Transport from OR: Transported from OR on room air with adequate spontaneous ventilation    Post pain: adequate analgesia    Post assessment: no apparent anesthetic complications    Post vital signs: stable    Level of consciousness: awake    Nausea/Vomiting: no nausea/vomiting    Complications: none    Transfer of care protocol was followed      Last vitals:   Visit Vitals  /79 (BP Location: Left arm, Patient Position: Lying)   Pulse 102   Temp 36.7 °C (98.1 °F)   Resp 20   Ht 5' 7" (1.702 m)   Wt 72.6 kg (160 lb)   SpO2 100%   BMI 25.06 kg/m²     "

## 2023-05-10 NOTE — PLAN OF CARE
Problem: Adult Inpatient Plan of Care  Goal: Plan of Care Review  Outcome: Ongoing, Progressing  Goal: Patient-Specific Goal (Individualized)  Outcome: Ongoing, Progressing  Goal: Absence of Hospital-Acquired Illness or Injury  Outcome: Ongoing, Progressing  Goal: Optimal Comfort and Wellbeing  Outcome: Ongoing, Progressing  Goal: Readiness for Transition of Care  Outcome: Ongoing, Progressing     Problem: Skin Injury Risk Increased  Goal: Skin Health and Integrity  Outcome: Ongoing, Progressing     Problem: Adjustment to Illness (Sepsis/Septic Shock)  Goal: Optimal Coping  Outcome: Ongoing, Progressing     Problem: Bleeding (Sepsis/Septic Shock)  Goal: Absence of Bleeding  Outcome: Ongoing, Progressing     Problem: Glycemic Control Impaired (Sepsis/Septic Shock)  Goal: Blood Glucose Level Within Desired Range  Outcome: Ongoing, Progressing     Problem: Infection Progression (Sepsis/Septic Shock)  Goal: Absence of Infection Signs and Symptoms  Outcome: Ongoing, Progressing     Problem: Nutrition Impaired (Sepsis/Septic Shock)  Goal: Optimal Nutrition Intake  Outcome: Ongoing, Progressing     Problem: Impaired Wound Healing  Goal: Optimal Wound Healing  Outcome: Ongoing, Progressing     Problem: Fall Injury Risk  Goal: Absence of Fall and Fall-Related Injury  Outcome: Ongoing, Progressing     Problem: Infection  Goal: Absence of Infection Signs and Symptoms  Outcome: Ongoing, Progressing      Problem: Patient Care Overview  Goal: Team Discussion  Team Plan:   BEHAVIORAL TEAM DISCUSSION    Participants: Beth Spencer CNP; Fadia Hoang [facility]  Progress: pt is being discharged today to treatment, residential  Continued Stay Criteria/Rationale: pt is dually committed with court requirements  Medical/Physical: see chart  Precautions:   Behavioral Orders   Procedures     Assault precautions     Code 1 - Restrict to Unit     Routine Programming     As clinically indicated     Status 15     Every 15 minutes.     Plan: discharge today.  Pt signed provisional discharge paperwork  Rationale for change in precautions or plan: no change.

## 2023-05-10 NOTE — ANESTHESIA PREPROCEDURE EVALUATION
"                                                                                                             05/10/2023  Reji Plasencia is a 62 y.o., male   "  ASSESSMENT & PLAN:    Reji Plasencia is a 62 y.o. 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 While at [facility] he developed cholecystitis and had a percutaneous cholecystostomy tube placed. General surgery consulted yesterday for cholecystostomy tube recs and now consulted today for sacral decubitus wound.      - vitals, labs, and images reviewed   - Will plan for debridement of sacral wound in the operating room Wednesday   - NPO at midnight Tuesday night   - remainder of care per primary team      Shilpi Persaud MD   [facility] General Surgery PGY4      Cosigned by: Reggie Castañeda MD at 5/9/2023  1:49 PM     History by ID  "HPI:   62-year-old male with past medical history of DM type 2, untreated hepatitis-C, decompensated liver cirrhosis, IV drug use, known to my service from his hospitalization at our [facility] before he left [place] on 05/05/2023 and presented to this facility [facility] later the same day, reporting not being satisfied with the care over at our [facility].  While he was there we were treating him for MRSA bacteremia, with associated C-spine-L-spine osteomyelitis/diskitis/bilateral psoas, paraspinal muscle and epidural abscesses.  He is status post lumbar laminectomy with drainage of epidural abscess by Neurosurgery on 04/21.  He also was found to have acute cholecystitis and status post cholecystostomy tube placement.  He was on antibiotic coverage with vancomycin, rifampin for MRSA and had completed a course of Zosyn which was to cover for " "cholecystitis.  He however continued to have persistent bacteremia with multiple repeated positive blood cultures, and hence was placed on daptomycin with discontinuation of vancomycin.  On presentation he is noted to have no fevers but did have leukocytosis of 17.1 which is normalized.  CRP 76, ESR 7, anemic with low albumin.  Urine toxicology test positive for opiates and amphetamines.  Blood cultures from 05/05 with MRSA per BCID panel.  Urine culture with [phone] colonies of yeast.  He is currently on antibiotic coverage with daptomycin, rifampin and Zosyn."      Latest Reference Range & Units 05/10/23 04:03   WBC 4.50 - 11.50 x10(3)/mcL 12.33 (H)   Hemoglobin 14.0 - 18.0 g/dL 8.7 (L)   Hematocrit 42.0 - 52.0 % 27.7 (L)   Platelets 130 - 400 x10(3)/mcL 198   Sodium 136 - 145 mmol/L 137   Potassium 3.5 - 5.1 mmol/L 3.4 (L)   Chloride 98 - 107 mmol/L 100   CO2 23 - 31 mmol/L 29   BUN 8.4 - 25.7 mg/dL 19.5   Creatinine 0.73 - 1.18 mg/dL 1.07   eGFR mls/min/1.73/m2 >60   Glucose 82 - 115 mg/dL 85   Calcium 8.8 - 10.0 mg/dL 7.3 (L)   Alkaline Phosphatase 40 - 150 unit/L 86   (H): Data is abnormally high  (L): Data is abnormally low    Pre-op Assessment    I have reviewed the NPO Status.      Review of Systems      Physical Exam  General: Well nourished, Cooperative, Alert and Oriented  Appears chronically ill and debilitated, thin and wasted appearance  Airway:  Mallampati: II   Mouth Opening: Normal  TM Distance: Normal  Tongue: Normal  Neck ROM: Normal ROM    Chest/Lungs:  Clear to auscultation, Normal Respiratory Rate    Heart:  Rate: Normal  Rhythm: Regular Rhythm        Anesthesia Plan  Type of Anesthesia, risks & benefits discussed:    Anesthesia Type: Gen ETT  Intra-op Monitoring Plan: Standard ASA Monitors  Post Op Pain Control Plan: IV/PO Opioids PRN and multimodal analgesia  Induction:  IV  Airway Plan: Direct  Informed Consent: Informed consent signed with the Patient and all parties understand the risks " and agree with anesthesia plan.  All questions answered. Patient consented to blood products? No  ASA Score: 4  Day of Surgery Review of History & Physical: H&P Update referred to the surgeon/provider.  Anesthesia Plan Notes: Premedication with Midazolam  Technique: GETA   PONV Prophylaxis   PACU Postop       Ready For Surgery From Anesthesia Perspective.     .

## 2023-05-10 NOTE — PROCEDURES
"Reji Plasencia is a 62 y.o. male patient.    Temp: 98.1 °F (36.7 °C) (05/10/23 0445)  Pulse: (!) 117 (05/10/23 0445)  Resp: 18 (05/10/23 0456)  BP: (!) 146/81 (05/10/23 0445)  SpO2: 99 % (05/10/23 0445)  Weight: 72.6 kg (160 lb) (05/08/23 1000)  Height: 5' 7" (170.2 cm) (05/08/23 1000)    PICC  Date/Time: 5/10/2023 8:16 AM  Performed by: Sarahy Evans RN  Consent Done: Yes  Time out: Immediately prior to procedure a time out was called to verify the correct patient, procedure, equipment, support staff and site/side marked as required  Indications: vascular access  Anesthesia: local infiltration  Local anesthetic: lidocaine 1% without epinephrine  Anesthetic Total (mL): 5  Preparation: skin prepped with ChloraPrep  Skin prep agent dried: skin prep agent completely dried prior to procedure  Sterile barriers: all five maximum sterile barriers used - cap, mask, sterile gown, sterile gloves, and large sterile sheet  Hand hygiene: hand hygiene performed prior to central venous catheter insertion  Location details: right brachial  Catheter type: double lumen  Catheter size: 5 Fr  Catheter Length: 37cm    Ultrasound guidance: yes  Vessel Caliber: medium and patent, compressibility normal  Vascular Doppler: not done  Needle advanced into vessel with real time Ultrasound guidance.  Guidewire confirmed in vessel.  Sterile sheath used.  Number of attempts: 1  Post-procedure: blood return through all ports, sterile dressing applied and chlorhexidine patch    Assessment: placement verified by x-ray  Complications: none  Comments: Arm circumference 25 cm. Order for midline. However reading ID notes pt requires 6-8 weeks of antibiotic therapy. Ok to place picc per Dr Franco. Contacted by MICHOACANO Lu NP during procedure, did not receive until post insertion that pt has positive blood cultures and she would not put in picc yet. Following INS standards that states to place appropriate vascular access for pt therapy. Provided study " "to MICHOACANO Lu NP, "Early use of peripherally inserted central catheters is safe in staphylococcus aureus bacteremia." No response.         Name Sarahy Evans RN  5/10/2023    "

## 2023-05-10 NOTE — PROGRESS NOTES
[facility] - 8th Floor Med Surg  Neurosurgery  Progress Note    Subjective:     Interval History: No acute events overnight.    No complaints of new weakness.  Awaiting completion of MRI and also having debridement by surgical team today.    62-year-old male with significant history of polysubstance abuse/IV drug abuse, type 2 diabetes mellitus, untreated chronic hep C/cirrhosis.  Patient had a recent hospitalization to [facility] for MRSA bacteremia with MRSA C and L-spine osteomyelitis, diskitis, bilateral psoas and paraspinous muscle abscess as well as lumbar epidural abscess.  Patient was being treated in [facility] with close follow-up with neurosurgery, Infectious Disease. Patient underwent lumbar laminectomy with I&D with Dr. Chang on 4/21. Hospital stay also was eventful for cholecystitis for which he underwent cholecystostomy tube placement.  Patient signed out AMA from [facility] on 05/05, went home, called ambulance and presented to the hospital.  Per chart review patient was being treated with daptomycin and rifampin in [facility].  While hospitalized his urine drug screen was positive for amphetamines and there were concerns that he was using it while hospitalized.  Patient was tachycardic in the ED.  Otherwise hemodynamics stable.  Complaining of bilateral lower extremity swelling which is interfering with movement.  Lab significant for leukocytosis, anemia, hyperglycemia.  UDS was positive for opiates, amphetamines.  Hospitalist team was consulted for admission. Dr. Rosa was consulted for evaluation and treatment recommendations regarding his recent lumbar surgery.    CT lumbar spine with and without contrast 05/06/2023:  FINDINGS:   Patient is status post wide decompression laminectomies from L1 to L5.  There are extensive posterior paraspinal soft inflammations and fluid collection containing several air locules.  The fluid collection with peripheral  thin enhancement broadly abuts the thecal sac is from the superior endplate of L2 the inferior endplate of L5 with craniocaudal span of 8.5 cm and shows maximum anterior-posterior diameter of 2.5 cm and transverse diameter of 3.8 cm.  There is also left intrathecal small air locule on image 83 series 3.  These findings may represent postsurgical seroma or hematoma without exclusion of infected collection.  There are no lumbar vertebrae disc spaces irregularity or osteolytic destructive changes.  Several Schmorl nodes are seen involving lumbar vertebrae and the most conspicuous is of L3.  No acute fracture or malalignment.  Disc segmental analysis is given below:     At L1-L2, disc height is preserved.  Bilateral facet arthropathy without significant central canal stenosis.  There are no narrowings of the neural foramen.     At L2-L3, there is osteophyte disc complex which indents the ventral thecal sac.  Thecal sac is patent with decompression laminectomies.  There is mild narrowing of the right neural foramen caused by uncinate arthropathy and there is moderate narrowing of the left neural foramen.     At L3-L4, there is broad osteophyte disc complex which indents the ventral thecal sac.  Right paracentral spur like growth from vertebral body effaces the right lateral recess with compression upon the right exiting nerve root.  There also bilateral moderate spondylotic narrowings of the neural foramen.     At L4-L5, there is broad disc bulge which indents the ventral thecal sac.  Central canal is not stenosed with decompression laminectomies.  There is bilateral uncovertebral and facet arthropathy which encroaches onto the neural foramen.  There is mild narrowing of the right neural foramen and moderate narrowing of the left neural canal.     At L5-S1, there is broad posterior vertebral spondylosis.  Central canal is not stenosed.  There are no significant narrowings of the neural foramen    Impression:       1.  Multiple posterior lumbar decompression laminectomies.  Posterior paraspinal fluid collection.  This may represent postsurgical seroma or hematoma without exclusion of infected collection.     2. Lumbar degenerative disc disease and spondylosis level by level discussed above.          Post-Op Info:  Procedure(s) (LRB):  DEBRIDEMENT, WOUND, SACRUM (N/A)   Day of Surgery      Medications:  Continuous Infusions:  Scheduled Meds:   DAPTOmycin (CUBICIN) IV (PEDS and ADULTS)  6 mg/kg Intravenous Q24H    enoxaparin  40 mg Subcutaneous Daily    furosemide  40 mg Oral Daily    insulin detemir U-100  12 Units Subcutaneous QHS    Lactobacillus acidophilus  1 capsule Oral TID WM    multivitamin  1 tablet Oral Daily    piperacillin-tazobactam (ZOSYN) IVPB  4.5 g Intravenous Q8H    rifAMpin  300 mg Oral Q12H    sodium chloride 0.9%  10 mL Intravenous Q6H    spironolactone  25 mg Oral BID    thiamine  100 mg Oral Daily     PRN Meds:aluminum-magnesium hydroxide-simethicone, dextrose 10%, dextrose 10%, glucagon (human recombinant), glucose, glucose, HYDROcodone-acetaminophen, insulin aspart U-100, melatonin, ondansetron, polyethylene glycol, prochlorperazine, senna-docusate 8.6-50 mg, sodium chloride 0.9%, Flushing PICC Protocol **AND** sodium chloride 0.9% **AND** sodium chloride 0.9%, tiZANidine     Review of Systems  Objective:     Weight: 72.6 kg (160 lb)  Body mass index is 25.06 kg/m².  Vital Signs (Most Recent):  Temp: 97.7 °F (36.5 °C) (05/10/23 0844)  Pulse: 96 (05/10/23 0844)  Resp: 18 (05/10/23 0907)  BP: 126/78 (05/10/23 1057)  SpO2: (!) 92 % (05/10/23 0844) Vital Signs (24h Range):  Temp:  [97.7 °F (36.5 °C)-98.4 °F (36.9 °C)] 97.7 °F (36.5 °C)  Pulse:  [] 96  Resp:  [10-20] 18  SpO2:  [91 %-99 %] 92 %  BP: (112-146)/(73-81) 126/78                              Drain/Device    Right lower flank (Active)   Insertion Site clean and dry 05/07/23 2054   Drainage Characteristics/Odor Brown 05/07/23 2054   Drainage  Amount Large 05/07/23 2054   General Output (mL) 80 05/08/23 0623       Neurosurgery Physical Exam  AFVSS  PERRLA bilateral.  No focal neurological deficits.  Full strength bilateral UE  LE weakness unchanged from previous  Serous drainage from lumbar incision.  Sacral wound unchanged    Significant Labs:  Recent Labs   Lab 05/09/23  0358 05/10/23  0403   * 137   K 3.6 3.4*   CO2 27 29   BUN 16.4 19.5   CREATININE 0.75 1.07   CALCIUM 7.3* 7.3*   MG 1.80  --        Recent Labs   Lab 05/09/23  0358 05/10/23  0403   WBC 13.76* 12.33*   HGB 8.7* 8.7*   HCT 27.8* 27.7*    198       Recent Labs   Lab 05/10/23  1044   INR 1.22     Microbiology Results (last 7 days)       Procedure Component Value Units Date/Time    Blood culture #1 **CANNOT BE ORDERED STAT** [[id number]]  (Abnormal)  (Susceptibility) Collected: 05/05/23 2109    Order Status: Completed Specimen: Blood Updated: 05/10/23 0706     CULTURE, BLOOD ([facility]) Methicillin resistant Staphylococcus aureus     GRAM STAIN Gram Positive Cocci, probable Staphylococcus      Seen in gram stain of broth only      2 of 2 bottles positive    Blood Culture [[id number]] Collected: 05/09/23 2104    Order Status: Resulted Specimen: Blood Updated: 05/09/23 2127    Blood culture #2 **CANNOT BE ORDERED STAT** [[id number]]  (Abnormal)  (Susceptibility) Collected: 05/05/23 2109    Order Status: Completed Specimen: Blood Updated: 05/09/23 0716     CULTURE, BLOOD ([facility]) Methicillin resistant Staphylococcus aureus     GRAM STAIN Gram Positive Cocci, probable Staphylococcus      Seen in gram stain of broth only      1 of 2 Anaerobic bottles positive    Urine culture [[id number]]  (Abnormal) Collected: 05/06/23 0605    Order Status: Completed Specimen: Urine Updated: 05/08/23 0910     Urine Culture 10,000 - 25,000 colonies/ml Candida albicans    BCID2 Panel [[id number]]  (Abnormal) Collected: 05/05/23 2109    Order Status: Completed Specimen: Blood Updated: 05/07/23 0142     CTX-M (ESBL  ) N/A     Comment: Note: Antimicrobial resistance can occur via multiple mechanisms. A Not Detected result for antimicrobial resistance gene(s) does not indicate antimicrobial susceptibility. Subculturing is required for species identification and susceptibility testing of   isolates.        IMP (Cabapenemase ) N/A     Comment: Note: Antimicrobial resistance can occur via multiple mechanisms. A Not Detected result for antimicrobial resistance gene(s) does not indicate antimicrobial susceptibility. Subculturing is required for species identification and susceptibility testing of   isolates.        KPC resistance gene (Carbapenemase ) N/A     Comment: Note: Antimicrobial resistance can occur via multiple mechanisms. A Not Detected result for antimicrobial resistance gene(s) does not indicate antimicrobial susceptibility. Subculturing is required for species identification and susceptibility testing of   isolates.        mcr-1 N/A     Comment: Note: Antimicrobial resistance can occur via multiple mechanisms. A Not Detected result for antimicrobial resistance gene(s) does not indicate antimicrobial susceptibility. Subculturing is required for species identification and susceptibility testing of   isolates.        mecA ID N/A     Comment: Note: Antimicrobial resistance can occur via multiple mechanisms. A Not Detected result for antimicrobial resistance gene(s) does not indicate antimicrobial susceptibility. Subculturing is required for species identification and susceptibility testing of   isolates.        mecA/C and MREJ (MRSA) gene Detected     Comment: Note: Antimicrobial resistance can occur via multiple mechanisms. A Not Detected result for antimicrobial resistance gene(s) does not indicate antimicrobial susceptibility. Subculturing is required for species identification and susceptibility testing of   isolates.        NDM (Carbapenemase ) N/A     Comment: Note: Antimicrobial  resistance can occur via multiple mechanisms. A Not Detected result for antimicrobial resistance gene(s) does not indicate antimicrobial susceptibility. Subculturing is required for species identification and susceptibility testing of   isolates.        OXA-48-like (Carbapenemase ) N/A     Comment: Note: Antimicrobial resistance can occur via multiple mechanisms. A Not Detected result for antimicrobial resistance gene(s) does not indicate antimicrobial susceptibility. Subculturing is required for species identification and susceptibility testing of   isolates.        Kenia/B (VRE gene) N/A     Comment: Note: Antimicrobial resistance can occur via multiple mechanisms. A Not Detected result for antimicrobial resistance gene(s) does not indicate antimicrobial susceptibility. Subculturing is required for species identification and susceptibility testing of   isolates.        VIM (Carbapenemase ) N/A     Comment: Note: Antimicrobial resistance can occur via multiple mechanisms. A Not Detected result for antimicrobial resistance gene(s) does not indicate antimicrobial susceptibility. Subculturing is required for species identification and susceptibility testing of   isolates.        Enterococcus faecalis Not Detected     Enterococcus faecium Not Detected     Listeria monocytogenes Not Detected     Staphylococcus spp. Detected     Staphylococcus aureus Detected     Staphylococcus epidermidis Not Detected     Staphylococcus lugdunensis Not Detected     Streptococcus spp. Not Detected     Streptococcus agalactiae (Group B) Not Detected     Streptococcus pneumoniae Not Detected     Streptococcus pyogenes (Group A) Not Detected     Acinetobacter calcoaceticus/baumannii complex Not Detected     Bacteroides fragilis Not Detected     Enterobacterales Not Detected     Enterobacter cloacae complex Not Detected     Escherichia coli Not Detected     Klebsiella aerogenes Not Detected     Klebsiella oxytoca Not Detected      Klebsiella pneumoniae group Not Detected     Proteus spp. Not Detected     Salmonella spp. Not Detected     Serratia marcescens Not Detected     Haemophilus influenzae Not Detected     Neisseria meningitidis Not Detected     Pseudomonas aeruginosa Not Detected     Stenotrophomonas maltophilia Not Detected     Candida albicans Not Detected     Candida auris Not Detected     Candida glabrata Not Detected     Candida krusei Not Detected     Candida parapsilosis Not Detected     Candida tropicalis Not Detected     Cryptococcus neoformans/gattii Not Detected    Narrative:      The [facility] BCID2 Panel is a multiplexed nucleic acid test intended for the use with [employer]® [employer].0 or Jibestream® [employer] Systems for the simultaneous qualitative detection and identification of multiple bacterial and yeast nucleic acids and select genetic determinants associated with antimicrobial resistance.  The [facility] BCID2 Panel test is performed directly on blood culture samples identified as positive by a continuous monitoring blood culture system.  Results are intended to be interpreted in conjunction with Gram stain results.            Significant Diagnostics:      Assessment/Plan:    Recent MRSA bacteremia   Recent MRSA cervical, lumbar spine osteomyelitis with bilateral psoas, paraspinal muscle abscess as well as lumbar epidural abscess status post drainage, laminectomy  Recent cholecystitis status post cholecystostomy tube placement   Sepsis secondary to all the above   Medical noncompliance   Polysubstance abuse/IV drug abuse   Decompensated cirrhosis with volume overload   Untreated chronic hep C   Type 2 diabetes mellitus        Await MRI   Surgical debridement today  Infectious disease with recommendations for daptomycin, Zosyn, rifampin.  PTOT  Pain control  SCDs   Debridement of sacral wound in the operating room on Wednesday.\  Continue supportive measures   Further recommendations once MRI complete.        Active Diagnoses:    Diagnosis Date Noted POA    PRINCIPAL PROBLEM:  Sepsis [A41.9] 05/05/2023 Yes    Staphylococcus aureus bacteremia [R78.81, B95.61] 05/05/2023 Unknown      Problems Resolved During this Admission:         LUIS Moore-BC  Neurosurgery  [facility] - 8th Floor Med Surg

## 2023-05-10 NOTE — PROGRESS NOTES
Infectious Diseases Progress Note  62-year-old male with past medical history of DM type 2, untreated hepatitis-C, decompensated liver cirrhosis, IV drug use, known to my service from his hospitalization at our [facility] before he left [place] on 05/05/2023 and presented to this facility [facility] later the same day, reporting not being satisfied with the care over at our [facility].  While he was there we were treating him for MRSA bacteremia, with associated C-spine-L-spine osteomyelitis/diskitis/bilateral psoas, paraspinal muscle and epidural abscesses.  He is status post lumbar laminectomy with drainage of epidural abscess by Neurosurgery on 04/21.  He also was found to have acute cholecystitis and status post cholecystostomy tube placement.  He was on antibiotic coverage with vancomycin, rifampin for MRSA and had completed a course of Zosyn which was to cover for cholecystitis.  He however continued to have persistent bacteremia with multiple repeated positive blood cultures, and hence was placed on daptomycin with discontinuation of vancomycin.  On presentation he is noted to have no fevers but did have leukocytosis of 17.1 which is normalized.  CRP 76, ESR 7, anemic with low albumin.  Urine toxicology test positive for opiates and amphetamines.  Blood cultures from 05/05 with MRSA per BCID panel.  Urine culture with [phone] colonies of candida albicans.  He is currently on antibiotic coverage with daptomycin, rifampin and Zosyn.     Subjective:  Lying in bed in no acute distress noted. No new complaints voiced. Low grade temps. Friend present    ROS  Constitutional:  Positive for malaise/fatigue.   HENT: Negative.     Respiratory: Negative.     Gastrointestinal:  Positive for abdominal pain.   Genitourinary: Negative.    Musculoskeletal:  Positive for back pain.   Neurological:  Positive for weakness.   Endo/Heme/Allergies: Negative.   "  Psychiatric/Behavioral: Negative.     All other Systems review done and negative    Review of patient's allergies indicates:  No Known Allergies    No past medical history on file.    No past surgical history on file.    Social History     Socioeconomic History    Marital status: Single         Scheduled Meds:   DAPTOmycin (CUBICIN) IV (PEDS and ADULTS)  6 mg/kg Intravenous Q24H    enoxaparin  40 mg Subcutaneous Daily    furosemide (LASIX) injection  20 mg Intravenous Q12H    insulin detemir U-100  12 Units Subcutaneous QHS    Lactobacillus acidophilus  1 capsule Oral TID WM    multivitamin  1 tablet Oral Daily    piperacillin-tazobactam (ZOSYN) IVPB  4.5 g Intravenous Q8H    rifAMpin  300 mg Oral Q12H    spironolactone  25 mg Oral BID    thiamine  100 mg Oral Daily     Continuous Infusions:  PRN Meds:aluminum-magnesium hydroxide-simethicone, dextrose 10%, dextrose 10%, glucagon (human recombinant), glucose, glucose, HYDROcodone-acetaminophen, insulin aspart U-100, melatonin, ondansetron, polyethylene glycol, prochlorperazine, senna-docusate 8.6-50 mg, sodium chloride 0.9%, tiZANidine    Objective:  /76   Pulse (!) 116   Temp 98.1 °F (36.7 °C) (Oral)   Resp 10   Ht 5' 7" (1.702 m)   Wt 72.6 kg (160 lb)   SpO2 (!) 94%   BMI 25.06 kg/m²     Physical Exam:   Physical Exam  Vitals reviewed.   Constitutional:       General: He is not in acute distress.     Appearance: He is not toxic-appearing.      Comments: Significant other at the bedside   HENT:      Head: Normocephalic and atraumatic.   Eyes:      Pupils: Pupils are equal, round, and reactive to light.   Cardiovascular:      Rate and Rhythm: Normal rate and regular rhythm.      Heart sounds: Normal heart sounds.   Pulmonary:      Effort: Pulmonary effort is normal. No respiratory distress.      Breath sounds: Normal breath sounds.   Abdominal:      General: Bowel sounds are normal. There is no distension.      Palpations: Abdomen is soft.      " Tenderness: There is no abdominal tenderness.      Comments: RUQ cholecystotomy tube noted with bilious content   Genitourinary:     Comments: No suprapubic tenderness  Musculoskeletal:         General: No deformity.      Cervical back: Neck supple.      Right lower leg: Edema present.      Left lower leg: Edema present.   Skin:     Findings: No erythema or rash.   Neurological:      Mental Status: He is alert and oriented to person, place, and time.   Psychiatric:      Comments: Calm and cooperative     Imaging    Lab Review   Recent Results (from the past 24 hour(s))   POCT glucose    Collection Time: 05/08/23 10:17 PM   Result Value Ref Range    POCT Glucose 97 70 - 110 mg/dL   Comprehensive Metabolic Panel    Collection Time: 05/09/23  3:58 AM   Result Value Ref Range    Sodium Level 135 (L) 136 - 145 mmol/L    Potassium Level 3.6 3.5 - 5.1 mmol/L    Chloride 99 98 - 107 mmol/L    Carbon Dioxide 27 23 - 31 mmol/L    Glucose Level 103 82 - 115 mg/dL    Blood Urea Nitrogen 16.4 8.4 - 25.7 mg/dL    Creatinine 0.75 0.73 - 1.18 mg/dL    Calcium Level Total 7.3 (L) 8.8 - 10.0 mg/dL    Protein Total 4.8 (L) 5.8 - 7.6 gm/dL    Albumin Level 1.3 (L) 3.4 - 4.8 g/dL    Globulin 3.5 2.4 - 3.5 gm/dL    Albumin/Globulin Ratio 0.4 (L) 1.1 - 2.0 ratio    Bilirubin Total 1.1 <=1.5 mg/dL    Alkaline Phosphatase 82 40 - 150 unit/L    Alanine Aminotransferase 11 0 - 55 unit/L    Aspartate Aminotransferase 29 5 - 34 unit/L    eGFR >60 mls/min/1.73/m2   Magnesium    Collection Time: 05/09/23  3:58 AM   Result Value Ref Range    Magnesium Level 1.80 1.60 - 2.60 mg/dL   CBC with Differential    Collection Time: 05/09/23  3:58 AM   Result Value Ref Range    WBC 13.76 (H) 4.50 - 11.50 x10(3)/mcL    RBC 2.95 (L) 4.70 - 6.10 x10(6)/mcL    Hgb 8.7 (L) 14.0 - 18.0 g/dL    Hct 27.8 (L) 42.0 - 52.0 %    MCV 94.2 (H) 80.0 - 94.0 fL    MCH 29.5 27.0 - 31.0 pg    MCHC 31.3 (L) 33.0 - 36.0 g/dL    RDW 16.2 11.5 - 17.0 %    Platelet 161 130 - 400  x10(3)/mcL    MPV 10.4 7.4 - 10.4 fL    Neut % 85.7 %    Lymph % 9.3 %    Mono % 3.0 %    Eos % 0.6 %    Basophil % 0.7 %    Lymph # 1.28 0.6 - 4.6 x10(3)/mcL    Neut # 11.81 (H) 2.1 - 9.2 x10(3)/mcL    Mono # 0.41 0.1 - 1.3 x10(3)/mcL    Eos # 0.08 0 - 0.9 x10(3)/mcL    Baso # 0.09 <=0.2 x10(3)/mcL    IG# 0.09 (H) 0 - 0.04 x10(3)/mcL    IG% 0.7 %    NRBC% 0.0 %   POCT glucose    Collection Time: 05/09/23  6:34 AM   Result Value Ref Range    POCT Glucose 103 70 - 110 mg/dL       Assessment/Plan:  1. MRSA bacteremia  2. L-spine/C-spine MRSA osteomyelitis/diskitis/psoas/paraspinal/epidural abscesses  3.  Cholecystitis status post cholecystotomy tube   4.  Candiduria  5.  Elevated CRP  6.  Chronic hepatitis-C   7.  Decompensated liver cirrhosis   8.  Polysubstance abuse/IVDU   9.  Diabetes type 2  10. Anemia   11. Protein calorie malnutrition      -Continue Daptomycin #4, Rifampin #4 and Zosyn #4  -Will need a 6-8 week course of IV antibiotics with likely subsequent transitioning to oral anti MRSA coverage. Follow weekly inflammatory markers  -Low grade temps with leukocytosis trending down, follow  -5/5 blood cultures with MRSA 2/2 sets. Follow repeat blood cultures to assess for clearance  -Continue wound care  -Surgery consulted, inputs noted. Plan for sacral wound debridement tomorrow 5/10  -Neurosurgery on board. MRI L-Spine w/wo contrast ordered, follow results.   -Plan to keep cholecystostomy tube in place for 4-6 weeks   -Discussed with patient, friend and nursing staff

## 2023-05-10 NOTE — PROGRESS NOTES
Inpatient Nutrition Assessment    Admit Date: 5/5/2023   Total duration of encounter: 5 days     Nutrition Recommendation/Prescription   NEW MALNUTRITION DIAGNOSIS  Patient has poor dentition and difficulty chewing, modify diet texture:    Advance to minced and moist, low sodium (2 mg), high protein diet when medically feasible.     Updated standing weight when possible.     Communication of Recommendations: reviewed with patient and communicated via EMR    Nutrition Assessment   Malnutrition Assessment/Nutrition-Focused Physical Exam    Malnutrition Context: chronic illness  Malnutrition Level: moderate  Energy Intake (Malnutrition): less than 75% for greater than or equal to 1 month  Weight Loss (Malnutrition): other (see comments) (unable to get updated wt, returned with standing scale and pt gone for procedure)  Subcutaneous Fat (Malnutrition): moderate depletion  Orbital Region (Subcutaneous Fat Loss): moderate depletion  Upper Arm Region (Subcutaneous Fat Loss): moderate depletion     Muscle Mass (Malnutrition): moderate depletion  [place] Region (Muscle Loss): severe depletion  Clavicle Bone Region (Muscle Loss): moderate depletion                    Fluid Accumulation (Malnutrition): other (see comments) (+1 pitting pedal edema)        A minimum of two characteristics is recommended for diagnosis of either severe or non-severe malnutrition.     Chart Review    Reason Seen: continuous nutrition monitoring    Malnutrition Screening Tool Results   Have you recently lost weight without trying?: No  Have you been eating poorly because of a decreased appetite?: No   MST Score: 0     Diagnosis:  Sepsis    Relevant Medical History: DM2, untreated hepatitis-C, cirrhosis, IV drug use, MRSA bacteremia, MRSA C-Spine / L-Spine osteomyelitis/discitis with B/L psoas and paraspinous muscle abscesses as well as lumbar epidural abscess    Nutrition-Related Medications: antimicrobials with daptomycin, rifampin and Zosyn as  "noted.  Calorie Containing IV Medications: no significant kcals from medications at this time    Nutrition-Related Labs:  5/10/23: HGB/HCT: 8.7/27.7,     Diet/PN Order: Diet NPO  Oral Supplement Order: none  Tube Feeding Order: none  Appetite/Oral Intake: NPO/NPO  Factors Affecting Nutritional Intake: chewing difficulty and NPO  Food/Shinto/Cultural Preferences: none reported  Food Allergies: none reported    Skin Integrity: drain/device(s), wound, incision  Wound(s):      Altered Skin Integrity 23 1450 medial Sacral spine-Tissue loss description: Full thickness Stage 4 absess    Comments    5/10/23: Pt states eaten little of tray, has been getting 1 meal a day from outside due to poor dentition and difficulty chewing. Agreed to minced and moist diet modification when advanced. Pt reported moderate appetite prior to admission; typical intake 1 meal a day (soup), snacks on chips (lets dissolve in mouth). Denies nausea, vomiting, and diarrhea. BM every other day; solid with some straining. Does not take supplements.      Anthropometrics    Height: 5' 7" (170.2 cm) Height Method: Stated  Last Weight: 72.6 kg (160 lb) (23 1000) Weight Method: Standard Scale  BMI (Calculated): 25.1  BMI Classification: overweight (BMI 25-29.9)        Ideal Body Weight (IBW), Male: 148 lb     % Ideal Body Weight, Male (lb): 108.11 %                 Usual Body Weight (UBW), k.6 kg  % Usual Body Weight: 100.18     Usual Weight Provided By: patient    Wt Readings from Last 5 Encounters:   23 72.6 kg (160 lb)   23 72.6 kg (160 lb)   18 75.7 kg (166 lb 14.2 oz)     Weight Change(s) Since Admission:  Admit Weight: 72.6 kg (160 lb) (23 1300)  Unable to get updated weight due to patient gone when returned with scale, current weight is self reported    Estimated Needs    Weight Used For Calorie Calculations: 72.6 kg (160 lb 0.9 oz)  Energy Calorie Requirements (kcal): 2092 (SF 1.4)  Energy Need Method: " [facility]  Weight Used For Protein Calculations: 72.6 kg (160 lb 0.9 oz)  Protein Requirements: 109 (1.5 g/kg)  Fluid Requirements (mL): 2092  Temp (24hrs), Av.1 °F (36.7 °C), Min:97.7 °F (36.5 °C), Max:98.4 °F (36.9 °C)         Enteral Nutrition    Patient not receiving enteral nutrition at this time.    Parenteral Nutrition    Patient not receiving parenteral nutrition support at this time.    Evaluation of Received Nutrient Intake    Calories: not meeting estimated needs  Protein: not meeting estimated needs    Patient Education    Not applicable.    Nutrition Diagnosis   PES: Malnutrition related to  chronic illness, chronic IV drug use, and chewing problems as evidenced by less than 75% needs met for greater than 1 month, moderate fat depletion, and moderate muscle depletion. (new)    Interventions/Goals     Intervention(s): modified composition of meals/snacks  Goal: Meet greater than 75% of nutritional needs by follow-up. (new)    Monitoring & Evaluation     Dietitian will monitor food and beverage intake, weight, and glucose/endocrine profile.  Nutrition Risk/Follow-Up: moderate (follow-up in 3-5 days)   Please consult if re-assessment needed sooner.

## 2023-05-10 NOTE — PROGRESS NOTES
[facility] Hospital Medicine Progress Note        Chief Complaint: Inpatient Follow-up for sepsis    Subjective:  History:  62-year-old male with significant history of polysubstance abuse/IV drug abuse, type 2 diabetes mellitus, untreated chronic hep C/cirrhosis.  Patient had a recent hospitalization to [facility] for MRSA bacteremia with MRSA C and L-spine osteomyelitis, diskitis, bilateral psoas and paraspinous muscle abscess as well as lumbar epidural abscess.  Patient was being treated in [facility] with close follow-up with neurosurgery, Infectious Disease.  Hospital stay also was even full for cholecystitis for which he underwent cholecystostomy tube placement.  Patient signed out AMA from [facility] on 05/05, went home, called ambulance and presented to the hospital.  Per chart review patient was being treated with daptomycin and rifampin in [facility].  While hospitalized his urine drug screen was positive for amphetamines and there were concerns that he was using it while hospitalized.  Patient was tachycardic in the ED.  Otherwise hemodynamics stable.  Complaining of bilateral lower extremity swelling which is interfering with movement.  Lab significant for leukocytosis, anemia, hyperglycemia.  UDS was positive for opiates, amphetamines.  Hospitalist team was consulted for admission.    Seen and examined the patient.  Afebrile vitals stable he does not have any complaints at this time.    Pending debridement    General appearance: Thin, chronically ill-appearing  male in no apparent distress.  HENT: Atraumatic head. Moist mucous membranes of oral cavity.  Eyes: Normal extraocular movements.   Neck: Supple.   Lungs: Clear to auscultation bilaterally. Tachypnea   Heart: Regular rate and rhythm. S1 and S2 present. No pedal edema.  Abdomen: Soft, non-distended, non-tender. RUQ Drain  Extremities:  1+ pitting pedal edema   Skin: No Rash.    Neuro:  Moving upper extremity without any difficulty, patient reports decreased range of motion lower extremity secondary to swelling   Psych/mental status: Appropriate mood and affect. Responds appropriately to questions    Recent MRSA bacteremia   Recent MRSA cervical, lumbar spine osteomyelitis with bilateral psoas, paraspinal muscle abscess as well as lumbar epidural abscess status post drainage, laminectomy  Recent cholecystitis status post cholecystostomy tube placement   Sepsis secondary to all the above   Medical noncompliance   Polysubstance abuse/IV drug abuse   Decompensated cirrhosis with volume overload   Untreated chronic hep C   Type 2 diabetes mellitus with hyperglycemia  Anemia of chronic disease  Prophylaxis     Waiting for debridement by surgical team today.    -Patient is still to receive MRI.  Patient was receiving daptomycin, rifampin in outlying facility for MRSA bacteremia with lumbar spinal infection, Appreciate Infectious Disease recommendations.  Patient has cholecystostomy tube in place   Echo results are noted.  Venous ultrasound bilateral lower extremity negative for DVT.  However showed 9 x 4 x 2 cm fluid collection in the left proximal through mid calf.  - will get CT of the left leg to review this area, it is possible that patient has another abscess here.  Will also get another CT scan giving that loculated finding in the chest x-ray is still persistent, also suspect masslike structure, review modality.  Discontinue IV Lasix continue with 40 mg p.o. daily, also continue Cipro and Aldactone 25 mg.  Levemir, sliding scale for diabetes mellitus  Continue probiotic, multivitami ,  Anemic, hemoglobin is more than 8   DVT prophylaxis-Lovenox    VITAL SIGNS: 24 HRS MIN & MAX LAST   Temp  Min: 97.7 °F (36.5 °C)  Max: 98.4 °F (36.9 °C) 97.7 °F (36.5 °C)   BP  Min: 112/73  Max: 146/81 126/78   Pulse  Min: 96  Max: 124  96   Resp  Min: 10  Max: 20 18   SpO2  Min: 91 %  Max: 99 % (!) 92 %        Labs, Microbiology and Imaging were Reviewed.      Microbiology Results (last 7 days)       Procedure Component Value Units Date/Time    Blood culture #1 **CANNOT BE ORDERED STAT** [[id number]]  (Abnormal)  (Susceptibility) Collected: 05/05/23 2109    Order Status: Completed Specimen: Blood Updated: 05/10/23 0706     CULTURE, BLOOD ([facility]) Methicillin resistant Staphylococcus aureus     GRAM STAIN Gram Positive Cocci, probable Staphylococcus      Seen in gram stain of broth only      2 of 2 bottles positive    Blood Culture [[id number]] Collected: 05/09/23 2104    Order Status: Resulted Specimen: Blood Updated: 05/09/23 2127    Blood culture #2 **CANNOT BE ORDERED STAT** [[id number]]  (Abnormal)  (Susceptibility) Collected: 05/05/23 2109    Order Status: Completed Specimen: Blood Updated: 05/09/23 0716     CULTURE, BLOOD ([facility]) Methicillin resistant Staphylococcus aureus     GRAM STAIN Gram Positive Cocci, probable Staphylococcus      Seen in gram stain of broth only      1 of 2 Anaerobic bottles positive    Urine culture [[id number]]  (Abnormal) Collected: 05/06/23 0605    Order Status: Completed Specimen: Urine Updated: 05/08/23 0910     Urine Culture 10,000 - 25,000 colonies/ml Candida albicans    BCID2 Panel [[id number]]  (Abnormal) Collected: 05/05/23 2109    Order Status: Completed Specimen: Blood Updated: 05/07/23 0142     CTX-M (ESBL ) N/A     Comment: Note: Antimicrobial resistance can occur via multiple mechanisms. A Not Detected result for antimicrobial resistance gene(s) does not indicate antimicrobial susceptibility. Subculturing is required for species identification and susceptibility testing of   isolates.        IMP (Cabapenemase ) N/A     Comment: Note: Antimicrobial resistance can occur via multiple mechanisms. A Not Detected result for antimicrobial resistance gene(s) does not indicate antimicrobial susceptibility. Subculturing is required for species identification and  susceptibility testing of   isolates.        KPC resistance gene (Carbapenemase ) N/A     Comment: Note: Antimicrobial resistance can occur via multiple mechanisms. A Not Detected result for antimicrobial resistance gene(s) does not indicate antimicrobial susceptibility. Subculturing is required for species identification and susceptibility testing of   isolates.        mcr-1 N/A     Comment: Note: Antimicrobial resistance can occur via multiple mechanisms. A Not Detected result for antimicrobial resistance gene(s) does not indicate antimicrobial susceptibility. Subculturing is required for species identification and susceptibility testing of   isolates.        mecA ID N/A     Comment: Note: Antimicrobial resistance can occur via multiple mechanisms. A Not Detected result for antimicrobial resistance gene(s) does not indicate antimicrobial susceptibility. Subculturing is required for species identification and susceptibility testing of   isolates.        mecA/C and MREJ (MRSA) gene Detected     Comment: Note: Antimicrobial resistance can occur via multiple mechanisms. A Not Detected result for antimicrobial resistance gene(s) does not indicate antimicrobial susceptibility. Subculturing is required for species identification and susceptibility testing of   isolates.        NDM (Carbapenemase ) N/A     Comment: Note: Antimicrobial resistance can occur via multiple mechanisms. A Not Detected result for antimicrobial resistance gene(s) does not indicate antimicrobial susceptibility. Subculturing is required for species identification and susceptibility testing of   isolates.        OXA-48-like (Carbapenemase ) N/A     Comment: Note: Antimicrobial resistance can occur via multiple mechanisms. A Not Detected result for antimicrobial resistance gene(s) does not indicate antimicrobial susceptibility. Subculturing is required for species identification and susceptibility testing of   isolates.         Kenia/B (VRE gene) N/A     Comment: Note: Antimicrobial resistance can occur via multiple mechanisms. A Not Detected result for antimicrobial resistance gene(s) does not indicate antimicrobial susceptibility. Subculturing is required for species identification and susceptibility testing of   isolates.        VIM (Carbapenemase ) N/A     Comment: Note: Antimicrobial resistance can occur via multiple mechanisms. A Not Detected result for antimicrobial resistance gene(s) does not indicate antimicrobial susceptibility. Subculturing is required for species identification and susceptibility testing of   isolates.        Enterococcus faecalis Not Detected     Enterococcus faecium Not Detected     Listeria monocytogenes Not Detected     Staphylococcus spp. Detected     Staphylococcus aureus Detected     Staphylococcus epidermidis Not Detected     Staphylococcus lugdunensis Not Detected     Streptococcus spp. Not Detected     Streptococcus agalactiae (Group B) Not Detected     Streptococcus pneumoniae Not Detected     Streptococcus pyogenes (Group A) Not Detected     Acinetobacter calcoaceticus/baumannii complex Not Detected     Bacteroides fragilis Not Detected     Enterobacterales Not Detected     Enterobacter cloacae complex Not Detected     Escherichia coli Not Detected     Klebsiella aerogenes Not Detected     Klebsiella oxytoca Not Detected     Klebsiella pneumoniae group Not Detected     Proteus spp. Not Detected     Salmonella spp. Not Detected     Serratia marcescens Not Detected     Haemophilus influenzae Not Detected     Neisseria meningitidis Not Detected     Pseudomonas aeruginosa Not Detected     Stenotrophomonas maltophilia Not Detected     Candida albicans Not Detected     Candida auris Not Detected     Candida glabrata Not Detected     Candida krusei Not Detected     Candida parapsilosis Not Detected     Candida tropicalis Not Detected     Cryptococcus neoformans/gattii Not Detected    Narrative:       The [employer] BCID2 Panel is a multiplexed nucleic acid test intended for the use with [employer]® FilmArray® 2.0 or [employer]® FilmArray® LIKECHARITY Systems for the simultaneous qualitative detection and identification of multiple bacterial and yeast nucleic acids and select genetic determinants associated with antimicrobial resistance.  The [employer] BCID2 Panel test is performed directly on blood culture samples identified as positive by a continuous monitoring blood culture system.  Results are intended to be interpreted in conjunction with Gram stain results.               Medications   DAPTOmycin (CUBICIN) IV (PEDS and ADULTS)  6 mg/kg Intravenous Q24H    enoxaparin  40 mg Subcutaneous Daily    furosemide (LASIX) injection  20 mg Intravenous Q12H    insulin detemir U-100  12 Units Subcutaneous QHS    Lactobacillus acidophilus  1 capsule Oral TID WM    multivitamin  1 tablet Oral Daily    piperacillin-tazobactam (ZOSYN) IVPB  4.5 g Intravenous Q8H    rifAMpin  300 mg Oral Q12H    sodium chloride 0.9%  10 mL Intravenous Q6H    spironolactone  25 mg Oral BID    thiamine  100 mg Oral Daily        aluminum-magnesium hydroxide-simethicone, dextrose 10%, dextrose 10%, glucagon (human recombinant), glucose, glucose, HYDROcodone-acetaminophen, insulin aspart U-100, melatonin, ondansetron, polyethylene glycol, prochlorperazine, senna-docusate 8.6-50 mg, sodium chloride 0.9%, Flushing PICC Protocol **AND** sodium chloride 0.9% **AND** sodium chloride 0.9%, tiZANidine     Radiology:  X-Ray Chest 1 View for Line/Tube Placement  Narrative: EXAMINATION:  XR CHEST 1 VIEW FOR LINE/TUBE PLACEMENT    CLINICAL HISTORY:  picc;    TECHNIQUE:  One view    COMPARISON:  May 6, 2023.    FINDINGS:  Right upper extremity approach PICC line terminates within the superior vena cava and is optimal.  Otherwise, no significant interval change.  Impression: Optimal placement of the PICC line.    Electronically signed by: Roland  Pierce  Date:    05/10/2023  Time:    08:55          Assessment/Plan:      All diagnosis and differential diagnosis have been reviewed; assessment and plan has been documented; I have personally reviewed the labs and test results that are presently available; I have reviewed the patients medication list; I have reviewed the consulting providers response and recommendations. I have reviewed or attempted to review medical records based upon their availability.       Darnell Franco MD   05/10/2023

## 2023-05-10 NOTE — PROGRESS NOTES
Pt procedure discussed in detail. Post op orders reviewed. Pt attached to v/s machine and vitals WNL. Procedure site assessed and intact. Everyone understands pt info with no further questions.

## 2023-05-10 NOTE — BRIEF OP NOTE
Brief Operative Note    Date of Procedure: 05/10/2023     Procedure: Debridement of sacral decubitus wound     Surgeon(s) and Role:  Staff: Dr. Castañeda   Resident(s): Dr. Oneida Awad, PGY2    Pre-Operative Diagnosis: Stage 2 sacral decubitus wound     Post-Operative Diagnosis: Same    Anesthesia: GETA    Operative Findings: Refer to full dictated operative report    Description of Technical Procedures: Refer to full dictated operative report    Estimated Blood Loss (EBL):  minimal            Implants: None    Drains: None    Specimens: Sacral wound eschar     Complications: None            Condition: Good     Disposition: Return to previous room     Plan of Care: Wound care consulted to manage sacral wound. Surgery will sign off. Please call with any questions

## 2023-05-10 NOTE — ANESTHESIA PROCEDURE NOTES
Intubation    Date/Time: 5/10/2023 2:53 PM  Performed by: Agnieszka Omalley CRNA  Authorized by: Kylah Ortiz MD     Intubation:     Induction:  Intravenous    Intubated:  Postinduction    Mask Ventilation:  Easy mask    Attempts:  1    Attempted By:  CRNA    Method of Intubation:  Direct    Blade:  Missael 3    Laryngeal View Grade: Grade I - full view of cords      Difficult Airway Encountered?: No      Complications:  None    Airway Device:  Oral endotracheal tube    Airway Device Size:  7.5    Style/Cuff Inflation:  Cuffed (inflated to minimal occlusive pressure)    Tube secured:  23    Secured at:  The teeth    Placement Verified By:  Capnometry    Complicating Factors:  None    Findings Post-Intubation:  BS equal bilateral and atraumatic/condition of teeth unchanged

## 2023-05-11 LAB
ALBUMIN SERPL-MCNC: 1.3 G/DL (ref 3.4–4.8)
ALBUMIN/GLOB SERPL: 0.4 RATIO (ref 1.1–2)
ALP SERPL-CCNC: 84 UNIT/L (ref 40–150)
ALT SERPL-CCNC: 10 UNIT/L (ref 0–55)
AST SERPL-CCNC: 27 UNIT/L (ref 5–34)
BASOPHILS # BLD AUTO: 0.07 X10(3)/MCL
BASOPHILS NFR BLD AUTO: 0.5 %
BILIRUBIN DIRECT+TOT PNL SERPL-MCNC: 0.9 MG/DL
BUN SERPL-MCNC: 25.6 MG/DL (ref 8.4–25.7)
CALCIUM SERPL-MCNC: 6.9 MG/DL (ref 8.8–10)
CHLORIDE SERPL-SCNC: 97 MMOL/L (ref 98–107)
CK SERPL-CCNC: 21 U/L (ref 30–200)
CO2 SERPL-SCNC: 30 MMOL/L (ref 23–31)
CREAT SERPL-MCNC: 1.78 MG/DL (ref 0.73–1.18)
EOSINOPHIL # BLD AUTO: 0.25 X10(3)/MCL (ref 0–0.9)
EOSINOPHIL NFR BLD AUTO: 1.9 %
ERYTHROCYTE [DISTWIDTH] IN BLOOD BY AUTOMATED COUNT: 15.8 % (ref 11.5–17)
GFR SERPLBLD CREATININE-BSD FMLA CKD-EPI: 43 MLS/MIN/1.73/M2
GLOBULIN SER-MCNC: 3.4 GM/DL (ref 2.4–3.5)
GLUCOSE SERPL-MCNC: 139 MG/DL (ref 82–115)
HCT VFR BLD AUTO: 26.1 % (ref 42–52)
HGB BLD-MCNC: 8.3 G/DL (ref 14–18)
IMM GRANULOCYTES # BLD AUTO: 0.09 X10(3)/MCL (ref 0–0.04)
IMM GRANULOCYTES NFR BLD AUTO: 0.7 %
LYMPHOCYTES # BLD AUTO: 1.08 X10(3)/MCL (ref 0.6–4.6)
LYMPHOCYTES NFR BLD AUTO: 8.3 %
MCH RBC QN AUTO: 29.7 PG (ref 27–31)
MCHC RBC AUTO-ENTMCNC: 31.8 G/DL (ref 33–36)
MCV RBC AUTO: 93.5 FL (ref 80–94)
MONOCYTES # BLD AUTO: 0.36 X10(3)/MCL (ref 0.1–1.3)
MONOCYTES NFR BLD AUTO: 2.8 %
NEUTROPHILS # BLD AUTO: 11.23 X10(3)/MCL (ref 2.1–9.2)
NEUTROPHILS NFR BLD AUTO: 85.8 %
NRBC BLD AUTO-RTO: 0 %
PLATELET # BLD AUTO: 172 X10(3)/MCL (ref 130–400)
PMV BLD AUTO: 10.2 FL (ref 7.4–10.4)
POCT GLUCOSE: 138 MG/DL (ref 70–110)
POCT GLUCOSE: 214 MG/DL (ref 70–110)
POCT GLUCOSE: 72 MG/DL (ref 70–110)
POCT GLUCOSE: 98 MG/DL (ref 70–110)
POTASSIUM SERPL-SCNC: 3.5 MMOL/L (ref 3.5–5.1)
PROT SERPL-MCNC: 4.7 GM/DL (ref 5.8–7.6)
RBC # BLD AUTO: 2.79 X10(6)/MCL (ref 4.7–6.1)
SODIUM SERPL-SCNC: 135 MMOL/L (ref 136–145)
WBC # SPEC AUTO: 13.08 X10(3)/MCL (ref 4.5–11.5)

## 2023-05-11 PROCEDURE — A4216 STERILE WATER/SALINE, 10 ML: HCPCS | Performed by: STUDENT IN AN ORGANIZED HEALTH CARE EDUCATION/TRAINING PROGRAM

## 2023-05-11 PROCEDURE — 21400001 HC TELEMETRY ROOM

## 2023-05-11 PROCEDURE — 80053 COMPREHEN METABOLIC PANEL: CPT | Performed by: STUDENT IN AN ORGANIZED HEALTH CARE EDUCATION/TRAINING PROGRAM

## 2023-05-11 PROCEDURE — 25000003 PHARM REV CODE 250: Performed by: STUDENT IN AN ORGANIZED HEALTH CARE EDUCATION/TRAINING PROGRAM

## 2023-05-11 PROCEDURE — 63600175 PHARM REV CODE 636 W HCPCS: Performed by: INTERNAL MEDICINE

## 2023-05-11 PROCEDURE — 85025 COMPLETE CBC W/AUTO DIFF WBC: CPT | Performed by: STUDENT IN AN ORGANIZED HEALTH CARE EDUCATION/TRAINING PROGRAM

## 2023-05-11 PROCEDURE — 27000207 HC ISOLATION

## 2023-05-11 PROCEDURE — 63600175 PHARM REV CODE 636 W HCPCS: Performed by: STUDENT IN AN ORGANIZED HEALTH CARE EDUCATION/TRAINING PROGRAM

## 2023-05-11 PROCEDURE — 25000003 PHARM REV CODE 250: Performed by: SURGERY

## 2023-05-11 PROCEDURE — 25000003 PHARM REV CODE 250: Performed by: INTERNAL MEDICINE

## 2023-05-11 PROCEDURE — 82550 ASSAY OF CK (CPK): CPT | Performed by: STUDENT IN AN ORGANIZED HEALTH CARE EDUCATION/TRAINING PROGRAM

## 2023-05-11 RX ORDER — SODIUM CHLORIDE, SODIUM LACTATE, POTASSIUM CHLORIDE, CALCIUM CHLORIDE 600; 310; 30; 20 MG/100ML; MG/100ML; MG/100ML; MG/100ML
INJECTION, SOLUTION INTRAVENOUS CONTINUOUS
Status: DISCONTINUED | OUTPATIENT
Start: 2023-05-11 | End: 2023-05-16

## 2023-05-11 RX ADMIN — SODIUM CHLORIDE, POTASSIUM CHLORIDE, SODIUM LACTATE AND CALCIUM CHLORIDE: 600; 310; 30; 20 INJECTION, SOLUTION INTRAVENOUS at 09:05

## 2023-05-11 RX ADMIN — HYDROCODONE BITARTRATE AND ACETAMINOPHEN 1 TABLET: 7.5; 325 TABLET ORAL at 08:05

## 2023-05-11 RX ADMIN — SODIUM CHLORIDE, PRESERVATIVE FREE 10 ML: 5 INJECTION INTRAVENOUS at 12:05

## 2023-05-11 RX ADMIN — HYDROCODONE BITARTRATE AND ACETAMINOPHEN 1 TABLET: 7.5; 325 TABLET ORAL at 03:05

## 2023-05-11 RX ADMIN — HYDROCODONE BITARTRATE AND ACETAMINOPHEN 1 TABLET: 7.5; 325 TABLET ORAL at 06:05

## 2023-05-11 RX ADMIN — Medication 1 CAPSULE: at 11:05

## 2023-05-11 RX ADMIN — INSULIN DETEMIR 12 UNITS: 100 INJECTION, SOLUTION SUBCUTANEOUS at 10:05

## 2023-05-11 RX ADMIN — THIAMINE HCL TAB 100 MG 100 MG: 100 TAB at 08:05

## 2023-05-11 RX ADMIN — FUROSEMIDE 40 MG: 40 TABLET ORAL at 08:05

## 2023-05-11 RX ADMIN — Medication 1 CAPSULE: at 05:05

## 2023-05-11 RX ADMIN — THERA TABS 1 TABLET: TAB at 08:05

## 2023-05-11 RX ADMIN — SODIUM CHLORIDE, PRESERVATIVE FREE 10 ML: 5 INJECTION INTRAVENOUS at 05:05

## 2023-05-11 RX ADMIN — HYDROCODONE BITARTRATE AND ACETAMINOPHEN 1 TABLET: 7.5; 325 TABLET ORAL at 11:05

## 2023-05-11 RX ADMIN — PIPERACILLIN AND TAZOBACTAM 4.5 G: 4; .5 INJECTION, POWDER, LYOPHILIZED, FOR SOLUTION INTRAVENOUS; PARENTERAL at 05:05

## 2023-05-11 RX ADMIN — PIPERACILLIN AND TAZOBACTAM 4.5 G: 4; .5 INJECTION, POWDER, LYOPHILIZED, FOR SOLUTION INTRAVENOUS; PARENTERAL at 09:05

## 2023-05-11 RX ADMIN — COLLAGENASE SANTYL: 250 OINTMENT TOPICAL at 10:05

## 2023-05-11 RX ADMIN — Medication 1 CAPSULE: at 08:05

## 2023-05-11 RX ADMIN — ENOXAPARIN SODIUM 40 MG: 40 INJECTION SUBCUTANEOUS at 05:05

## 2023-05-11 RX ADMIN — RIFAMPIN 300 MG: 300 CAPSULE ORAL at 09:05

## 2023-05-11 RX ADMIN — PIPERACILLIN AND TAZOBACTAM 4.5 G: 4; .5 INJECTION, POWDER, LYOPHILIZED, FOR SOLUTION INTRAVENOUS; PARENTERAL at 01:05

## 2023-05-11 RX ADMIN — SPIRONOLACTONE 25 MG: 25 TABLET ORAL at 08:05

## 2023-05-11 RX ADMIN — RIFAMPIN 300 MG: 300 CAPSULE ORAL at 08:05

## 2023-05-11 RX ADMIN — HYDROCODONE BITARTRATE AND ACETAMINOPHEN 1 TABLET: 7.5; 325 TABLET ORAL at 02:05

## 2023-05-11 RX ADMIN — DAPTOMYCIN 435 MG: 500 INJECTION, POWDER, LYOPHILIZED, FOR SOLUTION INTRAVENOUS at 05:05

## 2023-05-11 NOTE — PROGRESS NOTES
[facility] - 8th Floor Med Surg  Neurosurgery  Progress Note    Subjective:     Interval History: No acute events overnight.    No complaints of new weakness.  Patient underwent debridement of sacral decubitus wound yesterday in the OR.  MRIs not performed yet.      62-year-old male with significant history of polysubstance abuse/IV drug abuse, type 2 diabetes mellitus, untreated chronic hep C/cirrhosis.  Patient had a recent hospitalization to [facility] for MRSA bacteremia with MRSA C and L-spine osteomyelitis, diskitis, bilateral psoas and paraspinous muscle abscess as well as lumbar epidural abscess.  Patient was being treated in [facility] with close follow-up with neurosurgery, Infectious Disease. Patient underwent lumbar laminectomy with I&D with Dr. Chang on 4/21. Hospital stay also was eventful for cholecystitis for which he underwent cholecystostomy tube placement.  Patient signed out AMA from [facility] on 05/05, went home, called ambulance and presented to the hospital.  Per chart review patient was being treated with daptomycin and rifampin in [facility].  While hospitalized his urine drug screen was positive for amphetamines and there were concerns that he was using it while hospitalized.  Patient was tachycardic in the ED.  Otherwise hemodynamics stable.  Complaining of bilateral lower extremity swelling which is interfering with movement.  Lab significant for leukocytosis, anemia, hyperglycemia.  UDS was positive for opiates, amphetamines.  Hospitalist team was consulted for admission. Dr. Rosa was consulted for evaluation and treatment recommendations regarding his recent lumbar surgery.    CT lumbar spine with and without contrast 05/06/2023:  FINDINGS:   Patient is status post wide decompression laminectomies from L1 to L5.  There are extensive posterior paraspinal soft inflammations and fluid collection containing several air locules.  The fluid  collection with peripheral thin enhancement broadly abuts the thecal sac is from the superior endplate of L2 the inferior endplate of L5 with craniocaudal span of 8.5 cm and shows maximum anterior-posterior diameter of 2.5 cm and transverse diameter of 3.8 cm.  There is also left intrathecal small air locule on image 83 series 3.  These findings may represent postsurgical seroma or hematoma without exclusion of infected collection.  There are no lumbar vertebrae disc spaces irregularity or osteolytic destructive changes.  Several Schmorl nodes are seen involving lumbar vertebrae and the most conspicuous is of L3.  No acute fracture or malalignment.  Disc segmental analysis is given below:     At L1-L2, disc height is preserved.  Bilateral facet arthropathy without significant central canal stenosis.  There are no narrowings of the neural foramen.     At L2-L3, there is osteophyte disc complex which indents the ventral thecal sac.  Thecal sac is patent with decompression laminectomies.  There is mild narrowing of the right neural foramen caused by uncinate arthropathy and there is moderate narrowing of the left neural foramen.     At L3-L4, there is broad osteophyte disc complex which indents the ventral thecal sac.  Right paracentral spur like growth from vertebral body effaces the right lateral recess with compression upon the right exiting nerve root.  There also bilateral moderate spondylotic narrowings of the neural foramen.     At L4-L5, there is broad disc bulge which indents the ventral thecal sac.  Central canal is not stenosed with decompression laminectomies.  There is bilateral uncovertebral and facet arthropathy which encroaches onto the neural foramen.  There is mild narrowing of the right neural foramen and moderate narrowing of the left neural canal.     At L5-S1, there is broad posterior vertebral spondylosis.  Central canal is not stenosed.  There are no significant narrowings of the neural foramen     Impression:       1. Multiple posterior lumbar decompression laminectomies.  Posterior paraspinal fluid collection.  This may represent postsurgical seroma or hematoma without exclusion of infected collection.     2. Lumbar degenerative disc disease and spondylosis level by level discussed above.          Post-Op Info:  Procedure(s) (LRB):  DEBRIDEMENT, WOUND, SACRUM (N/A)   1 Day Post-Op      Medications:  Continuous Infusions:  Scheduled Meds:   collagenase   Topical (Top) BID    DAPTOmycin (CUBICIN) IV (PEDS and ADULTS)  6 mg/kg Intravenous Q24H    enoxaparin  40 mg Subcutaneous Daily    furosemide  40 mg Oral Daily    insulin detemir U-100  12 Units Subcutaneous QHS    Lactobacillus acidophilus  1 capsule Oral TID WM    multivitamin  1 tablet Oral Daily    piperacillin-tazobactam (ZOSYN) IVPB  4.5 g Intravenous Q8H    rifAMpin  300 mg Oral Q12H    sodium chloride 0.9%  10 mL Intravenous Q6H    spironolactone  25 mg Oral BID    thiamine  100 mg Oral Daily     PRN Meds:aluminum-magnesium hydroxide-simethicone, dextrose 10%, dextrose 10%, glucagon (human recombinant), glucose, glucose, HYDROcodone-acetaminophen, HYDROmorphone, insulin aspart U-100, melatonin, meperidine, ondansetron, ondansetron, polyethylene glycol, prochlorperazine, prochlorperazine, senna-docusate 8.6-50 mg, sodium chloride 0.9%, Flushing PICC Protocol **AND** sodium chloride 0.9% **AND** sodium chloride 0.9%, tiZANidine     Review of Systems  Objective:     Weight: 72.6 kg (160 lb)  Body mass index is 25.06 kg/m².  Vital Signs (Most Recent):  Temp: 98.4 °F (36.9 °C) (05/11/23 0816)  Pulse: 101 (05/11/23 0816)  Resp: 18 (05/11/23 0641)  BP: 124/79 (05/11/23 0848)  SpO2: (!) 93 % (05/11/23 0816) Vital Signs (24h Range):  Temp:  [98.1 °F (36.7 °C)-98.4 °F (36.9 °C)] 98.4 °F (36.9 °C)  Pulse:  [100-133] 101  Resp:  [7-24] 18  SpO2:  [80 %-100 %] 93 %  BP: (105-146)/() 124/79                              Drain/Device    Right lower flank  (Active)   Insertion Site clean and dry 05/07/23 2054   Drainage Characteristics/Odor Brown 05/07/23 2054   Drainage Amount Large 05/07/23 2054   General Output (mL) 80 05/08/23 0623       Neurosurgery Physical Exam  AFVSS  PERRLA bilateral.  No focal neurological deficits.  Full strength bilateral UE  LE weakness unchanged from previous  Serous drainage from lumbar incision.  Sacral wound unchanged    Significant Labs:  Recent Labs   Lab 05/10/23  0403      K 3.4*   CO2 29   BUN 19.5   CREATININE 1.07   CALCIUM 7.3*       Recent Labs   Lab 05/10/23  0403   WBC 12.33*   HGB 8.7*   HCT 27.7*          Recent Labs   Lab 05/10/23  1044   INR 1.22       Microbiology Results (last 7 days)       Procedure Component Value Units Date/Time    Blood Culture [[id number]]  (Normal) Collected: 05/09/23 2104    Order Status: Completed Specimen: Blood Updated: 05/10/23 2200     CULTURE, BLOOD ([facility]) No Growth At 24 Hours    Blood culture #1 **CANNOT BE ORDERED STAT** [[id number]]  (Abnormal)  (Susceptibility) Collected: 05/05/23 2109    Order Status: Completed Specimen: Blood Updated: 05/10/23 0706     CULTURE, BLOOD ([facility]) Methicillin resistant Staphylococcus aureus     GRAM STAIN Gram Positive Cocci, probable Staphylococcus      Seen in gram stain of broth only      2 of 2 bottles positive    Blood culture #2 **CANNOT BE ORDERED STAT** [[id number]]  (Abnormal)  (Susceptibility) Collected: 05/05/23 2109    Order Status: Completed Specimen: Blood Updated: 05/09/23 0716     CULTURE, BLOOD ([facility]) Methicillin resistant Staphylococcus aureus     GRAM STAIN Gram Positive Cocci, probable Staphylococcus      Seen in gram stain of broth only      1 of 2 Anaerobic bottles positive    Urine culture [[id number]]  (Abnormal) Collected: 05/06/23 0605    Order Status: Completed Specimen: Urine Updated: 05/08/23 0910     Urine Culture 10,000 - 25,000 colonies/ml Candida albicans    BCID2 Panel [[id number]]  (Abnormal) Collected: 05/05/23  2109    Order Status: Completed Specimen: Blood Updated: 05/07/23 0142     CTX-M (ESBL ) N/A     Comment: Note: Antimicrobial resistance can occur via multiple mechanisms. A Not Detected result for antimicrobial resistance gene(s) does not indicate antimicrobial susceptibility. Subculturing is required for species identification and susceptibility testing of   isolates.        IMP (Cabapenemase ) N/A     Comment: Note: Antimicrobial resistance can occur via multiple mechanisms. A Not Detected result for antimicrobial resistance gene(s) does not indicate antimicrobial susceptibility. Subculturing is required for species identification and susceptibility testing of   isolates.        KPC resistance gene (Carbapenemase ) N/A     Comment: Note: Antimicrobial resistance can occur via multiple mechanisms. A Not Detected result for antimicrobial resistance gene(s) does not indicate antimicrobial susceptibility. Subculturing is required for species identification and susceptibility testing of   isolates.        mcr-1 N/A     Comment: Note: Antimicrobial resistance can occur via multiple mechanisms. A Not Detected result for antimicrobial resistance gene(s) does not indicate antimicrobial susceptibility. Subculturing is required for species identification and susceptibility testing of   isolates.        mecA ID N/A     Comment: Note: Antimicrobial resistance can occur via multiple mechanisms. A Not Detected result for antimicrobial resistance gene(s) does not indicate antimicrobial susceptibility. Subculturing is required for species identification and susceptibility testing of   isolates.        mecA/C and MREJ (MRSA) gene Detected     Comment: Note: Antimicrobial resistance can occur via multiple mechanisms. A Not Detected result for antimicrobial resistance gene(s) does not indicate antimicrobial susceptibility. Subculturing is required for species identification and susceptibility testing of    isolates.        NDM (Carbapenemase ) N/A     Comment: Note: Antimicrobial resistance can occur via multiple mechanisms. A Not Detected result for antimicrobial resistance gene(s) does not indicate antimicrobial susceptibility. Subculturing is required for species identification and susceptibility testing of   isolates.        OXA-48-like (Carbapenemase ) N/A     Comment: Note: Antimicrobial resistance can occur via multiple mechanisms. A Not Detected result for antimicrobial resistance gene(s) does not indicate antimicrobial susceptibility. Subculturing is required for species identification and susceptibility testing of   isolates.        Kenia/B (VRE gene) N/A     Comment: Note: Antimicrobial resistance can occur via multiple mechanisms. A Not Detected result for antimicrobial resistance gene(s) does not indicate antimicrobial susceptibility. Subculturing is required for species identification and susceptibility testing of   isolates.        VIM (Carbapenemase ) N/A     Comment: Note: Antimicrobial resistance can occur via multiple mechanisms. A Not Detected result for antimicrobial resistance gene(s) does not indicate antimicrobial susceptibility. Subculturing is required for species identification and susceptibility testing of   isolates.        Enterococcus faecalis Not Detected     Enterococcus faecium Not Detected     Listeria monocytogenes Not Detected     Staphylococcus spp. Detected     Staphylococcus aureus Detected     Staphylococcus epidermidis Not Detected     Staphylococcus lugdunensis Not Detected     Streptococcus spp. Not Detected     Streptococcus agalactiae (Group B) Not Detected     Streptococcus pneumoniae Not Detected     Streptococcus pyogenes (Group A) Not Detected     Acinetobacter calcoaceticus/baumannii complex Not Detected     Bacteroides fragilis Not Detected     Enterobacterales Not Detected     Enterobacter cloacae complex Not Detected     Escherichia coli Not  Detected     Klebsiella aerogenes Not Detected     Klebsiella oxytoca Not Detected     Klebsiella pneumoniae group Not Detected     Proteus spp. Not Detected     Salmonella spp. Not Detected     Serratia marcescens Not Detected     Haemophilus influenzae Not Detected     Neisseria meningitidis Not Detected     Pseudomonas aeruginosa Not Detected     Stenotrophomonas maltophilia Not Detected     Candida albicans Not Detected     Candida auris Not Detected     Candida glabrata Not Detected     Candida krusei Not Detected     Candida parapsilosis Not Detected     Candida tropicalis Not Detected     Cryptococcus neoformans/gattii Not Detected    Narrative:      The [employer] BCID2 Panel is a multiplexed nucleic acid test intended for the use with [employer]® 2.0 or [employer]® [employer] Systems for the simultaneous qualitative detection and identification of multiple bacterial and yeast nucleic acids and select genetic determinants associated with antimicrobial resistance.  The [employer] BCID2 Panel test is performed directly on blood culture samples identified as positive by a continuous monitoring blood culture system.  Results are intended to be interpreted in conjunction with Gram stain results.            Significant Diagnostics:      Assessment/Plan:    Recent MRSA bacteremia   Recent MRSA cervical, lumbar spine osteomyelitis with bilateral psoas, paraspinal muscle abscess as well as lumbar epidural abscess status post drainage, laminectomy  Recent cholecystitis status post cholecystostomy tube placement   Sepsis secondary to all the above   Medical noncompliance   Polysubstance abuse/IV drug abuse   Decompensated cirrhosis with volume overload   Untreated chronic hep C   Type 2 diabetes mellitus        Await MRI   Surgical debridement performed yesterday 05/10/2023.  Infectious disease with recommendations for daptomycin, Zosyn, rifampin.  PTOT  Pain control  SCDs     Continue supportive measures    Further recommendations once MRI complete.  Hopefully we can get these done soon.       Active Diagnoses:    Diagnosis Date Noted POA    PRINCIPAL PROBLEM:  Sepsis [A41.9] 05/05/2023 Yes    Staphylococcus aureus bacteremia [R78.81, B95.61] 05/05/2023 Unknown      Problems Resolved During this Admission:         TANA Moore-BC  Neurosurgery  [facility] - 8th Floor Med Surg

## 2023-05-11 NOTE — PLAN OF CARE
Problem: Adult Inpatient Plan of Care  Goal: Plan of Care Review  Outcome: Ongoing, Progressing  Goal: Patient-Specific Goal (Individualized)  Outcome: Ongoing, Progressing  Goal: Absence of Hospital-Acquired Illness or Injury  Outcome: Ongoing, Progressing  Goal: Optimal Comfort and Wellbeing  Outcome: Ongoing, Progressing  Goal: Readiness for Transition of Care  Outcome: Ongoing, Progressing     Problem: Skin Injury Risk Increased  Goal: Skin Health and Integrity  Outcome: Ongoing, Progressing  Intervention: Optimize Skin Protection  Flowsheets (Taken 5/11/2023 0804)  Skin Protection:   adhesive use limited   tubing/devices free from skin contact

## 2023-05-11 NOTE — PROGRESS NOTES
[facility] Hospital Medicine Progress Note        Chief Complaint: Inpatient Follow-up for MRSA Spinal Osteomyelitis    Subjective:  History:  62-year-old male with significant history of polysubstance abuse/IV drug abuse, type 2 diabetes mellitus, untreated chronic hep C/cirrhosis.  Patient had a recent hospitalization to [facility] for MRSA bacteremia with MRSA C and L-spine osteomyelitis, diskitis, bilateral psoas and paraspinous muscle abscess as well as lumbar epidural abscess.  Patient was being treated in [facility] with close follow-up with neurosurgery, Infectious Disease.  Hospital stay also was even full for cholecystitis for which he underwent cholecystostomy tube placement.  Patient signed out AMA from [facility] on 05/05, went home, called ambulance and presented to the hospital.  Per chart review patient was being treated with daptomycin and rifampin in [facility].  While hospitalized his urine drug screen was positive for amphetamines and there were concerns that he was using it while hospitalized.  Patient was tachycardic in the ED.  Otherwise hemodynamics stable.  Complained of bilateral lower extremity swelling. Labs significant for leukocytosis, anemia, hyperglycemia. UDS was positive for opiates, amphetamines.  Hospitalist team was consulted for admission. Restarted on IV Daptomycin, Rifampin, Zosyn. Echocardiogram on 5/6 showed normal LV systolic function, EF 50-55%, indeterminate diastolic function, mild RV enlargement, mild MR, PASP 19 mmHg with no mention of vegetations.  Blood cultures from 05/05 for positive for MRSA.  Repeat blood culture from 05/09 negative for 24 hours.  Patient awaiting MRI lumbar spine.  Noted to have LLE pain with B/L LE U/S showing fluid collection in left cough.  Awaiting CT L leg and thigh with contrast.  Also ordered CT chest with contrast to further evaluate right-sided oblong opacity on CXR which is  thought to be mass/possible abscess/fissural fluid collection. Underwent debridement for sacral ulcer on 05/10 with wound care being done. Neurosurgery to make further recommendations once MRI lumbar spine is done.    Today, Mr. Plasencia was seen at bedside.  He stated he was doing well but complained of ongoing sacral pain.  Continued on IV antimicrobials. Waiting on MRI L-spine for further Neurosurgery recommendations. Noted to have elevated Cr of 1.78; will DC diuretics. Possibly d/t ATN from ABX; will consult Nephrology. Ordering US Retroperitoneum, UA. Will cancel CT chest, CT L thigh/leg due to RUPERTO.    General appearance: Thin, chronically ill-appearing  male in no apparent distress.  HENT: Atraumatic head. Moist mucous membranes of oral cavity.  Eyes: Normal extraocular movements.   Neck: Supple.   Lungs: Clear to auscultation bilaterally. Tachypnea   Heart: Regular rate and rhythm. S1 and S2 present. No pedal edema.  Abdomen: Soft, non-distended, non-tender. RUQ Drain  Extremities:  1+ pitting pedal edema   Skin: No Rash.   Neuro:  Moving upper extremity without any difficulty, patient reports decreased range of motion lower extremity secondary to swelling   Psych/mental status: Appropriate mood and affect. Responds appropriately to questions    A/P:  MRSA bacteremia   MRSA cervical, lumbar spine osteomyelitis with bilateral psoas, paraspinal muscle abscess as well as lumbar epidural abscess status post drainage, laminectomy  Recent cholecystitis status post cholecystostomy tube placement   Sepsis secondary to all the above   RUPERTO 2/2 possible ATN d/t ABX  Polysubstance abuse/IV drug abuse   Decompensated cirrhosis with volume overload   Untreated chronic hep C   Type 2 diabetes mellitus with hyperglycemia  Anemia of chronic disease  Prophylaxis     Patient continues to be admitted for close monitoring   Underwent sacral ulcer debridement with surgery on 05/10   Surgery on board; following  recommendations   Id on board; following recommendations  Nephrology consulted for RUPERTO likely secondary to ATN  Will hold off on CTs with contrast d/t RUPERTO   Patient is still yet to undergo MRI L Spine  Neurosurgery to make further recommendations once patient gets MRI L-spine  Patient continued on IV Daptomycin, IV Rifampin, IV Zosyn  Patient has cholecystostomy tube in place   Echocardiogram negative for vegetations; will defer to ID whether patient needs CRISTY  Venous ultrasound bilateral lower extremity negative for DVT  However showed 9 x 4 x 2 cm fluid collection in the left proximal through mid calf; will hold off on CT with contrast due to RUPERTO  Will also hold off on CT Chest with Contrast given RUPERTO; patient has loculated R sided opacity on CXR  Will DC Lasix and Aldactone due to RUPERTO  Levemir, sliding scale for diabetes mellitus  Continue monitoring patient's symptoms    DVT prophylaxis-Lovenox    VITAL SIGNS: 24 HRS MIN & MAX LAST   Temp  Min: 98.1 °F (36.7 °C)  Max: 98.4 °F (36.9 °C) 98.3 °F (36.8 °C)   BP  Min: 111/69  Max: 157/87 131/80   Pulse  Min: 100  Max: 125  (!) 121   Resp  Min: 18  Max: 20 18   SpO2  Min: 91 %  Max: 97 % (!) 91 %       All diagnosis and differential diagnosis have been reviewed; assessment and plan has been documented; I have personally reviewed the labs and test results that are presently available; I have reviewed the patients medication list; I have reviewed the consulting providers response and recommendations. I have reviewed or attempted to review medical records based upon their availability.       Guerrero Alfaro MD   05/11/2023

## 2023-05-11 NOTE — PROGRESS NOTES
[facility] - 8th Floor Med Surg  Wound Care    Patient Name:  Reji Plasencia   MRN:  [id number]  Date: 5/11/2023  Diagnosis: Sepsis    History:     No past medical history on file.    Social History     Socioeconomic History    Marital status: Single       Precautions:     Allergies as of 05/05/2023    (No Known Allergies)       [facility] Assessment Details/Treatment        05/11/23 1333        Altered Skin Integrity 05/06/23 1450 medial Sacral spine   Date First Assessed/Time First Assessed: 05/06/23 1450   Altered Skin Integrity Present on Admission - Did Patient arrive to the hospital with altered skin?: yes  Orientation: medial  Location: Sacral spine   Wound Image    Description of Altered Skin Integrity Full thickness tissue loss. Subcutaneous fat may be visible but bone, tendon or muscle are not exposed   Dressing Appearance Dry;Intact;Clean   Drainage Amount Small   Drainage Characteristics/Odor Sanguineous   Appearance Pink;Fibrin;Yellow   Tissue loss description Full thickness   Red (%), Wound Tissue Color 50 %   Yellow (%), Wound Tissue Color 50 %   Periwound Area Redness   Wound Edges Defined   Wound Length (cm) 4 cm   Wound Width (cm) 3.5 cm   Wound Depth (cm) 0.5 cm   Wound Volume (cm^3) 7 cm^3   Wound Surface Area (cm^2) 14 cm^2   Care Cleansed with:;Sterile normal saline   Dressing Applied     WOCN consulted s/p debridement of sacral ulcer 5/10/2023. Treatment recommendations put into place.Sacrum: Cleanse with vashe. Apply santyl, cover with vashe moistened 4x4, abd pad, secure with medipore tape. Change BID and PRN soilage.  Keep areas clean and dry, no adult briefs while in bed. Nursing to continue with turning every two hours, wedge and floating heels. Repositioned on left side with wedge, head of bed elevated, bed in lowest position, call bell within reach. Patient is on a ALISON mattress. Will follow up.     05/11/2023

## 2023-05-11 NOTE — OP NOTE
[facility]                       [address] [place] [id number]    PATIENT NAME:      JEN BONNER  YOB: 1960  CSN:               [id number]  MRN:               [id number]  ADMIT DATE:        05/05/2023 19:57:00  PHYSICIAN:         Reggie Castañeda MD                          OPERATIVE REPORT      DATE OF SURGERY:    05/10/2023 00:00:00    SURGEON:  Reggie Castañeda MD    PROCEDURE:  Sharp excisional debridement of unstageable decubitus ulcer down to   the subcutaneous fat.    PREOPERATIVE DIAGNOSIS:  Unstageable decubitus ulcer.    POSTOPERATIVE DIAGNOSIS:  Stage II sacral decubitus ulcer.    ANESTHESIA:  General endotracheal anesthesia.    COMPLICATIONS:  None.    ESTIMATED BLOOD LOSS:  5.    CONDITION:  Good.    SPECIMEN:  Sacral decubitus ulcer.    ASSISTANT:  Oneida Awad.    INDICATION FOR PROCEDURE:  This is a 62-year-old male who has had multiple   recent medical problems, including paraspinous infection, who now has a sacral   decubitus ulcer, is currently unstageable with dry necrotic cap.  We will take   him to the operating room to excise the area for better care and to stage the   ulcer.    FINDINGS:  Decubitus ulcer down to the subcutaneous fat with no further   involvement.  There are no signs of infection.  It was an upside-down heart   shaped 4 x 3 cm necrotic cap eschar.    PROCEDURE IN DETAIL:  Patient was brought to the operating room.  He was brought   under general endotracheal anesthesia.  He was placed in the prone position.    He was prepped and draped in sterile fashion.  Antibiotics had been given   standing as Lovenox.  A time-out was called.  The eschar was excised with the   Bovie cautery first with cutting at the skin and then coag in the deep layers.    It went down to the subcutaneous fat.  There was no evidence of infection.    Hemostasis was obtained.  Lap, sponge, needle,  instrument counts were correct.    The dressing was applied.  Patient tolerated procedure well, was awoken without   difficulty and brought to the recovery room without further event.        ______________________________  MD TAWANA Briseno/KENDRICK  DD:  05/10/2023  Time:  03:35PM  DT:  05/10/2023  Time:  09:31PM  Job #:  [id number]      OPERATIVE REPORT

## 2023-05-12 LAB
ABO + RH BLD: NORMAL
ABO + RH BLD: NORMAL
ABORH RETYPE: NORMAL
ALBUMIN SERPL-MCNC: 1.2 G/DL (ref 3.4–4.8)
ALBUMIN/GLOB SERPL: 0.4 RATIO (ref 1.1–2)
ALP SERPL-CCNC: 78 UNIT/L (ref 40–150)
ALT SERPL-CCNC: 12 UNIT/L (ref 0–55)
AMORPH URATE CRY URNS QL MICRO: ABNORMAL /HPF
APPEARANCE UR: ABNORMAL
AST SERPL-CCNC: 33 UNIT/L (ref 5–34)
BACTERIA #/AREA URNS AUTO: ABNORMAL /HPF
BASOPHILS # BLD AUTO: 0.08 X10(3)/MCL
BASOPHILS NFR BLD AUTO: 0.7 %
BILIRUB UR QL STRIP.AUTO: ABNORMAL MG/DL
BILIRUBIN DIRECT+TOT PNL SERPL-MCNC: 0.9 MG/DL
BLD PROD TYP BPU: NORMAL
BLD PROD TYP BPU: NORMAL
BLOOD UNIT EXPIRATION DATE: NORMAL
BLOOD UNIT EXPIRATION DATE: NORMAL
BLOOD UNIT TYPE CODE: 600
BLOOD UNIT TYPE CODE: 600
BUN SERPL-MCNC: 28.6 MG/DL (ref 8.4–25.7)
CALCIUM SERPL-MCNC: 6.6 MG/DL (ref 8.8–10)
CHLORIDE SERPL-SCNC: 98 MMOL/L (ref 98–107)
CO2 SERPL-SCNC: 26 MMOL/L (ref 23–31)
COLOR STL: ABNORMAL
COLOR UR: ABNORMAL
CONSISTENCY STL: ABNORMAL
CREAT SERPL-MCNC: 1.98 MG/DL (ref 0.73–1.18)
CREAT UR-MCNC: 61.4 MG/DL (ref 63–166)
CROSSMATCH INTERPRETATION: NORMAL
CROSSMATCH INTERPRETATION: NORMAL
DISPENSE STATUS: NORMAL
DISPENSE STATUS: NORMAL
EOSINOPHIL # BLD AUTO: 0.2 X10(3)/MCL (ref 0–0.9)
EOSINOPHIL NFR BLD AUTO: 1.8 %
ERYTHROCYTE [DISTWIDTH] IN BLOOD BY AUTOMATED COUNT: 15.9 % (ref 11.5–17)
GFR SERPLBLD CREATININE-BSD FMLA CKD-EPI: 38 MLS/MIN/1.73/M2
GLOBULIN SER-MCNC: 3.4 GM/DL (ref 2.4–3.5)
GLUCOSE SERPL-MCNC: 94 MG/DL (ref 82–115)
GLUCOSE UR QL STRIP.AUTO: NEGATIVE MG/DL
GROUP & RH: NORMAL
HCT VFR BLD AUTO: 18.1 % (ref 42–52)
HCT VFR BLD AUTO: 22 % (ref 42–52)
HEMOCCULT SP1 STL QL: POSITIVE
HEMOCCULT SP3 STL QL: POSITIVE
HGB BLD-MCNC: 5.9 G/DL (ref 14–18)
HGB BLD-MCNC: 6.7 G/DL (ref 14–18)
IMM GRANULOCYTES # BLD AUTO: 0.09 X10(3)/MCL (ref 0–0.04)
IMM GRANULOCYTES NFR BLD AUTO: 0.8 %
INDIRECT COOMBS GEL: NORMAL
KETONES UR QL STRIP.AUTO: NEGATIVE MG/DL
LEUKOCYTE ESTERASE UR QL STRIP.AUTO: ABNORMAL UNIT/L
LYMPHOCYTES # BLD AUTO: 1.61 X10(3)/MCL (ref 0.6–4.6)
LYMPHOCYTES NFR BLD AUTO: 14.2 %
MAGNESIUM SERPL-MCNC: 1.9 MG/DL (ref 1.6–2.6)
MCH RBC QN AUTO: 29.1 PG (ref 27–31)
MCHC RBC AUTO-ENTMCNC: 30.5 G/DL (ref 33–36)
MCV RBC AUTO: 95.7 FL (ref 80–94)
MONOCYTES # BLD AUTO: 0.52 X10(3)/MCL (ref 0.1–1.3)
MONOCYTES NFR BLD AUTO: 4.6 %
NEUTROPHILS # BLD AUTO: 8.84 X10(3)/MCL (ref 2.1–9.2)
NEUTROPHILS NFR BLD AUTO: 77.9 %
NITRITE UR QL STRIP.AUTO: NEGATIVE
NRBC BLD AUTO-RTO: 0 %
PH UR STRIP.AUTO: 5.5 [PH]
PLATELET # BLD AUTO: 193 X10(3)/MCL (ref 130–400)
PMV BLD AUTO: 10.4 FL (ref 7.4–10.4)
POCT GLUCOSE: 119 MG/DL (ref 70–110)
POCT GLUCOSE: 144 MG/DL (ref 70–110)
POTASSIUM SERPL-SCNC: 3.7 MMOL/L (ref 3.5–5.1)
PROT SERPL-MCNC: 4.6 GM/DL (ref 5.8–7.6)
PROT UR QL STRIP.AUTO: ABNORMAL MG/DL
PROT UR STRIP-MCNC: 136.2 MG/DL
RBC # BLD AUTO: 2.3 X10(6)/MCL (ref 4.7–6.1)
RBC #/AREA URNS AUTO: ABNORMAL /HPF
RBC UR QL AUTO: ABNORMAL UNIT/L
RENAL EPI CELLS #/AREA UR COMP ASSIST: ABNORMAL /HPF
SODIUM SERPL-SCNC: 138 MMOL/L (ref 136–145)
SODIUM UR-SCNC: 35 MMOL/L
SP GR UR STRIP.AUTO: 1.02 (ref 1–1.03)
SPECIMEN OUTDATE: NORMAL
SQUAMOUS #/AREA URNS AUTO: <5 /HPF
UNIT NUMBER: NORMAL
UNIT NUMBER: NORMAL
URINE PROTEIN/CREATININE RATIO (OHS): 2.2
UROBILINOGEN UR STRIP-ACNC: 0.2 MG/DL
WBC # SPEC AUTO: 11.34 X10(3)/MCL (ref 4.5–11.5)
WBC #/AREA URNS AUTO: 45 /HPF
YEAST URNS QL MICRO: ABNORMAL /HPF

## 2023-05-12 PROCEDURE — 85014 HEMATOCRIT: CPT | Performed by: STUDENT IN AN ORGANIZED HEALTH CARE EDUCATION/TRAINING PROGRAM

## 2023-05-12 PROCEDURE — 86923 COMPATIBILITY TEST ELECTRIC: CPT | Mod: 91 | Performed by: STUDENT IN AN ORGANIZED HEALTH CARE EDUCATION/TRAINING PROGRAM

## 2023-05-12 PROCEDURE — 82270 OCCULT BLOOD FECES: CPT | Performed by: NURSE PRACTITIONER

## 2023-05-12 PROCEDURE — A4216 STERILE WATER/SALINE, 10 ML: HCPCS | Performed by: STUDENT IN AN ORGANIZED HEALTH CARE EDUCATION/TRAINING PROGRAM

## 2023-05-12 PROCEDURE — 81001 URINALYSIS AUTO W/SCOPE: CPT | Performed by: STUDENT IN AN ORGANIZED HEALTH CARE EDUCATION/TRAINING PROGRAM

## 2023-05-12 PROCEDURE — 25000003 PHARM REV CODE 250: Performed by: STUDENT IN AN ORGANIZED HEALTH CARE EDUCATION/TRAINING PROGRAM

## 2023-05-12 PROCEDURE — 84300 ASSAY OF URINE SODIUM: CPT | Performed by: STUDENT IN AN ORGANIZED HEALTH CARE EDUCATION/TRAINING PROGRAM

## 2023-05-12 PROCEDURE — 63600175 PHARM REV CODE 636 W HCPCS: Performed by: NURSE PRACTITIONER

## 2023-05-12 PROCEDURE — 86923 COMPATIBILITY TEST ELECTRIC: CPT | Mod: 91 | Performed by: NURSE PRACTITIONER

## 2023-05-12 PROCEDURE — 25000003 PHARM REV CODE 250: Performed by: SURGERY

## 2023-05-12 PROCEDURE — 27000207 HC ISOLATION

## 2023-05-12 PROCEDURE — 63600175 PHARM REV CODE 636 W HCPCS: Performed by: STUDENT IN AN ORGANIZED HEALTH CARE EDUCATION/TRAINING PROGRAM

## 2023-05-12 PROCEDURE — 63600175 PHARM REV CODE 636 W HCPCS: Performed by: INTERNAL MEDICINE

## 2023-05-12 PROCEDURE — 82570 ASSAY OF URINE CREATININE: CPT | Performed by: STUDENT IN AN ORGANIZED HEALTH CARE EDUCATION/TRAINING PROGRAM

## 2023-05-12 PROCEDURE — 25000003 PHARM REV CODE 250: Performed by: INTERNAL MEDICINE

## 2023-05-12 PROCEDURE — 83735 ASSAY OF MAGNESIUM: CPT | Performed by: STUDENT IN AN ORGANIZED HEALTH CARE EDUCATION/TRAINING PROGRAM

## 2023-05-12 PROCEDURE — 25000003 PHARM REV CODE 250: Performed by: NURSE PRACTITIONER

## 2023-05-12 PROCEDURE — 80053 COMPREHEN METABOLIC PANEL: CPT | Performed by: STUDENT IN AN ORGANIZED HEALTH CARE EDUCATION/TRAINING PROGRAM

## 2023-05-12 PROCEDURE — 85060 BLOOD SMEAR INTERPRETATION: CPT | Performed by: STUDENT IN AN ORGANIZED HEALTH CARE EDUCATION/TRAINING PROGRAM

## 2023-05-12 PROCEDURE — 87088 URINE BACTERIA CULTURE: CPT | Performed by: STUDENT IN AN ORGANIZED HEALTH CARE EDUCATION/TRAINING PROGRAM

## 2023-05-12 PROCEDURE — P9040 RBC LEUKOREDUCED IRRADIATED: HCPCS | Performed by: STUDENT IN AN ORGANIZED HEALTH CARE EDUCATION/TRAINING PROGRAM

## 2023-05-12 PROCEDURE — 87077 CULTURE AEROBIC IDENTIFY: CPT | Performed by: STUDENT IN AN ORGANIZED HEALTH CARE EDUCATION/TRAINING PROGRAM

## 2023-05-12 PROCEDURE — 85397 CLOTTING FUNCT ACTIVITY: CPT | Mod: 90 | Performed by: STUDENT IN AN ORGANIZED HEALTH CARE EDUCATION/TRAINING PROGRAM

## 2023-05-12 PROCEDURE — 97605 NEG PRS WND THER DME<=50SQCM: CPT

## 2023-05-12 PROCEDURE — 85025 COMPLETE CBC W/AUTO DIFF WBC: CPT | Performed by: STUDENT IN AN ORGANIZED HEALTH CARE EDUCATION/TRAINING PROGRAM

## 2023-05-12 PROCEDURE — 93005 ELECTROCARDIOGRAM TRACING: CPT

## 2023-05-12 PROCEDURE — 86900 BLOOD TYPING SEROLOGIC ABO: CPT | Performed by: STUDENT IN AN ORGANIZED HEALTH CARE EDUCATION/TRAINING PROGRAM

## 2023-05-12 PROCEDURE — 21400001 HC TELEMETRY ROOM

## 2023-05-12 PROCEDURE — 51702 INSERT TEMP BLADDER CATH: CPT

## 2023-05-12 PROCEDURE — P9016 RBC LEUKOCYTES REDUCED: HCPCS | Performed by: STUDENT IN AN ORGANIZED HEALTH CARE EDUCATION/TRAINING PROGRAM

## 2023-05-12 RX ORDER — HYDROCODONE BITARTRATE AND ACETAMINOPHEN 500; 5 MG/1; MG/1
TABLET ORAL
Status: DISCONTINUED | OUTPATIENT
Start: 2023-05-12 | End: 2023-05-23

## 2023-05-12 RX ORDER — METOPROLOL TARTRATE 1 MG/ML
5 INJECTION, SOLUTION INTRAVENOUS EVERY 5 MIN PRN
Status: COMPLETED | OUTPATIENT
Start: 2023-05-12 | End: 2023-05-16

## 2023-05-12 RX ADMIN — COLLAGENASE SANTYL: 250 OINTMENT TOPICAL at 08:05

## 2023-05-12 RX ADMIN — ENOXAPARIN SODIUM 40 MG: 40 INJECTION SUBCUTANEOUS at 04:05

## 2023-05-12 RX ADMIN — Medication 1 CAPSULE: at 04:05

## 2023-05-12 RX ADMIN — DAPTOMYCIN 435 MG: 500 INJECTION, POWDER, LYOPHILIZED, FOR SOLUTION INTRAVENOUS at 06:05

## 2023-05-12 RX ADMIN — INSULIN ASPART 2 UNITS: 100 INJECTION, SOLUTION INTRAVENOUS; SUBCUTANEOUS at 10:05

## 2023-05-12 RX ADMIN — COLLAGENASE SANTYL: 250 OINTMENT TOPICAL at 01:05

## 2023-05-12 RX ADMIN — RIFAMPIN 300 MG: 300 CAPSULE ORAL at 01:05

## 2023-05-12 RX ADMIN — SODIUM CHLORIDE, PRESERVATIVE FREE 10 ML: 5 INJECTION INTRAVENOUS at 01:05

## 2023-05-12 RX ADMIN — PIPERACILLIN AND TAZOBACTAM 4.5 G: 4; .5 INJECTION, POWDER, LYOPHILIZED, FOR SOLUTION INTRAVENOUS; PARENTERAL at 08:05

## 2023-05-12 RX ADMIN — HYDROCODONE BITARTRATE AND ACETAMINOPHEN 1 TABLET: 7.5; 325 TABLET ORAL at 04:05

## 2023-05-12 RX ADMIN — HYDROCODONE BITARTRATE AND ACETAMINOPHEN 1 TABLET: 7.5; 325 TABLET ORAL at 07:05

## 2023-05-12 RX ADMIN — SODIUM CHLORIDE 250 ML: 9 INJECTION, SOLUTION INTRAVENOUS at 06:05

## 2023-05-12 RX ADMIN — SODIUM CHLORIDE, PRESERVATIVE FREE 10 ML: 5 INJECTION INTRAVENOUS at 06:05

## 2023-05-12 RX ADMIN — Medication: at 01:05

## 2023-05-12 RX ADMIN — SODIUM CHLORIDE, POTASSIUM CHLORIDE, SODIUM LACTATE AND CALCIUM CHLORIDE: 600; 310; 30; 20 INJECTION, SOLUTION INTRAVENOUS at 06:05

## 2023-05-12 RX ADMIN — Medication: at 08:05

## 2023-05-12 RX ADMIN — HYDROCODONE BITARTRATE AND ACETAMINOPHEN 1 TABLET: 7.5; 325 TABLET ORAL at 01:05

## 2023-05-12 RX ADMIN — INSULIN DETEMIR 12 UNITS: 100 INJECTION, SOLUTION SUBCUTANEOUS at 08:05

## 2023-05-12 RX ADMIN — HYDROCODONE BITARTRATE AND ACETAMINOPHEN 1 TABLET: 7.5; 325 TABLET ORAL at 08:05

## 2023-05-12 RX ADMIN — SODIUM CHLORIDE: 9 INJECTION, SOLUTION INTRAVENOUS at 04:05

## 2023-05-12 RX ADMIN — Medication 1 CAPSULE: at 07:05

## 2023-05-12 RX ADMIN — PIPERACILLIN AND TAZOBACTAM 4.5 G: 4; .5 INJECTION, POWDER, LYOPHILIZED, FOR SOLUTION INTRAVENOUS; PARENTERAL at 02:05

## 2023-05-12 RX ADMIN — HYDROCODONE BITARTRATE AND ACETAMINOPHEN 1 TABLET: 7.5; 325 TABLET ORAL at 12:05

## 2023-05-12 RX ADMIN — THIAMINE HCL TAB 100 MG 100 MG: 100 TAB at 01:05

## 2023-05-12 RX ADMIN — METOPROLOL TARTRATE 5 MG: 1 INJECTION, SOLUTION INTRAVENOUS at 08:05

## 2023-05-12 RX ADMIN — THERA TABS 1 TABLET: TAB at 01:05

## 2023-05-12 RX ADMIN — RIFAMPIN 300 MG: 300 CAPSULE ORAL at 08:05

## 2023-05-12 RX ADMIN — PIPERACILLIN AND TAZOBACTAM 4.5 G: 4; .5 INJECTION, POWDER, LYOPHILIZED, FOR SOLUTION INTRAVENOUS; PARENTERAL at 01:05

## 2023-05-12 RX ADMIN — SODIUM CHLORIDE, PRESERVATIVE FREE 10 ML: 5 INJECTION INTRAVENOUS at 12:05

## 2023-05-12 RX ADMIN — Medication 1 CAPSULE: at 01:05

## 2023-05-12 NOTE — PROGRESS NOTES
Infectious Diseases Progress Note  62-year-old male with past medical history of DM type 2, untreated hepatitis-C, decompensated liver cirrhosis, IV drug use, known to my service from his hospitalization at our [facility] before he left [place] on 05/05/2023 and presented to this facility [facility] later the same day, reporting not being satisfied with the care over at our [facility].  While he was there we were treating him for MRSA bacteremia, with associated C-spine-L-spine osteomyelitis/diskitis/bilateral psoas, paraspinal muscle and epidural abscesses.  He is status post lumbar laminectomy with drainage of epidural abscess by Neurosurgery on 04/21.  He also was found to have acute cholecystitis and status post cholecystostomy tube placement.  He was on antibiotic coverage with vancomycin, rifampin for MRSA and had completed a course of Zosyn which was to cover for cholecystitis.  He however continued to have persistent bacteremia with multiple repeated positive blood cultures, and hence was placed on daptomycin with discontinuation of vancomycin.  On presentation he is noted to have no fevers but did have leukocytosis of 17.1 which is normalized.  CRP 76, ESR 7, anemic with low albumin.  Urine toxicology test positive for opiates and amphetamines.  Blood cultures from 05/05 with MRSA per BCID panel.  Urine culture with [phone] colonies of candida albicans.  He is currently on antibiotic coverage with daptomycin, rifampin and Zosyn.       Subjective:  Lying in bed in no acute distress. No new complaints voiced. Afebrile. Significant other present    ROS  Constitutional:  Positive for malaise/fatigue.   HENT: Negative.     Respiratory: Negative.     Gastrointestinal:  Positive for abdominal pain.   Genitourinary: Negative.    Musculoskeletal:  Positive for back pain.   Neurological:  Positive for weakness.   Endo/Heme/Allergies: Negative.   "  Psychiatric/Behavioral: Negative.     All other Systems review done and negative       Review of patient's allergies indicates:  No Known Allergies    No past medical history on file.    Past Surgical History:   Procedure Laterality Date    DEBRIDEMENT OF SACRAL WOUND N/A 5/10/2023    Procedure: DEBRIDEMENT, WOUND, SACRUM;  Surgeon: Reggie Castañeda MD;  Location: [facility];  Service: General;  Laterality: N/A;       Social History     Socioeconomic History    Marital status: Single         Scheduled Meds:   collagenase   Topical (Top) BID    DAPTOmycin (CUBICIN) IV (PEDS and ADULTS)  6 mg/kg Intravenous Q24H    enoxaparin  40 mg Subcutaneous Daily    insulin detemir U-100  12 Units Subcutaneous QHS    Lactobacillus acidophilus  1 capsule Oral TID WM    multivitamin  1 tablet Oral Daily    piperacillin-tazobactam (ZOSYN) IVPB  4.5 g Intravenous Q8H    rifAMpin  300 mg Oral Q12H    sodium chloride 0.9%  10 mL Intravenous Q6H    thiamine  100 mg Oral Daily     Continuous Infusions:   lactated ringers       PRN Meds:aluminum-magnesium hydroxide-simethicone, dextrose 10%, dextrose 10%, glucagon (human recombinant), glucose, glucose, HYDROcodone-acetaminophen, HYDROmorphone, insulin aspart U-100, melatonin, ondansetron, ondansetron, polyethylene glycol, prochlorperazine, prochlorperazine, senna-docusate 8.6-50 mg, sodium chloride 0.9%, Flushing PICC Protocol **AND** sodium chloride 0.9% **AND** sodium chloride 0.9%, tiZANidine    Objective:  /80   Pulse (!) 121   Temp 98.3 °F (36.8 °C) (Oral)   Resp 18   Ht 5' 7" (1.702 m)   Wt 72.6 kg (160 lb)   SpO2 (!) 91%   BMI 25.06 kg/m²     Physical Exam:   Physical Exam  Vitals reviewed.   Constitutional:       General: He is not in acute distress.     Appearance: He is not toxic-appearing.      Comments: Significant other at the bedside   HENT:      Head: Normocephalic and atraumatic.   Eyes:      Pupils: Pupils are equal, round, and reactive to light. "   Cardiovascular:      Rate and Rhythm: Normal rate and regular rhythm.      Heart sounds: Normal heart sounds.   Pulmonary:      Effort: Pulmonary effort is normal. No respiratory distress.      Breath sounds: Normal breath sounds.   Abdominal:      General: Bowel sounds are normal. There is no distension.      Palpations: Abdomen is soft.      Tenderness: There is no abdominal tenderness.      Comments: RUQ cholecystotomy tube noted with bilious content   Genitourinary:     Comments: No suprapubic tenderness  Musculoskeletal:         General: No deformity.      Cervical back: Neck supple.      Right lower leg: Edema present.      Left lower leg: Edema present.   Skin:     Findings: No erythema or rash.   Neurological:      Mental Status: He is alert and oriented to person, place, and time.   Psychiatric:      Comments: Calm and cooperative        Imaging      Lab Review   Recent Results (from the past 24 hour(s))   POCT glucose    Collection Time: 05/10/23 10:17 PM   Result Value Ref Range    POCT Glucose 289 (H) 70 - 110 mg/dL   POCT glucose    Collection Time: 05/11/23  4:50 AM   Result Value Ref Range    POCT Glucose 98 70 - 110 mg/dL   POCT glucose    Collection Time: 05/11/23 11:06 AM   Result Value Ref Range    POCT Glucose 72 70 - 110 mg/dL   Comprehensive Metabolic Panel    Collection Time: 05/11/23  4:21 PM   Result Value Ref Range    Sodium Level 135 (L) 136 - 145 mmol/L    Potassium Level 3.5 3.5 - 5.1 mmol/L    Chloride 97 (L) 98 - 107 mmol/L    Carbon Dioxide 30 23 - 31 mmol/L    Glucose Level 139 (H) 82 - 115 mg/dL    Blood Urea Nitrogen 25.6 8.4 - 25.7 mg/dL    Creatinine 1.78 (H) 0.73 - 1.18 mg/dL    Calcium Level Total 6.9 (LL) 8.8 - 10.0 mg/dL    Protein Total 4.7 (L) 5.8 - 7.6 gm/dL    Albumin Level 1.3 (L) 3.4 - 4.8 g/dL    Globulin 3.4 2.4 - 3.5 gm/dL    Albumin/Globulin Ratio 0.4 (L) 1.1 - 2.0 ratio    Bilirubin Total 0.9 <=1.5 mg/dL    Alkaline Phosphatase 84 40 - 150 unit/L    Alanine  Aminotransferase 10 0 - 55 unit/L    Aspartate Aminotransferase 27 5 - 34 unit/L    eGFR 43 mls/min/1.73/m2   CBC with Differential    Collection Time: 05/11/23  4:21 PM   Result Value Ref Range    WBC 13.08 (H) 4.50 - 11.50 x10(3)/mcL    RBC 2.79 (L) 4.70 - 6.10 x10(6)/mcL    Hgb 8.3 (L) 14.0 - 18.0 g/dL    Hct 26.1 (L) 42.0 - 52.0 %    MCV 93.5 80.0 - 94.0 fL    MCH 29.7 27.0 - 31.0 pg    MCHC 31.8 (L) 33.0 - 36.0 g/dL    RDW 15.8 11.5 - 17.0 %    Platelet 172 130 - 400 x10(3)/mcL    MPV 10.2 7.4 - 10.4 fL    Neut % 85.8 %    Lymph % 8.3 %    Mono % 2.8 %    Eos % 1.9 %    Basophil % 0.5 %    Lymph # 1.08 0.6 - 4.6 x10(3)/mcL    Neut # 11.23 (H) 2.1 - 9.2 x10(3)/mcL    Mono # 0.36 0.1 - 1.3 x10(3)/mcL    Eos # 0.25 0 - 0.9 x10(3)/mcL    Baso # 0.07 <=0.2 x10(3)/mcL    IG# 0.09 (H) 0 - 0.04 x10(3)/mcL    IG% 0.7 %    NRBC% 0.0 %   CK    Collection Time: 05/11/23  4:21 PM   Result Value Ref Range    Creatine Kinase 21 (L) 30 - 200 U/L   POCT glucose    Collection Time: 05/11/23  4:35 PM   Result Value Ref Range    POCT Glucose 138 (H) 70 - 110 mg/dL       Assessment/Plan:  1. MRSA bacteremia  2. L-spine/C-spine MRSA osteomyelitis/diskitis/psoas/paraspinal/epidural abscesses  3.  Cholecystitis status post cholecystotomy tube   4.  Candiduria  5.  Elevated CRP  6.  Chronic hepatitis-C   7.  Decompensated liver cirrhosis   8.  Polysubstance abuse/IVDU   9.  Diabetes type 2  10. Anemia   11. Protein calorie malnutrition       -Continue Daptomycin #6, Rifampin #6 and Zosyn #6  -Will need a 6-8 week course of IV antibiotics with likely subsequent transitioning to oral anti MRSA coverage. Follow weekly inflammatory markers  -5/11 CPK 21  -Afebrile with leukocytosis slightly worse, follow  -5/5 blood cultures with MRSA 2/2 sets. Follow repeat blood cultures to assess for clearance, negative thus far  -Renal dysfunction noted. Nephrology consulted, follow  -Continue wound care  -Surgery consulted, inputs noted. S/P sacral  wound debridement 5/10  -Neurosurgery on board. MRI L-Spine w/wo contrast ordered, follow results.   -Plan to keep cholecystostomy tube in place for 4-6 weeks   -Discussed with patient, significant other and nursing staff

## 2023-05-12 NOTE — PROGRESS NOTES
Inpatient Nutrition Assessment    Admit Date: 5/5/2023   Total duration of encounter: 7 days     Nutrition Recommendation/Prescription   NEW MALNUTRITION DIAGNOSIS  Patient has poor dentition and difficulty chewing, modify diet texture:  Advance to minced and moist, low sodium (2 mg), high protein diet when medically feasible.     Updated standing weight when possible: pt out of room.     Add Boost Breeze (provides 250 kcal, 9 g protein per serving) and Prosource (provides 60 kcal, 15 g protein per serving) TID while on clears.     Communication of Recommendations: reviewed with patient and communicated via EMR    Nutrition Assessment   Malnutrition Assessment/Nutrition-Focused Physical Exam    Malnutrition Context: chronic illness  Malnutrition Level: moderate  Energy Intake (Malnutrition): less than 75% for greater than or equal to 1 month  Weight Loss (Malnutrition): other (see comments) (unable to get updated wt, returned with standing scale and pt gone for procedure)  Subcutaneous Fat (Malnutrition): moderate depletion  Orbital Region (Subcutaneous Fat Loss): moderate depletion  Upper Arm Region (Subcutaneous Fat Loss): moderate depletion     Muscle Mass (Malnutrition): moderate depletion  [place] Region (Muscle Loss): severe depletion  Clavicle Bone Region (Muscle Loss): moderate depletion                    Fluid Accumulation (Malnutrition): other (see comments) (+1 pitting pedal edema)        A minimum of two characteristics is recommended for diagnosis of either severe or non-severe malnutrition.     Chart Review    Reason Seen: continuous nutrition monitoring    Malnutrition Screening Tool Results   Have you recently lost weight without trying?: No  Have you been eating poorly because of a decreased appetite?: No   MST Score: 0     Diagnosis:  Sepsis    Relevant Medical History: DM2, untreated hepatitis-C, cirrhosis, IV drug use, MRSA bacteremia, MRSA C-Spine / L-Spine osteomyelitis/discitis with B/L psoas  "and paraspinous muscle abscesses as well as lumbar epidural abscess    Nutrition-Related Medications: MVI, lactobacillus acidophilus, insulin detemir, antimicrobials with daptomycin, rifampin and Zosyn as noted.  Calorie Containing IV Medications: no significant kcals from medications at this time    Nutrition-Related Labs:  5/10/23: HGB/HCT: 8.7/27.7,   5/12/23: HGB/HCT: 6.7/22.0,     Diet/PN Order: Diet NPO  Diet clear liquid  Oral Supplement Order: none  Tube Feeding Order: none  Appetite/Oral Intake: fair/25-50% of meals  Factors Affecting Nutritional Intake: chewing difficulty and NPO  Food/[place]/Cultural Preferences: none reported  Food Allergies: none reported    Skin Integrity: drain/device(s)  Wound(s):      Altered Skin Integrity 05/06/23 1450 medial Sacral spine-Tissue loss description: Full thickness Stage 4 absess    Comments    5/10/23: Pt states eaten little of tray, has been getting 1 meal a day from outside due to poor dentition and difficulty chewing. Agreed to minced and moist diet modification when advanced. Pt reported moderate appetite prior to admission; typical intake 1 meal a day (soup), snacks on chips (lets dissolve in mouth). Denies nausea, vomiting, and diarrhea. BM every other day; solid with some straining. Does not take supplements.      5/13/23: RD attempted three times and patient out of room. Per RN, pt eating 25-50% of tray, focusing on mashable foods. Drinking juice. This afternoon patient now on clears after GI bleed scan pending results. Per EMR "reports of 2 large bowel movements overnight described as " bright red jelly like"."    Anthropometrics    Height: 5' 7" (170.2 cm) Height Method: Stated  Last Weight: 72.6 kg (160 lb) (05/08/23 1000) Weight Method: Standard Scale  BMI (Calculated): 25.1  BMI Classification: overweight (BMI 25-29.9)        Ideal Body Weight (IBW), Male: 148 lb     % Ideal Body Weight, Male (lb): 108.11 %                 Usual Body Weight (UBW), kg: " 72.6 kg  % Usual Body Weight: 100.18     Usual Weight Provided By: patient    Wt Readings from Last 5 Encounters:   23 72.6 kg (160 lb)   23 72.6 kg (160 lb)   18 75.7 kg (166 lb 14.2 oz)     Weight Change(s) Since Admission:  Admit Weight: 72.6 kg (160 lb) (23 1300)  Unable to get updated weight due to patient gone when returned with scale, current weight is self reported    Estimated Needs    Weight Used For Calorie Calculations: 72.6 kg (160 lb 0.9 oz)  Energy Calorie Requirements (kcal): 2092 (SF 1.4)  Energy Need Method: [place]-St Jeor  Weight Used For Protein Calculations: 72.6 kg (160 lb 0.9 oz)  Protein Requirements: 109 (1.5 g/kg)  Fluid Requirements (mL): 2092  Temp (24hrs), Av °F (36.7 °C), Min:97.7 °F (36.5 °C), Max:98.5 °F (36.9 °C)         Enteral Nutrition    Patient not receiving enteral nutrition at this time.    Parenteral Nutrition    Patient not receiving parenteral nutrition support at this time.    Evaluation of Received Nutrient Intake    Calories: not meeting estimated needs  Protein: not meeting estimated needs    Patient Education    Not applicable.    Nutrition Diagnosis   PES: Malnutrition related to  chronic illness, chronic IV drug use, and chewing problems as evidenced by less than 75% needs met for greater than 1 month, moderate fat depletion, and moderate muscle depletion. (continues)    Interventions/Goals     Intervention(s): modified composition of meals/snacks  Goal: Meet greater than 75% of nutritional needs by follow-up. (goal not met)    Monitoring & Evaluation     Dietitian will monitor food and beverage intake, weight, and glucose/endocrine profile.  Nutrition Risk/Follow-Up: high (follow-up in 1-4 days)   Please consult if re-assessment needed sooner.

## 2023-05-12 NOTE — PROGRESS NOTES
[facility] Hospital Medicine Progress Note        Chief Complaint: Inpatient Follow-up for MRSA Spinal Osteomyelitis    Subjective:  History:  62-year-old male with significant history of polysubstance abuse/IV drug abuse, type 2 diabetes mellitus, untreated chronic hep C/cirrhosis.  Patient had a recent hospitalization to [facility] for MRSA bacteremia with MRSA C and L-spine osteomyelitis, diskitis, bilateral psoas and paraspinous muscle abscess as well as lumbar epidural abscess.  Patient was being treated in [facility] with close follow-up with neurosurgery, Infectious Disease.  Hospital stay also was even full for cholecystitis for which he underwent cholecystostomy tube placement.  Patient signed out AMA from [facility] on 05/05, went home, called ambulance and presented to the hospital.  Per chart review patient was being treated with daptomycin and rifampin in [facility].  While hospitalized his urine drug screen was positive for amphetamines and there were concerns that he was using it while hospitalized.  Patient was tachycardic in the ED.  Otherwise hemodynamics stable.  Complained of bilateral lower extremity swelling. Labs significant for leukocytosis, anemia, hyperglycemia. UDS was positive for opiates, amphetamines.  Hospitalist team was consulted for admission. Restarted on IV Daptomycin, Rifampin, Zosyn. Echocardiogram on 5/6 showed normal LV systolic function, EF 50-55%, indeterminate diastolic function, mild RV enlargement, mild MR, PASP 19 mmHg with no mention of vegetations.  Blood cultures from 05/05 for positive for MRSA.  Repeat blood culture from 05/09 negative for 24 hours.  Patient awaiting MRI lumbar spine.  Noted to have LLE pain with B/L LE U/S showing fluid collection in left cough.  Awaiting CT L leg and thigh with contrast.  Also ordered CT chest with contrast to further evaluate right-sided oblong opacity on CXR which is  thought to be mass/possible abscess/fissural fluid collection. Underwent debridement for sacral ulcer on 05/10 with wound care being done. Neurosurgery to make further recommendations once MRI lumbar spine is done. Noted to have elevated Cr of 1.78; DC diuretics. Possibly d/t ATN from ABX; consulted Nephrology. Ordered US Retroperitoneum, UA. Cancelled CT chest, CT L thigh/leg due to RUPERTO.    Today, Mr. Plasencia was seen at bedside.  He stated he was doing well but complained of ongoing sacral pain.  Continued on IV antimicrobials.  Continued on IV LR at 75 mL/hour.  BUN/creatinine worsened to 28.6/1.98 today.  Nephrology on board; ordered [id number] given anemia, renal dysfunction, petechial rash to evaluate for TTP.  Patient also noted to have hematochezia by nurse today; consulted GI.  HGB/HCT noted to drop to 6.7/22.0; will transfuse 2 units PRBCs.  Will remain NPO after midnight for possible scope tomorrow.  NM bleeding scan ordered by GI today.    General appearance: Thin, chronically ill-appearing  male in no apparent distress.  HENT: Atraumatic head. Moist mucous membranes of oral cavity.  Eyes: Normal extraocular movements.   Neck: Supple.   Lungs: Clear to auscultation bilaterally. Tachypnea   Heart: Regular rate and rhythm. S1 and S2 present. No pedal edema.  Abdomen: Soft, non-distended, non-tender. RUQ Drain  Extremities:  1+ pitting pedal edema   Skin: No Rash.   Neuro:  Moving upper extremity without any difficulty, patient reports decreased range of motion lower extremity secondary to swelling   Psych/mental status: Appropriate mood and affect. Responds appropriately to questions    A/P:  MRSA bacteremia   MRSA cervical, lumbar spine osteomyelitis with bilateral psoas, paraspinal muscle abscess as well as lumbar epidural abscess status post drainage, laminectomy  Recent cholecystitis status post cholecystostomy tube placement   Sepsis secondary to above   RUPERTO 2/2 possible ATN d/t ABX vs  TTP  Hematochezia  Tachycardia 2/2 Anemia  Polysubstance abuse/IV drug abuse   Decompensated cirrhosis with volume overload   Untreated chronic hep C   Type 2 diabetes mellitus with hyperglycemia  Acute Normocytic Anemia 2/2 TTP vs GI Bleed     Patient continues to be admitted for close monitoring   Underwent sacral ulcer debridement with surgery on 05/10   Surgery on board; following recommendations   ID on board; following recommendations  Nephrology consulted for RUPERTO likely secondary to ATN  [id number] ordered by Nephrology to evaluate for TTP   GI consulted for hematochezia; NM bleeding scan ordered, will follow up  Will hold off on CTs with contrast d/t RUPERTO   Patient is still yet to undergo MRI L Spine  Neurosurgery to make further recommendations once patient gets MRI L-spine  Patient continued on IV Daptomycin, IV Rifampin, IV Zosyn  Patient has cholecystostomy tube in place   Echocardiogram negative for vegetations; will defer to ID whether patient needs CRISTY  Venous ultrasound bilateral lower extremity negative for DVT  However showed 9 x 4 x 2 cm fluid collection in the left proximal through mid calf; will hold off on CT with contrast due to RUPERTO  Will also hold off on CT Chest with Contrast given RUPERTO; patient has loculated R sided opacity on CXR  Will DC Lasix and Aldactone due to RUPERTO  Levemir, sliding scale for diabetes mellitus  Continue monitoring patient's symptoms    DVT prophylaxis-Lovenox    VITAL SIGNS: 24 HRS MIN & MAX LAST   Temp  Min: 97.7 °F (36.5 °C)  Max: 98.5 °F (36.9 °C) 98.4 °F (36.9 °C)   BP  Min: 121/81  Max: 141/88 125/80   Pulse  Min: 101  Max: 130  (!) 118   Resp  Min: 16  Max: 20 20   SpO2  Min: 93 %  Max: 97 % 95 %       All diagnosis and differential diagnosis have been reviewed; assessment and plan has been documented; I have personally reviewed the labs and test results that are presently available; I have reviewed the patients medication list; I have reviewed the consulting  providers response and recommendations. I have reviewed or attempted to review medical records based upon their availability.       Guerrero Alfaro MD   05/12/2023

## 2023-05-12 NOTE — PROGRESS NOTES
[facility] - 8th Floor Med Surg  Wound Care    Patient Name:  Reji Plasencia   MRN:  [id number]  Date: 5/12/2023  Diagnosis: Sepsis    History:     No past medical history on file.    Social History     Socioeconomic History    Marital status: Single       Precautions:     Allergies as of 05/05/2023    (No Known Allergies)       WO Assessment Details/Treatment        05/12/23 1000        Incision/Site 05/06/23 0450 Thoracic spine medial   Date First Assessed/Time First Assessed: 05/06/23 0450   Present Prior to Hospital Arrival?: Yes  Location: Thoracic spine  Orientation: medial  Additional Comments: from lumbar laminectomy at [facility]   Dressing Appearance Dry;Clean;Intact   Drainage Amount Small   Drainage Characteristics/Odor Serous   Appearance Moist   Periwound Area Moist;Redness   Wound Edges Irregular   Wound Length (cm) 16 cm   Wound Width (cm) 0.2 cm   Wound Surface Area (cm^2) 3.2 cm^2   Care Cleansed with:;Antimicrobial agent   Dressing Applied        Negative Pressure Wound Therapy  05/09/23 1000   Placement Date/Time: 05/09/23 1000   Location: Thoracic spine   NPWT Type Incision Management   Therapy Setting NPWT Continuous therapy   Pressure Setting NPWT 125 mmHg   Sponges Inserted NPWT Black   Sponges Removed NPWT Black     WOCN follow up wound vac change. Patient tolerated well. Dark red stools noted. Nurse Ancelmo aware. Denuded areas noted around morris anal area. Treatment recommendations put into place. Will follow up.     05/12/2023

## 2023-05-12 NOTE — PROGRESS NOTES
[facility] - 8th Floor Med Surg  Neurosurgery  Progress Note    Subjective:     Interval History: BETH overnight. Reports continued sacral pain. Per patient, was brought to MRI yesterday evening, however, the machine malfunctioned mid-way through the testing.   Denies any new complaints or problems at this time.      Post-Op Info:  Procedure(s) (LRB):  DEBRIDEMENT, WOUND, SACRUM (N/A)   2 Days Post-Op      Medications:  Continuous Infusions:   lactated ringers 75 mL/hr at 05/11/23 2153     Scheduled Meds:   collagenase   Topical (Top) BID    DAPTOmycin (CUBICIN) IV (PEDS and ADULTS)  6 mg/kg Intravenous Q24H    enoxaparin  40 mg Subcutaneous Daily    insulin detemir U-100  12 Units Subcutaneous QHS    Lactobacillus acidophilus  1 capsule Oral TID WM    multivitamin  1 tablet Oral Daily    piperacillin-tazobactam (ZOSYN) IVPB  4.5 g Intravenous Q8H    rifAMpin  300 mg Oral Q12H    sodium chloride 0.9%  10 mL Intravenous Q6H    thiamine  100 mg Oral Daily     PRN Meds:sodium chloride, aluminum-magnesium hydroxide-simethicone, dextrose 10%, dextrose 10%, glucagon (human recombinant), glucose, glucose, HYDROcodone-acetaminophen, HYDROmorphone, insulin aspart U-100, melatonin, ondansetron, ondansetron, polyethylene glycol, prochlorperazine, prochlorperazine, senna-docusate 8.6-50 mg, sodium chloride 0.9%, Flushing PICC Protocol **AND** sodium chloride 0.9% **AND** sodium chloride 0.9%, tiZANidine       Objective:     Weight: 72.6 kg (160 lb)  Body mass index is 25.06 kg/m².  Vital Signs (Most Recent):  Temp: 97.7 °F (36.5 °C) (05/12/23 0400)  Pulse: 107 (05/12/23 0400)  Resp: 18 (05/12/23 0710)  BP: 128/73 (05/12/23 0400)  SpO2: (!) 93 % (05/12/23 0400) Vital Signs (24h Range):  Temp:  [97.7 °F (36.5 °C)-98.5 °F (36.9 °C)] 97.7 °F (36.5 °C)  Pulse:  [101-130] 107  Resp:  [16-20] 18  SpO2:  [91 %-97 %] 93 %  BP: (123-157)/(73-87) 128/73                              Drain/Device    Right lower flank (Active)    Insertion Site dressing changed 05/10/23 1954   Drainage Characteristics/Odor Serous;Brown;Yellow 05/10/23 1954   Drainage Amount Small 05/10/23 1954   General Output (mL) 120 05/12/23 0633       Physical Exam:  Vitals reviewed.    Constitutional: He appears well-developed.   Laying in bed.     Eyes: Pupils are equal, round, and reactive to light. EOM are normal.     Psych/Behavior: He is alert. He is oriented to person, place, and time.     Musculoskeletal:      Comments: BURCIAGA spontaneously and with commands.     Significant Labs:  Recent Labs   Lab 05/11/23  1621 05/12/23  0431   * 138   K 3.5 3.7   CO2 30 26   BUN 25.6 28.6*   CREATININE 1.78* 1.98*   CALCIUM 6.9* 6.6*   MG  --  1.90     Recent Labs   Lab 05/11/23  1621 05/12/23  0431   WBC 13.08* 11.34   HGB 8.3* 6.7*   HCT 26.1* 22.0*    193     Recent Labs   Lab 05/10/23  1044   INR 1.22     Microbiology Results (last 7 days)       Procedure Component Value Units Date/Time    Blood Culture [[id number]]  (Normal) Collected: 05/09/23 2104    Order Status: Completed Specimen: Blood Updated: 05/11/23 2200     CULTURE, BLOOD ([facility]) No Growth At 48 Hours    Blood culture #1 **CANNOT BE ORDERED STAT** [[id number]]  (Abnormal)  (Susceptibility) Collected: 05/05/23 2109    Order Status: Completed Specimen: Blood Updated: 05/10/23 0706     CULTURE, BLOOD ([facility]) Methicillin resistant Staphylococcus aureus     GRAM STAIN Gram Positive Cocci, probable Staphylococcus      Seen in gram stain of broth only      2 of 2 bottles positive    Blood culture #2 **CANNOT BE ORDERED STAT** [[id number]]  (Abnormal)  (Susceptibility) Collected: 05/05/23 2109    Order Status: Completed Specimen: Blood Updated: 05/09/23 0716     CULTURE, BLOOD ([facility]) Methicillin resistant Staphylococcus aureus     GRAM STAIN Gram Positive Cocci, probable Staphylococcus      Seen in gram stain of broth only      1 of 2 Anaerobic bottles positive    Urine culture [[id number]]  (Abnormal) Collected:  05/06/23 0605    Order Status: Completed Specimen: Urine Updated: 05/08/23 0910     Urine Culture 10,000 - 25,000 colonies/ml Candida albicans    BCID2 Panel [[id number]]  (Abnormal) Collected: 05/05/23 2109    Order Status: Completed Specimen: Blood Updated: 05/07/23 0142     CTX-M (ESBL ) N/A     Comment: Note: Antimicrobial resistance can occur via multiple mechanisms. A Not Detected result for antimicrobial resistance gene(s) does not indicate antimicrobial susceptibility. Subculturing is required for species identification and susceptibility testing of   isolates.        IMP (Cabapenemase ) N/A     Comment: Note: Antimicrobial resistance can occur via multiple mechanisms. A Not Detected result for antimicrobial resistance gene(s) does not indicate antimicrobial susceptibility. Subculturing is required for species identification and susceptibility testing of   isolates.        KPC resistance gene (Carbapenemase ) N/A     Comment: Note: Antimicrobial resistance can occur via multiple mechanisms. A Not Detected result for antimicrobial resistance gene(s) does not indicate antimicrobial susceptibility. Subculturing is required for species identification and susceptibility testing of   isolates.        mcr-1 N/A     Comment: Note: Antimicrobial resistance can occur via multiple mechanisms. A Not Detected result for antimicrobial resistance gene(s) does not indicate antimicrobial susceptibility. Subculturing is required for species identification and susceptibility testing of   isolates.        mecA ID N/A     Comment: Note: Antimicrobial resistance can occur via multiple mechanisms. A Not Detected result for antimicrobial resistance gene(s) does not indicate antimicrobial susceptibility. Subculturing is required for species identification and susceptibility testing of   isolates.        mecA/C and MREJ (MRSA) gene Detected     Comment: Note: Antimicrobial resistance can occur via multiple  mechanisms. A Not Detected result for antimicrobial resistance gene(s) does not indicate antimicrobial susceptibility. Subculturing is required for species identification and susceptibility testing of   isolates.        NDM (Carbapenemase ) N/A     Comment: Note: Antimicrobial resistance can occur via multiple mechanisms. A Not Detected result for antimicrobial resistance gene(s) does not indicate antimicrobial susceptibility. Subculturing is required for species identification and susceptibility testing of   isolates.        OXA-48-like (Carbapenemase ) N/A     Comment: Note: Antimicrobial resistance can occur via multiple mechanisms. A Not Detected result for antimicrobial resistance gene(s) does not indicate antimicrobial susceptibility. Subculturing is required for species identification and susceptibility testing of   isolates.        Kenia/B (VRE gene) N/A     Comment: Note: Antimicrobial resistance can occur via multiple mechanisms. A Not Detected result for antimicrobial resistance gene(s) does not indicate antimicrobial susceptibility. Subculturing is required for species identification and susceptibility testing of   isolates.        VIM (Carbapenemase ) N/A     Comment: Note: Antimicrobial resistance can occur via multiple mechanisms. A Not Detected result for antimicrobial resistance gene(s) does not indicate antimicrobial susceptibility. Subculturing is required for species identification and susceptibility testing of   isolates.        Enterococcus faecalis Not Detected     Enterococcus faecium Not Detected     Listeria monocytogenes Not Detected     Staphylococcus spp. Detected     Staphylococcus aureus Detected     Staphylococcus epidermidis Not Detected     Staphylococcus lugdunensis Not Detected     Streptococcus spp. Not Detected     Streptococcus agalactiae (Group B) Not Detected     Streptococcus pneumoniae Not Detected     Streptococcus pyogenes (Group A) Not Detected      Acinetobacter calcoaceticus/baumannii complex Not Detected     Bacteroides fragilis Not Detected     Enterobacterales Not Detected     Enterobacter cloacae complex Not Detected     Escherichia coli Not Detected     Klebsiella aerogenes Not Detected     Klebsiella oxytoca Not Detected     Klebsiella pneumoniae group Not Detected     Proteus spp. Not Detected     Salmonella spp. Not Detected     Serratia marcescens Not Detected     Haemophilus influenzae Not Detected     Neisseria meningitidis Not Detected     Pseudomonas aeruginosa Not Detected     Stenotrophomonas maltophilia Not Detected     Candida albicans Not Detected     Candida auris Not Detected     Candida glabrata Not Detected     Candida krusei Not Detected     Candida parapsilosis Not Detected     Candida tropicalis Not Detected     Cryptococcus neoformans/gattii Not Detected    Narrative:      The Optasite BCID2 Panel is a multiplexed nucleic acid test intended for the use with [employer]® [employer] or [employer] for the simultaneous qualitative detection and identification of multiple bacterial and yeast nucleic acids and select genetic determinants associated with antimicrobial resistance.  The Optasite BCID2 Panel test is performed directly on blood culture samples identified as positive by a continuous monitoring blood culture system.  Results are intended to be interpreted in conjunction with Gram stain results.              Assessment/Plan:     Active Diagnoses:    Diagnosis Date Noted POA    PRINCIPAL PROBLEM:  Sepsis [A41.9] 05/05/2023 Yes    Staphylococcus aureus bacteremia [R78.81, B95.61] 05/05/2023 Unknown      Problems Resolved During this Admission:     -MRI Lumbar with and without contrast pending  -S/p sacral debridement 5/10/23  -Continue pain control  -Continue PT/OT  -Continue antibiotics per ID  -SCDs for DVT prophylaxis  -Await MRI lumbar for further neurosurgical recommendations      Melissa Martinez  PA  Neurosurgery  [facility] - 8th Floor Med Surg

## 2023-05-12 NOTE — CONSULTS
"Gastroenterology Consultation Note    Reason for Consult:  dark stools    PCP:   Primary Doctor No    History of Present Illness (HPI):  63 year old make known to us in the outpatient setting only with PMHx polysubstance abuse/IV drug abuse, type 2 diabetes mellitus, untreated chronic hep C/cirrhosis who presented to [facility] after leaving AMA from [facility].   GI consulted for dark stools.     Patient was recently hospitalized at [facility] for MRSA bacteremia with MRSA C and L-spine osteomyelitis, diskitis, bilateral psoas and paraspinous muscle abscess as well as lumbar epidural abscess. GI was consulted on this admit for elevated LFTs and cholecystis requiring edison tube placement. He then signed out AMA as he felt though he was not receiving adequate care and presented to [facility].     On arrival, patient was tachycardic with vital signs otherwise stable. Labs notable for leukocytosis, anemia (hemoglobin 8.7 ), and UDS+ opiates and amphetamines. US abdomen 05/06 with hepatic lobular contour and coarsened parenchyma, edison tube in place with small ascites and right pleural effusion.     Discussed care with nurse with reports of 2 large bowel movements overnight described as " bright red jelly like".   On exam this morning, patient with recent bowel movement. Maroon in color and liquid. He denies abdominal pain, N/V, cramping. He denies GI bleed in the past or any past episodes of BRB or melena.     No prior colonoscopy or EGD.     ROS:  Review of Systems   Constitutional:  Negative for malaise/fatigue.   HENT:  Negative for sore throat.    Respiratory:  Negative for sputum production, shortness of breath and stridor.    Gastrointestinal:  Positive for blood in stool, diarrhea and melena. Negative for abdominal pain, heartburn, nausea and vomiting.   Skin:  Negative for itching and rash.   Neurological:  Negative for seizures and loss of consciousness.   Psychiatric/Behavioral:  The patient is not nervous/anxious.      Medical " "History:   No past medical history on file.    Surgical History:   Past Surgical History:   Procedure Laterality Date    DEBRIDEMENT OF SACRAL WOUND N/A 5/10/2023    Procedure: DEBRIDEMENT, WOUND, SACRUM;  Surgeon: Reggie Castañeda MD;  Location: [facility];  Service: General;  Laterality: N/A;       Family History:   No family history on file..     Social History:   Social History     Tobacco Use    Smoking status: Not on file    Smokeless tobacco: Not on file   Substance Use Topics    Alcohol use: Not on file       Allergies:  Review of patient's allergies indicates:  No Known Allergies    Medications Prior to Admission   Medication Sig Dispense Refill Last Dose    HYDROcodone-acetaminophen (NORCO)  mg per tablet Take 1 tablet by mouth every 6 (six) hours as needed for Pain (severe pain only). 12 tablet 0          Objective Findings:    Vital Signs:  /73   Pulse 107   Temp 97.7 °F (36.5 °C)   Resp 18   Ht 5' 7" (1.702 m)   Wt 72.6 kg (160 lb)   SpO2 (!) 93%   BMI 25.06 kg/m²   Body mass index is 25.06 kg/m².    Physical Exam:  Physical Exam  Constitutional:       General: He is not in acute distress.  HENT:      Head: Normocephalic.      Nose: Nose normal.   Eyes:      Extraocular Movements: Extraocular movements intact.   Cardiovascular:      Pulses: Normal pulses.      Heart sounds: Normal heart sounds.   Pulmonary:      Effort: Pulmonary effort is normal. No respiratory distress.      Breath sounds: Normal breath sounds. No stridor.   Abdominal:      General: Abdomen is flat. Bowel sounds are normal. There is no distension.      Palpations: Abdomen is soft.      Tenderness: There is no abdominal tenderness.      Comments: Bandages in place over sacral wound s/p debridement- no evidence of bleeding or leakage. Wound vac in place.   Erythema/discoloration to perianal area noted to be worse per wound care. RN aware.   Dark red/maroon, liquid stool noted.    Skin:     General: Skin is warm and dry. "   Neurological:      General: No focal deficit present.      Mental Status: He is alert. Mental status is at baseline.   Psychiatric:         Mood and Affect: Mood normal.         Behavior: Behavior normal.         Thought Content: Thought content normal.       Labs:  Recent Results (from the past 24 hour(s))   POCT glucose    Collection Time: 05/11/23 11:06 AM   Result Value Ref Range    POCT Glucose 72 70 - 110 mg/dL   Comprehensive Metabolic Panel    Collection Time: 05/11/23  4:21 PM   Result Value Ref Range    Sodium Level 135 (L) 136 - 145 mmol/L    Potassium Level 3.5 3.5 - 5.1 mmol/L    Chloride 97 (L) 98 - 107 mmol/L    Carbon Dioxide 30 23 - 31 mmol/L    Glucose Level 139 (H) 82 - 115 mg/dL    Blood Urea Nitrogen 25.6 8.4 - 25.7 mg/dL    Creatinine 1.78 (H) 0.73 - 1.18 mg/dL    Calcium Level Total 6.9 (LL) 8.8 - 10.0 mg/dL    Protein Total 4.7 (L) 5.8 - 7.6 gm/dL    Albumin Level 1.3 (L) 3.4 - 4.8 g/dL    Globulin 3.4 2.4 - 3.5 gm/dL    Albumin/Globulin Ratio 0.4 (L) 1.1 - 2.0 ratio    Bilirubin Total 0.9 <=1.5 mg/dL    Alkaline Phosphatase 84 40 - 150 unit/L    Alanine Aminotransferase 10 0 - 55 unit/L    Aspartate Aminotransferase 27 5 - 34 unit/L    eGFR 43 mls/min/1.73/m2   CBC with Differential    Collection Time: 05/11/23  4:21 PM   Result Value Ref Range    WBC 13.08 (H) 4.50 - 11.50 x10(3)/mcL    RBC 2.79 (L) 4.70 - 6.10 x10(6)/mcL    Hgb 8.3 (L) 14.0 - 18.0 g/dL    Hct 26.1 (L) 42.0 - 52.0 %    MCV 93.5 80.0 - 94.0 fL    MCH 29.7 27.0 - 31.0 pg    MCHC 31.8 (L) 33.0 - 36.0 g/dL    RDW 15.8 11.5 - 17.0 %    Platelet 172 130 - 400 x10(3)/mcL    MPV 10.2 7.4 - 10.4 fL    Neut % 85.8 %    Lymph % 8.3 %    Mono % 2.8 %    Eos % 1.9 %    Basophil % 0.5 %    Lymph # 1.08 0.6 - 4.6 x10(3)/mcL    Neut # 11.23 (H) 2.1 - 9.2 x10(3)/mcL    Mono # 0.36 0.1 - 1.3 x10(3)/mcL    Eos # 0.25 0 - 0.9 x10(3)/mcL    Baso # 0.07 <=0.2 x10(3)/mcL    IG# 0.09 (H) 0 - 0.04 x10(3)/mcL    IG% 0.7 %    NRBC% 0.0 %   CK     Collection Time: 05/11/23  4:21 PM   Result Value Ref Range    Creatine Kinase 21 (L) 30 - 200 U/L   POCT glucose    Collection Time: 05/11/23  4:35 PM   Result Value Ref Range    POCT Glucose 138 (H) 70 - 110 mg/dL   POCT glucose    Collection Time: 05/11/23  9:55 PM   Result Value Ref Range    POCT Glucose 214 (H) 70 - 110 mg/dL   Comprehensive Metabolic Panel    Collection Time: 05/12/23  4:31 AM   Result Value Ref Range    Sodium Level 138 136 - 145 mmol/L    Potassium Level 3.7 3.5 - 5.1 mmol/L    Chloride 98 98 - 107 mmol/L    Carbon Dioxide 26 23 - 31 mmol/L    Glucose Level 94 82 - 115 mg/dL    Blood Urea Nitrogen 28.6 (H) 8.4 - 25.7 mg/dL    Creatinine 1.98 (H) 0.73 - 1.18 mg/dL    Calcium Level Total 6.6 (LL) 8.8 - 10.0 mg/dL    Protein Total 4.6 (L) 5.8 - 7.6 gm/dL    Albumin Level 1.2 (L) 3.4 - 4.8 g/dL    Globulin 3.4 2.4 - 3.5 gm/dL    Albumin/Globulin Ratio 0.4 (L) 1.1 - 2.0 ratio    Bilirubin Total 0.9 <=1.5 mg/dL    Alkaline Phosphatase 78 40 - 150 unit/L    Alanine Aminotransferase 12 0 - 55 unit/L    Aspartate Aminotransferase 33 5 - 34 unit/L    eGFR 38 mls/min/1.73/m2   Magnesium    Collection Time: 05/12/23  4:31 AM   Result Value Ref Range    Magnesium Level 1.90 1.60 - 2.60 mg/dL   CBC with Differential    Collection Time: 05/12/23  4:31 AM   Result Value Ref Range    WBC 11.34 4.50 - 11.50 x10(3)/mcL    RBC 2.30 (L) 4.70 - 6.10 x10(6)/mcL    Hgb 6.7 (L) 14.0 - 18.0 g/dL    Hct 22.0 (L) 42.0 - 52.0 %    MCV 95.7 (H) 80.0 - 94.0 fL    MCH 29.1 27.0 - 31.0 pg    MCHC 30.5 (L) 33.0 - 36.0 g/dL    RDW 15.9 11.5 - 17.0 %    Platelet 193 130 - 400 x10(3)/mcL    MPV 10.4 7.4 - 10.4 fL    Neut % 77.9 %    Lymph % 14.2 %    Mono % 4.6 %    Eos % 1.8 %    Basophil % 0.7 %    Lymph # 1.61 0.6 - 4.6 x10(3)/mcL    Neut # 8.84 2.1 - 9.2 x10(3)/mcL    Mono # 0.52 0.1 - 1.3 x10(3)/mcL    Eos # 0.20 0 - 0.9 x10(3)/mcL    Baso # 0.08 <=0.2 x10(3)/mcL    IG# 0.09 (H) 0 - 0.04 x10(3)/mcL    IG% 0.8 %    NRBC%  0.0 %       US Retroperitoneal Limited   Final Result      1. Right kidney not visualized.   2. Visualized upper portion of the left kidney demonstrates no gross abnormality.   3. Of note the recent prior CT demonstrated no significant abnormality of the kidneys.         Electronically signed by: Brooks Mccain   Date:    05/12/2023   Time:    08:08      X-Ray Chest 1 View for Line/Tube Placement   Final Result      Optimal placement of the PICC line.         Electronically signed by: Roland Pierce   Date:    05/10/2023   Time:    08:55      X-Ray Chest 1 View   Final Result      As above.         Electronically signed by: Roland Pierce   Date:    05/06/2023   Time:    18:22      CT Lumbar Spine W Wo Contrast   Final Result      1. Multiple posterior lumbar decompression laminectomies.  Posterior paraspinal fluid collection.  This may represent postsurgical seroma or hematoma without exclusion of infected collection.      2. Lumbar degenerative disc disease and spondylosis level by level discussed above.         Electronically signed by: Roland Pierce   Date:    05/06/2023   Time:    13:31      US Abdomen Limited   Final Result      1.  Hepatic lobular contour and coarsened parenchyma.      2.  Gallbladder could not be visualized with a cholecystostomy tube in place.      3.  Small ascites and right pleural effusion.         Electronically signed by: Roland Pierce   Date:    05/06/2023   Time:    11:31      MRI Lumbar Spine W WO Cont    (Results Pending)         Assessment/Plan:  63 year old make known to us in the outpatient setting only with PMHx polysubstance abuse/IV drug abuse, type 2 diabetes mellitus, untreated chronic hep C/cirrhosis who presented to [facility] after leaving AMA from [facility] for MRSA Bacteremia now s/p edison tube for cholecystis and sacral wound debridement.    GI consulted for dark stools.     MRSA Bacteremia   -MRSA cervical, lumbar spine osteomyelitis with bilateral psoas, paraspinal muscle abscess as  well as lumbar epidural abscess s/p drainage, laminectomy  Dark stools  -  hemoglobin trend since arrival  8.7--8.4--8.8--8.7--8.3--6.7 today with plans to transfuse 2u PRBC today   - NM Bleeding scan ordered to localize bleed. Patient ate this morning and therefore unable to proceed with endoscopy   - NPO at [facility] for tentative scope pending results of scan   Sacral Decubitus wound  - s/p debridement 05/10  3. S/p edison tube placement secondary to cholecystitis   4. RUPERTO  5. Polysubstance abuse   6. Untreated chronic Hep C    Thank you for allowing us to participate in the care of Reji Plasencia.    Jessica Ortiz PA-C  Gastroenterology  [facility]

## 2023-05-12 NOTE — CONSULTS
Nephrology Initial Consult Note    Patient Name: Reji Plasencia  Age: 63 y.o.  : 1960  MRN: [id number]  Admission Date: 2023    Reason for Consult:      RUPERTO  Self, Aaareferral    HPI:     Reji Plasencia is a 63 y.o. male with MRSA bacteremia.  Past medical history significant for polysubstance abuse, IV drug use, diabetes mellitus, untreated hepatitis-C, and cirrhosis.  He was recently hospitalized at [facility] for MRSA bacteremia due to C and L-spine osteomyelitis/diskitis, paraspinous muscle abscess, and lumbar epidural abscess.  He also had a cholecystostomy drain placed for cholecystitis.  Patient left [place] on 2023 and then presented to [facility] on 2023.  Blood cultures at admission were positive for MRSA.  UDS was also positive for amphetamines and opiates.  Neurosurgery and Infectious Disease have been following for bacteremia and spinal osteomyelitis/diskitis.  He has been getting daptomycin, rifampin, and Zosyn per ID recommendations.  At admission his renal function was normal with creatinine of 0.7.  Starting 05/10/2023 his renal function started to worsen with creatinine 1.0->1.7->1.9 at time of Nephrology consultation.  Labs also concerning with worsening anemia and hypoalbuminemia.  Patient overall feels about the same as whenever he presented to the hospital.  He has no specific complaints today.  He does endorse lower extremity edema.  No chest pain, shortness of breath, abdominal pain, nausea, vomiting, dysuria, or hematuria.  Since admission he has developed a petechial rash on the dorsum of his distal arms and legs.        Current Facility-Administered Medications   Medication Dose Route Frequency Provider Last Rate Last Admin    0.9%  NaCl infusion (for blood administration)   Intravenous Q24H PRN Guerrero Alfaro MD        aluminum-magnesium hydroxide-simethicone 200-200-20 mg/5 mL suspension 30 mL  30 mL Oral QID PRN Nery Sepulveda MD         collagenase ointment   Topical (Top) BID Reggie Castañeda MD   Given at 05/11/23 2210    DAPTOmycin (CUBICIN) 435 mg in sodium chloride 0.9% SolP 50 mL IVPB  6 mg/kg Intravenous Q24H Mica Naidu MD   Stopped at 05/11/23 1753    dextrose 10% bolus 125 mL 125 mL  12.5 g Intravenous PRN TANA LackeyP-BC        dextrose 10% bolus 250 mL 250 mL  25 g Intravenous PRN LUIS Lackey-BC        enoxaparin injection 40 mg  40 mg Subcutaneous Daily Nery Sepulveda MD   40 mg at 05/11/23 1724    glucagon (human recombinant) injection 1 mg  1 mg Intramuscular PRN LAURA Lackey        glucose chewable tablet 16 g  16 g Oral PRN Mica Naidu MD        glucose chewable tablet 24 g  24 g Oral PRN Mica Naidu MD   24 g at 05/08/23 0500    HYDROcodone-acetaminophen 7.5-325 mg per tablet 1 tablet  1 tablet Oral Q4H PRN Darnell Franco MD   1 tablet at 05/12/23 0710    HYDROmorphone (PF) injection 0.4 mg  0.4 mg Intravenous Q5 Min PRN Kylah Ortiz MD   0.4 mg at 05/10/23 1610    insulin aspart U-100 injection 1-10 Units  1-10 Units Subcutaneous QID (AC + HS) PRN LAURA Lackey   2 Units at 05/07/23 1226    insulin detemir U-100 injection 12 Units  12 Units Subcutaneous QHS Darnell Franco MD   12 Units at 05/11/23 2200    lactated ringers infusion   Intravenous Continuous Guerrero Alfaro MD 75 mL/hr at 05/11/23 2153 New Bag at 05/11/23 2153    Lactobacillus acidophilus capsule 1 capsule  1 capsule Oral TID WM Nery Sepulveda MD   1 capsule at 05/12/23 0710    melatonin tablet 6 mg  6 mg Oral Nightly PRN Nery Sepulveda MD        multivitamin tablet  1 tablet Oral Daily Nery Sepulveda MD   1 tablet at 05/11/23 0848    ondansetron injection 4 mg  4 mg Intravenous Q4H PRN Nery Sepulveda MD   4 mg at 05/06/23 0434    ondansetron injection 4 mg  4 mg Intravenous Daily PRN Kylah Ortiz MD        piperacillin-tazobactam (ZOSYN) 4.5 g in dextrose 5 % in water (D5W) 5 % 100 mL IVPB (MB+)  4.5 g  Intravenous Q8H Mica Naidu MD   Stopped at 05/12/23 0633    polyethylene glycol packet 17 g  17 g Oral BID PRN Nery Sepulveda MD        prochlorperazine injection Soln 5 mg  5 mg Intravenous Q6H PRN Nery Sepulveda MD        prochlorperazine injection Soln 5 mg  5 mg Intravenous Q30 Min PRN Kylah Ortiz MD        rifAMpin capsule 300 mg  300 mg Oral Q12H Mica Naidu MD   300 mg at 05/11/23 2153    senna-docusate 8.6-50 mg per tablet 2 tablet  2 tablet Oral BID PRN Nery Sepulveda MD        sodium chloride 0.9% flush 10 mL  10 mL Intravenous PRN Nery Sepulveda MD        sodium chloride 0.9% flush 10 mL  10 mL Intravenous Q6H Darnell Franco MD   10 mL at 05/12/23 0625    And    sodium chloride 0.9% flush 10 mL  10 mL Intravenous PRN Darnell Franco MD        thiamine tablet 100 mg  100 mg Oral Daily Darnell Franco MD   100 mg at 05/11/23 0848    tiZANidine tablet 4 mg  4 mg Oral Q8H PRN Nery Sepulveda MD           Primary Doctor No    No past medical history on file.   Past Surgical History:   Procedure Laterality Date    DEBRIDEMENT OF SACRAL WOUND N/A 5/10/2023    Procedure: DEBRIDEMENT, WOUND, SACRUM;  Surgeon: Reggie Castañeda MD;  Location: [facility];  Service: General;  Laterality: N/A;      No family history on file.  Social History     Tobacco Use    Smoking status: Not on file    Smokeless tobacco: Not on file   Substance Use Topics    Alcohol use: Not on file     Medications Prior to Admission   Medication Sig Dispense Refill Last Dose    HYDROcodone-acetaminophen (NORCO)  mg per tablet Take 1 tablet by mouth every 6 (six) hours as needed for Pain (severe pain only). 12 tablet 0      Review of patient's allergies indicates:  No Known Allergies         Review of Systems:     Comprehensive 10pt ROS negative except as noted per HPI.      Objective:       VITAL SIGNS: 24 HR MIN & MAX LAST    Temp  Min: 97.7 °F (36.5 °C)  Max: 98.5 °F (36.9 °C)  97.7 °F (36.5 °C)        BP  Min: 123/84  Max: 157/87  137/88     Pulse   Min: 107  Max: 130  108     Resp  Min: 16  Max: 20  20    SpO2  Min: 91 %  Max: 97 %  (!) 94 %      GEN:  Ill-appearing WM in NAD  HEENT: Conjunctiva anicteric, pupils equal   CV: RRR +S1,S2 without murmur  PULM: CTAB, unlabored  ABD: Soft, NT/ND abdomen with NABS  EXT:  2+ lower extremity edema  SKIN:  Petechial rash over dorsum of feet and upper extremity to elbows  PSYCH: Awake, alert and appropriately conversant.   Vascular access:  No hemodialysis access            Component Value Date/Time     05/12/2023 0431     (L) 05/11/2023 1621    K 3.7 05/12/2023 0431    K 3.5 05/11/2023 1621    CHLORIDE 98 05/12/2023 0431    CHLORIDE 97 (L) 05/11/2023 1621    CO2 26 05/12/2023 0431    CO2 30 05/11/2023 1621    BUN 28.6 (H) 05/12/2023 0431    BUN 25.6 05/11/2023 1621    CREATININE 1.98 (H) 05/12/2023 0431    CREATININE 1.78 (H) 05/11/2023 1621    CALCIUM 6.6 (LL) 05/12/2023 0431    CALCIUM 6.9 (LL) 05/11/2023 1621    PHOS 3.1 05/06/2023 0630            Component Value Date/Time    WBC 11.34 05/12/2023 0431    WBC 13.08 (H) 05/11/2023 1621    HGB 6.7 (L) 05/12/2023 0431    HGB 8.3 (L) 05/11/2023 1621    HCT 22.0 (L) 05/12/2023 0431    HCT 26.1 (L) 05/11/2023 1621     05/12/2023 0431     05/11/2023 1621             US Retroperitoneal Limited   Final Result      1. Right kidney not visualized.   2. Visualized upper portion of the left kidney demonstrates no gross abnormality.   3. Of note the recent prior CT demonstrated no significant abnormality of the kidneys.         Electronically signed by: Brooks Mccain   Date:    05/12/2023   Time:    08:08      X-Ray Chest 1 View for Line/Tube Placement   Final Result      Optimal placement of the PICC line.         Electronically signed by: Roland Pierce   Date:    05/10/2023   Time:    08:55      X-Ray Chest 1 View   Final Result      As above.         Electronically signed by: Roland Pierce   Date:    05/06/2023   Time:    18:22      CT Lumbar Spine W Wo  Contrast   Final Result      1. Multiple posterior lumbar decompression laminectomies.  Posterior paraspinal fluid collection.  This may represent postsurgical seroma or hematoma without exclusion of infected collection.      2. Lumbar degenerative disc disease and spondylosis level by level discussed above.         Electronically signed by: Roland Pierce   Date:    05/06/2023   Time:    13:31      US Abdomen Limited   Final Result      1.  Hepatic lobular contour and coarsened parenchyma.      2.  Gallbladder could not be visualized with a cholecystostomy tube in place.      3.  Small ascites and right pleural effusion.         Electronically signed by: Roland Pierce   Date:    05/06/2023   Time:    11:31      MRI Lumbar Spine W WO Cont    (Results Pending)   NM GI Bleed Scan (Tagged RBC)    (Results Pending)       Assessment / Plan:       Active Hospital Problems    Diagnosis  POA    *Sepsis [A41.9]  Yes    Staphylococcus aureus bacteremia [R78.81, B95.61]  Unknown      Resolved Hospital Problems   No resolved problems to display.         Acute kidney injury - baseline creatinine 0.7  Anemia  Hypoalbuminemia  MRSA bacteremia - on daptomycin rifampin, and Zosyn  Polysubstance abuse  Multiple infectious sources - osteomyelitis, diskitis, cholecystitis, epidural abscess    Plan:  Unsure of cause of acute kidney injury at this time.  Differential is fairly broad including prerenal injury, ATN, AIN, and possible TTP.  Place Awad catheter for strict intake and output.  Obtain urinalysis, and UPCR.  Renal ultrasound done yesterday was inconclusive due to poor windows.  We will go ahead and order [id number] due to petechial rash, renal dysfunction, and anemia.  However, patient does not have thrombocytopenia.  May need to consider Hematology consult.  Okay with continuing lactated Ringer's at 75 mL/hr for now.  With acute kidney injury and hypoalbuminemia, Zosyn would not be the preferred drug, but will defer this decision  to ID.  No acute indication for hemodialysis at this time.    Thank you for the consult.  We will follow along.      Fred Dominguez DO  Nephrology  [facility]  Clinic number: [phone]

## 2023-05-13 LAB
ABO + RH BLD: NORMAL
ALBUMIN SERPL-MCNC: 1.2 G/DL (ref 3.4–4.8)
ALBUMIN/GLOB SERPL: 0.4 RATIO (ref 1.1–2)
ALP SERPL-CCNC: 79 UNIT/L (ref 40–150)
ALT SERPL-CCNC: 10 UNIT/L (ref 0–55)
AST SERPL-CCNC: 22 UNIT/L (ref 5–34)
BASOPHILS # BLD AUTO: 0.08 X10(3)/MCL
BASOPHILS NFR BLD AUTO: 0.6 %
BILIRUBIN DIRECT+TOT PNL SERPL-MCNC: 1.2 MG/DL
BLD PROD TYP BPU: NORMAL
BLOOD UNIT EXPIRATION DATE: NORMAL
BLOOD UNIT TYPE CODE: 6200
BUN SERPL-MCNC: 32.5 MG/DL (ref 8.4–25.7)
CALCIUM SERPL-MCNC: 6.6 MG/DL (ref 8.8–10)
CHLORIDE SERPL-SCNC: 98 MMOL/L (ref 98–107)
CO2 SERPL-SCNC: 27 MMOL/L (ref 23–31)
CREAT SERPL-MCNC: 2.32 MG/DL (ref 0.73–1.18)
CROSSMATCH INTERPRETATION: NORMAL
DISPENSE STATUS: NORMAL
EOSINOPHIL # BLD AUTO: 0.25 X10(3)/MCL (ref 0–0.9)
EOSINOPHIL NFR BLD AUTO: 1.8 %
ERYTHROCYTE [DISTWIDTH] IN BLOOD BY AUTOMATED COUNT: 16.6 % (ref 11.5–17)
GFR SERPLBLD CREATININE-BSD FMLA CKD-EPI: 31 MLS/MIN/1.73/M2
GLOBULIN SER-MCNC: 3.1 GM/DL (ref 2.4–3.5)
GLUCOSE SERPL-MCNC: 123 MG/DL (ref 82–115)
HCT VFR BLD AUTO: 24.2 % (ref 42–52)
HCT VFR BLD AUTO: 24.6 % (ref 42–52)
HCT VFR BLD AUTO: 24.8 % (ref 42–52)
HEMATOLOGIST REVIEW: NORMAL
HGB BLD-MCNC: 8.2 G/DL (ref 14–18)
HGB BLD-MCNC: 8.3 G/DL (ref 14–18)
HGB BLD-MCNC: 8.3 G/DL (ref 14–18)
IMM GRANULOCYTES # BLD AUTO: 0.1 X10(3)/MCL (ref 0–0.04)
IMM GRANULOCYTES NFR BLD AUTO: 0.7 %
LYMPHOCYTES # BLD AUTO: 1.97 X10(3)/MCL (ref 0.6–4.6)
LYMPHOCYTES NFR BLD AUTO: 14.5 %
MAGNESIUM SERPL-MCNC: 1.9 MG/DL (ref 1.6–2.6)
MCH RBC QN AUTO: 29.9 PG (ref 27–31)
MCHC RBC AUTO-ENTMCNC: 33.7 G/DL (ref 33–36)
MCV RBC AUTO: 88.5 FL (ref 80–94)
MONOCYTES # BLD AUTO: 0.55 X10(3)/MCL (ref 0.1–1.3)
MONOCYTES NFR BLD AUTO: 4.1 %
NEUTROPHILS # BLD AUTO: 10.59 X10(3)/MCL (ref 2.1–9.2)
NEUTROPHILS NFR BLD AUTO: 78.3 %
NRBC BLD AUTO-RTO: 0 %
PHOSPHATE SERPL-MCNC: 4.5 MG/DL (ref 2.3–4.7)
PLATELET # BLD AUTO: 196 X10(3)/MCL (ref 130–400)
PMV BLD AUTO: 10.2 FL (ref 7.4–10.4)
POCT GLUCOSE: 103 MG/DL (ref 70–110)
POCT GLUCOSE: 106 MG/DL (ref 70–110)
POCT GLUCOSE: 108 MG/DL (ref 70–110)
POCT GLUCOSE: 207 MG/DL (ref 70–110)
POTASSIUM SERPL-SCNC: 3.5 MMOL/L (ref 3.5–5.1)
PROT SERPL-MCNC: 4.3 GM/DL (ref 5.8–7.6)
RBC # BLD AUTO: 2.78 X10(6)/MCL (ref 4.7–6.1)
SODIUM SERPL-SCNC: 136 MMOL/L (ref 136–145)
UNIT NUMBER: NORMAL
WBC # SPEC AUTO: 13.54 X10(3)/MCL (ref 4.5–11.5)

## 2023-05-13 PROCEDURE — 25000003 PHARM REV CODE 250: Performed by: INTERNAL MEDICINE

## 2023-05-13 PROCEDURE — 63600175 PHARM REV CODE 636 W HCPCS

## 2023-05-13 PROCEDURE — 80053 COMPREHEN METABOLIC PANEL: CPT | Performed by: STUDENT IN AN ORGANIZED HEALTH CARE EDUCATION/TRAINING PROGRAM

## 2023-05-13 PROCEDURE — 84100 ASSAY OF PHOSPHORUS: CPT | Performed by: STUDENT IN AN ORGANIZED HEALTH CARE EDUCATION/TRAINING PROGRAM

## 2023-05-13 PROCEDURE — 63600175 PHARM REV CODE 636 W HCPCS: Performed by: INTERNAL MEDICINE

## 2023-05-13 PROCEDURE — 27000207 HC ISOLATION

## 2023-05-13 PROCEDURE — A4216 STERILE WATER/SALINE, 10 ML: HCPCS | Performed by: STUDENT IN AN ORGANIZED HEALTH CARE EDUCATION/TRAINING PROGRAM

## 2023-05-13 PROCEDURE — 25000003 PHARM REV CODE 250: Performed by: STUDENT IN AN ORGANIZED HEALTH CARE EDUCATION/TRAINING PROGRAM

## 2023-05-13 PROCEDURE — 63600175 PHARM REV CODE 636 W HCPCS: Performed by: STUDENT IN AN ORGANIZED HEALTH CARE EDUCATION/TRAINING PROGRAM

## 2023-05-13 PROCEDURE — 94761 N-INVAS EAR/PLS OXIMETRY MLT: CPT

## 2023-05-13 PROCEDURE — 85014 HEMATOCRIT: CPT | Performed by: INTERNAL MEDICINE

## 2023-05-13 PROCEDURE — P9016 RBC LEUKOCYTES REDUCED: HCPCS | Performed by: NURSE PRACTITIONER

## 2023-05-13 PROCEDURE — 99232 SBSQ HOSP IP/OBS MODERATE 35: CPT | Mod: ,,, | Performed by: NEUROLOGICAL SURGERY

## 2023-05-13 PROCEDURE — 83735 ASSAY OF MAGNESIUM: CPT | Performed by: STUDENT IN AN ORGANIZED HEALTH CARE EDUCATION/TRAINING PROGRAM

## 2023-05-13 PROCEDURE — 63600175 PHARM REV CODE 636 W HCPCS: Performed by: NURSE PRACTITIONER

## 2023-05-13 PROCEDURE — 99232 PR SUBSEQUENT HOSPITAL CARE,LEVL II: ICD-10-PCS | Mod: ,,, | Performed by: NEUROLOGICAL SURGERY

## 2023-05-13 PROCEDURE — 21400001 HC TELEMETRY ROOM

## 2023-05-13 PROCEDURE — 85025 COMPLETE CBC W/AUTO DIFF WBC: CPT | Performed by: STUDENT IN AN ORGANIZED HEALTH CARE EDUCATION/TRAINING PROGRAM

## 2023-05-13 RX ORDER — FUROSEMIDE 10 MG/ML
20 INJECTION INTRAMUSCULAR; INTRAVENOUS ONCE
Status: COMPLETED | OUTPATIENT
Start: 2023-05-13 | End: 2023-05-13

## 2023-05-13 RX ORDER — HYDROCODONE BITARTRATE AND ACETAMINOPHEN 500; 5 MG/1; MG/1
TABLET ORAL
Status: DISCONTINUED | OUTPATIENT
Start: 2023-05-13 | End: 2023-05-23

## 2023-05-13 RX ORDER — SODIUM, POTASSIUM,MAG SULFATES 17.5-3.13G
1 SOLUTION, RECONSTITUTED, ORAL ORAL 2 TIMES DAILY
Status: COMPLETED | OUTPATIENT
Start: 2023-05-13 | End: 2023-05-13

## 2023-05-13 RX ORDER — HYDROMORPHONE HYDROCHLORIDE 2 MG/ML
0.5 INJECTION, SOLUTION INTRAMUSCULAR; INTRAVENOUS; SUBCUTANEOUS EVERY 4 HOURS PRN
Status: DISCONTINUED | OUTPATIENT
Start: 2023-05-13 | End: 2023-05-22

## 2023-05-13 RX ADMIN — RIFAMPIN 300 MG: 300 CAPSULE ORAL at 12:05

## 2023-05-13 RX ADMIN — PIPERACILLIN AND TAZOBACTAM 4.5 G: 4; .5 INJECTION, POWDER, LYOPHILIZED, FOR SOLUTION INTRAVENOUS; PARENTERAL at 05:05

## 2023-05-13 RX ADMIN — SODIUM CHLORIDE, PRESERVATIVE FREE 10 ML: 5 INJECTION INTRAVENOUS at 01:05

## 2023-05-13 RX ADMIN — SODIUM SULFATE, POTASSIUM SULFATE, MAGNESIUM SULFATE 177 ML: 17.5; 3.13; 1.6 SOLUTION, CONCENTRATE ORAL at 05:05

## 2023-05-13 RX ADMIN — Medication: at 09:05

## 2023-05-13 RX ADMIN — Medication: at 12:05

## 2023-05-13 RX ADMIN — PIPERACILLIN AND TAZOBACTAM 4.5 G: 4; .5 INJECTION, POWDER, LYOPHILIZED, FOR SOLUTION INTRAVENOUS; PARENTERAL at 12:05

## 2023-05-13 RX ADMIN — SODIUM CHLORIDE, PRESERVATIVE FREE 10 ML: 5 INJECTION INTRAVENOUS at 12:05

## 2023-05-13 RX ADMIN — SODIUM CHLORIDE, POTASSIUM CHLORIDE, SODIUM LACTATE AND CALCIUM CHLORIDE: 600; 310; 30; 20 INJECTION, SOLUTION INTRAVENOUS at 01:05

## 2023-05-13 RX ADMIN — FUROSEMIDE 20 MG: 10 INJECTION, SOLUTION INTRAMUSCULAR; INTRAVENOUS at 02:05

## 2023-05-13 RX ADMIN — THERA TABS 1 TABLET: TAB at 12:05

## 2023-05-13 RX ADMIN — SODIUM CHLORIDE, POTASSIUM CHLORIDE, SODIUM LACTATE AND CALCIUM CHLORIDE: 600; 310; 30; 20 INJECTION, SOLUTION INTRAVENOUS at 09:05

## 2023-05-13 RX ADMIN — SODIUM SULFATE, POTASSIUM SULFATE, MAGNESIUM SULFATE 177 ML: 17.5; 3.13; 1.6 SOLUTION, CONCENTRATE ORAL at 12:05

## 2023-05-13 RX ADMIN — COLLAGENASE SANTYL: 250 OINTMENT TOPICAL at 09:05

## 2023-05-13 RX ADMIN — HYDROCODONE BITARTRATE AND ACETAMINOPHEN 1 TABLET: 7.5; 325 TABLET ORAL at 02:05

## 2023-05-13 RX ADMIN — HYDROMORPHONE HYDROCHLORIDE 0.5 MG: 2 INJECTION, SOLUTION INTRAMUSCULAR; INTRAVENOUS; SUBCUTANEOUS at 12:05

## 2023-05-13 RX ADMIN — COLLAGENASE SANTYL: 250 OINTMENT TOPICAL at 12:05

## 2023-05-13 RX ADMIN — Medication 1 CAPSULE: at 12:05

## 2023-05-13 RX ADMIN — THIAMINE HCL TAB 100 MG 100 MG: 100 TAB at 12:05

## 2023-05-13 RX ADMIN — HYDROCODONE BITARTRATE AND ACETAMINOPHEN 1 TABLET: 7.5; 325 TABLET ORAL at 09:05

## 2023-05-13 RX ADMIN — SODIUM CHLORIDE, PRESERVATIVE FREE 10 ML: 5 INJECTION INTRAVENOUS at 05:05

## 2023-05-13 RX ADMIN — PIPERACILLIN AND TAZOBACTAM 4.5 G: 4; .5 INJECTION, POWDER, LYOPHILIZED, FOR SOLUTION INTRAVENOUS; PARENTERAL at 09:05

## 2023-05-13 RX ADMIN — HYDROMORPHONE HYDROCHLORIDE 0.5 MG: 2 INJECTION, SOLUTION INTRAMUSCULAR; INTRAVENOUS; SUBCUTANEOUS at 06:05

## 2023-05-13 RX ADMIN — DAPTOMYCIN 435 MG: 500 INJECTION, POWDER, LYOPHILIZED, FOR SOLUTION INTRAVENOUS at 05:05

## 2023-05-13 RX ADMIN — RIFAMPIN 300 MG: 300 CAPSULE ORAL at 09:05

## 2023-05-13 RX ADMIN — HYDROCODONE BITARTRATE AND ACETAMINOPHEN 1 TABLET: 7.5; 325 TABLET ORAL at 03:05

## 2023-05-13 RX ADMIN — Medication 1 CAPSULE: at 05:05

## 2023-05-13 NOTE — PROGRESS NOTES
Events noted.  Discussed findings with radiologist, Dr. Tiffany Bashir.  Nuclear medicine scan in progress.  Initial imaging study yesterday was negative.  Repeat images pending today.  Patient reported to have bouts of hematochezia.  Also had SVT.  Hemoglobin was 5.9 last night after 1 unit.  Additional 2 units were transfused.  Post transfusion hemoglobin/hematocrit is pending.  Plan for colonoscopy over the weekend either this afternoon or tomorrow morning pending clinical course and lab results.  Discussed with the patient in details.  He agrees with the plan. Also discussed with the patient's son Clyde Delvalle as per patient's request.

## 2023-05-13 NOTE — PROGRESS NOTES
[facility] Hospital Medicine Progress Note        Chief Complaint: Inpatient Follow-up for MRSA Spinal Osteomyelitis    Subjective:  History:  62-year-old male with significant history of polysubstance abuse/IV drug abuse, type 2 diabetes mellitus, untreated chronic hep C/cirrhosis.  Patient had a recent hospitalization to [facility] for MRSA bacteremia with MRSA C and L-spine osteomyelitis, diskitis, bilateral psoas and paraspinous muscle abscess as well as lumbar epidural abscess.  Patient was being treated in [facility] with close follow-up with neurosurgery, Infectious Disease.  Hospital stay also was even full for cholecystitis for which he underwent cholecystostomy tube placement.  Patient signed out AMA from [facility] on 05/05, went home, called ambulance and presented to the hospital.  Per chart review patient was being treated with daptomycin and rifampin in [facility].  While hospitalized his urine drug screen was positive for amphetamines and there were concerns that he was using it while hospitalized.  Patient was tachycardic in the ED.  Otherwise hemodynamics stable.  Complained of bilateral lower extremity swelling. Labs significant for leukocytosis, anemia, hyperglycemia. UDS was positive for opiates, amphetamines.  Hospitalist team was consulted for admission. Restarted on IV Daptomycin, Rifampin, Zosyn. Echocardiogram on 5/6 showed normal LV systolic function, EF 50-55%, indeterminate diastolic function, mild RV enlargement, mild MR, PASP 19 mmHg with no mention of vegetations.  Blood cultures from 05/05 for positive for MRSA.  Repeat blood culture from 05/09 negative for 24 hours.  Patient awaiting MRI lumbar spine.  Noted to have LLE pain with B/L LE U/S showing fluid collection in left cough.  Awaiting CT L leg and thigh with contrast.  Also ordered CT chest with contrast to further evaluate right-sided oblong opacity on CXR which is  thought to be mass/possible abscess/fissural fluid collection. Underwent debridement for sacral ulcer on 05/10 with wound care being done. Neurosurgery to make further recommendations once MRI lumbar spine is done. Noted to have elevated Cr of 1.78; DC diuretics. Possibly d/t ATN from ABX; consulted Nephrology. Ordered US Retroperitoneum, UA. Cancelled CT chest, CT L thigh/leg due to RUPERTO. Nephrology on board; ordered [id number] given anemia, renal dysfunction, petechial rash to evaluate for TTP.  Patient noted to have hematochezia by nurse on 05/12; consulted GI.  HGB/HCT noted to drop to 6.7/22.0; transfused 2 units PRBCs.  Kept NPO after midnight for possible scope.  NM bleeding scan ordered by GI.    Today, Mr. Plasencia was seen at bedside.  He stated he was doing well but complained of ongoing sacral pain.  Continued to have hematochezia overnight and received another unit of PRBCs.  Started on bowel prep by GI for colonoscopy tomorrow morning.  Will repeat HGB/HCT q.6 hours and transfuse as needed.  Will also order platelets and FFP to be kept ahead as patient will have received 4 units of PRBCs. BUN/creatinine worsened to 32.5/2.32 today.      General appearance: Thin, chronically ill-appearing  male in no apparent distress.  HENT: Atraumatic head. Moist mucous membranes of oral cavity.  Eyes: Normal extraocular movements.   Neck: Supple.   Lungs: Clear to auscultation bilaterally. Tachypnea   Heart: Regular rate and rhythm. S1 and S2 present. No pedal edema.  Abdomen: Soft, non-distended, non-tender. RUQ Drain  Extremities:  1+ pitting pedal edema   Skin: No Rash.   Neuro:  Moving upper extremity without any difficulty, patient reports decreased range of motion lower extremity secondary to swelling   Psych/mental status: Appropriate mood and affect. Responds appropriately to questions    A/P:  MRSA bacteremia   MRSA cervical, lumbar spine osteomyelitis with bilateral psoas, paraspinal muscle abscess as  well as lumbar epidural abscess status post drainage, laminectomy  Recent cholecystitis status post cholecystostomy tube placement   Sepsis secondary to above   RUPERTO 2/2 possible ATN d/t ABX vs TTP  Hematochezia  Tachycardia 2/2 Anemia  Polysubstance abuse/IV drug abuse   Decompensated cirrhosis with volume overload   Untreated chronic hep C   Type 2 diabetes mellitus with hyperglycemia  Acute Normocytic Anemia 2/2 TTP vs GI Bleed     Patient continues to be admitted for close monitoring   Continues to have sacral pain and hematochezia with stable HGB/HCT  Underwent sacral ulcer debridement with surgery on 05/10   Surgery on board; following recommendations   ID on board; following recommendations  Nephrology consulted for RUPERTO likely secondary to ATN  [id number] ordered by Nephrology to evaluate for TTP   GI consulted for hematochezia; planning for Colonoscopy tomorrow morning for which patient will be kept NPO overnight  Receiving bowel prep  Will hold off on CTs with contrast d/t RUPERTO   Patient is still yet to undergo MRI L Spine  Neurosurgery to make further recommendations once patient gets MRI L-spine  Patient continued on IV Daptomycin, IV Rifampin, IV Zosyn  Patient has cholecystostomy tube in place   Echocardiogram negative for vegetations; will defer to ID whether patient needs CRISTY  Venous ultrasound bilateral lower extremity negative for DVT  However showed 9 x 4 x 2 cm fluid collection in the left proximal through mid calf; will hold off on CT with contrast due to RUPERTO  Will also hold off on CT Chest with Contrast given RUPERTO; patient has loculated R sided opacity on CXR  DC Lasix and Aldactone due to RUPERTO  Levemir, sliding scale for diabetes mellitus  Continue monitoring patient's symptoms    DVT prophylaxis-Lovenox    VITAL SIGNS: 24 HRS MIN & MAX LAST   Temp  Min: 97.4 °F (36.3 °C)  Max: 98.6 °F (37 °C) 98.6 °F (37 °C)   BP  Min: 108/73  Max: 153/90 (!) 153/90   Pulse  Min: 102  Max: 174  (!) 121   Resp  Min:  18  Max: 40 18   SpO2  Min: 93 %  Max: 96 % 95 %       All diagnosis and differential diagnosis have been reviewed; assessment and plan has been documented; I have personally reviewed the labs and test results that are presently available; I have reviewed the patients medication list; I have reviewed the consulting providers response and recommendations. I have reviewed or attempted to review medical records based upon their availability.       Guerrero Alfaro MD   05/13/2023

## 2023-05-13 NOTE — PROGRESS NOTES
Reji Plasencia   MRN: [id number]   ADMISSION DATE: 2023  : 1960  AGE: 63 y.o.    DATE :  2023     PROVIDER: BUCK EASTMAN    SUBJECTIVE:  Please also refer to the note written earlier today at 9:31 a.m. hemodynamically stable.  Bleeding scan with right colonic bleed reported.  Systolic blood pressure around 150.    Review of Systems   Awake and alert.  Feeling better.  He is being prepped for colonoscopy.  Dignishield rectal tube in place in view of decubitus ulcers.  He has received 1st dose of the prep.  Second dose of prep to be started soon.  He could not be prepped earlier as he had gone down to get bleeding scan images.  No significant cardiopulmonary symptoms at this time.  Denies any abdominal rectal discomfort at this time    Review of patient's allergies indicates:  No Known Allergies      collagenase   Topical (Top) BID    DAPTOmycin (CUBICIN) IV (PEDS and ADULTS)  6 mg/kg Intravenous Q24H    enoxaparin  40 mg Subcutaneous Daily    insulin detemir U-100  12 Units Subcutaneous QHS    Lactobacillus acidophilus  1 capsule Oral TID WM    multivitamin  1 tablet Oral Daily    piperacillin-tazobactam (ZOSYN) IVPB  4.5 g Intravenous Q8H    rifAMpin  300 mg Oral Q12H    sodium chloride 0.9%  10 mL Intravenous Q6H    thiamine  100 mg Oral Daily    zinc oxide-cod liver oil   Topical (Top) BID       Medications Discontinued During This Encounter   Medication Reason    dextrose 40 % gel 15,000 mg     dextrose 40 % gel 30,000 mg     oxyCODONE immediate release tablet 5 mg     meropenem (MERREM) 1 g in sodium chloride 0.9 % 100 mL IVPB (MB+)     linezolid 600 mg/300 mL IVPB 600 mg     lactated ringers infusion     piperacillin-tazobactam (ZOSYN) 3.375 g in dextrose 5 % in water (D5W) 5 % 50 mL IVPB (MB+)     insulin detemir U-100 injection 12 Units     insulin detemir U-100 injection 18 Units     acetaminophen tablet 1,000 mg     acetaminophen tablet 650 mg     oxyCODONE immediate release tablet 5 mg      furosemide injection 20 mg     spironolactone tablet 25 mg     furosemide tablet 40 mg          lactated ringers 125 mL/hr at 05/13/23 1349        No past medical history on file.   Social History     Socioeconomic History    Marital status: Single      Past Surgical History:   Procedure Laterality Date    DEBRIDEMENT OF SACRAL WOUND N/A 5/10/2023    Procedure: DEBRIDEMENT, WOUND, SACRUM;  Surgeon: Reggie Castañeda MD;  Location: [facility];  Service: General;  Laterality: N/A;        OBJECTIVE:     Vitals:    05/13/23 0830 05/13/23 1230 05/13/23 1233 05/13/23 1510   BP: 116/70 130/78  (!) 153/90   BP Location: Left arm      Patient Position: Lying      Pulse: 102 (!) 114  (!) 121   Resp: 18 20 20 20   Temp: 97.4 °F (36.3 °C) 98.1 °F (36.7 °C)  98.6 °F (37 °C)   TempSrc: Oral   Oral   SpO2: 95% (!) 93%  95%   Weight:       Height:           Physical Exam  HEENT examination:  Pale conjunctiva, anicteric sclera   Neck:  Supple   Chest:  Decreased breath sounds bilaterally   Heart examination: S1, S2 audible   Abdomen:  Soft.  Nontender.  Bowel sounds positive.  Cholecystostomy tube in place.    Extremities:  No edema   Neurologic:  Awake and alert.  Cooperative at this time.    Skin:  Warm and dry.  Psychiatry. : Patient was somewhat upset this morning as he was reluctant to undergo procedures.  Explained in detail about blood transfusion, GI bleed as well as bleeding scan.  Also discussed procedure in details.  He seems more relaxed now.    LABS    Recent Labs   Lab 05/11/23  1621 05/12/23  0431 05/12/23  2249 05/13/23  0820   WBC 13.08* 11.34  --  13.54*   HGB 8.3* 6.7* 5.9* 8.3*   HCT 26.1* 22.0* 18.1* 24.6*    193  --  196      Recent Labs   Lab 05/11/23  1621 05/12/23  0431 05/13/23  0820   * 138 136   K 3.5 3.7 3.5   CO2 30 26 27   BUN 25.6 28.6* 32.5*   CREATININE 1.78* 1.98* 2.32*   CALCIUM 6.9* 6.6* 6.6*   BILITOT 0.9 0.9 1.2   ALKPHOS 84 78 79   ALT 10 12 10   AST 27 33 22   GLUCOSE 139* 94 123*       Recent Labs   Lab 05/10/23  1044   INR 1.22    No results for input(s): AMYLASE in the last 168 hours.   Recent Labs   Lab 05/10/23  1044   INR 1.22           RESULTS: X-Ray Chest 1 View    Result Date: 5/6/2023  EXAMINATION: XR CHEST 1 VIEW CLINICAL HISTORY: leukocytosis; TECHNIQUE: One view COMPARISON: None available. FINDINGS: Cardiopericardial silhouette is within normal limits.  There is right mid to lower lung zone oblong configuration opacity which may represent a mass lesion versus fluid loculation within the interlobar fissure.  There are right mid to lower lung zone infiltrates.  Left lower lung atelectatic changes.  There also bilateral dependent small amount of pleural effusions.  No pulmonary edema.  No pneumothorax.     As above. Electronically signed by: Roland Pierce Date:    05/06/2023 Time:    18:22    CT Lumbar Spine W Wo Contrast    Result Date: 5/6/2023  EXAMINATION: CT LUMBAR SPINE W WO CONTRAST CLINICAL HISTORY: Recent lumbar spine infection status post surgery; TECHNIQUE: Multidetector axial images were performed of the lumbar spine without contrast and the images were reformatted. Dose length product of 2716 mGycm. Automated exposure control was utilized to minimize radiation dose. COMPARISON: None available FINDINGS: Patient is status post wide decompression laminectomies from L1 to L5.  There are extensive posterior paraspinal soft inflammations and fluid collection containing several air locules.  The fluid collection with peripheral thin enhancement broadly abuts the thecal sac is from the superior endplate of L2 the inferior endplate of L5 with craniocaudal span of 8.5 cm and shows maximum anterior-posterior diameter of 2.5 cm and transverse diameter of 3.8 cm.  There is also left intrathecal small air locule on image 83 series 3.  These findings may represent postsurgical seroma or hematoma without exclusion of infected collection.  There are no lumbar vertebrae disc spaces  irregularity or osteolytic destructive changes.  Several Schmorl nodes are seen involving lumbar vertebrae and the most conspicuous is of L3.  No acute fracture or malalignment.  Disc segmental analysis is given below: At L1-L2, disc height is preserved.  Bilateral facet arthropathy without significant central canal stenosis.  There are no narrowings of the neural foramen. At L2-L3, there is osteophyte disc complex which indents the ventral thecal sac.  Thecal sac is patent with decompression laminectomies.  There is mild narrowing of the right neural foramen caused by uncinate arthropathy and there is moderate narrowing of the left neural foramen. At L3-L4, there is broad osteophyte disc complex which indents the ventral thecal sac.  Right paracentral spur like growth from vertebral body effaces the right lateral recess with compression upon the right exiting nerve root.  There also bilateral moderate spondylotic narrowings of the neural foramen. At L4-L5, there is broad disc bulge which indents the ventral thecal sac.  Central canal is not stenosed with decompression laminectomies.  There is bilateral uncovertebral and facet arthropathy which encroaches onto the neural foramen.  There is mild narrowing of the right neural foramen and moderate narrowing of the left neural canal. At L5-S1, there is broad posterior vertebral spondylosis.  Central canal is not stenosed.  There are no significant narrowings of the neural foramen     1. Multiple posterior lumbar decompression laminectomies.  Posterior paraspinal fluid collection.  This may represent postsurgical seroma or hematoma without exclusion of infected collection. 2. Lumbar degenerative disc disease and spondylosis level by level discussed above. Electronically signed by: Roland Pierce Date:    05/06/2023 Time:    13:31    NM GI Bleed Scan (Tagged RBC)    Result Date: 5/13/2023  EXAMINATION: NM GI BLEED STUDY CLINICAL HISTORY: GI bleed;Dark stools- need to  localize bleed; TECHNIQUE: After injection of autologous red blood cells labeled in vitro with 29.1mCi of Tc-99m pertechnetate, sequential dynamic images of the abdomen were obtained for 90minutes. Delayed static images were acquired at 2, 4 and 24 hours. COMPARISON: None. FINDINGS: There is normal distribution of the activity within the labeled blood pool with visualization of the heart, liver, spleen, kidneys, bladder, and genitals. There is accumulation of radiotracer at the colon at 24 hours.  Activity is most intense at the region of the cecum and ascending colon with fainter activity seen at the descending colon.     Accumulation of radiotracer at the colon at 24 hours compatible with bleed.  Activity is most intense at the ascending colon. This report was flagged in Epic as abnormal. Electronically signed by: Tiffany Bashir Date:    05/13/2023 Time:    10:31    US Retroperitoneal Limited    Result Date: 5/12/2023  EXAMINATION: US RETROPERITONEAL LIMITED CLINICAL HISTORY: raj; COMPARISON: CT 6 May 2023 FINDINGS: Grayscale and color Doppler sonographic evaluation of the kidneys and urinary bladder. Poor acoustic windows for the kidneys.  The right kidney was not visualized.  Only the upper portion of the left kidney is seen and appears grossly normal with no hydronephrosis evident. There is small volume ascites.     1. Right kidney not visualized. 2. Visualized upper portion of the left kidney demonstrates no gross abnormality. 3. Of note the recent prior CT demonstrated no significant abnormality of the kidneys. Electronically signed by: Brooks Mccain Date:    05/12/2023 Time:    08:08    Echo    Result Date: 5/6/2023  · TDS due to lung interference. · The left ventricle is normal in size with low normal systolic function. · The estimated ejection fraction is 50-55%. · Indeterminate left ventricular diastolic function. · Mild right ventricular enlargement with low normal right ventricular systolic function. ·  Mild mitral regurgitation. · Intermediate central venous pressure (8 mmHg). · The estimated PA systolic pressure is 19 mmHg.      CV Ultrasound doppler venous legs bilat    Result Date: 5/7/2023  There was no evidence of deep or superficial vein thrombosis in bilateral lower extremities. A fluid collection measuring approximately 9 x 4 x 2 cm was identified in the left proximal through mid calf. There was no venous or arterial flow to this structure.     US Abdomen Limited    Result Date: 5/6/2023  EXAMINATION: US ABDOMEN LIMITED CLINICAL HISTORY: Cholecystostomy tube placement April 19, 2023 TECHNIQUE: Multiple real-time transverse and longitudinal sections were performed of the right abdomen by the sonographer. Select images were submitted for review. COMPARISON: None available FINDINGS: Liver shows lobular contour and coarsened parenchymal texture.  There is no delineation of discrete hepatic cystic or solid mass. Hepatic maximum diameter of 14.3 cm.  There is small amount of free fluid around the liver consistent with ascites.  There is unremarkable hepatopedal flow within the portal vein.  Pancreas is diffusely echogenic.  Incidental note is made of right-sided pleural effusion. Gallbladder could not be visualized.  A cholecystostomy tube is in place.  Common bile duct caliber of 2.0 mm is within normal limits for the age. Sonographer reported negative Yusuf's sign. Normally located right kidney length measures 11.2 x 5.0 x 5.6 cm. Right renal corticomedullary differentiation is unremarkable. No evidence of hydronephrosis.     1.  Hepatic lobular contour and coarsened parenchyma. 2.  Gallbladder could not be visualized with a cholecystostomy tube in place. 3.  Small ascites and right pleural effusion. Electronically signed by: Roland Pierce Date:    05/06/2023 Time:    11:31    X-Ray Chest 1 View for Line/Tube Placement    Result Date: 5/10/2023  EXAMINATION: XR CHEST 1 VIEW FOR LINE/TUBE PLACEMENT CLINICAL  HISTORY: picc; TECHNIQUE: One view COMPARISON: May 6, 2023. FINDINGS: Right upper extremity approach PICC line terminates within the superior vena cava and is optimal.  Otherwise, no significant interval change.     Optimal placement of the PICC line. Electronically signed by: Roland Pierce Date:    05/10/2023 Time:    08:55             ICD-10-CM ICD-9-CM   1. Spinal abscess  M46.20 730.08   2. Tachycardia  R00.0 785.0   3. Chest pain  R07.9 786.50   4. Endocarditis  I38 424.90   5. Bacteremia  R78.81 790.7   6. Swelling  R60.9 782.3   7. Wound of sacral region, initial encounter  S31.000A 959.19   8. Atrial fibrillation  I48.91 427.31   9. Sepsis  A41.9 038.9     995.91        ASSESSMENT & PLAN:    63 year old make known to us in the outpatient setting only with PMHx polysubstance abuse/IV drug abuse, type 2 diabetes mellitus, untreated chronic hep C/cirrhosis who presented to [facility] after leaving AMA from [facility] for MRSA Bacteremia now s/p edison tube for cholecystis and sacral wound debridement.    GI consulted for dark stools.      MRSA Bacteremia   -MRSA cervical, lumbar spine osteomyelitis with bilateral psoas, paraspinal muscle abscess as well as lumbar epidural abscess s/p drainage, laminectomy  Dark stools  -  hemoglobin trend since arrival  8.7--8.4--8.8--8.7--8.3--6.7 today with plans to transfuse 2u PRBC today   - NM Bleeding scan ordered to localize bleed. Patient ate this morning and therefore unable to proceed with endoscopy   - NPO at [facility] for tentative scope pending results of scan   Sacral Decubitus wound  - s/p debridement 05/10  3. S/p edison tube placement secondary to cholecystitis   4. RUPERTO  5. Polysubstance abuse   6. Untreated chronic Hep C    5/13/23  Hemodynamically stable.  Colonoscopy preparation in process.  For colonoscopy tomorrow morning.  Serial hemoglobin/hematocrit q.6 hours as specified earlier.  If hemoglobin less than 8, transfuse 1 unit of packed RBC.  Systolic blood pressure around  150.  Bleeding scan with activity in the right colon

## 2023-05-13 NOTE — PROGRESS NOTES
[facility] - 8th Floor Med Surg  Neurosurgery  Progress Note    Subjective:     Interval History:  Patient with episode of hematochezia and SVT receiving blood.  Colonoscopy over the weekend.  No complaints of new weakness.  Patient underwent debridement of sacral decubitus wound  in the OR.  MRIs not performed yet.  Patient acuity now will not allow for this.      62-year-old male with significant history of polysubstance abuse/IV drug abuse, type 2 diabetes mellitus, untreated chronic hep C/cirrhosis.  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  Patient was being treated in [facility] with close follow-up with neurosurgery, Infectious Disease. Patient underwent lumbar laminectomy with I&D with Dr. Chang on 4/21. Hospital stay also was eventful for cholecystitis for which he underwent cholecystostomy tube placement.  Patient signed out AMA from [facility] on 05/05, went home, called ambulance and presented to the hospital.  Per chart review patient was being treated with daptomycin and rifampin in [facility].  While hospitalized his urine drug screen was positive for amphetamines and there were concerns that he was using it while hospitalized.  Patient was tachycardic in the ED.  Otherwise hemodynamics stable.  Complaining of bilateral lower extremity swelling which is interfering with movement.  Lab significant for leukocytosis, anemia, hyperglycemia.  UDS was positive for opiates, amphetamines.  Hospitalist team was consulted for admission. Dr. Rosa was consulted for evaluation and treatment recommendations regarding his recent lumbar surgery.    CT lumbar spine with and without contrast 05/06/2023:  FINDINGS:   Patient is status post wide decompression laminectomies from L1 to L5.  There are extensive  posterior paraspinal soft inflammations and fluid collection containing several air locules.  The fluid collection with peripheral thin enhancement broadly abuts the thecal sac is from the superior endplate of L2 the inferior endplate of L5 with craniocaudal span of 8.5 cm and shows maximum anterior-posterior diameter of 2.5 cm and transverse diameter of 3.8 cm.  There is also left intrathecal small air locule on image 83 series 3.  These findings may represent postsurgical seroma or hematoma without exclusion of infected collection.  There are no lumbar vertebrae disc spaces irregularity or osteolytic destructive changes.  Several Schmorl nodes are seen involving lumbar vertebrae and the most conspicuous is of L3.  No acute fracture or malalignment.  Disc segmental analysis is given below:     At L1-L2, disc height is preserved.  Bilateral facet arthropathy without significant central canal stenosis.  There are no narrowings of the neural foramen.     At L2-L3, there is osteophyte disc complex which indents the ventral thecal sac.  Thecal sac is patent with decompression laminectomies.  There is mild narrowing of the right neural foramen caused by uncinate arthropathy and there is moderate narrowing of the left neural foramen.     At L3-L4, there is broad osteophyte disc complex which indents the ventral thecal sac.  Right paracentral spur like growth from vertebral body effaces the right lateral recess with compression upon the right exiting nerve root.  There also bilateral moderate spondylotic narrowings of the neural foramen.     At L4-L5, there is broad disc bulge which indents the ventral thecal sac.  Central canal is not stenosed with decompression laminectomies.  There is bilateral uncovertebral and facet arthropathy which encroaches onto the neural foramen.  There is mild narrowing of the right neural foramen and moderate narrowing of the left neural canal.     At L5-S1, there is broad posterior vertebral  spondylosis.  Central canal is not stenosed.  There are no significant narrowings of the neural foramen    Impression:       1. Multiple posterior lumbar decompression laminectomies.  Posterior paraspinal fluid collection.  This may represent postsurgical seroma or hematoma without exclusion of infected collection.     2. Lumbar degenerative disc disease and spondylosis level by level discussed above.          Post-Op Info:  Procedure(s) (LRB):  DEBRIDEMENT, WOUND, SACRUM (N/A)   3 Days Post-Op      Medications:  Continuous Infusions:   lactated ringers 75 mL/hr at 05/12/23 1835     Scheduled Meds:   collagenase   Topical (Top) BID    DAPTOmycin (CUBICIN) IV (PEDS and ADULTS)  6 mg/kg Intravenous Q24H    enoxaparin  40 mg Subcutaneous Daily    insulin detemir U-100  12 Units Subcutaneous QHS    Lactobacillus acidophilus  1 capsule Oral TID WM    multivitamin  1 tablet Oral Daily    piperacillin-tazobactam (ZOSYN) IVPB  4.5 g Intravenous Q8H    rifAMpin  300 mg Oral Q12H    sodium chloride 0.9%  10 mL Intravenous Q6H    sodium,potassium,mag sulfates  1 Bottle Oral BID    thiamine  100 mg Oral Daily    zinc oxide-cod liver oil   Topical (Top) BID     PRN Meds:sodium chloride, sodium chloride, sodium chloride, sodium chloride, sodium chloride, sodium chloride, aluminum-magnesium hydroxide-simethicone, dextrose 10%, dextrose 10%, glucagon (human recombinant), glucose, glucose, HYDROcodone-acetaminophen, HYDROmorphone, HYDROmorphone, insulin aspart U-100, melatonin, metoprolol, ondansetron, ondansetron, polyethylene glycol, prochlorperazine, prochlorperazine, senna-docusate 8.6-50 mg, sodium chloride 0.9%, Flushing PICC Protocol **AND** sodium chloride 0.9% **AND** sodium chloride 0.9%, tiZANidine     Review of Systems  Objective:     Weight: 72.6 kg (160 lb)  Body mass index is 25.06 kg/m².  Vital Signs (Most Recent):  Temp: 97.4 °F (36.3 °C) (05/13/23 0830)  Pulse: 102 (05/13/23 0830)  Resp: 18 (05/13/23 0830)  BP:  116/70 (05/13/23 0830)  SpO2: 95 % (05/13/23 0830) Vital Signs (24h Range):  Temp:  [97.4 °F (36.3 °C)-98.5 °F (36.9 °C)] 97.4 °F (36.3 °C)  Pulse:  [101-174] 102  Resp:  [16-40] 18  SpO2:  [93 %-96 %] 95 %  BP: (108-141)/(70-88) 116/70                              Drain/Device    Right lower flank (Active)   Insertion Site clean and dry 05/07/23 2054   Drainage Characteristics/Odor Brown 05/07/23 2054   Drainage Amount Large 05/07/23 2054   General Output (mL) 80 05/08/23 0623       Neurosurgery Physical Exam  AFVSS  PERRLA bilateral.  No focal neurological deficits.  Full strength bilateral UE  LE weakness unchanged from previous  Serous drainage from lumbar incision.  Sacral wound unchanged    Significant Labs:  Recent Labs   Lab 05/11/23  1621 05/12/23  0431 05/13/23  0820   * 138 136   K 3.5 3.7 3.5   CO2 30 26 27   BUN 25.6 28.6* 32.5*   CREATININE 1.78* 1.98* 2.32*   CALCIUM 6.9* 6.6* 6.6*   MG  --  1.90 1.90       Recent Labs   Lab 05/11/23  1621 05/12/23  0431 05/12/23  2249 05/13/23  0820   WBC 13.08* 11.34  --  13.54*   HGB 8.3* 6.7* 5.9* 8.3*   HCT 26.1* 22.0* 18.1* 24.6*    193  --  196       No results for input(s): LABPT, INR, APTT in the last 48 hours.    Microbiology Results (last 7 days)       Procedure Component Value Units Date/Time    Blood Culture [[id number]]  (Normal) Collected: 05/09/23 2104    Order Status: Completed Specimen: Blood Updated: 05/12/23 2200     CULTURE, BLOOD ([facility]) No Growth At 72 Hours    Urine culture [[id number]] Collected: 05/12/23 1048    Order Status: Resulted Specimen: Urine Updated: 05/12/23 1159    Blood culture #1 **CANNOT BE ORDERED STAT** [[id number]]  (Abnormal)  (Susceptibility) Collected: 05/05/23 2109    Order Status: Completed Specimen: Blood Updated: 05/10/23 0706     CULTURE, BLOOD ([facility]) Methicillin resistant Staphylococcus aureus     GRAM STAIN Gram Positive Cocci, probable Staphylococcus      Seen in gram stain of broth only      2 of 2 bottles  positive    Blood culture #2 **CANNOT BE ORDERED STAT** [[id number]]  (Abnormal)  (Susceptibility) Collected: 05/05/23 2109    Order Status: Completed Specimen: Blood Updated: 05/09/23 0716     CULTURE, BLOOD ([facility]) Methicillin resistant Staphylococcus aureus     GRAM STAIN Gram Positive Cocci, probable Staphylococcus      Seen in gram stain of broth only      1 of 2 Anaerobic bottles positive    Urine culture [[id number]]  (Abnormal) Collected: 05/06/23 0605    Order Status: Completed Specimen: Urine Updated: 05/08/23 0910     Urine Culture 10,000 - 25,000 colonies/ml Candida albicans    BCID2 Panel [[id number]]  (Abnormal) Collected: 05/05/23 2109    Order Status: Completed Specimen: Blood Updated: 05/07/23 0142     CTX-M (ESBL ) N/A     Comment: Note: Antimicrobial resistance can occur via multiple mechanisms. A Not Detected result for antimicrobial resistance gene(s) does not indicate antimicrobial susceptibility. Subculturing is required for species identification and susceptibility testing of   isolates.        IMP (Cabapenemase ) N/A     Comment: Note: Antimicrobial resistance can occur via multiple mechanisms. A Not Detected result for antimicrobial resistance gene(s) does not indicate antimicrobial susceptibility. Subculturing is required for species identification and susceptibility testing of   isolates.        KPC resistance gene (Carbapenemase ) N/A     Comment: Note: Antimicrobial resistance can occur via multiple mechanisms. A Not Detected result for antimicrobial resistance gene(s) does not indicate antimicrobial susceptibility. Subculturing is required for species identification and susceptibility testing of   isolates.        mcr-1 N/A     Comment: Note: Antimicrobial resistance can occur via multiple mechanisms. A Not Detected result for antimicrobial resistance gene(s) does not indicate antimicrobial susceptibility. Subculturing is required for species identification and  susceptibility testing of   isolates.        mecA ID N/A     Comment: Note: Antimicrobial resistance can occur via multiple mechanisms. A Not Detected result for antimicrobial resistance gene(s) does not indicate antimicrobial susceptibility. Subculturing is required for species identification and susceptibility testing of   isolates.        mecA/C and MREJ (MRSA) gene Detected     Comment: Note: Antimicrobial resistance can occur via multiple mechanisms. A Not Detected result for antimicrobial resistance gene(s) does not indicate antimicrobial susceptibility. Subculturing is required for species identification and susceptibility testing of   isolates.        NDM (Carbapenemase ) N/A     Comment: Note: Antimicrobial resistance can occur via multiple mechanisms. A Not Detected result for antimicrobial resistance gene(s) does not indicate antimicrobial susceptibility. Subculturing is required for species identification and susceptibility testing of   isolates.        OXA-48-like (Carbapenemase ) N/A     Comment: Note: Antimicrobial resistance can occur via multiple mechanisms. A Not Detected result for antimicrobial resistance gene(s) does not indicate antimicrobial susceptibility. Subculturing is required for species identification and susceptibility testing of   isolates.        Kenia/B (VRE gene) N/A     Comment: Note: Antimicrobial resistance can occur via multiple mechanisms. A Not Detected result for antimicrobial resistance gene(s) does not indicate antimicrobial susceptibility. Subculturing is required for species identification and susceptibility testing of   isolates.        VIM (Carbapenemase ) N/A     Comment: Note: Antimicrobial resistance can occur via multiple mechanisms. A Not Detected result for antimicrobial resistance gene(s) does not indicate antimicrobial susceptibility. Subculturing is required for species identification and susceptibility testing of   isolates.         Enterococcus faecalis Not Detected     Enterococcus faecium Not Detected     Listeria monocytogenes Not Detected     Staphylococcus spp. Detected     Staphylococcus aureus Detected     Staphylococcus epidermidis Not Detected     Staphylococcus lugdunensis Not Detected     Streptococcus spp. Not Detected     Streptococcus agalactiae (Group B) Not Detected     Streptococcus pneumoniae Not Detected     Streptococcus pyogenes (Group A) Not Detected     Acinetobacter calcoaceticus/baumannii complex Not Detected     Bacteroides fragilis Not Detected     Enterobacterales Not Detected     Enterobacter cloacae complex Not Detected     Escherichia coli Not Detected     Klebsiella aerogenes Not Detected     Klebsiella oxytoca Not Detected     Klebsiella pneumoniae group Not Detected     Proteus spp. Not Detected     Salmonella spp. Not Detected     Serratia marcescens Not Detected     Haemophilus influenzae Not Detected     Neisseria meningitidis Not Detected     Pseudomonas aeruginosa Not Detected     Stenotrophomonas maltophilia Not Detected     Candida albicans Not Detected     Candida auris Not Detected     Candida glabrata Not Detected     Candida krusei Not Detected     Candida parapsilosis Not Detected     Candida tropicalis Not Detected     Cryptococcus neoformans/gattii Not Detected    Narrative:      The [employer] BCID2 Panel is a multiplexed nucleic acid test intended for the use with [employer]® 2.0 or Acamica® [employer] for the simultaneous qualitative detection and identification of multiple bacterial and yeast nucleic acids and select genetic determinants associated with antimicrobial resistance.  The [employer] BCID2 Panel test is performed directly on blood culture samples identified as positive by a continuous monitoring blood culture system.  Results are intended to be interpreted in conjunction with Gram stain results.            Significant Diagnostics:      Assessment/Plan:    Recent  MRSA bacteremia   Recent MRSA cervical, lumbar spine osteomyelitis with bilateral psoas, paraspinal muscle abscess as well as lumbar epidural abscess status post drainage, laminectomy  Recent cholecystitis status post cholecystostomy tube placement   Sepsis secondary to all the above   Medical noncompliance   Polysubstance abuse/IV drug abuse   Decompensated cirrhosis with volume overload   Untreated chronic hep C   Type 2 diabetes mellitus      Patient with SVT and hematochezia receiving blood.  Colonoscopy this weekend.  Await MRI -patient acuity at this time will not safely allow for this.  Surgical debridement performed yesterday 05/10/2023.  Infectious disease with recommendations for daptomycin, Zosyn, rifampin.  PTOT  Pain control  SCDs     Continue supportive measures   Further recommendations once MRI complete.         Active Diagnoses:    Diagnosis Date Noted POA    PRINCIPAL PROBLEM:  Sepsis [A41.9] 05/05/2023 Yes    Staphylococcus aureus bacteremia [R78.81, B95.61] 05/05/2023 Unknown      Problems Resolved During this Admission:         LUIS Moore-BC  Neurosurgery  [facility] - 8th Floor Med Surg

## 2023-05-13 NOTE — PROGRESS NOTES
Nephrology Initial Consult Note    Patient Name: Reji Plasencia  Age: 63 y.o.  : 1960  MRN: [id number]  Admission Date: 2023    Reason for Consult:      RUPERTO  Self, Aaareferral    HPI:     Reji Plasencia is a 63 y.o. male with MRSA bacteremia.  Past medical history significant for polysubstance abuse, IV drug use, diabetes mellitus, untreated hepatitis-C, and cirrhosis.  He was recently hospitalized at [facility] for MRSA bacteremia due to C and L-spine osteomyelitis/diskitis, paraspinous muscle abscess, and lumbar epidural abscess.  He also had a cholecystostomy drain placed for cholecystitis.  Patient left A on 2023 and then presented to [facility] on 2023.  Blood cultures at admission were positive for MRSA.  UDS was also positive for amphetamines and opiates.  Neurosurgery and Infectious Disease have been following for bacteremia and spinal osteomyelitis/diskitis.  He has been getting daptomycin, rifampin, and Zosyn per ID recommendations.  At admission his renal function was normal with creatinine of 0.7.  Starting 05/10/2023 his renal function started to worsen with creatinine 1.0->1.7->1.9 at time of Nephrology consultation.  Labs also concerning with worsening anemia and hypoalbuminemia.    Since admission he has developed a petechial rash on the dorsum of his distal arms and legs.    23- transfused 3u PRBCs. Had some SVT.    23- Moderate UOP overnight. H&H improved to 8.3/24.6 today. Renal indices worsened today. Bleeding scan positive for GI bleed, likely at ascending colon. Colonoscopy pending GI prep. Patient is resting comfortably in bed on RA. He denies SOB, CP, N/V, or LE edema.      Current Facility-Administered Medications   Medication Dose Route Frequency Provider Last Rate Last Admin    0.9%  NaCl infusion (for blood administration)   Intravenous Q24H PRN Guerrero Alfaro MD        0.9%  NaCl infusion (for blood administration)   Intravenous  Q24H PRN Fred Dominguez,    New Bag at 05/12/23 1640    0.9%  NaCl infusion (for blood administration)   Intravenous Q24H PRN LUIS Morris-BC        0.9%  NaCl infusion (for blood administration)   Intravenous Q24H PRN Guerrero Alfaro MD        0.9%  NaCl infusion (for blood administration)   Intravenous Q24H PRN Guerrero Alfaro MD        0.9%  NaCl infusion (for blood administration)   Intravenous Q24H PRN Guerrero Alfaro MD        aluminum-magnesium hydroxide-simethicone 200-200-20 mg/5 mL suspension 30 mL  30 mL Oral QID PRN Nery Sepulveda MD        collagenase ointment   Topical (Top) BID Reggie Castañeda MD   Given at 05/13/23 1233    DAPTOmycin (CUBICIN) 435 mg in sodium chloride 0.9% SolP 50 mL IVPB  6 mg/kg Intravenous Q24H Mica Naidu MD   Stopped at 05/12/23 1905    dextrose 10% bolus 125 mL 125 mL  12.5 g Intravenous PRN LUIS Lackey-BC        dextrose 10% bolus 250 mL 250 mL  25 g Intravenous PRN LUIS Lackey-BC        enoxaparin injection 40 mg  40 mg Subcutaneous Daily Nery Sepulveda MD   40 mg at 05/12/23 1638    glucagon (human recombinant) injection 1 mg  1 mg Intramuscular PRN LAURA Lackey        glucose chewable tablet 16 g  16 g Oral PRN Mica Naidu MD        glucose chewable tablet 24 g  24 g Oral PRN Mica Naidu MD   24 g at 05/08/23 0500    HYDROcodone-acetaminophen 7.5-325 mg per tablet 1 tablet  1 tablet Oral Q4H PRN Darnell Franco MD   1 tablet at 05/13/23 0228    HYDROmorphone (PF) injection 0.4 mg  0.4 mg Intravenous Q5 Min PRN Kylah Ortiz MD   0.4 mg at 05/10/23 1610    HYDROmorphone (PF) injection 0.5 mg  0.5 mg Intravenous Q4H PRN Guerrero Alfaro MD   0.5 mg at 05/13/23 1233    insulin aspart U-100 injection 1-10 Units  1-10 Units Subcutaneous QID (AC + HS) LUIS Womack-BC   2 Units at 05/12/23 2246    insulin detemir U-100 injection 12 Units  12 Units Subcutaneous QHS Darnell Franco MD   12 Units at 05/12/23 2050     lactated ringers infusion   Intravenous Continuous Guerrero Alfaro MD 75 mL/hr at 05/12/23 1835 New Bag at 05/12/23 1835    Lactobacillus acidophilus capsule 1 capsule  1 capsule Oral TID WM Nery Sepulveda MD   1 capsule at 05/13/23 1233    melatonin tablet 6 mg  6 mg Oral Nightly PRN Nery Sepulveda MD        metoprolol injection 5 mg  5 mg Intravenous Q5 Min PRN MARYJO MorrisCNP-BC   5 mg at 05/12/23 2008    multivitamin tablet  1 tablet Oral Daily Nery Sepulveda MD   1 tablet at 05/13/23 1233    ondansetron injection 4 mg  4 mg Intravenous Q4H PRN Nery Sepulveda MD   4 mg at 05/06/23 0434    ondansetron injection 4 mg  4 mg Intravenous Daily PRN Kylah Ortiz MD        piperacillin-tazobactam (ZOSYN) 4.5 g in dextrose 5 % in water (D5W) 5 % 100 mL IVPB (MB+)  4.5 g Intravenous Q8H Mica Naidu MD 25 mL/hr at 05/13/23 1233 4.5 g at 05/13/23 1233    polyethylene glycol packet 17 g  17 g Oral BID PRN Nery Sepulveda MD        prochlorperazine injection Soln 5 mg  5 mg Intravenous Q6H PRN Nery Sepuvleda MD        prochlorperazine injection Soln 5 mg  5 mg Intravenous Q30 Min PRN Kylah Ortiz MD        rifAMpin capsule 300 mg  300 mg Oral Q12H Mica Naidu MD   300 mg at 05/13/23 1233    senna-docusate 8.6-50 mg per tablet 2 tablet  2 tablet Oral BID PRN Nery Sepulveda MD        sodium chloride 0.9% flush 10 mL  10 mL Intravenous PRN Nery Sepulveda MD        sodium chloride 0.9% flush 10 mL  10 mL Intravenous Q6H Darnell Franco MD   10 mL at 05/13/23 1234    And    sodium chloride 0.9% flush 10 mL  10 mL Intravenous PRN Darnell Franco MD        sodium,potassium,mag sulfates 17.5-3.13-1.6 gram SolR 177 mL  1 Bottle Oral BID Shaneka Ayers MD   177 mL at 05/13/23 1250    thiamine tablet 100 mg  100 mg Oral Daily Darnell Franco MD   100 mg at 05/13/23 1233    tiZANidine tablet 4 mg  4 mg Oral Q8H PRN Nery Sepulveda MD        zinc oxide-cod liver oil 40 % paste   Topical (Top) BID Reggie Castañeda MD   Given at 05/13/23 1233        Primary Doctor No    No past medical history on file.   Past Surgical History:   Procedure Laterality Date    DEBRIDEMENT OF SACRAL WOUND N/A 5/10/2023    Procedure: DEBRIDEMENT, WOUND, SACRUM;  Surgeon: Reggie Castañeda MD;  Location: [facility];  Service: General;  Laterality: N/A;      No family history on file.  Social History     Tobacco Use    Smoking status: Not on file    Smokeless tobacco: Not on file   Substance Use Topics    Alcohol use: Not on file     Medications Prior to Admission   Medication Sig Dispense Refill Last Dose    HYDROcodone-acetaminophen (NORCO)  mg per tablet Take 1 tablet by mouth every 6 (six) hours as needed for Pain (severe pain only). 12 tablet 0      Review of patient's allergies indicates:  No Known Allergies         Review of Systems:     Comprehensive 10pt ROS negative except as noted per HPI.      Objective:       VITAL SIGNS: 24 HR MIN & MAX LAST    Temp  Min: 97.4 °F (36.3 °C)  Max: 98.5 °F (36.9 °C)  98.1 °F (36.7 °C)        BP  Min: 108/73  Max: 141/88  130/78     Pulse  Min: 101  Max: 174  (!) 114     Resp  Min: 18  Max: 40  20    SpO2  Min: 93 %  Max: 96 %  (!) 93 %      GEN:  Ill-appearing WM in NAD  HEENT: Conjunctiva anicteric   CV: RRR +S1,S2 without murmur  PULM: CTAB, unlabored  ABD: Soft, rounded, NT/ND abdomen  EXT:  No LE edema  SKIN:  Petechial rash over dorsum of feet and upper extremity to elbows  PSYCH: Awake, alert and appropriately conversant.   Vascular access:  No hemodialysis access            Component Value Date/Time     05/13/2023 0820     05/12/2023 0431    K 3.5 05/13/2023 0820    K 3.7 05/12/2023 0431    CHLORIDE 98 05/13/2023 0820    CHLORIDE 98 05/12/2023 0431    CO2 27 05/13/2023 0820    CO2 26 05/12/2023 0431    BUN 32.5 (H) 05/13/2023 0820    BUN 28.6 (H) 05/12/2023 0431    CREATININE 2.32 (H) 05/13/2023 0820    CREATININE 1.98 (H) 05/12/2023 0431    CALCIUM 6.6 (LL) 05/13/2023 0820    CALCIUM 6.6 (LL) 05/12/2023 0431     PHOS 4.5 05/13/2023 0820            Component Value Date/Time    WBC 13.54 (H) 05/13/2023 0820    WBC 11.34 05/12/2023 0431    HGB 8.3 (L) 05/13/2023 0820    HGB 5.9 (LL) 05/12/2023 2249    HCT 24.6 (L) 05/13/2023 0820    HCT 18.1 (LL) 05/12/2023 2249     05/13/2023 0820     05/12/2023 0431             NM GI Bleed Scan (Tagged RBC)   Final Result   Abnormal      Accumulation of radiotracer at the colon at 24 hours compatible with bleed.  Activity is most intense at the ascending colon.      This report was flagged in Epic as abnormal.         Electronically signed by: Tiffany Bashir   Date:    05/13/2023   Time:    10:31      US Retroperitoneal Limited   Final Result      1. Right kidney not visualized.   2. Visualized upper portion of the left kidney demonstrates no gross abnormality.   3. Of note the recent prior CT demonstrated no significant abnormality of the kidneys.         Electronically signed by: Brooks Mccain   Date:    05/12/2023   Time:    08:08      X-Ray Chest 1 View for Line/Tube Placement   Final Result      Optimal placement of the PICC line.         Electronically signed by: Roland Pierce   Date:    05/10/2023   Time:    08:55      X-Ray Chest 1 View   Final Result      As above.         Electronically signed by: Roland Pierce   Date:    05/06/2023   Time:    18:22      CT Lumbar Spine W Wo Contrast   Final Result      1. Multiple posterior lumbar decompression laminectomies.  Posterior paraspinal fluid collection.  This may represent postsurgical seroma or hematoma without exclusion of infected collection.      2. Lumbar degenerative disc disease and spondylosis level by level discussed above.         Electronically signed by: Roland Pierce   Date:    05/06/2023   Time:    13:31      US Abdomen Limited   Final Result      1.  Hepatic lobular contour and coarsened parenchyma.      2.  Gallbladder could not be visualized with a cholecystostomy tube in place.      3.  Small ascites and  right pleural effusion.         Electronically signed by: Roland Pierce   Date:    05/06/2023   Time:    11:31          Assessment / Plan:       Active Hospital Problems    Diagnosis  POA    *Sepsis [A41.9]  Yes    Staphylococcus aureus bacteremia [R78.81, B95.61]  Unknown      Resolved Hospital Problems   No resolved problems to display.         Acute kidney injury - baseline creatinine 0.7  Anemia-- hematochezia, GI following  Hypoalbuminemia  MRSA bacteremia - on daptomycin rifampin, and Zosyn  Polysubstance abuse  Multiple infectious sources - osteomyelitis, diskitis, cholecystitis, epidural abscess. S/p surgical debridement 5/10/23  Diabetes Mellitus    Plan:  Unsure of cause of acute kidney injury at this time.  Differential is fairly broad including prerenal injury, ATN, AIN, and possible TTP.    Renal indices worsened today, this is likely r/t significant anemia requiring 3 u PRBCs transfusion.  UPCR with 2.2 gm protein.  [id number] is pending.  May need to consider Hematology consult. Increase lactated Ringer's to 125 mL/hr. With acute kidney injury and hypoalbuminemia, Zosyn would not be the preferred drug, but will defer this decision to ID.  No acute indication for hemodialysis at this time. Labs in AM.

## 2023-05-14 ENCOUNTER — ANESTHESIA (OUTPATIENT)
Dept: SURGERY | Facility: HOSPITAL | Age: 63
DRG: 853 | End: 2023-05-14
Payer: MEDICAID

## 2023-05-14 ENCOUNTER — ANESTHESIA EVENT (OUTPATIENT)
Dept: SURGERY | Facility: HOSPITAL | Age: 63
DRG: 853 | End: 2023-05-14
Payer: MEDICAID

## 2023-05-14 LAB
ABO + RH BLD: NORMAL
ABO + RH BLD: NORMAL
ALBUMIN SERPL-MCNC: 1.2 G/DL (ref 3.4–4.8)
ALBUMIN/GLOB SERPL: 0.3 RATIO (ref 1.1–2)
ALP SERPL-CCNC: 88 UNIT/L (ref 40–150)
ALT SERPL-CCNC: 12 UNIT/L (ref 0–55)
AST SERPL-CCNC: 26 UNIT/L (ref 5–34)
BACTERIA BLD CULT: NORMAL
BASOPHILS # BLD AUTO: 0.09 X10(3)/MCL
BASOPHILS NFR BLD AUTO: 0.5 %
BILIRUBIN DIRECT+TOT PNL SERPL-MCNC: 0.9 MG/DL
BLD PROD TYP BPU: NORMAL
BLD PROD TYP BPU: NORMAL
BLOOD UNIT EXPIRATION DATE: NORMAL
BLOOD UNIT EXPIRATION DATE: NORMAL
BLOOD UNIT TYPE CODE: 6200
BLOOD UNIT TYPE CODE: 8400
BUN SERPL-MCNC: 30.8 MG/DL (ref 8.4–25.7)
CALCIUM SERPL-MCNC: 6.8 MG/DL (ref 8.8–10)
CHLORIDE SERPL-SCNC: 101 MMOL/L (ref 98–107)
CO2 SERPL-SCNC: 26 MMOL/L (ref 23–31)
CREAT SERPL-MCNC: 2.53 MG/DL (ref 0.73–1.18)
CROSSMATCH INTERPRETATION: NORMAL
CROSSMATCH INTERPRETATION: NORMAL
DISPENSE STATUS: NORMAL
DISPENSE STATUS: NORMAL
EOSINOPHIL # BLD AUTO: 0.33 X10(3)/MCL (ref 0–0.9)
EOSINOPHIL NFR BLD AUTO: 2 %
ERYTHROCYTE [DISTWIDTH] IN BLOOD BY AUTOMATED COUNT: 16.9 % (ref 11.5–17)
GFR SERPLBLD CREATININE-BSD FMLA CKD-EPI: 28 MLS/MIN/1.73/M2
GLOBULIN SER-MCNC: 3.5 GM/DL (ref 2.4–3.5)
GLUCOSE SERPL-MCNC: 124 MG/DL (ref 82–115)
HCT VFR BLD AUTO: 23.8 % (ref 42–52)
HCT VFR BLD AUTO: 24.1 % (ref 42–52)
HGB BLD-MCNC: 7.9 G/DL (ref 14–18)
HGB BLD-MCNC: 8.1 G/DL (ref 14–18)
IMM GRANULOCYTES # BLD AUTO: 0.15 X10(3)/MCL (ref 0–0.04)
IMM GRANULOCYTES NFR BLD AUTO: 0.9 %
LYMPHOCYTES # BLD AUTO: 2.18 X10(3)/MCL (ref 0.6–4.6)
LYMPHOCYTES NFR BLD AUTO: 12.9 %
MCH RBC QN AUTO: 29.8 PG (ref 27–31)
MCHC RBC AUTO-ENTMCNC: 33.2 G/DL (ref 33–36)
MCV RBC AUTO: 89.8 FL (ref 80–94)
MONOCYTES # BLD AUTO: 0.7 X10(3)/MCL (ref 0.1–1.3)
MONOCYTES NFR BLD AUTO: 4.2 %
NEUTROPHILS # BLD AUTO: 13.4 X10(3)/MCL (ref 2.1–9.2)
NEUTROPHILS NFR BLD AUTO: 79.5 %
NRBC BLD AUTO-RTO: 0 %
PLATELET # BLD AUTO: 202 X10(3)/MCL (ref 130–400)
PMV BLD AUTO: 9.8 FL (ref 7.4–10.4)
POCT GLUCOSE: 113 MG/DL (ref 70–110)
POCT GLUCOSE: 162 MG/DL (ref 70–110)
POCT GLUCOSE: 204 MG/DL (ref 70–110)
POCT GLUCOSE: 85 MG/DL (ref 70–110)
POTASSIUM SERPL-SCNC: 3.8 MMOL/L (ref 3.5–5.1)
PROT SERPL-MCNC: 4.7 GM/DL (ref 5.8–7.6)
RBC # BLD AUTO: 2.65 X10(6)/MCL (ref 4.7–6.1)
SODIUM SERPL-SCNC: 139 MMOL/L (ref 136–145)
UNIT NUMBER: NORMAL
UNIT NUMBER: NORMAL
WBC # SPEC AUTO: 16.85 X10(3)/MCL (ref 4.5–11.5)

## 2023-05-14 PROCEDURE — 25000003 PHARM REV CODE 250: Performed by: STUDENT IN AN ORGANIZED HEALTH CARE EDUCATION/TRAINING PROGRAM

## 2023-05-14 PROCEDURE — 85014 HEMATOCRIT: CPT | Performed by: INTERNAL MEDICINE

## 2023-05-14 PROCEDURE — 63600175 PHARM REV CODE 636 W HCPCS: Performed by: INTERNAL MEDICINE

## 2023-05-14 PROCEDURE — 25000242 PHARM REV CODE 250 ALT 637 W/ HCPCS: Performed by: STUDENT IN AN ORGANIZED HEALTH CARE EDUCATION/TRAINING PROGRAM

## 2023-05-14 PROCEDURE — P9016 RBC LEUKOCYTES REDUCED: HCPCS | Performed by: STUDENT IN AN ORGANIZED HEALTH CARE EDUCATION/TRAINING PROGRAM

## 2023-05-14 PROCEDURE — 63600175 PHARM REV CODE 636 W HCPCS

## 2023-05-14 PROCEDURE — 85025 COMPLETE CBC W/AUTO DIFF WBC: CPT

## 2023-05-14 PROCEDURE — 63600175 PHARM REV CODE 636 W HCPCS: Performed by: STUDENT IN AN ORGANIZED HEALTH CARE EDUCATION/TRAINING PROGRAM

## 2023-05-14 PROCEDURE — 36430 TRANSFUSION BLD/BLD COMPNT: CPT

## 2023-05-14 PROCEDURE — D9220A PRA ANESTHESIA: Mod: CRNA,,, | Performed by: NURSE ANESTHETIST, CERTIFIED REGISTERED

## 2023-05-14 PROCEDURE — 21400001 HC TELEMETRY ROOM

## 2023-05-14 PROCEDURE — 80053 COMPREHEN METABOLIC PANEL: CPT

## 2023-05-14 PROCEDURE — D9220A PRA ANESTHESIA: ICD-10-PCS | Mod: CRNA,,, | Performed by: NURSE ANESTHETIST, CERTIFIED REGISTERED

## 2023-05-14 PROCEDURE — 27000207 HC ISOLATION

## 2023-05-14 PROCEDURE — D9220A PRA ANESTHESIA: Mod: ANES,,, | Performed by: ANESTHESIOLOGY

## 2023-05-14 PROCEDURE — 25000003 PHARM REV CODE 250: Performed by: NURSE ANESTHETIST, CERTIFIED REGISTERED

## 2023-05-14 PROCEDURE — D9220A PRA ANESTHESIA: ICD-10-PCS | Mod: ANES,,, | Performed by: ANESTHESIOLOGY

## 2023-05-14 PROCEDURE — 94761 N-INVAS EAR/PLS OXIMETRY MLT: CPT

## 2023-05-14 PROCEDURE — 63600175 PHARM REV CODE 636 W HCPCS: Performed by: NURSE ANESTHETIST, CERTIFIED REGISTERED

## 2023-05-14 PROCEDURE — 25000003 PHARM REV CODE 250: Performed by: INTERNAL MEDICINE

## 2023-05-14 PROCEDURE — 37000008 HC ANESTHESIA 1ST 15 MINUTES: Performed by: INTERNAL MEDICINE

## 2023-05-14 PROCEDURE — A4216 STERILE WATER/SALINE, 10 ML: HCPCS | Performed by: STUDENT IN AN ORGANIZED HEALTH CARE EDUCATION/TRAINING PROGRAM

## 2023-05-14 PROCEDURE — 63600175 PHARM REV CODE 636 W HCPCS: Performed by: NURSE PRACTITIONER

## 2023-05-14 PROCEDURE — 45378 DIAGNOSTIC COLONOSCOPY: CPT | Performed by: INTERNAL MEDICINE

## 2023-05-14 PROCEDURE — 94640 AIRWAY INHALATION TREATMENT: CPT

## 2023-05-14 PROCEDURE — 27000221 HC OXYGEN, UP TO 24 HOURS

## 2023-05-14 PROCEDURE — 37000009 HC ANESTHESIA EA ADD 15 MINS: Performed by: INTERNAL MEDICINE

## 2023-05-14 PROCEDURE — 99900031 HC PATIENT EDUCATION (STAT)

## 2023-05-14 PROCEDURE — P9035 PLATELET PHERES LEUKOREDUCED: HCPCS | Performed by: STUDENT IN AN ORGANIZED HEALTH CARE EDUCATION/TRAINING PROGRAM

## 2023-05-14 RX ORDER — HYDROMORPHONE HYDROCHLORIDE 2 MG/ML
0.5 INJECTION, SOLUTION INTRAMUSCULAR; INTRAVENOUS; SUBCUTANEOUS ONCE
Status: COMPLETED | OUTPATIENT
Start: 2023-05-14 | End: 2023-05-14

## 2023-05-14 RX ORDER — LIDOCAINE HYDROCHLORIDE 20 MG/ML
INJECTION, SOLUTION EPIDURAL; INFILTRATION; INTRACAUDAL; PERINEURAL
Status: DISCONTINUED | OUTPATIENT
Start: 2023-05-14 | End: 2023-05-14

## 2023-05-14 RX ORDER — DIPHENHYDRAMINE HYDROCHLORIDE 50 MG/ML
25 INJECTION INTRAMUSCULAR; INTRAVENOUS EVERY 6 HOURS PRN
Status: CANCELLED | OUTPATIENT
Start: 2023-05-14

## 2023-05-14 RX ORDER — ACETAMINOPHEN 10 MG/ML
1000 INJECTION, SOLUTION INTRAVENOUS ONCE
Status: CANCELLED | OUTPATIENT
Start: 2023-05-14 | End: 2023-05-14

## 2023-05-14 RX ORDER — FLUCONAZOLE 200 MG/1
200 TABLET ORAL ONCE
Status: COMPLETED | OUTPATIENT
Start: 2023-05-14 | End: 2023-05-14

## 2023-05-14 RX ORDER — SODIUM CHLORIDE, SODIUM GLUCONATE, SODIUM ACETATE, POTASSIUM CHLORIDE AND MAGNESIUM CHLORIDE 30; 37; 368; 526; 502 MG/100ML; MG/100ML; MG/100ML; MG/100ML; MG/100ML
INJECTION, SOLUTION INTRAVENOUS CONTINUOUS
Status: CANCELLED | OUTPATIENT
Start: 2023-05-14 | End: 2023-06-13

## 2023-05-14 RX ORDER — IPRATROPIUM BROMIDE AND ALBUTEROL SULFATE 2.5; .5 MG/3ML; MG/3ML
3 SOLUTION RESPIRATORY (INHALATION) EVERY 6 HOURS
Status: DISCONTINUED | OUTPATIENT
Start: 2023-05-14 | End: 2023-05-15

## 2023-05-14 RX ORDER — PROPOFOL 10 MG/ML
VIAL (ML) INTRAVENOUS
Status: DISCONTINUED | OUTPATIENT
Start: 2023-05-14 | End: 2023-05-14

## 2023-05-14 RX ORDER — CALCIUM CHLORIDE INJECTION 100 MG/ML
INJECTION, SOLUTION INTRAVENOUS
Status: DISCONTINUED | OUTPATIENT
Start: 2023-05-14 | End: 2023-05-14

## 2023-05-14 RX ORDER — ONDANSETRON 2 MG/ML
4 INJECTION INTRAMUSCULAR; INTRAVENOUS DAILY PRN
Status: CANCELLED | OUTPATIENT
Start: 2023-05-14

## 2023-05-14 RX ORDER — MIDAZOLAM HYDROCHLORIDE 1 MG/ML
2 INJECTION INTRAMUSCULAR; INTRAVENOUS ONCE AS NEEDED
Status: CANCELLED | OUTPATIENT
Start: 2023-05-14 | End: 2034-10-10

## 2023-05-14 RX ORDER — LIDOCAINE HYDROCHLORIDE 10 MG/ML
1 INJECTION, SOLUTION EPIDURAL; INFILTRATION; INTRACAUDAL; PERINEURAL ONCE
Status: CANCELLED | OUTPATIENT
Start: 2023-05-14 | End: 2023-05-14

## 2023-05-14 RX ORDER — HYDROMORPHONE HYDROCHLORIDE 2 MG/ML
0.2 INJECTION, SOLUTION INTRAMUSCULAR; INTRAVENOUS; SUBCUTANEOUS EVERY 5 MIN PRN
Status: CANCELLED | OUTPATIENT
Start: 2023-05-14

## 2023-05-14 RX ADMIN — SODIUM CHLORIDE, PRESERVATIVE FREE 10 ML: 5 INJECTION INTRAVENOUS at 07:05

## 2023-05-14 RX ADMIN — Medication 1 CAPSULE: at 12:05

## 2023-05-14 RX ADMIN — HYDROCODONE BITARTRATE AND ACETAMINOPHEN 1 TABLET: 7.5; 325 TABLET ORAL at 05:05

## 2023-05-14 RX ADMIN — COLLAGENASE SANTYL: 250 OINTMENT TOPICAL at 09:05

## 2023-05-14 RX ADMIN — HYDROMORPHONE HYDROCHLORIDE 0.5 MG: 2 INJECTION, SOLUTION INTRAMUSCULAR; INTRAVENOUS; SUBCUTANEOUS at 02:05

## 2023-05-14 RX ADMIN — Medication 1 CAPSULE: at 04:05

## 2023-05-14 RX ADMIN — PROPOFOL 50 MG: 10 INJECTION, EMULSION INTRAVENOUS at 11:05

## 2023-05-14 RX ADMIN — SODIUM CHLORIDE, POTASSIUM CHLORIDE, SODIUM LACTATE AND CALCIUM CHLORIDE: 600; 310; 30; 20 INJECTION, SOLUTION INTRAVENOUS at 07:05

## 2023-05-14 RX ADMIN — RIFAMPIN 300 MG: 300 CAPSULE ORAL at 12:05

## 2023-05-14 RX ADMIN — INSULIN DETEMIR 12 UNITS: 100 INJECTION, SOLUTION SUBCUTANEOUS at 09:05

## 2023-05-14 RX ADMIN — IPRATROPIUM BROMIDE AND ALBUTEROL SULFATE 3 ML: 2.5; .5 SOLUTION RESPIRATORY (INHALATION) at 08:05

## 2023-05-14 RX ADMIN — HYDROMORPHONE HYDROCHLORIDE 0.5 MG: 2 INJECTION, SOLUTION INTRAMUSCULAR; INTRAVENOUS; SUBCUTANEOUS at 04:05

## 2023-05-14 RX ADMIN — HYDROCODONE BITARTRATE AND ACETAMINOPHEN 1 TABLET: 7.5; 325 TABLET ORAL at 12:05

## 2023-05-14 RX ADMIN — PIPERACILLIN AND TAZOBACTAM 4.5 G: 4; .5 INJECTION, POWDER, LYOPHILIZED, FOR SOLUTION INTRAVENOUS; PARENTERAL at 09:05

## 2023-05-14 RX ADMIN — LIDOCAINE HYDROCHLORIDE 80 MG: 20 INJECTION, SOLUTION EPIDURAL; INFILTRATION; INTRACAUDAL; PERINEURAL at 11:05

## 2023-05-14 RX ADMIN — SODIUM CHLORIDE, PRESERVATIVE FREE 10 ML: 5 INJECTION INTRAVENOUS at 12:05

## 2023-05-14 RX ADMIN — IPRATROPIUM BROMIDE AND ALBUTEROL SULFATE 3 ML: 2.5; .5 SOLUTION RESPIRATORY (INHALATION) at 02:05

## 2023-05-14 RX ADMIN — FLUCONAZOLE 200 MG: 200 TABLET ORAL at 04:05

## 2023-05-14 RX ADMIN — PROPOFOL 50 MG: 10 INJECTION, EMULSION INTRAVENOUS at 10:05

## 2023-05-14 RX ADMIN — CALCIUM CHLORIDE INJECTION 500 MG: 100 INJECTION, SOLUTION INTRAVENOUS at 11:05

## 2023-05-14 RX ADMIN — INSULIN ASPART 1 UNITS: 100 INJECTION, SOLUTION INTRAVENOUS; SUBCUTANEOUS at 09:05

## 2023-05-14 RX ADMIN — THIAMINE HCL TAB 100 MG 100 MG: 100 TAB at 12:05

## 2023-05-14 RX ADMIN — THERA TABS 1 TABLET: TAB at 12:05

## 2023-05-14 RX ADMIN — HYDROCODONE BITARTRATE AND ACETAMINOPHEN 1 TABLET: 7.5; 325 TABLET ORAL at 07:05

## 2023-05-14 RX ADMIN — SODIUM CHLORIDE, PRESERVATIVE FREE 10 ML: 5 INJECTION INTRAVENOUS at 05:05

## 2023-05-14 RX ADMIN — SODIUM CHLORIDE, POTASSIUM CHLORIDE, SODIUM LACTATE AND CALCIUM CHLORIDE: 600; 310; 30; 20 INJECTION, SOLUTION INTRAVENOUS at 09:05

## 2023-05-14 RX ADMIN — CALCIUM CHLORIDE INJECTION 250 MG: 100 INJECTION, SOLUTION INTRAVENOUS at 11:05

## 2023-05-14 RX ADMIN — SODIUM CHLORIDE, SODIUM GLUCONATE, SODIUM ACETATE, POTASSIUM CHLORIDE AND MAGNESIUM CHLORIDE: 526; 502; 368; 37; 30 INJECTION, SOLUTION INTRAVENOUS at 10:05

## 2023-05-14 RX ADMIN — HYDROMORPHONE HYDROCHLORIDE 0.5 MG: 2 INJECTION, SOLUTION INTRAMUSCULAR; INTRAVENOUS; SUBCUTANEOUS at 08:05

## 2023-05-14 RX ADMIN — INSULIN ASPART 4 UNITS: 100 INJECTION, SOLUTION INTRAVENOUS; SUBCUTANEOUS at 05:05

## 2023-05-14 RX ADMIN — Medication: at 09:05

## 2023-05-14 RX ADMIN — RIFAMPIN 300 MG: 300 CAPSULE ORAL at 09:05

## 2023-05-14 RX ADMIN — PIPERACILLIN AND TAZOBACTAM 4.5 G: 4; .5 INJECTION, POWDER, LYOPHILIZED, FOR SOLUTION INTRAVENOUS; PARENTERAL at 01:05

## 2023-05-14 NOTE — PROCEDURES
Reji Plasencia   MRN: [id number]   ADMISSION DATE: 2023  : 1960  AGE: 63 y.o.    PROCEDURE:  Colonoscopy     DATE OF PROCEDURE:  2023     SURGEON: BUCK EASTMAN    PREOPERATIVE DIAGNOSIS:  Acute blood loss anemia.  History of hematochezia    POSTOPERATIVE DIAGNOSIS:  1.  Pancolonic diverticula.  No evidence of active bleed.  2. Hyperemic mucosa, anorectal region, partly circumferential.  No evidence of active bleeding.  Otherwise normal exam.  Preparation was fair.    HISTORY AND PHYSICAL:  As per preoperative note.      DESCRIPTION OF PROCEDURE:  Informed consent was obtained.  Patient was placed in left lateral position.  Sedation per anesthesia service.  Rectal exam was unremarkable.  Olympus video colonoscope was introduced into the rectum and was carefully advanced to the cecum.  Quality of the preparation was suboptimal requiring aggressive suctioning throughout the procedure especially in the right colon.  There was fair visualization after irrigation.   Patient tolerated the procedure well without any complications.    FINDINGS:  There were scattered diverticula noted present throughout the colon.  Photodocumentation was obtained.  There were areas of mucosal abrasions noted in the ascending colon with no definite stigmata of recent bleeding.  These seemed to be related to suctioning during the procedure for better visualization.  No therapeutic intervention was done.  Terminal ileum was intubated during procedure and appeared to be unremarkable.  No polyp or any other significant abnormality was noted in the colon throughout the extent of the exam to the extent of visualization.  There was hyperemic mucosa noted in the anorectal region which was partly circumferential.  No biopsies were obtained.  Photodocumentation was done.  Estimated withdrawal time from cecum to rectum was more than 10 minutes    ESTIMATED BLOOD LOSS:  None.    COMPLICATIONS:  None    RECOMMENDATIONS:  1. Clear liquid  diet for now.  2. Monitor post transfusion hemoglobin/hematocrit.  Also recommend 1 unit of FFP in view of multiple packed RBC transfusion.  3. Colorectal surgery evaluation to evaluate anorectal region for partly circumferential hyperemic mucosa.  This can be obtained tomorrow.

## 2023-05-14 NOTE — PLAN OF CARE
Problem: Adult Inpatient Plan of Care  Goal: Plan of Care Review  Outcome: Ongoing, Progressing  Goal: Patient-Specific Goal (Individualized)  Outcome: Ongoing, Progressing  Goal: Absence of Hospital-Acquired Illness or Injury  Outcome: Ongoing, Progressing  Goal: Optimal Comfort and Wellbeing  Outcome: Ongoing, Progressing  Goal: Readiness for Transition of Care  Outcome: Ongoing, Progressing     Problem: Skin Injury Risk Increased  Goal: Skin Health and Integrity  Outcome: Ongoing, Progressing     Problem: Adjustment to Illness (Sepsis/Septic Shock)  Goal: Optimal Coping  Outcome: Ongoing, Progressing     Problem: Bleeding (Sepsis/Septic Shock)  Goal: Absence of Bleeding  Outcome: Ongoing, Progressing     Problem: Glycemic Control Impaired (Sepsis/Septic Shock)  Goal: Blood Glucose Level Within Desired Range  Outcome: Ongoing, Progressing     Problem: Infection Progression (Sepsis/Septic Shock)  Goal: Absence of Infection Signs and Symptoms  Outcome: Ongoing, Progressing     Problem: Nutrition Impaired (Sepsis/Septic Shock)  Goal: Optimal Nutrition Intake  Outcome: Ongoing, Progressing     Problem: Impaired Wound Healing  Goal: Optimal Wound Healing  Outcome: Ongoing, Progressing     Problem: Fall Injury Risk  Goal: Absence of Fall and Fall-Related Injury  Outcome: Ongoing, Progressing     Problem: Infection  Goal: Absence of Infection Signs and Symptoms  Outcome: Ongoing, Progressing   PLAN OF CARE REVIEWED WITH PT.  PT AA+OX4.  ABLE TO MAKE NEEDS KNOWN.  DOES NOT APPEAR TO BE IN ANY DISTRESS.  C/O PAIN.  PAIN MEDICATION GIVEN AS ORDERED.  REQUIRES MODERATE ASSIST WITH ADLS.  FOLE CATHETER INTACT DRAINING URINE TO GRAVITY.  CABRAL CATHETER CARE PROVIDED.  WILL CONTINUE TO MONITOR.

## 2023-05-14 NOTE — PROGRESS NOTES
[facility] Hospital Medicine Progress Note        Chief Complaint: Inpatient Follow-up for MRSA Spinal Osteomyelitis    Subjective:  History:  62-year-old male with significant history of polysubstance abuse/IV drug abuse, type 2 diabetes mellitus, untreated chronic hep C/cirrhosis.  Patient had a recent hospitalization to [facility] for MRSA bacteremia with MRSA C and L-spine osteomyelitis, diskitis, bilateral psoas and paraspinous muscle abscess as well as lumbar epidural abscess.  Patient was being treated in [facility] with close follow-up with neurosurgery, Infectious Disease.  Hospital stay also was even full for cholecystitis for which he underwent cholecystostomy tube placement.  Patient signed out AMA from [facility] on 05/05, went home, called ambulance and presented to the hospital.  Per chart review patient was being treated with daptomycin and rifampin in [facility].  While hospitalized his urine drug screen was positive for amphetamines and there were concerns that he was using it while hospitalized.  Patient was tachycardic in the ED.  Otherwise hemodynamics stable.  Complained of bilateral lower extremity swelling. Labs significant for leukocytosis, anemia, hyperglycemia. UDS was positive for opiates, amphetamines.  Hospitalist team was consulted for admission. Restarted on IV Daptomycin, Rifampin, Zosyn. Echocardiogram on 5/6 showed normal LV systolic function, EF 50-55%, indeterminate diastolic function, mild RV enlargement, mild MR, PASP 19 mmHg with no mention of vegetations.  Blood cultures from 05/05 for positive for MRSA.  Repeat blood culture from 05/09 negative for 24 hours.  Patient awaiting MRI lumbar spine.  Noted to have LLE pain with B/L LE U/S showing fluid collection in left cough.  Awaiting CT L leg and thigh with contrast.  Also ordered CT chest with contrast to further evaluate right-sided oblong opacity on CXR which is  thought to be mass/possible abscess/fissural fluid collection. Underwent debridement for sacral ulcer on 05/10 with wound care being done. Neurosurgery to make further recommendations once MRI lumbar spine is done. Noted to have elevated Cr of 1.78; DC diuretics. Possibly d/t ATN from ABX; consulted Nephrology. Ordered US Retroperitoneum, UA. Cancelled CT chest, CT L thigh/leg due to RUPERTO. Nephrology on board; ordered [id number] given anemia, renal dysfunction, petechial rash to evaluate for TTP.  Patient noted to have hematochezia by nurse on 05/12; consulted GI.  HGB/HCT noted to drop to 6.7/22.0; transfused 2 units PRBCs. NM bleeding scan ordered by GI which was negative. Continued to have hematochezia on 05/13, started on bowel prep for colonoscopy by GI.    Today, Mr. Plasencia stated he was doing well & had no new complaints. Continues to have sacral pain for which he is on PRN Dilaudid & Norco. Underwent Colonoscopy which showed pancolonic diverticula without active bleeding, hyperemic mucosa and anorectal region without active bleeding. HB/HCT stable at 8.1/24.1. BUN/creatinine at 30.8/2.53.    General appearance: Thin, chronically ill-appearing  male in no apparent distress.  HENT: Atraumatic head. Moist mucous membranes of oral cavity.  Eyes: Normal extraocular movements.   Neck: Supple.   Lungs: Clear to auscultation bilaterally. Tachypnea   Heart: Regular rate and rhythm. S1 and S2 present. No pedal edema.  Abdomen: Soft, non-distended, non-tender. RUQ Drain  Extremities:  1+ pitting pedal edema   Skin: No Rash.   Neuro:  Moving upper extremity without any difficulty, patient reports decreased range of motion lower extremity secondary to swelling   Psych/mental status: Appropriate mood and affect. Responds appropriately to questions    A/P:  MRSA bacteremia   MRSA cervical, lumbar spine osteomyelitis with bilateral psoas, paraspinal muscle abscess as well as lumbar epidural abscess status post  drainage, laminectomy  Recent cholecystitis status post cholecystostomy tube placement   Sepsis secondary to above   RUPERTO 2/2 possible ATN d/t ABX vs TTP  Hematochezia  Tachycardia 2/2 Anemia  Polysubstance abuse/IV drug abuse   Decompensated cirrhosis with volume overload   Untreated chronic hep C   Type 2 diabetes mellitus with hyperglycemia  Acute Normocytic Anemia 2/2 TTP vs GI Bleed     Patient continues to be admitted for close monitoring   Continues to have sacral pain with PRN meds ordered  Underwent sacral ulcer debridement with surgery on 05/10   Underwent Colonoscopy today without evidence of active bleeding  Surgery on board; following recommendations   ID on board; following recommendations  Nephrology consulted for RUPERTO likely secondary to ATN  [id number] ordered by Nephrology to evaluate for TTP   GI consulted for hematochezia; following recs  Receiving bowel prep  Neurosurgery to make further recommendations once patient gets MRI L-spine  Patient continued on IV Daptomycin, IV Rifampin, IV Zosyn  Patient has cholecystostomy tube in place   Echocardiogram negative for vegetations; will defer to ID whether patient needs CRISTY  Venous ultrasound bilateral lower extremity negative for DVT  However showed 9 x 4 x 2 cm fluid collection in the left proximal through mid calf; will hold off on CT with contrast due to RUPERTO  DC Lasix and Aldactone due to RUPERTO  Levemir, sliding scale for diabetes mellitus  Continue monitoring patient's symptoms    DVT prophylaxis-Lovenox    VITAL SIGNS: 24 HRS MIN & MAX LAST   Temp  Min: 97.5 °F (36.4 °C)  Max: 98.6 °F (37 °C) 98 °F (36.7 °C)   BP  Min: 118/86  Max: 138/87 131/85   Pulse  Min: 91  Max: 118  105   Resp  Min: 16  Max: 27 18   SpO2  Min: 92 %  Max: 100 % 98 %       All diagnosis and differential diagnosis have been reviewed; assessment and plan has been documented; I have personally reviewed the labs and test results that are presently available; I have reviewed the  patients medication list; I have reviewed the consulting providers response and recommendations. I have reviewed or attempted to review medical records based upon their availability.       Guerrero Alfaro MD   05/14/2023

## 2023-05-14 NOTE — TRANSFER OF CARE
"Anesthesia Transfer of Care Note    Patient: Reji Plasencia    Procedure(s) Performed: Procedure(s) (LRB):  COLONOSCOPY (N/A)    Patient location: GI    Anesthesia Type: MAC    Transport from OR: Transported from OR on room air with adequate spontaneous ventilation    Post pain: adequate analgesia    Post assessment: no apparent anesthetic complications    Post vital signs: stable    Level of consciousness: awake    Nausea/Vomiting: no nausea/vomiting    Complications: none    Transfer of care protocol was followed      Last vitals:   Visit Vitals  /64   Pulse 101   Temp 37 °C (98.6 °F)   Resp 20   Ht 5' 7" (1.702 m)   Wt 72.6 kg (160 lb)   SpO2 100%   BMI 25.06 kg/m²     "

## 2023-05-14 NOTE — NURSING
PT HAD CRITICAL LAB VALUE CALCIUM 6.8.  ON CALL PROVIDER (LD) NOTIFIED BY THIS NURSE (RM).  NO NEW ORDERS GIVEN.  WILL CONTINUE TO MONITOR.

## 2023-05-14 NOTE — PROGRESS NOTES
Nephrology Initial Consult Note    Patient Name: Reji Plasencia  Age: 63 y.o.  : 1960  MRN: [id number]  Admission Date: 2023    Reason for Consult:      RUPERTO  Self, Aaareferral    HPI:     Reji Plasencia is a 63 y.o. male with MRSA bacteremia.  Past medical history significant for polysubstance abuse, IV drug use, diabetes mellitus, untreated hepatitis-C, and cirrhosis.  He was recently hospitalized at [facility] for MRSA bacteremia due to C and L-spine osteomyelitis/diskitis, paraspinous muscle abscess, and lumbar epidural abscess.  He also had a cholecystostomy drain placed for cholecystitis.  Patient left [facility] on 2023 and then presented to [facility] on 2023.  Blood cultures at admission were positive for MRSA.  UDS was also positive for amphetamines and opiates.  Neurosurgery and Infectious Disease have been following for bacteremia and spinal osteomyelitis/diskitis.  He has been getting daptomycin, rifampin, and Zosyn per ID recommendations.  At admission his renal function was normal with creatinine of 0.7.  Starting 05/10/2023 his renal function started to worsen with creatinine 1.0->1.7->1.9 at time of Nephrology consultation.  Labs also concerning with worsening anemia and hypoalbuminemia.    Since admission he has developed a petechial rash on the dorsum of his distal arms and legs.    23- transfused 3u PRBCs. Had some SVT.    23- Moderate UOP overnight. H&H improved to 8.3/24.6 today. Renal indices worsened today. Bleeding scan positive for GI bleed, likely at ascending colon. Colonoscopy pending GI prep. Patient is resting comfortably in bed on RA. He denies SOB, CP, N/V, or LE edema.    23- 0.9L of UOP overnight. Significant GI losses overnight with colonoscopy prep. Renal indices slightly worsened today. Received 1u PRBCs this morning and receiving FFP now. Denies SOB, CP, N/V. Diarrhea with s/p colonoscopy-- pancolonic diverticula, no evidence  of bleed; hyperemic mucosa; no active bleed. Colorectal surgery eval pending.    Current Facility-Administered Medications   Medication Dose Route Frequency Provider Last Rate Last Admin    0.9%  NaCl infusion (for blood administration)   Intravenous Q24H PRN Guerrero Alfaro MD        0.9%  NaCl infusion (for blood administration)   Intravenous Q24H PRN Fred Dominguez DO   New Bag at 05/12/23 1640    0.9%  NaCl infusion (for blood administration)   Intravenous Q24H PRN LUIS Morris-BC        0.9%  NaCl infusion (for blood administration)   Intravenous Q24H PRN Guerrero Alfaro MD        0.9%  NaCl infusion (for blood administration)   Intravenous Q24H PRN Guerrero Alfaro MD        0.9%  NaCl infusion (for blood administration)   Intravenous Q24H PRN Guerrero Alfaro MD        aluminum-magnesium hydroxide-simethicone 200-200-20 mg/5 mL suspension 30 mL  30 mL Oral QID PRN Nery Sepulveda MD        collagenase ointment   Topical (Top) BID Reggie Castañeda MD   Given at 05/14/23 0942    [START ON 5/15/2023] DAPTOmycin (CUBICIN) 435 mg in sodium chloride 0.9% SolP 50 mL IVPB  6 mg/kg Intravenous Q48H Darnell Franco MD        dextrose 10% bolus 125 mL 125 mL  12.5 g Intravenous PRN LUIS Lackey-BC        dextrose 10% bolus 250 mL 250 mL  25 g Intravenous PRN LUIS Lackey-BC        glucagon (human recombinant) injection 1 mg  1 mg Intramuscular PRN LAURA Lackey        glucose chewable tablet 16 g  16 g Oral PRN Mica Naidu MD        glucose chewable tablet 24 g  24 g Oral PRN Mica Naidu MD   24 g at 05/08/23 0500    HYDROcodone-acetaminophen 7.5-325 mg per tablet 1 tablet  1 tablet Oral Q4H PRN Darnell Franco MD   1 tablet at 05/14/23 0520    HYDROmorphone (PF) injection 0.4 mg  0.4 mg Intravenous Q5 Min PRN Kylah Ortiz MD   0.4 mg at 05/10/23 1610    HYDROmorphone (PF) injection 0.5 mg  0.5 mg Intravenous Q4H PRN Guerrero Alfaro MD   0.5 mg at 05/14/23 7582    insulin aspart  U-100 injection 1-10 Units  1-10 Units Subcutaneous QID (AC + HS) PRN Melissa Mohan AGACNP-BC   2 Units at 05/12/23 2246    insulin detemir U-100 injection 12 Units  12 Units Subcutaneous QHS Darnell Franco MD   12 Units at 05/12/23 2050    lactated ringers infusion   Intravenous Continuous Mariaelena Christianson  mL/hr at 05/14/23 0942 New Bag at 05/14/23 0942    Lactobacillus acidophilus capsule 1 capsule  1 capsule Oral TID WM Nery Sepulveda MD   1 capsule at 05/13/23 1749    melatonin tablet 6 mg  6 mg Oral Nightly PRN Nery Sepulveda MD        metoprolol injection 5 mg  5 mg Intravenous Q5 Min PRN Ambreen Hatfield AGACNP-BC   5 mg at 05/12/23 2008    multivitamin tablet  1 tablet Oral Daily Nery Sepulveda MD   1 tablet at 05/13/23 1233    ondansetron injection 4 mg  4 mg Intravenous Q4H PRN Nery Sepulveda MD   4 mg at 05/06/23 0434    ondansetron injection 4 mg  4 mg Intravenous Daily PRN Kylah Ortiz MD        piperacillin-tazobactam (ZOSYN) 4.5 g in dextrose 5 % in water (D5W) 5 % 100 mL IVPB (MB+)  4.5 g Intravenous Q8H Mica Naidu MD   Stopped at 05/14/23 0131    polyethylene glycol packet 17 g  17 g Oral BID PRN Nery Sepulveda MD        prochlorperazine injection Soln 5 mg  5 mg Intravenous Q6H PRN Nery Sepulveda MD        prochlorperazine injection Soln 5 mg  5 mg Intravenous Q30 Min PRN Kylah Ortiz MD        rifAMpin capsule 300 mg  300 mg Oral Q12H Mica Naidu MD   300 mg at 05/13/23 2131    senna-docusate 8.6-50 mg per tablet 2 tablet  2 tablet Oral BID PRN Nery Sepulveda MD        sodium chloride 0.9% flush 10 mL  10 mL Intravenous PRN Nery Sepulveda MD        sodium chloride 0.9% flush 10 mL  10 mL Intravenous Q6H Darnell Franco MD   10 mL at 05/14/23 0520    And    sodium chloride 0.9% flush 10 mL  10 mL Intravenous PRN Darnell Franco MD        thiamine tablet 100 mg  100 mg Oral Daily Darnell Franco MD   100 mg at 05/13/23 1233    tiZANidine tablet 4 mg  4 mg Oral Q8H PRN Nery Sepulveda MD        zinc  oxide-cod liver oil 40 % paste   Topical (Top) BID Reggie Castañeda MD   Given at 05/14/23 0942     Facility-Administered Medications Ordered in Other Encounters   Medication Dose Route Frequency Provider Last Rate Last Admin    calcium chloride 100 mg/mL (10 %) injection   Intravenous PRN Laci Yen, CRNA   500 mg at 05/14/23 1115    electrolyte-A infusion   Intravenous Continuous PRN Laci Yen, CRNA   New Bag at 05/14/23 1056    LIDOcaine (PF) 20 mg/mL (2%) injection   Intravenous PRN Laci Rushder, CRNA   80 mg at 05/14/23 1103    propofol (DIPRIVAN) 10 mg/mL infusion   Intravenous PRN Laci MATA Farshad, CRNA   50 mg at 05/14/23 1115       Primary Doctor No    History reviewed. No pertinent past medical history.   Past Surgical History:   Procedure Laterality Date    DEBRIDEMENT OF SACRAL WOUND N/A 5/10/2023    Procedure: DEBRIDEMENT, WOUND, SACRUM;  Surgeon: Reggie Castañeda MD;  Location: [facility];  Service: General;  Laterality: N/A;      History reviewed. No pertinent family history.  Social History     Tobacco Use    Smoking status: Not on file    Smokeless tobacco: Not on file   Substance Use Topics    Alcohol use: Not on file     Medications Prior to Admission   Medication Sig Dispense Refill Last Dose    HYDROcodone-acetaminophen (NORCO)  mg per tablet Take 1 tablet by mouth every 6 (six) hours as needed for Pain (severe pain only). 12 tablet 0      Review of patient's allergies indicates:  No Known Allergies         Review of Systems:     Comprehensive 10pt ROS negative except as noted per HPI.      Objective:       VITAL SIGNS: 24 HR MIN & MAX LAST    Temp  Min: 97.9 °F (36.6 °C)  Max: 98.6 °F (37 °C)  97.9 °F (36.6 °C)        BP  Min: 118/81  Max: 153/90  124/78     Pulse  Min: 102  Max: 121  102     Resp  Min: 16  Max: 20  20    SpO2  Min: 93 %  Max: 96 %  95 %      GEN:  Ill-appearing WM in NAD  HEENT: Conjunctiva anicteric   CV: RRR +S1,S2 without murmur  PULM: CTAB, unlabored, 2L NC  ABD: Soft,  rounded, NT/ND abdomen  EXT:  BLE edema 1+  SKIN:  Petechial rash over dorsum of feet and upper extremity to elbows  PSYCH: Awake, alert and appropriately conversant.   Vascular access:  No hemodialysis access            Component Value Date/Time     05/14/2023 0336     05/13/2023 0820    K 3.8 05/14/2023 0336    K 3.5 05/13/2023 0820    CHLORIDE 101 05/14/2023 0336    CHLORIDE 98 05/13/2023 0820    CO2 26 05/14/2023 0336    CO2 27 05/13/2023 0820    BUN 30.8 (H) 05/14/2023 0336    BUN 32.5 (H) 05/13/2023 0820    CREATININE 2.53 (H) 05/14/2023 0336    CREATININE 2.32 (H) 05/13/2023 0820    CALCIUM 6.8 (LL) 05/14/2023 0336    CALCIUM 6.6 (LL) 05/13/2023 0820    PHOS 4.5 05/13/2023 0820            Component Value Date/Time    WBC 16.85 (H) 05/14/2023 0336    WBC 13.54 (H) 05/13/2023 0820    HGB 8.1 (L) 05/14/2023 0336    HGB 7.9 (L) 05/14/2023 0336    HCT 24.1 (L) 05/14/2023 0336    HCT 23.8 (L) 05/14/2023 0336     05/14/2023 0336     05/13/2023 0820             NM GI Bleed Scan (Tagged RBC)   Final Result   Abnormal      Accumulation of radiotracer at the colon at 24 hours compatible with bleed.  Activity is most intense at the ascending colon.      This report was flagged in Epic as abnormal.         Electronically signed by: Tiffany Bashir   Date:    05/13/2023   Time:    10:31      US Retroperitoneal Limited   Final Result      1. Right kidney not visualized.   2. Visualized upper portion of the left kidney demonstrates no gross abnormality.   3. Of note the recent prior CT demonstrated no significant abnormality of the kidneys.         Electronically signed by: Brooks Mccain   Date:    05/12/2023   Time:    08:08      X-Ray Chest 1 View for Line/Tube Placement   Final Result      Optimal placement of the PICC line.         Electronically signed by: Roland Pierce   Date:    05/10/2023   Time:    08:55      X-Ray Chest 1 View   Final Result      As above.         Electronically signed  by: Roland Pierce   Date:    05/06/2023   Time:    18:22      CT Lumbar Spine W Wo Contrast   Final Result      1. Multiple posterior lumbar decompression laminectomies.  Posterior paraspinal fluid collection.  This may represent postsurgical seroma or hematoma without exclusion of infected collection.      2. Lumbar degenerative disc disease and spondylosis level by level discussed above.         Electronically signed by: Roland Pierce   Date:    05/06/2023   Time:    13:31      US Abdomen Limited   Final Result      1.  Hepatic lobular contour and coarsened parenchyma.      2.  Gallbladder could not be visualized with a cholecystostomy tube in place.      3.  Small ascites and right pleural effusion.         Electronically signed by: Roland Pierce   Date:    05/06/2023   Time:    11:31          Assessment / Plan:       Active Hospital Problems    Diagnosis  POA    *Sepsis [A41.9]  Yes    Staphylococcus aureus bacteremia [R78.81, B95.61]  Unknown      Resolved Hospital Problems   No resolved problems to display.         Acute kidney injury - baseline creatinine 0.7  Anemia-- hematochezia, GI following  Hypoalbuminemia  MRSA bacteremia - on daptomycin rifampin, and Zosyn  Polysubstance abuse  Multiple infectious sources - osteomyelitis, diskitis, cholecystitis, epidural abscess. S/p surgical debridement 5/10/23  Diabetes Mellitus    Plan:  Unsure of cause of acute kidney injury at this time.  Differential is fairly broad including prerenal injury, ATN, AIN, GN, and possible TTP. Given hypoalbuminemia and risk of AIN, would recommend changing antibiotics from Zosyn if possible.  Renal indices worsened today, this is likely r/t significant anemia requiring multiple transfusions.  [id number] is pending.  Patient is on clear liquids s/p coloscopy, encourage PO intake. Continue lactated Ringer's at 125 mL/hr. Continue strict I&Os.  Labs in AM.

## 2023-05-14 NOTE — ANESTHESIA PREPROCEDURE EVALUATION
"Reji Plasencia is a 63 y.o., male with multiple medical problems including sacral decub s/p recent debridement, and hematochezia/signs of GI bleed.  He is here for colonoscopy today.  Rectal tube in place currently.  He did well recenly under general anesthesia.    "Subjective:  History:  62-year-old male with significant history of polysubstance abuse/IV drug abuse, type 2 diabetes mellitus, untreated chronic hep C/cirrhosis.  Patient had a recent hospitalization to [facility] for MRSA bacteremia with MRSA C and L-spine osteomyelitis, diskitis, bilateral psoas and paraspinous muscle abscess as well as lumbar epidural abscess.  Patient was being treated in [facility] with close follow-up with neurosurgery, Infectious Disease.  Hospital stay also was even full for cholecystitis for which he underwent cholecystostomy tube placement.  Patient signed out AMA from [facility] on 05/05, went home, called ambulance and presented to the hospital.  Per chart review patient was being treated with daptomycin and rifampin in [facility].  While hospitalized his urine drug screen was positive for amphetamines and there were concerns that he was using it while hospitalized.  Patient was tachycardic in the ED.  Otherwise hemodynamics stable.  Complained of bilateral lower extremity swelling. Labs significant for leukocytosis, anemia, hyperglycemia. UDS was positive for opiates, amphetamines.  Hospitalist team was consulted for admission. Restarted on IV Daptomycin, Rifampin, Zosyn. Echocardiogram on 5/6 showed normal LV systolic function, EF 50-55%, indeterminate diastolic function, mild RV enlargement, mild MR, PASP 19 mmHg with no mention of vegetations.  Blood cultures from 05/05 for positive for MRSA.  Repeat blood culture from 05/09 negative for 24 hours.  Patient awaiting MRI lumbar spine.  Noted to have LLE pain with B/L LE U/S showing fluid collection in left cough.  Awaiting CT L leg and thigh " "with contrast.  Also ordered CT chest with contrast to further evaluate right-sided oblong opacity on CXR which is thought to be mass/possible abscess/fissural fluid collection. Underwent debridement for sacral ulcer on 05/10 with wound care being done. Neurosurgery to make further recommendations once MRI lumbar spine is done. Noted to have elevated Cr of 1.78; DC diuretics. Possibly d/t ATN from ABX; consulted Nephrology. Ordered US Retroperitoneum, UA. Cancelled CT chest, CT L thigh/leg due to RUPERTO. Nephrology on board; ordered [id number] given anemia, renal dysfunction, petechial rash to evaluate for TTP.  Patient noted to have hematochezia by nurse on 05/12; consulted GI.  HGB/HCT noted to drop to 6.7/22.0; transfused 2 units PRBCs.  Kept NPO after midnight for possible scope.  NM bleeding scan ordered by GI.     Today, Mr. Plasencia was seen at bedside.  He stated he was doing well but complained of ongoing sacral pain.  Continued to have hematochezia overnight and received another unit of PRBCs.  Started on bowel prep by GI for colonoscopy tomorrow morning.  Will repeat HGB/HCT q.6 hours and transfuse as needed.  Will also order platelets and FFP to be kept ahead as patient will have received 4 units of PRBCs. BUN/creatinine worsened to 32.5/2.32 today."    "   CM ICD-9-CM     1. Spinal abscess  M46.20 730.08   2. Tachycardia  R00.0 785.0   3. Chest pain  R07.9 786.50   4. Endocarditis  I38 424.90   5. Bacteremia  R78.81 790.7   6. Swelling  R60.9 782.3   7. Wound of sacral region, initial encounter  S31.000A 959.19   8. Atrial fibrillation  I48.91 427.31   9. Sepsis  A41.9 038.9       995.91         ASSESSMENT & PLAN:    63 year old make known to us in the outpatient setting only with PMHx polysubstance abuse/IV drug abuse, type 2 diabetes mellitus, untreated chronic hep C/cirrhosis who presented to [facility] after leaving AMA from [facility] for MRSA Bacteremia now s/p edison tube for cholecystis and sacral wound " "debridement.    GI consulted for dark stools.      1. MRSA Bacteremia   -MRSA cervical, lumbar spine osteomyelitis with bilateral psoas, paraspinal muscle abscess as well as lumbar epidural abscess s/p drainage, laminectomy  2. Dark stools  -  hemoglobin trend since arrival  8.7--8.4--8.8--8.7--8.3--6.7 today with plans to transfuse 2u PRBC today   - NM Bleeding scan ordered to localize bleed. Patient ate this morning and therefore unable to proceed with endoscopy   - NPO at MN for tentative scope pending results of scan   3. Sacral Decubitus wound  - s/p debridement 05/10  3. S/p edison tube placement secondary to cholecystitis   4. RUPERTO  5. Polysubstance abuse   6. Untreated chronic Hep C     5/13/23  Hemodynamically stable.  Colonoscopy preparation in process.  For colonoscopy tomorrow morning.  Serial hemoglobin/hematocrit q.6 hours as specified earlier.  If hemoglobin less than 8, transfuse 1 unit of packed RBC.  Systolic blood pressure around 150.  Bleeding scan with activity in the right colon"          Pre-op Assessment    I have reviewed the Patient Summary Reports.     I have reviewed the Nursing Notes. I have reviewed the NPO Status.   I have reviewed the Medications.     Review of Systems  Anesthesia Hx:  Denies Family Hx of Anesthesia complications.   Denies Personal Hx of Anesthesia complications.   Cardiovascular:  Cardiovascular Normal     Pulmonary:  Pulmonary Normal        Physical Exam  General: Well nourished, Cooperative, Alert and Oriented  Appears chronically ill and debilitated, thin and wasted appearance  Airway:  Mallampati: II   Mouth Opening: Normal  TM Distance: Normal  Tongue: Normal  Neck ROM: Normal ROM    Chest/Lungs:  Clear to auscultation, Normal Respiratory Rate    Heart:  Rate: Normal  Rhythm: Regular Rhythm        Anesthesia Plan  Type of Anesthesia, risks & benefits discussed:    Anesthesia Type: Gen Natural Airway  Intra-op Monitoring Plan: Standard ASA Monitors  Post Op Pain " Control Plan: multimodal analgesia  Induction:  IV  Informed Consent: Informed consent signed with the Patient and all parties understand the risks and agree with anesthesia plan.  All questions answered. Patient consented to blood products? No  ASA Score: 4  Day of Surgery Review of History & Physical: H&P Update referred to the surgeon/provider.  Anesthesia Plan Notes: Premedication with Midazolam  Technique: GETA   PONV Prophylaxis   PACU Postop       Ready For Surgery From Anesthesia Perspective.     .

## 2023-05-15 LAB
ALLENS TEST BLOOD GAS (OHS): YES
ANION GAP SERPL CALC-SCNC: 13 MEQ/L
BACTERIA UR CULT: ABNORMAL
BASE EXCESS BLD CALC-SCNC: 1.3 MMOL/L (ref -2–2)
BASOPHILS # BLD AUTO: 0.08 X10(3)/MCL
BASOPHILS NFR BLD AUTO: 0.5 %
BLOOD GAS SAMPLE TYPE (OHS): ABNORMAL
BUN SERPL-MCNC: 27.7 MG/DL (ref 8.4–25.7)
CA-I BLD-SCNC: 1.02 MMOL/L (ref 1.12–1.23)
CALCIUM SERPL-MCNC: 7 MG/DL (ref 8.8–10)
CHLORIDE SERPL-SCNC: 100 MMOL/L (ref 98–107)
CO2 BLDA-SCNC: 27.1 MMOL/L
CO2 SERPL-SCNC: 24 MMOL/L (ref 23–31)
COHGB MFR BLDA: 2.5 % (ref 0.5–1.5)
CREAT SERPL-MCNC: 2.36 MG/DL (ref 0.73–1.18)
CREAT/UREA NIT SERPL: 12
DRAWN BY BLOOD GAS (OHS): ABNORMAL
EOSINOPHIL # BLD AUTO: 0.24 X10(3)/MCL (ref 0–0.9)
EOSINOPHIL NFR BLD AUTO: 1.4 %
ERYTHROCYTE [DISTWIDTH] IN BLOOD BY AUTOMATED COUNT: 15.9 % (ref 11.5–17)
GFR SERPLBLD CREATININE-BSD FMLA CKD-EPI: 31 MLS/MIN/1.73/M2
GLUCOSE SERPL-MCNC: 55 MG/DL (ref 82–115)
HCO3 BLDA-SCNC: 25.9 MMOL/L (ref 22–26)
HCT VFR BLD AUTO: 27.1 % (ref 42–52)
HGB BLD-MCNC: 9 G/DL (ref 14–18)
IMM GRANULOCYTES # BLD AUTO: 0.21 X10(3)/MCL (ref 0–0.04)
IMM GRANULOCYTES NFR BLD AUTO: 1.2 %
LPM (OHS): 10
LYMPHOCYTES # BLD AUTO: 1.49 X10(3)/MCL (ref 0.6–4.6)
LYMPHOCYTES NFR BLD AUTO: 8.5 %
MAGNESIUM SERPL-MCNC: 1.7 MG/DL (ref 1.6–2.6)
MCH RBC QN AUTO: 29.9 PG (ref 27–31)
MCHC RBC AUTO-ENTMCNC: 33.2 G/DL (ref 33–36)
MCV RBC AUTO: 90 FL (ref 80–94)
METHGB MFR BLDA: 0.7 % (ref 0.4–1.5)
MONOCYTES # BLD AUTO: 0.67 X10(3)/MCL (ref 0.1–1.3)
MONOCYTES NFR BLD AUTO: 3.8 %
NEUTROPHILS # BLD AUTO: 14.86 X10(3)/MCL (ref 2.1–9.2)
NEUTROPHILS NFR BLD AUTO: 84.6 %
NRBC BLD AUTO-RTO: 0 %
O2 HB BLOOD GAS (OHS): 93.7 % (ref 94–97)
OXYGEN DEVICE BLOOD GAS (OHS): ABNORMAL
OXYHGB MFR BLDA: 11.1 G/DL (ref 12–16)
PCO2 BLDA: 40 MMHG (ref 35–45)
PH BLDA: 7.42 [PH] (ref 7.35–7.45)
PLATELET # BLD AUTO: 238 X10(3)/MCL (ref 130–400)
PMV BLD AUTO: 10.2 FL (ref 7.4–10.4)
PO2 BLDA: 72 MMHG (ref 80–100)
POCT GLUCOSE: 118 MG/DL (ref 70–110)
POCT GLUCOSE: 124 MG/DL (ref 70–110)
POCT GLUCOSE: 47 MG/DL (ref 70–110)
POCT GLUCOSE: 71 MG/DL (ref 70–110)
POCT GLUCOSE: 81 MG/DL (ref 70–110)
POTASSIUM BLOOD GAS (OHS): 3.9 MMOL/L (ref 3.5–5)
POTASSIUM SERPL-SCNC: 3.5 MMOL/L (ref 3.5–5.1)
PSYCHE PATHOLOGY RESULT: NORMAL
RBC # BLD AUTO: 3.01 X10(6)/MCL (ref 4.7–6.1)
SAMPLE SITE BLOOD GAS (OHS): ABNORMAL
SAO2 % BLDA: 94.6 %
SODIUM BLOOD GAS (OHS): 131 MMOL/L (ref 137–145)
SODIUM SERPL-SCNC: 137 MMOL/L (ref 136–145)
WBC # SPEC AUTO: 17.55 X10(3)/MCL (ref 4.5–11.5)

## 2023-05-15 PROCEDURE — 25000003 PHARM REV CODE 250: Performed by: INTERNAL MEDICINE

## 2023-05-15 PROCEDURE — 27000221 HC OXYGEN, UP TO 24 HOURS

## 2023-05-15 PROCEDURE — 36600 WITHDRAWAL OF ARTERIAL BLOOD: CPT

## 2023-05-15 PROCEDURE — 63600175 PHARM REV CODE 636 W HCPCS: Performed by: NURSE PRACTITIONER

## 2023-05-15 PROCEDURE — 80048 BASIC METABOLIC PNL TOTAL CA: CPT

## 2023-05-15 PROCEDURE — 63600175 PHARM REV CODE 636 W HCPCS

## 2023-05-15 PROCEDURE — 99900031 HC PATIENT EDUCATION (STAT)

## 2023-05-15 PROCEDURE — 27000207 HC ISOLATION

## 2023-05-15 PROCEDURE — 99900035 HC TECH TIME PER 15 MIN (STAT)

## 2023-05-15 PROCEDURE — 63600175 PHARM REV CODE 636 W HCPCS: Performed by: INTERNAL MEDICINE

## 2023-05-15 PROCEDURE — 85025 COMPLETE CBC W/AUTO DIFF WBC: CPT

## 2023-05-15 PROCEDURE — 21400001 HC TELEMETRY ROOM

## 2023-05-15 PROCEDURE — 25000003 PHARM REV CODE 250: Performed by: STUDENT IN AN ORGANIZED HEALTH CARE EDUCATION/TRAINING PROGRAM

## 2023-05-15 PROCEDURE — A4216 STERILE WATER/SALINE, 10 ML: HCPCS | Performed by: STUDENT IN AN ORGANIZED HEALTH CARE EDUCATION/TRAINING PROGRAM

## 2023-05-15 PROCEDURE — 94761 N-INVAS EAR/PLS OXIMETRY MLT: CPT

## 2023-05-15 PROCEDURE — 63600175 PHARM REV CODE 636 W HCPCS: Performed by: STUDENT IN AN ORGANIZED HEALTH CARE EDUCATION/TRAINING PROGRAM

## 2023-05-15 PROCEDURE — 83735 ASSAY OF MAGNESIUM: CPT | Performed by: STUDENT IN AN ORGANIZED HEALTH CARE EDUCATION/TRAINING PROGRAM

## 2023-05-15 PROCEDURE — 94640 AIRWAY INHALATION TREATMENT: CPT

## 2023-05-15 PROCEDURE — 93005 ELECTROCARDIOGRAM TRACING: CPT

## 2023-05-15 PROCEDURE — 25000003 PHARM REV CODE 250: Performed by: NURSE PRACTITIONER

## 2023-05-15 PROCEDURE — 25000242 PHARM REV CODE 250 ALT 637 W/ HCPCS: Performed by: STUDENT IN AN ORGANIZED HEALTH CARE EDUCATION/TRAINING PROGRAM

## 2023-05-15 PROCEDURE — 82803 BLOOD GASES ANY COMBINATION: CPT

## 2023-05-15 RX ORDER — OXYCODONE AND ACETAMINOPHEN 5; 325 MG/1; MG/1
1 TABLET ORAL EVERY 6 HOURS PRN
Status: DISCONTINUED | OUTPATIENT
Start: 2023-05-15 | End: 2023-05-22

## 2023-05-15 RX ORDER — FUROSEMIDE 10 MG/ML
40 INJECTION INTRAMUSCULAR; INTRAVENOUS ONCE
Status: COMPLETED | OUTPATIENT
Start: 2023-05-15 | End: 2023-05-15

## 2023-05-15 RX ORDER — LEVALBUTEROL INHALATION SOLUTION 0.63 MG/3ML
0.63 SOLUTION RESPIRATORY (INHALATION) EVERY 6 HOURS
Status: DISCONTINUED | OUTPATIENT
Start: 2023-05-16 | End: 2023-05-16

## 2023-05-15 RX ORDER — LOPERAMIDE HYDROCHLORIDE 2 MG/1
2 CAPSULE ORAL 4 TIMES DAILY PRN
Status: DISCONTINUED | OUTPATIENT
Start: 2023-05-15 | End: 2023-05-18

## 2023-05-15 RX ADMIN — IPRATROPIUM BROMIDE AND ALBUTEROL SULFATE 3 ML: 2.5; .5 SOLUTION RESPIRATORY (INHALATION) at 08:05

## 2023-05-15 RX ADMIN — HYDROCODONE BITARTRATE AND ACETAMINOPHEN 1 TABLET: 7.5; 325 TABLET ORAL at 12:05

## 2023-05-15 RX ADMIN — Medication: at 10:05

## 2023-05-15 RX ADMIN — HYDROCODONE BITARTRATE AND ACETAMINOPHEN 1 TABLET: 7.5; 325 TABLET ORAL at 09:05

## 2023-05-15 RX ADMIN — RIFAMPIN 300 MG: 300 CAPSULE ORAL at 10:05

## 2023-05-15 RX ADMIN — Medication 1 CAPSULE: at 09:05

## 2023-05-15 RX ADMIN — HYDROCODONE BITARTRATE AND ACETAMINOPHEN 1 TABLET: 7.5; 325 TABLET ORAL at 05:05

## 2023-05-15 RX ADMIN — OXYCODONE HYDROCHLORIDE AND ACETAMINOPHEN 1 TABLET: 5; 325 TABLET ORAL at 10:05

## 2023-05-15 RX ADMIN — Medication: at 09:05

## 2023-05-15 RX ADMIN — RIFAMPIN 300 MG: 300 CAPSULE ORAL at 09:05

## 2023-05-15 RX ADMIN — FUROSEMIDE 40 MG: 10 INJECTION, SOLUTION INTRAMUSCULAR; INTRAVENOUS at 08:05

## 2023-05-15 RX ADMIN — Medication 1 CAPSULE: at 12:05

## 2023-05-15 RX ADMIN — INSULIN DETEMIR 12 UNITS: 100 INJECTION, SOLUTION SUBCUTANEOUS at 10:05

## 2023-05-15 RX ADMIN — SODIUM CHLORIDE, POTASSIUM CHLORIDE, SODIUM LACTATE AND CALCIUM CHLORIDE: 600; 310; 30; 20 INJECTION, SOLUTION INTRAVENOUS at 01:05

## 2023-05-15 RX ADMIN — METOPROLOL TARTRATE 5 MG: 1 INJECTION, SOLUTION INTRAVENOUS at 10:05

## 2023-05-15 RX ADMIN — PIPERACILLIN AND TAZOBACTAM 4.5 G: 4; .5 INJECTION, POWDER, LYOPHILIZED, FOR SOLUTION INTRAVENOUS; PARENTERAL at 05:05

## 2023-05-15 RX ADMIN — THERA TABS 1 TABLET: TAB at 09:05

## 2023-05-15 RX ADMIN — DAPTOMYCIN 435 MG: 500 INJECTION, POWDER, LYOPHILIZED, FOR SOLUTION INTRAVENOUS at 06:05

## 2023-05-15 RX ADMIN — SODIUM CHLORIDE, PRESERVATIVE FREE 10 ML: 5 INJECTION INTRAVENOUS at 05:05

## 2023-05-15 RX ADMIN — SODIUM CHLORIDE, PRESERVATIVE FREE 10 ML: 5 INJECTION INTRAVENOUS at 12:05

## 2023-05-15 RX ADMIN — THIAMINE HCL TAB 100 MG 100 MG: 100 TAB at 09:05

## 2023-05-15 RX ADMIN — HYDROMORPHONE HYDROCHLORIDE 0.5 MG: 2 INJECTION, SOLUTION INTRAMUSCULAR; INTRAVENOUS; SUBCUTANEOUS at 12:05

## 2023-05-15 RX ADMIN — SODIUM CHLORIDE, POTASSIUM CHLORIDE, SODIUM LACTATE AND CALCIUM CHLORIDE: 600; 310; 30; 20 INJECTION, SOLUTION INTRAVENOUS at 12:05

## 2023-05-15 RX ADMIN — IPRATROPIUM BROMIDE AND ALBUTEROL SULFATE 3 ML: 2.5; .5 SOLUTION RESPIRATORY (INHALATION) at 04:05

## 2023-05-15 RX ADMIN — LOPERAMIDE HYDROCHLORIDE 2 MG: 2 CAPSULE ORAL at 10:05

## 2023-05-15 RX ADMIN — COLLAGENASE SANTYL: 250 OINTMENT TOPICAL at 10:05

## 2023-05-15 RX ADMIN — COLLAGENASE SANTYL: 250 OINTMENT TOPICAL at 09:05

## 2023-05-15 RX ADMIN — LOPERAMIDE HYDROCHLORIDE 2 MG: 2 CAPSULE ORAL at 06:05

## 2023-05-15 RX ADMIN — IPRATROPIUM BROMIDE AND ALBUTEROL SULFATE 3 ML: 2.5; .5 SOLUTION RESPIRATORY (INHALATION) at 12:05

## 2023-05-15 RX ADMIN — Medication 1 CAPSULE: at 06:05

## 2023-05-15 RX ADMIN — SODIUM CHLORIDE, PRESERVATIVE FREE 10 ML: 5 INJECTION INTRAVENOUS at 06:05

## 2023-05-15 RX ADMIN — OXYCODONE HYDROCHLORIDE AND ACETAMINOPHEN 1 TABLET: 5; 325 TABLET ORAL at 04:05

## 2023-05-15 RX ADMIN — SODIUM CHLORIDE, PRESERVATIVE FREE 10 ML: 5 INJECTION INTRAVENOUS at 01:05

## 2023-05-15 RX ADMIN — PIPERACILLIN AND TAZOBACTAM 4.5 G: 4; .5 INJECTION, POWDER, LYOPHILIZED, FOR SOLUTION INTRAVENOUS; PARENTERAL at 02:05

## 2023-05-15 NOTE — PROGRESS NOTES
NEPHROLOGY: Progress      63 y.o. male with MRSA bacteremia.  Past medical history significant for polysubstance abuse, IV drug use, diabetes mellitus, untreated hepatitis-C, and cirrhosis.  He was recently hospitalized at [facility] for MRSA bacteremia due to C and L-spine osteomyelitis/diskitis, paraspinous muscle abscess, and lumbar epidural abscess.    He also had a cholecystostomy drain placed for cholecystitis.  Patient left A on 05/05/2023 and then presented to [facility] on 05/06/2023.  Blood cultures at admission were positive for MRSA.  UDS was also positive for amphetamines and opiates.  Neurosurgery and Infectious Disease have been following for bacteremia and spinal osteomyelitis/diskitis.  He has been getting daptomycin, rifampin, and Zosyn per ID recommendations.  At admission his renal function was normal with creatinine of 0.7.  Starting 05/10/2023 his renal function started to worsen with creatinine 1.0->1.7->1.9 at time of Nephrology consultation.  Labs also concerning with worsening anemia and hypoalbuminemia.  Patient had an episode of hematochezia on the 12th and was given 3 units of packed red cells.  The patient also has some pyuria and concerned for AIN, possibly related to Zosyn, in particular with development of petechial rash on the dorsum of his arms and legs .  The concomitant acute anemia with a hemoglobin is low as 5.9 could be a significant contributor to his RUPERTO.  Nuclear scan has located some accumulation of contrast in the ascending colon.  And colonoscopy revealed pan colonic diverticuli.  His renal functions seem to be somewhat stable now.                 Current Facility-Administered Medications:     0.9%  NaCl infusion (for blood administration), , Intravenous, Q24H PRN, Guerrero Alfaro MD    0.9%  NaCl infusion (for blood administration), ,  Intravenous, Q24H PRN, Fred Dominguez DO, New Bag at 05/12/23 1640    0.9%  NaCl infusion (for blood administration), , Intravenous, Q24H PRN, Ambreen Hatfield, TANAP-BC    0.9%  NaCl infusion (for blood administration), , Intravenous, Q24H PRN, Guerrero Alfaro MD    0.9%  NaCl infusion (for blood administration), , Intravenous, Q24H PRN, Guerrero Alfaro MD    0.9%  NaCl infusion (for blood administration), , Intravenous, Q24H PRN, Guerrero Alfaro MD    albuterol-ipratropium 2.5 mg-0.5 mg/3 mL nebulizer solution 3 mL, 3 mL, Nebulization, Q6H, Guerrero Alfaro MD, 3 mL at 05/15/23 0059    aluminum-magnesium hydroxide-simethicone 200-200-20 mg/5 mL suspension 30 mL, 30 mL, Oral, QID PRN, Nery Sepulveda MD    collagenase ointment, , Topical (Top), BID, Reggie Castañeda MD, Given at 05/15/23 0922    DAPTOmycin (CUBICIN) 435 mg in sodium chloride 0.9% SolP 50 mL IVPB, 6 mg/kg, Intravenous, Q48H, Darnell Franco MD    dextrose 10% bolus 125 mL 125 mL, 12.5 g, Intravenous, PRN, Melissa Mohan AGACNP-BC    dextrose 10% bolus 250 mL 250 mL, 25 g, Intravenous, PRN, MARYJO LackeyCNP-BC    glucagon (human recombinant) injection 1 mg, 1 mg, Intramuscular, PRN, MARYJO LackeyCNP-BC    glucose chewable tablet 16 g, 16 g, Oral, PRN, Mica Naidu MD    glucose chewable tablet 24 g, 24 g, Oral, PRN, Mica Naidu MD, 24 g at 05/08/23 0500    HYDROcodone-acetaminophen 7.5-325 mg per tablet 1 tablet, 1 tablet, Oral, Q4H PRN, Darnell Franco MD, 1 tablet at 05/15/23 0922    HYDROmorphone (PF) injection 0.4 mg, 0.4 mg, Intravenous, Q5 Min PRN, Kylah Ortiz MD, 0.4 mg at 05/10/23 1610    HYDROmorphone (PF) injection 0.5 mg, 0.5 mg, Intravenous, Q4H PRN, Guerrero Alfaro MD, 0.5 mg at 05/14/23 1433    insulin aspart U-100 injection 1-10 Units, 1-10 Units, Subcutaneous, QID (AC + HS) PRN, Melissa Mohan, [facility]-BC, 1 Units at 05/14/23 2132    insulin detemir U-100 injection 12 Units, 12 Units, Subcutaneous, QHS, Darnell Franco MD, 12  Units at 05/14/23 2133    lactated ringers infusion, , Intravenous, Continuous, Mariaelena Christianson NP, Last Rate: 125 mL/hr at 05/15/23 0152, New Bag at 05/15/23 0152    Lactobacillus acidophilus capsule 1 capsule, 1 capsule, Oral, TID WM, Nery Sepulveda MD, 1 capsule at 05/15/23 0922    melatonin tablet 6 mg, 6 mg, Oral, Nightly PRN, Nery Sepulveda MD    metoprolol injection 5 mg, 5 mg, Intravenous, Q5 Min PRN, Ambreen Hatfield, AGACN-BC, 5 mg at 05/12/23 2008    multivitamin tablet, 1 tablet, Oral, Daily, Nery Sepulveda MD, 1 tablet at 05/15/23 0922    ondansetron injection 4 mg, 4 mg, Intravenous, Q4H PRN, Nery Sepulveda MD, 4 mg at 05/06/23 0434    ondansetron injection 4 mg, 4 mg, Intravenous, Daily PRN, Kylah Ortiz MD    piperacillin-tazobactam (ZOSYN) 4.5 g in dextrose 5 % in water (D5W) 5 % 100 mL IVPB (MB+), 4.5 g, Intravenous, Q8H, Mica Naidu MD, Stopped at 05/15/23 0918    polyethylene glycol packet 17 g, 17 g, Oral, BID PRN, Nery Sepulveda MD    prochlorperazine injection Soln 5 mg, 5 mg, Intravenous, Q6H PRN, Nery Sepulveda MD    prochlorperazine injection Soln 5 mg, 5 mg, Intravenous, Q30 Min PRN, Kylah Ortiz MD    rifAMpin capsule 300 mg, 300 mg, Oral, Q12H, Mica Naidu MD, 300 mg at 05/15/23 0922    senna-docusate 8.6-50 mg per tablet 2 tablet, 2 tablet, Oral, BID PRN, Nery Sepulveda MD    sodium chloride 0.9% flush 10 mL, 10 mL, Intravenous, PRN, Nery Sepulveda MD    Flushing PICC Protocol, , , Until Discontinued **AND** sodium chloride 0.9% flush 10 mL, 10 mL, Intravenous, Q6H, 10 mL at 05/15/23 0519 **AND** sodium chloride 0.9% flush 10 mL, 10 mL, Intravenous, PRN, Darnell Franco MD    thiamine tablet 100 mg, 100 mg, Oral, Daily, Darnell Franco MD, 100 mg at 05/15/23 0922    tiZANidine tablet 4 mg, 4 mg, Oral, Q8H PRN, Nery Sepulveda MD    zinc oxide-cod liver oil 40 % paste, , Topical (Top), BID, Reggie Castañeda MD, Given at 05/15/23 0922        /83   Pulse 101   Temp 97.6 °F (36.4 °C) (Oral)    "Resp 18   Ht 5' 7" (1.702 m)   Wt 72.6 kg (160 lb)   SpO2 95%   BMI 25.06 kg/m²     Physical Exam:    GEN:  Patient is somewhat chronically ill-appearing.  He is in no distress.  Patient is anxious for solid food.   HEENT: Atraumatic. EOMI, no icterus  NECK : No JVD  CARD : RRR s rub or gallop  LUNGS : Clear to auscultation  ABD : Soft,non-tender. BS active ascites present.  Percutaneous cholecystostomy tube drain noted  EXT :  Trace to 1+ pitting edema.           Intake/Output Summary (Last 24 hours) at 5/15/2023 1025  Last data filed at 5/15/2023 0517  Gross per 24 hour   Intake 1503.33 ml   Output 985 ml   Net 518.33 ml         Laboratory:  Recent Results (from the past 24 hour(s))   POCT glucose    Collection Time: 05/14/23 12:37 PM   Result Value Ref Range    POCT Glucose 113 (H) 70 - 110 mg/dL   POCT glucose    Collection Time: 05/14/23  5:05 PM   Result Value Ref Range    POCT Glucose 204 (H) 70 - 110 mg/dL   POCT glucose    Collection Time: 05/14/23  9:12 PM   Result Value Ref Range    POCT Glucose 162 (H) 70 - 110 mg/dL   Basic Metabolic Panel    Collection Time: 05/15/23  4:22 AM   Result Value Ref Range    Sodium Level 137 136 - 145 mmol/L    Potassium Level 3.5 3.5 - 5.1 mmol/L    Chloride 100 98 - 107 mmol/L    Carbon Dioxide 24 23 - 31 mmol/L    Glucose Level 55 (L) 82 - 115 mg/dL    Blood Urea Nitrogen 27.7 (H) 8.4 - 25.7 mg/dL    Creatinine 2.36 (H) 0.73 - 1.18 mg/dL    BUN/Creatinine Ratio 12     Calcium Level Total 7.0 (L) 8.8 - 10.0 mg/dL    Anion Gap 13.0 mEq/L    eGFR 31 mls/min/1.73/m2   Magnesium    Collection Time: 05/15/23  4:22 AM   Result Value Ref Range    Magnesium Level 1.70 1.60 - 2.60 mg/dL   CBC with Differential    Collection Time: 05/15/23  4:22 AM   Result Value Ref Range    WBC 17.55 (H) 4.50 - 11.50 x10(3)/mcL    RBC 3.01 (L) 4.70 - 6.10 x10(6)/mcL    Hgb 9.0 (L) 14.0 - 18.0 g/dL    Hct 27.1 (L) 42.0 - 52.0 %    MCV 90.0 80.0 - 94.0 fL    MCH 29.9 27.0 - 31.0 pg    MCHC 33.2 " 33.0 - 36.0 g/dL    RDW 15.9 11.5 - 17.0 %    Platelet 238 130 - 400 x10(3)/mcL    MPV 10.2 7.4 - 10.4 fL    Neut % 84.6 %    Lymph % 8.5 %    Mono % 3.8 %    Eos % 1.4 %    Basophil % 0.5 %    Lymph # 1.49 0.6 - 4.6 x10(3)/mcL    Neut # 14.86 (H) 2.1 - 9.2 x10(3)/mcL    Mono # 0.67 0.1 - 1.3 x10(3)/mcL    Eos # 0.24 0 - 0.9 x10(3)/mcL    Baso # 0.08 <=0.2 x10(3)/mcL    IG# 0.21 (H) 0 - 0.04 x10(3)/mcL    IG% 1.2 %    NRBC% 0.0 %         Assessment/Plan:   RUPERTO-seems to be stabilizing  Probable diverticular bleed   MRSA bacteremia   Polysubstance abuse   Multiple infectious sources-epidural abscess, osteomyelitis/diskitis, cholecystitis, sacral ulcer debrided 5/10    I do not see any further evidence of rash on the patient.  He remains on Zosyn and renal functions appear to be stabilizing.  If creatinine continues to rise we will discontinue the Zosyn.  Need to advance diet.    Adi Campos MD, NALDO

## 2023-05-15 NOTE — PROGRESS NOTES
"Gastroenterology Progress Note    Subjective:  Patient with no acute GI complaints and continues to tolerate PO without issues. Denies N/V, abdominal pain.   Discussed patient care with nurse who reports that stools are becoming more formed and brown in color-denies any further episodes of melena or bright red blood.    Objective:    ROS:    Review of Systems   Constitutional:  Negative for malaise/fatigue.   HENT:  Negative for sore throat.    Eyes:  Negative for discharge.   Respiratory:  Negative for shortness of breath, wheezing and stridor.    Cardiovascular:  Negative for chest pain.   Gastrointestinal:  Negative for abdominal pain, blood in stool, heartburn, melena, nausea and vomiting.   Musculoskeletal:  Negative for falls.   Skin:  Negative for itching.   Neurological:  Negative for speech change, focal weakness, seizures and loss of consciousness.   Psychiatric/Behavioral:  The patient is not nervous/anxious.        Vital Signs:  /83   Pulse 101   Temp 97.6 °F (36.4 °C) (Oral)   Resp 18   Ht 5' 7" (1.702 m)   Wt 72.6 kg (160 lb)   SpO2 95%   BMI 25.06 kg/m²   Body mass index is 25.06 kg/m².    Physical Exam:    Physical Exam  Constitutional:       General: He is not in acute distress.     Appearance: He is underweight. He is ill-appearing.   HENT:      Head: Normocephalic.      Nose: Nose normal.   Eyes:      General: No scleral icterus.     Extraocular Movements: Extraocular movements intact.   Cardiovascular:      Pulses: Normal pulses.   Pulmonary:      Effort: Pulmonary effort is normal. No respiratory distress.      Breath sounds: Normal breath sounds. No wheezing.   Abdominal:      Tenderness: There is no abdominal tenderness.   Skin:     General: Skin is warm and dry.   Neurological:      General: No focal deficit present.      Mental Status: He is alert. Mental status is at baseline.   Psychiatric:         Mood and Affect: Mood normal.         Behavior: Behavior normal. "       Labs:  Recent Results (from the past 24 hour(s))   POCT glucose    Collection Time: 05/14/23 12:37 PM   Result Value Ref Range    POCT Glucose 113 (H) 70 - 110 mg/dL   POCT glucose    Collection Time: 05/14/23  5:05 PM   Result Value Ref Range    POCT Glucose 204 (H) 70 - 110 mg/dL   POCT glucose    Collection Time: 05/14/23  9:12 PM   Result Value Ref Range    POCT Glucose 162 (H) 70 - 110 mg/dL   Basic Metabolic Panel    Collection Time: 05/15/23  4:22 AM   Result Value Ref Range    Sodium Level 137 136 - 145 mmol/L    Potassium Level 3.5 3.5 - 5.1 mmol/L    Chloride 100 98 - 107 mmol/L    Carbon Dioxide 24 23 - 31 mmol/L    Glucose Level 55 (L) 82 - 115 mg/dL    Blood Urea Nitrogen 27.7 (H) 8.4 - 25.7 mg/dL    Creatinine 2.36 (H) 0.73 - 1.18 mg/dL    BUN/Creatinine Ratio 12     Calcium Level Total 7.0 (L) 8.8 - 10.0 mg/dL    Anion Gap 13.0 mEq/L    eGFR 31 mls/min/1.73/m2   Magnesium    Collection Time: 05/15/23  4:22 AM   Result Value Ref Range    Magnesium Level 1.70 1.60 - 2.60 mg/dL   CBC with Differential    Collection Time: 05/15/23  4:22 AM   Result Value Ref Range    WBC 17.55 (H) 4.50 - 11.50 x10(3)/mcL    RBC 3.01 (L) 4.70 - 6.10 x10(6)/mcL    Hgb 9.0 (L) 14.0 - 18.0 g/dL    Hct 27.1 (L) 42.0 - 52.0 %    MCV 90.0 80.0 - 94.0 fL    MCH 29.9 27.0 - 31.0 pg    MCHC 33.2 33.0 - 36.0 g/dL    RDW 15.9 11.5 - 17.0 %    Platelet 238 130 - 400 x10(3)/mcL    MPV 10.2 7.4 - 10.4 fL    Neut % 84.6 %    Lymph % 8.5 %    Mono % 3.8 %    Eos % 1.4 %    Basophil % 0.5 %    Lymph # 1.49 0.6 - 4.6 x10(3)/mcL    Neut # 14.86 (H) 2.1 - 9.2 x10(3)/mcL    Mono # 0.67 0.1 - 1.3 x10(3)/mcL    Eos # 0.24 0 - 0.9 x10(3)/mcL    Baso # 0.08 <=0.2 x10(3)/mcL    IG# 0.21 (H) 0 - 0.04 x10(3)/mcL    IG% 1.2 %    NRBC% 0.0 %         Assessment/Plan:  63 year old make known to us in the outpatient setting only with PMHx polysubstance abuse/IV drug abuse, type 2 diabetes mellitus, untreated chronic hep C/cirrhosis who presented to  [facility] after leaving AMA from [facility] for MRSA Bacteremia now s/p edison tube for cholecystis and sacral wound debridement.    GI consulted for dark stools.      MRSA Bacteremia   -MRSA cervical, lumbar spine osteomyelitis with bilateral psoas, paraspinal muscle abscess as well as lumbar epidural abscess s/p drainage, laminectomy  Dark stools, improving   -  hemoglobin trend since arrival  8.7--8.4--8.8--8.7--8.3--6.7--5.9--8.3--8.1--9.0 s/p 4u PRBC and 1u FFP  - NM Bleeding scan with activity most intense in ascending colon  S/p colonoscopy with scattered diverticula throughout the colon with mucosal abrasion in the ascending colon with no stigmata of recent bleeding, possibly r/t suction. Hyperemic mucosa noted in anorectal region   - Can consider colorectal consult for eval of anorectal region for hyperemic mucosa   - will continue to monitor for now- stool becoming light with stable hemoglobin   Sacral Decubitus wound  - s/p debridement 05/10  3. S/p edison tube placement secondary to cholecystitis   4. RUPERTO  5. Polysubstance abuse   6. Untreated chronic Hep C     Thank you for allowing us to participate in the care of Reji Ortiz PA-C  Gastroenterology  [facility]

## 2023-05-15 NOTE — PROGRESS NOTES
Inpatient Nutrition Assessment    Admit Date: 5/5/2023   Total duration of encounter: 10 days     Nutrition Recommendation/Prescription     Patient has poor dentition and difficulty chewing, modify diet texture:  Advance to minced and moist, low sodium (2 mg), high protein diet when medically feasible.     Updated standing weight when possible.    Discontinue Boost Breeze TID    Continue Prosource (provides 60 kcal, 15 g protein per serving) TID.       Communication of Recommendations: reviewed with nurse, reviewed with patient, and communicated via EMR    Nutrition Assessment   Malnutrition Assessment/Nutrition-Focused Physical Exam    Malnutrition Context: chronic illness  Malnutrition Level: moderate  Energy Intake (Malnutrition): less than 75% for greater than or equal to 1 month  Weight Loss (Malnutrition): other (see comments) (unable to get updated wt, returned with standing scale and pt gone for procedure)  Subcutaneous Fat (Malnutrition): moderate depletion  Orbital Region (Subcutaneous Fat Loss): moderate depletion  Upper Arm Region (Subcutaneous Fat Loss): moderate depletion     Muscle Mass (Malnutrition): moderate depletion  [place] Region (Muscle Loss): severe depletion  Clavicle Bone Region (Muscle Loss): moderate depletion                    Fluid Accumulation (Malnutrition): other (see comments) (+1 pitting pedal edema)        A minimum of two characteristics is recommended for diagnosis of either severe or non-severe malnutrition.     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 as well as  "lumbar epidural abscess    Nutrition-Related Medications: MVI, lactobacillus acidophilus, insulin detemir, antimicrobials with daptomycin, thiamine, Lactated Ringers IVF's  Calorie Containing IV Medications: no significant kcals from medications at this time    Nutrition-Related Labs:  5/10/23: HGB/HCT: 8.7/27.7   5/12/23: HGB/HCT: 6.7/22.0  5/15/23: H/H 9.0/27.1, Bun 27.7, Crea 2.36, GFR 31, CBG's past 24hrs 113-204    Diet/PN Order: Diet clear liquid  Oral Supplement Order: Boost Breeze and Prosource No Carb  Tube Feeding Order: none  Appetite/Oral Intake: fair/25-50% of meals  Factors Affecting Nutritional Intake: chewing difficulty  Food/Restoration/Cultural Preferences: none reported  Food Allergies: none reported    Skin Integrity: drain/device(s)  Wound(s):      Altered Skin Integrity 05/06/23 1450 medial Sacral spine-Tissue loss description: Full thickness Stage 4 absess    Comments    5/10/23: Pt states eaten little of tray, has been getting 1 meal a day from outside due to poor dentition and difficulty chewing. Agreed to minced and moist diet modification when advanced. Pt reported moderate appetite prior to admission; typical intake 1 meal a day (soup), snacks on chips (lets dissolve in mouth). Denies nausea, vomiting, and diarrhea. BM every other day; solid with some straining. Does not take supplements.      5/12/23: RD attempted three times and patient out of room. Per RN, pt eating 25-50% of tray, focusing on mashable foods. Drinking juice. This afternoon patient now on clears after GI bleed scan pending results. Per EMR "reports of 2 large bowel movements overnight described as " bright red jelly like".    5/15/23: Patient underwent colonoscopy yesterday. Currently tolerating clear liquids diet. Patient wanting to begin solid foods today, discussed patient concerns with RN. Patient wanting to discontinue Boost Breeze supplement, however would like to continue Prosource supplement. " "        Anthropometrics    Height: 5' 7" (170.2 cm) Height Method: Stated  Last Weight: 72.6 kg (160 lb) (23 1000) Weight Method: Standard Scale  BMI (Calculated): 25.1  BMI Classification: overweight (BMI 25-29.9)        Ideal Body Weight (IBW), Male: 148 lb     % Ideal Body Weight, Male (lb): 108.11 %                 Usual Body Weight (UBW), k.6 kg  % Usual Body Weight: 100.18     Usual Weight Provided By: patient    Wt Readings from Last 5 Encounters:   23 72.6 kg (160 lb)   23 72.6 kg (160 lb)   18 75.7 kg (166 lb 14.2 oz)     Weight Change(s) Since Admission:  Admit Weight: 72.6 kg (160 lb) (23 1300)  : Unable to get updated weight due to patient gone when returned with scale, current weight is self reported  5/15: Unable to get updated weight due to patient preferring to have his bed angled with his legs above his head at this time.     Estimated Needs    Weight Used For Calorie Calculations: 72.6 kg (160 lb 0.9 oz)  Energy Calorie Requirements (kcal): 2092 (SF 1.4)  Energy Need Method: Alpha-St Jeor  Weight Used For Protein Calculations: 72.6 kg (160 lb 0.9 oz)  Protein Requirements: 109 (1.5 g/kg)  Fluid Requirements (mL): 2092  Temp (24hrs), Av.1 °F (36.7 °C), Min:97.5 °F (36.4 °C), Max:98.8 °F (37.1 °C)         Enteral Nutrition    Patient not receiving enteral nutrition at this time.    Parenteral Nutrition    Patient not receiving parenteral nutrition support at this time.    Evaluation of Received Nutrient Intake    Calories: not meeting estimated needs  Protein: not meeting estimated needs    Patient Education    Not applicable.    Nutrition Diagnosis   PES: Malnutrition related to  chronic illness, chronic IV drug use, and chewing problems as evidenced by less than 75% needs met for greater than 1 month, moderate fat depletion, and moderate muscle depletion. (continues)    Interventions/Goals     Intervention(s): modified composition of meals/snacks  Goal: " Meet greater than 75% of nutritional needs by follow-up. (goal not met)    Monitoring & Evaluation     Dietitian will monitor food and beverage intake, weight, and glucose/endocrine profile.  Nutrition Risk/Follow-Up: high (follow-up in 1-4 days)   Please consult if re-assessment needed sooner.

## 2023-05-15 NOTE — PROGRESS NOTES
"[facility] - 8th Floor Med Surg  Neurosurgery  Progress Note    Subjective:     Interval History:  MRI was canceled due to visibility issues".  Also over the weekend patient with hematochezia, receiving blood transfusions for low H&H.  Underwent colonoscopy over the weekend.    History of Present Illness: 62-year-old male with significant history of polysubstance abuse/IV drug abuse, type 2 diabetes mellitus, untreated chronic hep C/cirrhosis.  Patient had a recent hospitalization to [facility] for MRSA bacteremia with MRSA C and L-spine osteomyelitis, diskitis, bilateral psoas and paraspinous muscle abscess as well as lumbar epidural abscess.  Patient was being treated in [facility] with close follow-up with neurosurgery, Infectious Disease.  Hospital stay also was even full for cholecystitis for which he underwent cholecystostomy tube placement.  Patient signed out AMA from [facility] on 05/05, went home, called ambulance and presented to the hospital.  Per chart review patient was being treated with daptomycin and rifampin in [facility].  While hospitalized his urine drug screen was positive for amphetamines and there were concerns that he was using it while hospitalized.  Patient was tachycardic in the ED.  Otherwise hemodynamics stable.  Complained of bilateral lower extremity swelling. Labs significant for leukocytosis, anemia, hyperglycemia. UDS was positive for opiates, amphetamines.  Hospitalist team was consulted for admission. Restarted on IV Daptomycin, Rifampin, Zosyn. Echocardiogram on 5/6 showed normal LV systolic function, EF 50-55%, indeterminate diastolic function, mild RV enlargement, mild MR, PASP 19 mmHg with no mention of vegetations.  Blood cultures from 05/05 for positive for MRSA.  Repeat blood culture from 05/09 negative for 24 hours.  Patient awaiting MRI lumbar spine.  Noted to have LLE pain with B/L LE U/S showing fluid collection in left " cough.  Awaiting CT L leg and thigh with contrast.  Also ordered CT chest with contrast to further evaluate right-sided oblong opacity on CXR which is thought to be mass/possible abscess/fissural fluid collection. Underwent debridement for sacral ulcer on 05/10 with wound care being done. Neurosurgery to make further recommendations once MRI lumbar spine is done. Noted to have elevated Cr of 1.78; DC diuretics. Possibly d/t ATN from ABX; consulted Nephrology. Ordered US Retroperitoneum, UA. Cancelled CT chest, CT L thigh/leg due to RUPERTO. Nephrology on board; ordered [id number] given anemia, renal dysfunction, petechial rash to evaluate for TTP.  Patient noted to have hematochezia by nurse on 05/12; consulted GI.  HGB/HCT noted to drop to 6.7/22.0; transfused 2 units PRBCs. NM bleeding scan ordered by GI which was negative. Continued to have hematochezia on 05/13, started on bowel prep for colonoscopy by GI.    Post-Op Info:  Procedure(s) (LRB):  COLONOSCOPY (N/A)   1 Day Post-Op      Medications:  Continuous Infusions:   lactated ringers 125 mL/hr at 05/15/23 0152     Scheduled Meds:   albuterol-ipratropium  3 mL Nebulization Q6H    collagenase   Topical (Top) BID    DAPTOmycin (CUBICIN) IV (PEDS and ADULTS)  6 mg/kg Intravenous Q48H    insulin detemir U-100  12 Units Subcutaneous QHS    Lactobacillus acidophilus  1 capsule Oral TID WM    multivitamin  1 tablet Oral Daily    piperacillin-tazobactam (ZOSYN) IVPB  4.5 g Intravenous Q8H    rifAMpin  300 mg Oral Q12H    sodium chloride 0.9%  10 mL Intravenous Q6H    thiamine  100 mg Oral Daily    zinc oxide-cod liver oil   Topical (Top) BID     PRN Meds:sodium chloride, sodium chloride, sodium chloride, sodium chloride, sodium chloride, sodium chloride, aluminum-magnesium hydroxide-simethicone, dextrose 10%, dextrose 10%, glucagon (human recombinant), glucose, glucose, HYDROcodone-acetaminophen, HYDROmorphone, HYDROmorphone, insulin aspart U-100, melatonin, metoprolol,  "ondansetron, ondansetron, polyethylene glycol, prochlorperazine, prochlorperazine, senna-docusate 8.6-50 mg, sodium chloride 0.9%, Flushing PICC Protocol **AND** sodium chloride 0.9% **AND** sodium chloride 0.9%, tiZANidine     Review of Systems  Objective:     Weight: 72.6 kg (160 lb)  Body mass index is 25.06 kg/m².  Vital Signs (Most Recent):  Temp: 97.6 °F (36.4 °C) (05/15/23 0807)  Pulse: 101 (05/15/23 0807)  Resp: 18 (05/15/23 0922)  BP: 127/83 (05/15/23 0807)  SpO2: 95 % (05/15/23 0807) Vital Signs (24h Range):  Temp:  [97.5 °F (36.4 °C)-98.8 °F (37.1 °C)] 97.6 °F (36.4 °C)  Pulse:  [] 101  Resp:  [16-27] 18  SpO2:  [92 %-99 %] 95 %  BP: (118-138)/(71-87) 127/83                              Urethral Catheter 05/12/23 1630 Latex 16 Fr. (Active)   $ Awad Insertion Bedside Insertion Performed 05/12/23 1630   Site Assessment Clean;Intact;Dry 05/14/23 2100   Collection Container Standard drainage bag 05/14/23 2100   Securement Method secured to top of thigh w/ adhesive device 05/14/23 2100   Catheter Care Performed yes 05/14/23 2100   Reason for Continuing Urinary Catheterization Non-healing sacral/perineal wound 05/14/23 2100   CAUTI Prevention Bundle Securement Device in place with 1" slack 05/14/23 2100   Output (mL) 310 mL 05/15/23 0013            Drain/Device    Right lower flank (Active)   Insertion Site dry crusted drainage 05/14/23 2100   Drainage Characteristics/Odor Brown;Yellow 05/14/23 2100   Drainage Amount Moderate 05/14/23 2100   General Output (mL) 50 05/15/23 0517       Neurosurgery Physical Exam    Significant Labs:  Recent Labs   Lab 05/14/23  0336 05/15/23  0422    137   K 3.8 3.5   CO2 26 24   BUN 30.8* 27.7*   CREATININE 2.53* 2.36*   CALCIUM 6.8* 7.0*   MG  --  1.70     Recent Labs   Lab 05/13/23  2222 05/14/23  0336 05/15/23  0422   WBC  --  16.85* 17.55*   HGB 8.3* 8.1*  7.9* 9.0*   HCT 24.8* 24.1*  23.8* 27.1*   PLT  --  202 238     No results for input(s): LABPT, INR, APTT " in the last 48 hours.  Microbiology Results (last 7 days)       Procedure Component Value Units Date/Time    Urine culture [[id number]]  (Abnormal) Collected: 05/12/23 1048    Order Status: Completed Specimen: Urine Updated: 05/15/23 1018     Urine Culture >/= 100,000 colonies/ml Candida albicans    Blood Culture [[id number]]  (Normal) Collected: 05/09/23 2104    Order Status: Completed Specimen: Blood Updated: 05/14/23 2200     CULTURE, BLOOD ([facility]) No Growth at 5 days    Blood culture #1 **CANNOT BE ORDERED STAT** [[id number]]  (Abnormal)  (Susceptibility) Collected: 05/05/23 2109    Order Status: Completed Specimen: Blood Updated: 05/10/23 0706     CULTURE, BLOOD ([facility]) Methicillin resistant Staphylococcus aureus     GRAM STAIN Gram Positive Cocci, probable Staphylococcus      Seen in gram stain of broth only      2 of 2 bottles positive    Blood culture #2 **CANNOT BE ORDERED STAT** [[id number]]  (Abnormal)  (Susceptibility) Collected: 05/05/23 2109    Order Status: Completed Specimen: Blood Updated: 05/09/23 0716     CULTURE, BLOOD ([facility]) Methicillin resistant Staphylococcus aureus     GRAM STAIN Gram Positive Cocci, probable Staphylococcus      Seen in gram stain of broth only      1 of 2 Anaerobic bottles positive            Assessment/Plan:    MRSA bacteremia   MRSA cervical, lumbar spine osteomyelitis with bilateral psoas, paraspinal muscle abscess as well as lumbar epidural abscess status post drainage, laminectomy  Recent cholecystitis status post cholecystostomy tube placement   Sepsis secondary to above   RUPERTO 2/2 possible ATN d/t ABX vs TTP  Hematochezia  Tachycardia 2/2 Anemia  Polysubstance abuse/IV drug abuse   Decompensated cirrhosis with volume overload   Untreated chronic hep C   Type 2 diabetes mellitus with hyperglycemia  Acute Normocytic Anemia 2/2 TTP vs GI Bleed      Unable to obtain MRI at this time.    Medicine and other specialties on the case managing medical issues.  Patient on daptomycin,  rifampin, Zosyn.  Attempt MRI lumbar spine when able to.  I spoke with the nurse about still needing this when the patient is able.  Infectious Disease.    Continue supportive measures   SCDs        Active Diagnoses:    Diagnosis Date Noted POA    PRINCIPAL PROBLEM:  Sepsis [A41.9] 05/05/2023 Yes    Staphylococcus aureus bacteremia [R78.81, B95.61] 05/05/2023 Unknown      Problems Resolved During this Admission:       LUIS Moore-BC  Neurosurgery  [facility] - 8th Floor Med Surg

## 2023-05-16 PROBLEM — Z72.0 TOBACCO USER: Status: ACTIVE | Noted: 2023-05-16

## 2023-05-16 PROBLEM — M46.26 OSTEOMYELITIS OF LUMBAR SPINE: Status: ACTIVE | Noted: 2023-04-13

## 2023-05-16 PROBLEM — E11.9 TYPE 2 DIABETES MELLITUS WITHOUT COMPLICATION, WITHOUT LONG-TERM CURRENT USE OF INSULIN: Status: ACTIVE | Noted: 2023-04-13

## 2023-05-16 PROBLEM — B19.20 HEPATITIS C VIRUS INFECTION: Status: ACTIVE | Noted: 2023-05-16

## 2023-05-16 LAB
ABO + RH BLD: NORMAL
ABO + RH BLD: NORMAL
ABS NEUT (OLG): 25.91 X10(3)/MCL (ref 2.1–9.2)
ALBUMIN SERPL-MCNC: 1.2 G/DL (ref 3.4–4.8)
ALBUMIN SERPL-MCNC: 1.3 G/DL (ref 3.4–4.8)
ALBUMIN/GLOB SERPL: 0.3 RATIO (ref 1.1–2)
ALBUMIN/GLOB SERPL: 0.3 RATIO (ref 1.1–2)
ALLENS TEST BLOOD GAS (OHS): YES
ALLENS TEST BLOOD GAS (OHS): YES
ALP SERPL-CCNC: 91 UNIT/L (ref 40–150)
ALP SERPL-CCNC: 93 UNIT/L (ref 40–150)
ALT SERPL-CCNC: 13 UNIT/L (ref 0–55)
ALT SERPL-CCNC: 14 UNIT/L (ref 0–55)
AMORPH URATE CRY URNS QL MICRO: ABNORMAL /HPF
AMPHET UR QL SCN: NEGATIVE
ANISOCYTOSIS BLD QL SMEAR: ABNORMAL
APPEARANCE UR: CLEAR
AST SERPL-CCNC: 30 UNIT/L (ref 5–34)
AST SERPL-CCNC: 50 UNIT/L (ref 5–34)
BACTERIA #/AREA URNS AUTO: ABNORMAL /HPF
BARBITURATE SCN PRESENT UR: NEGATIVE
BASE EXCESS BLD CALC-SCNC: 1.8 MMOL/L
BASE EXCESS BLD CALC-SCNC: 2.8 MMOL/L
BENZODIAZ UR QL SCN: NEGATIVE
BILIRUB UR QL STRIP.AUTO: NEGATIVE MG/DL
BILIRUBIN DIRECT+TOT PNL SERPL-MCNC: 0.8 MG/DL
BILIRUBIN DIRECT+TOT PNL SERPL-MCNC: 1 MG/DL
BLD PROD TYP BPU: NORMAL
BLD PROD TYP BPU: NORMAL
BLOOD GAS SAMPLE TYPE (OHS): ABNORMAL
BLOOD GAS SAMPLE TYPE (OHS): ABNORMAL
BLOOD UNIT EXPIRATION DATE: NORMAL
BLOOD UNIT EXPIRATION DATE: NORMAL
BLOOD UNIT TYPE CODE: 6200
BLOOD UNIT TYPE CODE: 6200
BUN SERPL-MCNC: 27.4 MG/DL (ref 8.4–25.7)
BUN SERPL-MCNC: 29.2 MG/DL (ref 8.4–25.7)
BURR CELLS (OLG): ABNORMAL
CA-I BLD-SCNC: 0.98 MMOL/L (ref 1.12–1.23)
CA-I BLD-SCNC: 1 MMOL/L (ref 1.12–1.23)
CALCIUM SERPL-MCNC: 7.3 MG/DL (ref 8.8–10)
CALCIUM SERPL-MCNC: 7.4 MG/DL (ref 8.8–10)
CANNABINOIDS UR QL SCN: NEGATIVE
CHLORIDE SERPL-SCNC: 101 MMOL/L (ref 98–107)
CHLORIDE SERPL-SCNC: 103 MMOL/L (ref 98–107)
CLARITY BODY FLUID (OHS): NORMAL
CO2 BLDA-SCNC: 29.8 MMOL/L
CO2 BLDA-SCNC: 30.4 MMOL/L
CO2 SERPL-SCNC: 19 MMOL/L (ref 23–31)
CO2 SERPL-SCNC: 24 MMOL/L (ref 23–31)
COAGULATION SPECIALIST REVIEW: ABNORMAL
COCAINE UR QL SCN: NEGATIVE
COLOR BODY FLUID (OHS): NORMAL
COLOR UR: ABNORMAL
CREAT SERPL-MCNC: 2.42 MG/DL (ref 0.73–1.18)
CREAT SERPL-MCNC: 2.6 MG/DL (ref 0.73–1.18)
CREAT UR-MCNC: 36.3 MG/DL (ref 63–166)
CROSSMATCH INTERPRETATION: NORMAL
CROSSMATCH INTERPRETATION: NORMAL
D DIMER PPP IA.FEU-MCNC: 7.97 UG/ML FEU (ref 0–0.5)
DISPENSE STATUS: NORMAL
DISPENSE STATUS: NORMAL
DRAWN BY BLOOD GAS (OHS): ABNORMAL
DRAWN BY BLOOD GAS (OHS): ABNORMAL
EPAP (OHS): 6 CMH2O
ERYTHROCYTE [DISTWIDTH] IN BLOOD BY AUTOMATED COUNT: 16.6 % (ref 11.5–17)
FENTANYL UR QL SCN: POSITIVE
FIO2 BLOOD GAS (OHS): 100 %
FIO2 BLOOD GAS (OHS): 100 %
GFR SERPLBLD CREATININE-BSD FMLA CKD-EPI: 27 MLS/MIN/1.73/M2
GFR SERPLBLD CREATININE-BSD FMLA CKD-EPI: 30 MLS/MIN/1.73/M2
GLOBULIN SER-MCNC: 3.9 GM/DL (ref 2.4–3.5)
GLOBULIN SER-MCNC: 4.3 GM/DL (ref 2.4–3.5)
GLUCOSE FLD-MCNC: 92 MG/DL
GLUCOSE SERPL-MCNC: 124 MG/DL (ref 82–115)
GLUCOSE SERPL-MCNC: 79 MG/DL (ref 82–115)
GLUCOSE UR QL STRIP.AUTO: NEGATIVE MG/DL
HCO3 BLDA-SCNC: 28.2 MMOL/L
HCO3 BLDA-SCNC: 28.9 MMOL/L
HCT VFR BLD AUTO: 32.4 % (ref 42–52)
HGB BLD-MCNC: 10.5 G/DL (ref 14–18)
INR BLD: 1.41 (ref 0–1.3)
INSTRUMENT WBC (OLG): 26.17 X10(3)/MCL
IPAP (OHS): 14 CMH2O
KETONES UR QL STRIP.AUTO: NEGATIVE MG/DL
LACTATE SERPL-SCNC: 1.9 MMOL/L (ref 0.5–2.2)
LACTATE SERPL-SCNC: 2.2 MMOL/L (ref 0.5–2.2)
LDH FLD-CCNC: 214 U/L
LEUKOCYTE ESTERASE UR QL STRIP.AUTO: ABNORMAL UNIT/L
LYMPHOCYTE MANUAL BF (OHS): 4 %
LYMPHOCYTES NFR BLD MANUAL: 0.26 X10(3)/MCL
LYMPHOCYTES NFR BLD MANUAL: 1 %
MAGNESIUM SERPL-MCNC: 1.9 MG/DL (ref 1.6–2.6)
MCH RBC QN AUTO: 30.1 PG (ref 27–31)
MCHC RBC AUTO-ENTMCNC: 32.4 G/DL (ref 33–36)
MCV RBC AUTO: 92.8 FL (ref 80–94)
MDMA UR QL SCN: NEGATIVE
MECH RR (OHS): 18 B/MIN
MECH VT (OHS): 450 ML
MODE (OHS): ABNORMAL
MODE (OHS): AC
MUCOUS THREADS URNS QL MICRO: ABNORMAL /LPF
NEUTROPHILS MAN BF (OHS): 96 %
NEUTROPHILS NFR BLD MANUAL: 99 %
NITRITE UR QL STRIP.AUTO: NEGATIVE
NRBC BLD AUTO-RTO: 0 %
OPIATES UR QL SCN: POSITIVE
PCO2 BLDA: 50 MMHG (ref 35–45)
PCO2 BLDA: 51 MMHG (ref 35–45)
PCP UR QL: NEGATIVE
PEEP (OHS): 8 CMH2O
PH BLDA: 7.35 [PH] (ref 7.35–7.45)
PH BLDA: 7.37 [PH] (ref 7.35–7.45)
PH FLD: 7.91 [PH]
PH UR STRIP.AUTO: 5 [PH]
PH UR: 5 [PH] (ref 3–11)
PHOSPHATE SERPL-MCNC: 5.9 MG/DL (ref 2.3–4.7)
PLATELET # BLD AUTO: 337 X10(3)/MCL (ref 130–400)
PLATELET # BLD EST: NORMAL 10*3/UL
PMV BLD AUTO: 10.2 FL (ref 7.4–10.4)
PO2 BLDA: 236 MMHG (ref 80–100)
PO2 BLDA: 90 MMHG (ref 80–100)
POCT GLUCOSE: 64 MG/DL (ref 70–110)
POCT GLUCOSE: 76 MG/DL (ref 70–110)
POCT GLUCOSE: 79 MG/DL (ref 70–110)
POCT GLUCOSE: 96 MG/DL (ref 70–110)
POIKILOCYTOSIS BLD QL SMEAR: ABNORMAL
POTASSIUM BLOOD GAS (OHS): 3.5 MMOL/L (ref 3.5–5)
POTASSIUM BLOOD GAS (OHS): 3.8 MMOL/L (ref 3.5–5)
POTASSIUM SERPL-SCNC: 4 MMOL/L (ref 3.5–5.1)
POTASSIUM SERPL-SCNC: 4.8 MMOL/L (ref 3.5–5.1)
PROT FLD-MCNC: 1.1 GM/DL
PROT SERPL-MCNC: 5.1 GM/DL (ref 5.8–7.6)
PROT SERPL-MCNC: 5.6 GM/DL (ref 5.8–7.6)
PROT UR QL STRIP.AUTO: ABNORMAL MG/DL
PROTHROMBIN TIME: 17.1 SECONDS (ref 12.5–14.5)
RBC # BLD AUTO: 3.49 X10(6)/MCL (ref 4.7–6.1)
RBC #/AREA URNS AUTO: 6 /HPF
RBC MORPH BLD: ABNORMAL
RBC UR QL AUTO: ABNORMAL UNIT/L
SAMPLE SITE BLOOD GAS (OHS): ABNORMAL
SAMPLE SITE BLOOD GAS (OHS): ABNORMAL
SAO2 % BLDA: 100 %
SAO2 % BLDA: 97 %
SODIUM BLOOD GAS (OHS): 133 MMOL/L (ref 137–145)
SODIUM BLOOD GAS (OHS): 134 MMOL/L (ref 137–145)
SODIUM SERPL-SCNC: 136 MMOL/L (ref 136–145)
SODIUM SERPL-SCNC: 137 MMOL/L (ref 136–145)
SODIUM UR-SCNC: 55 MMOL/L
SP GR UR STRIP.AUTO: 1.01 (ref 1–1.03)
SQUAMOUS #/AREA URNS AUTO: <5 /HPF
UNIT NUMBER: NORMAL
UNIT NUMBER: NORMAL
URATE CRY URNS QL MICRO: ABNORMAL /HPF
UROBILINOGEN UR STRIP-ACNC: 0.2 MG/DL
VWF CP ACT/NOR PPP CHRO: 33 %
WBC # FLD AUTO: 310 /UL
WBC # SPEC AUTO: 26.17 X10(3)/MCL (ref 4.5–11.5)
WBC #/AREA URNS AUTO: 5 /HPF
YEAST URNS QL MICRO: ABNORMAL /HPF

## 2023-05-16 PROCEDURE — 80053 COMPREHEN METABOLIC PANEL: CPT | Performed by: STUDENT IN AN ORGANIZED HEALTH CARE EDUCATION/TRAINING PROGRAM

## 2023-05-16 PROCEDURE — 83605 ASSAY OF LACTIC ACID: CPT | Performed by: NURSE PRACTITIONER

## 2023-05-16 PROCEDURE — 25000003 PHARM REV CODE 250: Performed by: STUDENT IN AN ORGANIZED HEALTH CARE EDUCATION/TRAINING PROGRAM

## 2023-05-16 PROCEDURE — 87102 FUNGUS ISOLATION CULTURE: CPT | Performed by: HOSPITALIST

## 2023-05-16 PROCEDURE — 20000000 HC ICU ROOM

## 2023-05-16 PROCEDURE — 25000003 PHARM REV CODE 250: Performed by: SURGERY

## 2023-05-16 PROCEDURE — 94002 VENT MGMT INPAT INIT DAY: CPT

## 2023-05-16 PROCEDURE — P9047 ALBUMIN (HUMAN), 25%, 50ML: HCPCS | Mod: JG | Performed by: STUDENT IN AN ORGANIZED HEALTH CARE EDUCATION/TRAINING PROGRAM

## 2023-05-16 PROCEDURE — 84157 ASSAY OF PROTEIN OTHER: CPT | Performed by: HOSPITALIST

## 2023-05-16 PROCEDURE — 81001 URINALYSIS AUTO W/SCOPE: CPT | Performed by: INTERNAL MEDICINE

## 2023-05-16 PROCEDURE — 27200966 HC CLOSED SUCTION SYSTEM

## 2023-05-16 PROCEDURE — 25000003 PHARM REV CODE 250: Performed by: INTERNAL MEDICINE

## 2023-05-16 PROCEDURE — 87070 CULTURE OTHR SPECIMN AEROBIC: CPT | Performed by: STUDENT IN AN ORGANIZED HEALTH CARE EDUCATION/TRAINING PROGRAM

## 2023-05-16 PROCEDURE — 99900026 HC AIRWAY MAINTENANCE (STAT)

## 2023-05-16 PROCEDURE — 27000207 HC ISOLATION

## 2023-05-16 PROCEDURE — 88305 TISSUE EXAM BY PATHOLOGIST: CPT | Performed by: HOSPITALIST

## 2023-05-16 PROCEDURE — 99900035 HC TECH TIME PER 15 MIN (STAT)

## 2023-05-16 PROCEDURE — 63600175 PHARM REV CODE 636 W HCPCS: Performed by: NURSE PRACTITIONER

## 2023-05-16 PROCEDURE — 87040 BLOOD CULTURE FOR BACTERIA: CPT | Performed by: STUDENT IN AN ORGANIZED HEALTH CARE EDUCATION/TRAINING PROGRAM

## 2023-05-16 PROCEDURE — 36600 WITHDRAWAL OF ARTERIAL BLOOD: CPT

## 2023-05-16 PROCEDURE — A4216 STERILE WATER/SALINE, 10 ML: HCPCS | Performed by: STUDENT IN AN ORGANIZED HEALTH CARE EDUCATION/TRAINING PROGRAM

## 2023-05-16 PROCEDURE — 97605 NEG PRS WND THER DME<=50SQCM: CPT

## 2023-05-16 PROCEDURE — 87186 SC STD MICRODIL/AGAR DIL: CPT | Performed by: INTERNAL MEDICINE

## 2023-05-16 PROCEDURE — 27000190 HC CPAP FULL FACE MASK W/VALVE

## 2023-05-16 PROCEDURE — 87116 MYCOBACTERIA CULTURE: CPT | Performed by: HOSPITALIST

## 2023-05-16 PROCEDURE — 89051 BODY FLUID CELL COUNT: CPT | Performed by: HOSPITALIST

## 2023-05-16 PROCEDURE — 83615 LACTATE (LD) (LDH) ENZYME: CPT | Performed by: HOSPITALIST

## 2023-05-16 PROCEDURE — 27000221 HC OXYGEN, UP TO 24 HOURS

## 2023-05-16 PROCEDURE — 88112 CYTOPATH CELL ENHANCE TECH: CPT

## 2023-05-16 PROCEDURE — 85379 FIBRIN DEGRADATION QUANT: CPT | Performed by: NURSE PRACTITIONER

## 2023-05-16 PROCEDURE — 82803 BLOOD GASES ANY COMBINATION: CPT

## 2023-05-16 PROCEDURE — 80307 DRUG TEST PRSMV CHEM ANLYZR: CPT | Performed by: INTERNAL MEDICINE

## 2023-05-16 PROCEDURE — 87077 CULTURE AEROBIC IDENTIFY: CPT | Performed by: NURSE PRACTITIONER

## 2023-05-16 PROCEDURE — 87070 CULTURE OTHR SPECIMN AEROBIC: CPT | Performed by: HOSPITALIST

## 2023-05-16 PROCEDURE — 63600175 PHARM REV CODE 636 W HCPCS: Performed by: NURSE ANESTHETIST, CERTIFIED REGISTERED

## 2023-05-16 PROCEDURE — 84100 ASSAY OF PHOSPHORUS: CPT | Performed by: STUDENT IN AN ORGANIZED HEALTH CARE EDUCATION/TRAINING PROGRAM

## 2023-05-16 PROCEDURE — 94761 N-INVAS EAR/PLS OXIMETRY MLT: CPT

## 2023-05-16 PROCEDURE — 87206 SMEAR FLUORESCENT/ACID STAI: CPT | Mod: 90 | Performed by: HOSPITALIST

## 2023-05-16 PROCEDURE — 82570 ASSAY OF URINE CREATININE: CPT | Performed by: INTERNAL MEDICINE

## 2023-05-16 PROCEDURE — 25000003 PHARM REV CODE 250: Performed by: NURSE PRACTITIONER

## 2023-05-16 PROCEDURE — 63600175 PHARM REV CODE 636 W HCPCS: Performed by: STUDENT IN AN ORGANIZED HEALTH CARE EDUCATION/TRAINING PROGRAM

## 2023-05-16 PROCEDURE — 83986 ASSAY PH BODY FLUID NOS: CPT | Performed by: HOSPITALIST

## 2023-05-16 PROCEDURE — 94640 AIRWAY INHALATION TREATMENT: CPT

## 2023-05-16 PROCEDURE — 84300 ASSAY OF URINE SODIUM: CPT | Performed by: INTERNAL MEDICINE

## 2023-05-16 PROCEDURE — 85610 PROTHROMBIN TIME: CPT | Performed by: STUDENT IN AN ORGANIZED HEALTH CARE EDUCATION/TRAINING PROGRAM

## 2023-05-16 PROCEDURE — 87206 SMEAR FLUORESCENT/ACID STAI: CPT | Performed by: HOSPITALIST

## 2023-05-16 PROCEDURE — 85027 COMPLETE CBC AUTOMATED: CPT | Performed by: NURSE PRACTITIONER

## 2023-05-16 PROCEDURE — 83735 ASSAY OF MAGNESIUM: CPT | Performed by: STUDENT IN AN ORGANIZED HEALTH CARE EDUCATION/TRAINING PROGRAM

## 2023-05-16 PROCEDURE — 87205 SMEAR GRAM STAIN: CPT | Performed by: HOSPITALIST

## 2023-05-16 PROCEDURE — 80053 COMPREHEN METABOLIC PANEL: CPT | Performed by: NURSE PRACTITIONER

## 2023-05-16 PROCEDURE — 82945 GLUCOSE OTHER FLUID: CPT | Performed by: HOSPITALIST

## 2023-05-16 RX ORDER — PROPOFOL 10 MG/ML
50 VIAL (ML) INTRAVENOUS ONCE
Status: COMPLETED | OUTPATIENT
Start: 2023-05-16 | End: 2023-05-16

## 2023-05-16 RX ORDER — NOREPINEPHRINE BITARTRATE/D5W 8 MG/250ML
0-3 PLASTIC BAG, INJECTION (ML) INTRAVENOUS CONTINUOUS
Status: DISCONTINUED | OUTPATIENT
Start: 2023-05-16 | End: 2023-05-23

## 2023-05-16 RX ORDER — FENTANYL CITRATE-0.9 % NACL/PF 10 MCG/ML
0-200 PLASTIC BAG, INJECTION (ML) INTRAVENOUS CONTINUOUS
Status: DISCONTINUED | OUTPATIENT
Start: 2023-05-16 | End: 2023-05-23

## 2023-05-16 RX ORDER — PROPOFOL 10 MG/ML
0-50 INJECTION, EMULSION INTRAVENOUS CONTINUOUS
Status: DISCONTINUED | OUTPATIENT
Start: 2023-05-16 | End: 2023-05-23

## 2023-05-16 RX ORDER — SUCCINYLCHOLINE CHLORIDE 20 MG/ML
100 INJECTION INTRAMUSCULAR; INTRAVENOUS ONCE
Status: COMPLETED | OUTPATIENT
Start: 2023-05-16 | End: 2023-05-16

## 2023-05-16 RX ORDER — DEXMEDETOMIDINE HYDROCHLORIDE 4 UG/ML
0-1.4 INJECTION, SOLUTION INTRAVENOUS CONTINUOUS
Status: DISCONTINUED | OUTPATIENT
Start: 2023-05-16 | End: 2023-05-23

## 2023-05-16 RX ORDER — FAMOTIDINE 10 MG/ML
20 INJECTION INTRAVENOUS EVERY 12 HOURS
Status: DISCONTINUED | OUTPATIENT
Start: 2023-05-16 | End: 2023-05-16

## 2023-05-16 RX ORDER — FUROSEMIDE 10 MG/ML
40 INJECTION INTRAMUSCULAR; INTRAVENOUS ONCE
Status: COMPLETED | OUTPATIENT
Start: 2023-05-16 | End: 2023-05-16

## 2023-05-16 RX ORDER — SODIUM CHLORIDE 0.9 % (FLUSH) 0.9 %
10 SYRINGE (ML) INJECTION
Status: DISCONTINUED | OUTPATIENT
Start: 2023-05-16 | End: 2023-05-23 | Stop reason: HOSPADM

## 2023-05-16 RX ORDER — METOPROLOL TARTRATE 1 MG/ML
5 INJECTION, SOLUTION INTRAVENOUS EVERY 4 HOURS PRN
Status: DISCONTINUED | OUTPATIENT
Start: 2023-05-16 | End: 2023-05-23 | Stop reason: HOSPADM

## 2023-05-16 RX ORDER — NOREPINEPHRINE BITARTRATE/D5W 8 MG/250ML
PLASTIC BAG, INJECTION (ML) INTRAVENOUS
Status: DISPENSED
Start: 2023-05-16 | End: 2023-05-16

## 2023-05-16 RX ORDER — ALBUMIN HUMAN 250 G/1000ML
12.5 SOLUTION INTRAVENOUS EVERY 6 HOURS
Status: DISCONTINUED | OUTPATIENT
Start: 2023-05-16 | End: 2023-05-23 | Stop reason: HOSPADM

## 2023-05-16 RX ORDER — FAMOTIDINE 10 MG/ML
20 INJECTION INTRAVENOUS DAILY
Status: DISCONTINUED | OUTPATIENT
Start: 2023-05-17 | End: 2023-05-23 | Stop reason: HOSPADM

## 2023-05-16 RX ADMIN — FAMOTIDINE 20 MG: 10 INJECTION, SOLUTION INTRAVENOUS at 09:05

## 2023-05-16 RX ADMIN — PROPOFOL 35 MCG/KG/MIN: 10 INJECTION, EMULSION INTRAVENOUS at 11:05

## 2023-05-16 RX ADMIN — PROPOFOL 35 MCG/KG/MIN: 10 INJECTION, EMULSION INTRAVENOUS at 04:05

## 2023-05-16 RX ADMIN — THIAMINE HCL TAB 100 MG 100 MG: 100 TAB at 09:05

## 2023-05-16 RX ADMIN — METOPROLOL TARTRATE 5 MG: 1 INJECTION, SOLUTION INTRAVENOUS at 12:05

## 2023-05-16 RX ADMIN — SODIUM CHLORIDE, PRESERVATIVE FREE 10 ML: 5 INJECTION INTRAVENOUS at 01:05

## 2023-05-16 RX ADMIN — Medication 1 CAPSULE: at 07:05

## 2023-05-16 RX ADMIN — ALBUMIN (HUMAN) 12.5 G: 12.5 SOLUTION INTRAVENOUS at 12:05

## 2023-05-16 RX ADMIN — SUCCINYLCHOLINE CHLORIDE 100 MG: 20 INJECTION, SOLUTION INTRAMUSCULAR; INTRAVENOUS at 02:05

## 2023-05-16 RX ADMIN — PROPOFOL 50 MCG/KG/MIN: 10 INJECTION, EMULSION INTRAVENOUS at 03:05

## 2023-05-16 RX ADMIN — COLLAGENASE SANTYL: 250 OINTMENT TOPICAL at 01:05

## 2023-05-16 RX ADMIN — SODIUM CHLORIDE, PRESERVATIVE FREE 10 ML: 5 INJECTION INTRAVENOUS at 06:05

## 2023-05-16 RX ADMIN — PROPOFOL 50 MG: 10 INJECTION, EMULSION INTRAVENOUS at 02:05

## 2023-05-16 RX ADMIN — COLLAGENASE SANTYL: 250 OINTMENT TOPICAL at 08:05

## 2023-05-16 RX ADMIN — PROPOFOL 30 MCG/KG/MIN: 10 INJECTION, EMULSION INTRAVENOUS at 07:05

## 2023-05-16 RX ADMIN — Medication 50 MCG/HR: at 03:05

## 2023-05-16 RX ADMIN — ALBUMIN (HUMAN) 12.5 G: 12.5 SOLUTION INTRAVENOUS at 11:05

## 2023-05-16 RX ADMIN — SODIUM CHLORIDE, PRESERVATIVE FREE 10 ML: 5 INJECTION INTRAVENOUS at 11:05

## 2023-05-16 RX ADMIN — ALBUMIN (HUMAN) 12.5 G: 12.5 SOLUTION INTRAVENOUS at 03:05

## 2023-05-16 RX ADMIN — DEXTROSE MONOHYDRATE 300 MG: 50 INJECTION, SOLUTION INTRAVENOUS at 09:05

## 2023-05-16 RX ADMIN — FUROSEMIDE 40 MG: 10 INJECTION, SOLUTION INTRAMUSCULAR; INTRAVENOUS at 12:05

## 2023-05-16 RX ADMIN — THERA TABS 1 TABLET: TAB at 09:05

## 2023-05-16 RX ADMIN — RIFAMPIN 300 MG: 300 CAPSULE ORAL at 09:05

## 2023-05-16 RX ADMIN — NOREPINEPHRINE BITARTRATE 0.06 MCG/KG/MIN: 8 INJECTION, SOLUTION INTRAVENOUS at 03:05

## 2023-05-16 RX ADMIN — NOREPINEPHRINE BITARTRATE 0.05 MCG/KG/MIN: 8 INJECTION, SOLUTION INTRAVENOUS at 06:05

## 2023-05-16 RX ADMIN — DEXTROSE MONOHYDRATE 125 ML: 100 INJECTION, SOLUTION INTRAVENOUS at 12:05

## 2023-05-16 RX ADMIN — MICAFUNGIN SODIUM 100 MG: 100 INJECTION, POWDER, LYOPHILIZED, FOR SOLUTION INTRAVENOUS at 03:05

## 2023-05-16 RX ADMIN — Medication 1 CAPSULE: at 04:05

## 2023-05-16 RX ADMIN — ALBUMIN (HUMAN) 12.5 G: 12.5 SOLUTION INTRAVENOUS at 06:05

## 2023-05-16 RX ADMIN — Medication 1 CAPSULE: at 01:05

## 2023-05-16 RX ADMIN — Medication: at 08:05

## 2023-05-16 NOTE — PROGRESS NOTES
NEPHROLOGY: Progress      63 y.o. male with MRSA bacteremia.  Past medical history significant for polysubstance abuse, IV drug use, diabetes mellitus, untreated hepatitis-C, and cirrhosis.  He was recently hospitalized at [facility] for MRSA bacteremia due to C and L-spine osteomyelitis/diskitis, paraspinous muscle abscess, and lumbar epidural abscess.    He also had a cholecystostomy drain placed for cholecystitis.  Patient left A on 05/05/2023 and then presented to [facility] on 05/06/2023.  Blood cultures at admission were positive for MRSA.  UDS was also positive for amphetamines and opiates.  Neurosurgery and Infectious Disease have been following for bacteremia and spinal osteomyelitis/diskitis.  He has been getting daptomycin, rifampin, and Zosyn per ID recommendations.  At admission his renal function was normal with creatinine of 0.7.  Starting 05/10/2023 his renal function started to worsen with creatinine 1.0->1.7->1.9 at time of Nephrology consultation.  Labs also concerning with worsening anemia and hypoalbuminemia.  Patient had an episode of hematochezia on the 12th and was given 3 units of packed red cells.  The patient also has some pyuria and concerned for AIN, possibly related to Zosyn, in particular with development of petechial rash on the dorsum of his arms and legs .  The concomitant acute anemia with a hemoglobin is low as 5.9 could be a significant contributor to his RUPERTO.  Nuclear scan has located some accumulation of contrast in the ascending colon.  And colonoscopy revealed pan colonic diverticuli.   Patient was transferred to ICU due to respiratory failure requiring intubation.  Patient is grossly volume overloaded but third-spacing peripherally as well as into pleural and peritoneal spaces.  We will start IV albumin now in anticipation that patient will need  thoracentesis.                 Current Facility-Administered Medications:     0.9%  NaCl infusion (for blood administration), , Intravenous, Q24H PRN, Guerrero Alfaro MD    0.9%  NaCl infusion (for blood administration), , Intravenous, Q24H PRN, Fred Dominguez DO, New Bag at 05/12/23 1640    0.9%  NaCl infusion (for blood administration), , Intravenous, Q24H PRN, MARYJO MorrisCNP-BC    0.9%  NaCl infusion (for blood administration), , Intravenous, Q24H PRN, Guerrero Alfaro MD    0.9%  NaCl infusion (for blood administration), , Intravenous, Q24H PRN, Guerrero Alfaro MD    0.9%  NaCl infusion (for blood administration), , Intravenous, Q24H PRN, Guerrero Alfaro MD    albumin human 25% bottle 12.5 g, 12.5 g, Intravenous, Q6H, Cici Nunez MD, Stopped at 05/16/23 0453    aluminum-magnesium hydroxide-simethicone 200-200-20 mg/5 mL suspension 30 mL, 30 mL, Oral, QID PRN, Nery Sepulveda MD    collagenase ointment, , Topical (Top), BID, Reggie Castañeda MD, Given at 05/15/23 2218    DAPTOmycin (CUBICIN) 435 mg in sodium chloride 0.9% SolP 50 mL IVPB, 6 mg/kg, Intravenous, Q48H, Darnell Franco MD, Stopped at 05/15/23 1834    dexmedetomidine (PRECEDEX) 400mcg/100mL 0.9% NaCL infusion, 0-1.4 mcg/kg/hr, Intravenous, Continuous, Cici Nunez MD    dextrose 10% bolus 125 mL 125 mL, 12.5 g, Intravenous, PRN, Melissa Mohan AGACNP-BC    dextrose 10% bolus 250 mL 250 mL, 25 g, Intravenous, PRN, Melissa Mohan AGACNP-BC    famotidine (PF) injection 20 mg, 20 mg, Intravenous, Q12H, Cici Nunez MD    fentaNYL 2500 mcg in 0.9% sodium chloride 250 mL infusion premix (titrating), 0-200 mcg/hr, Intravenous, Continuous, Cici Nunez MD, Last Rate: 5 mL/hr at 05/16/23 0353, 50 mcg/hr at 05/16/23 0353    glucagon (human recombinant) injection 1 mg, 1 mg, Intramuscular, PRN, Melissa Mohan AGACNP-BC    glucose chewable tablet 16 g, 16 g, Oral, PRN, Mica Naidu MD    glucose chewable tablet 24 g, 24 g,  Oral, PRN, Mica Naidu MD, 24 g at 05/08/23 0500    HYDROmorphone (PF) injection 0.4 mg, 0.4 mg, Intravenous, Q5 Min PRN, Kylah Ortiz MD, 0.4 mg at 05/10/23 1610    HYDROmorphone (PF) injection 0.5 mg, 0.5 mg, Intravenous, Q4H PRN, Guerrero Alfaro MD, 0.5 mg at 05/15/23 1228    insulin aspart U-100 injection 1-10 Units, 1-10 Units, Subcutaneous, QID (AC + HS) PRN, Melissa Mohan, AGACNP-BC, 1 Units at 05/14/23 2132    insulin detemir U-100 injection 12 Units, 12 Units, Subcutaneous, QHS, Darnell Franco MD, 12 Units at 05/15/23 2218    Lactobacillus acidophilus capsule 1 capsule, 1 capsule, Oral, TID WM, Nery Sepulveda MD, 1 capsule at 05/16/23 0759    loperamide capsule 2 mg, 2 mg, Oral, QID PRN, Guerrero Alfaro MD, 2 mg at 05/15/23 2217    melatonin tablet 6 mg, 6 mg, Oral, Nightly PRN, Nery Sepulveda MD    metoprolol injection 5 mg, 5 mg, Intravenous, Q4H PRN, Cici Nunez MD    micafungin 100 mg in sodium chloride 0.9 % 100 mL IVPB (MB+), 100 mg, Intravenous, Q24H, Cici Nunez MD, Stopped at 05/16/23 0453    multivitamin tablet, 1 tablet, Oral, Daily, Nery Sepulveda MD, 1 tablet at 05/15/23 0922    NORepinephrine 8 mg (LEVOPHED) 8 mg/250 mL (32 mcg/mL) in dextrose 5% 250 mL infusion, , , ,     NORepinephrine 8 mg in dextrose 5% 250 mL infusion, 0-3 mcg/kg/min, Intravenous, Continuous, Medhat Reece Jr., MD, FCCP, Last Rate: 8.2 mL/hr at 05/16/23 0335, 0.06 mcg/kg/min at 05/16/23 0335    ondansetron injection 4 mg, 4 mg, Intravenous, Q4H PRN, Nery Sepulveda MD, 4 mg at 05/06/23 0434    ondansetron injection 4 mg, 4 mg, Intravenous, Daily PRN, Kylah Ortiz MD    oxyCODONE-acetaminophen 5-325 mg per tablet 1 tablet, 1 tablet, Oral, Q6H PRN, Guerrero Alfaro MD, 1 tablet at 05/15/23 0189    polyethylene glycol packet 17 g, 17 g, Oral, BID PRN, Nery Sepulveda MD    prochlorperazine injection Soln 5 mg, 5 mg, Intravenous, Q6H PRN, Nery Sepulveda MD    prochlorperazine injection Soln 5 mg, 5 mg,  "Intravenous, Q30 Min PRN, Kylah Ortiz MD    propofol (DIPRIVAN) 10 mg/mL infusion, 0-50 mcg/kg/min, Intravenous, Continuous, Cici Nunez MD, Last Rate: 13.1 mL/hr at 05/16/23 0759, 30 mcg/kg/min at 05/16/23 0759    rifAMpin capsule 300 mg, 300 mg, Oral, Q12H, Mica Naidu MD, 300 mg at 05/15/23 2217    senna-docusate 8.6-50 mg per tablet 2 tablet, 2 tablet, Oral, BID PRN, Nery Sepulveda MD    sodium chloride 0.9% flush 10 mL, 10 mL, Intravenous, PRN, Nery Sepulveda MD    Flushing PICC Protocol, , , Until Discontinued **AND** sodium chloride 0.9% flush 10 mL, 10 mL, Intravenous, Q6H, 10 mL at 05/16/23 0122 **AND** sodium chloride 0.9% flush 10 mL, 10 mL, Intravenous, PRN, Darnell Franco MD    sodium chloride 0.9% flush 10 mL, 10 mL, Intravenous, PRN, Cici Nunez MD    thiamine tablet 100 mg, 100 mg, Oral, Daily, Darnell Franco MD, 100 mg at 05/15/23 0922    tiZANidine tablet 4 mg, 4 mg, Oral, Q8H PRN, Nery Sepulveda MD    zinc oxide-cod liver oil 40 % paste, , Topical (Top), BID, Reggie Castañeda MD, Given at 05/15/23 2218        BP (!) 86/62   Pulse 98   Temp 99 °F (37.2 °C) (Oral)   Resp 19   Ht 5' 7" (1.702 m)   Wt 72.6 kg (160 lb)   SpO2 100%   BMI 25.06 kg/m²     Physical Exam:    GEN:  Patient is somewhat chronically ill-appearing.  Patient is currently intubated and sedated.  He is on norepinephrine 0.12 mics per kilos per minute.   HEENT: Atraumatic. EOMI, no icterus  NECK : No JVD  CARD : RRR s rub or gallop  LUNGS :  Bilateral coarse rhonchi with diminished breath sounds  ABD : Soft,non-tender. BS active ascites present.  Percutaneous cholecystostomy tube draining ascites  EXT :  1 to 2+ pitting edema.           Intake/Output Summary (Last 24 hours) at 5/16/2023 0827  Last data filed at 5/16/2023 0524  Gross per 24 hour   Intake 960 ml   Output 2750 ml   Net -1790 ml         Laboratory:  Recent Results (from the past 24 hour(s))   POCT glucose    Collection Time: 05/15/23 11:25 AM "   Result Value Ref Range    POCT Glucose 71 70 - 110 mg/dL   POCT glucose    Collection Time: 05/15/23  6:03 PM   Result Value Ref Range    POCT Glucose 118 (H) 70 - 110 mg/dL   RT Blood Gas    Collection Time: 05/15/23  8:20 PM   Result Value Ref Range    Sample Type Arterial Blood     Sample site Right Radial Artery     Drawn by RF RRT     pH, Blood gas 7.420 7.350 - 7.450    pCO2, Blood gas 40.0 35.0 - 45.0 mmHg    pO2, Blood gas 72.0 (L) 80.0 - 100.0 mmHg    Sodium, Blood Gas 131 (L) 137 - 145 mmol/L    Potassium, Blood Gas 3.9 3.5 - 5.0 mmol/L    Calcium Level Ionized 1.02 (L) 1.12 - 1.23 mmol/L    TOC2, Blood gas 27.1 mmol/L    Base Excess, Blood gas 1.30 -2.00 - 2.00 mmol/L    sO2, Blood gas 94.6 %    HCO3, Blood gas 25.9 22.0 - 26.0 mmol/L    THb, Blood gas 11.1 (L) 12 - 16 g/dL    O2 Hb, Blood Gas 93.7 (L) 94.0 - 97.0 %    CO Hgb 2.5 (H) 0.5 - 1.5 %    Met Hgb 0.7 0.4 - 1.5 %    Allens Test Yes     Oxygen Device, Blood gas Face Mask     LPM 10.0    POCT glucose    Collection Time: 05/15/23 10:16 PM   Result Value Ref Range    POCT Glucose 124 (H) 70 - 110 mg/dL   Comprehensive Metabolic Panel    Collection Time: 05/16/23 12:45 AM   Result Value Ref Range    Sodium Level 137 136 - 145 mmol/L    Potassium Level 4.0 3.5 - 5.1 mmol/L    Chloride 101 98 - 107 mmol/L    Carbon Dioxide 24 23 - 31 mmol/L    Glucose Level 124 (H) 82 - 115 mg/dL    Blood Urea Nitrogen 27.4 (H) 8.4 - 25.7 mg/dL    Creatinine 2.42 (H) 0.73 - 1.18 mg/dL    Calcium Level Total 7.4 (L) 8.8 - 10.0 mg/dL    Protein Total 5.1 (L) 5.8 - 7.6 gm/dL    Albumin Level 1.2 (L) 3.4 - 4.8 g/dL    Globulin 3.9 (H) 2.4 - 3.5 gm/dL    Albumin/Globulin Ratio 0.3 (L) 1.1 - 2.0 ratio    Bilirubin Total 1.0 <=1.5 mg/dL    Alkaline Phosphatase 93 40 - 150 unit/L    Alanine Aminotransferase 14 0 - 55 unit/L    Aspartate Aminotransferase 30 5 - 34 unit/L    eGFR 30 mls/min/1.73/m2   D-Dimer, Quantitative    Collection Time: 05/16/23 12:45 AM   Result Value Ref  Range    D-Dimer 7.97 (H) 0.00 - 0.50 ug/mL FEU   Lactic Acid, Plasma    Collection Time: 05/16/23 12:45 AM   Result Value Ref Range    Lactic Acid Level 2.2 0.5 - 2.2 mmol/L   CBC with Differential    Collection Time: 05/16/23 12:45 AM   Result Value Ref Range    WBC 26.17 (H) 4.50 - 11.50 x10(3)/mcL    RBC 3.49 (L) 4.70 - 6.10 x10(6)/mcL    Hgb 10.5 (L) 14.0 - 18.0 g/dL    Hct 32.4 (L) 42.0 - 52.0 %    MCV 92.8 80.0 - 94.0 fL    MCH 30.1 27.0 - 31.0 pg    MCHC 32.4 (L) 33.0 - 36.0 g/dL    RDW 16.6 11.5 - 17.0 %    Platelet 337 130 - 400 x10(3)/mcL    MPV 10.2 7.4 - 10.4 fL    NRBC% 0.0 %   Manual Differential    Collection Time: 05/16/23 12:45 AM   Result Value Ref Range    Neut Man 99 %    Lymph Man 1 %    Instr WBC 26.17 x10(3)/mcL    Abs Lymp 0.2617 (L) 0.6 - 4.6 x10(3)/mcL    Abs Neut 25.9083 (H) 2.1 - 9.2 x10(3)/mcL    RBC Morph Abnormal (A) Normal    Anisocyte 1+ (A) (none)    Poik 1+ (A) (none)    [place] Cells 1+ (A) (none)    Platelet Est Normal Normal, Adequate   RT Blood Gas    Collection Time: 05/16/23  2:13 AM   Result Value Ref Range    Sample Type Arterial Blood     Sample site Left Radial Artery     Drawn by ,rrt     pH, Blood gas 7.350 7.350 - 7.450    pCO2, Blood gas 51.0 (HH) 35.0 - 45.0 mmHg    pO2, Blood gas 90.0 80.0 - 100.0 mmHg    Sodium, Blood Gas 133 (L) 137 - 145 mmol/L    Potassium, Blood Gas 3.8 3.5 - 5.0 mmol/L    Calcium Level Ionized 1.00 (L) 1.12 - 1.23 mmol/L    TOC2, Blood gas 29.8 mmol/L    Base Excess, Blood gas 1.80 mmol/L    sO2, Blood gas 97.0 %    HCO3, Blood gas 28.2 >=15.0 mmol/L    Allens Test Yes     MODE BiPAP     FIO2, Blood gas 100 %    IPAP 14 cmH2O    EPAP 6 cmH2O   RT Blood Gas    Collection Time: 05/16/23  5:44 AM   Result Value Ref Range    Sample Type Arterial Blood     Sample site Left Radial Artery     Drawn by sd rrt     pH, Blood gas 7.370 7.350 - 7.450    pCO2, Blood gas 50.0 (H) 35.0 - 45.0 mmHg    pO2, Blood gas 236.0 (H) 80.0 - 100.0 mmHg    Sodium,  Blood Gas 134 (L) 137 - 145 mmol/L    Potassium, Blood Gas 3.5 3.5 - 5.0 mmol/L    Calcium Level Ionized 0.98 (L) 1.12 - 1.23 mmol/L    TOC2, Blood gas 30.4 mmol/L    Base Excess, Blood gas 2.80 mmol/L    sO2, Blood gas 100.0 %    HCO3, Blood gas 28.9 >=15.0 mmol/L    Allens Test Yes     MODE AC     FIO2, Blood gas 100 %    Mech Vt 450 ml    Mech RR 18 b/min    PEEP 8.0 cmH2O   Comprehensive Metabolic Panel    Collection Time: 05/16/23  6:56 AM   Result Value Ref Range    Sodium Level 136 136 - 145 mmol/L    Potassium Level 4.8 3.5 - 5.1 mmol/L    Chloride 103 98 - 107 mmol/L    Carbon Dioxide 19 (L) 23 - 31 mmol/L    Glucose Level 79 (L) 82 - 115 mg/dL    Blood Urea Nitrogen 29.2 (H) 8.4 - 25.7 mg/dL    Creatinine 2.60 (H) 0.73 - 1.18 mg/dL    Calcium Level Total 7.3 (L) 8.8 - 10.0 mg/dL    Protein Total 5.6 (L) 5.8 - 7.6 gm/dL    Albumin Level 1.3 (L) 3.4 - 4.8 g/dL    Globulin 4.3 (H) 2.4 - 3.5 gm/dL    Albumin/Globulin Ratio 0.3 (L) 1.1 - 2.0 ratio    Bilirubin Total 0.8 <=1.5 mg/dL    Alkaline Phosphatase 91 40 - 150 unit/L    Alanine Aminotransferase 13 0 - 55 unit/L    Aspartate Aminotransferase 50 (H) 5 - 34 unit/L    eGFR 27 mls/min/1.73/m2   Magnesium    Collection Time: 05/16/23  6:56 AM   Result Value Ref Range    Magnesium Level 1.90 1.60 - 2.60 mg/dL   Phosphorus    Collection Time: 05/16/23  6:56 AM   Result Value Ref Range    Phosphorus Level 5.9 (H) 2.3 - 4.7 mg/dL         Assessment/Plan:  RUPERTO-nonoliguric thus far-multiple risk factors including AIN  Recent colonic diverticular bleed  Respiratory failure -bilateral pleural effusions-on ventilator  Cholecystostomy tube cholecystitis-draining PD fluid  MRSA bacteremia-osteomyelitis/psoas abscesses  Status post recent lumbar laminectomy and drainage   Polysubstance abuse   Multiple infectious sources-epidural abscess   Cervical and lumbar spine osteomyelitis/diskitis  Sacral ulcer debrided 5/10  Cirrhosis-untreated hep C  Type 2 diabetes mellitus       He remains on Zosyn and renal functions slightly worse.  No significant eosinophilia however.  Patient is currently critically ill on norepinephrine infusion, ventilator support and sedation.  Patient will likely undergo bilateral thoracentesis.  Cholecystostomy tube appears to be draining ascites> bile.  Patient may ultimately require renal replacement therapy.  We will repeat urine sodium and creatinine today.    Adi Campos MD, [facility]

## 2023-05-16 NOTE — NURSING
2300 RRT called after giving pt Lasix & increasing O2 from NC2L to OM 15L b/c pt was still c/o SOB, tachypneic, & tachycardic. Placed on Bipap, notified ICU resident, transferred to ICU.   Attempted to call cailin Delvalle [phone] & friendTammy [phone] but neither answered, voicemail left to return calls.    0300-significant other came to visit, updated on pt status, brought to ICU waiting room & notified ICU nurse.    0700-cailin Tang returned call-updated on father's condition, room number given & how to visit.

## 2023-05-16 NOTE — PROCEDURES
[facility] - 7 North ICU    Left Ultrasound guided Thoracentesis Procedure Note         Date of Procedure: 5/16/2023    Procedure:  Left Ultrasound-guided diagnostic and therapeutic thoracentesis    Performed by: Steven Cyr DO    Pre-Procedure Diagnosis:  Left Pleural effusion    Post-Procedure Diagnosis:  Same    Anesthesia:  5 cc of 1% local lidocaine injected subcutaneously      Indication: The patient is a 63 y.o. male with left pleural effusion.    Consent: The risks, benefits, potential complications, treatment options and expected outcomes were discussed with the patient and/or family.  The possibilities of reaction to medication, pulmonary aspiration, perforation of an organ , bleeding, failure to diagnose a condition and creating a complication requiring transfusion or operation were discussed.  Signed/verbal consent was granted.      Description of the Findings of the Procedure:  A Time Out was held and the above information confirmed.     With patient in sitting position, ultrasound guidance was used to identify a pocket of fluid felt amenable to aspiration.  The left posterior mid axillary area was sterilized with chlorhexidine and draped in sterile fashion.  5 cc of local lidocaine was injected subcutaneously.  A small incision was made with scalpel.  A 20 gauge thoracentesis needle was advanced with aspiration via 10 cc syringe.  When pleural fluid was obtained, an 8 Georgian drainage catheter was advanced over the needle into the pleural space and the needle was removed.  The catheter was then connected to suction for fluid removal via Vacutainer.    Total of 1000 cc of fluid was removed.     Appearance of fluid was straw-colored.    The patient recovered from the procedure and anesthesia in satisfactory condition.      Complications:  None    Estimated Blood Loss (EBL): Minimal    Specimens: micro and path    Steven Cyr DO  Pulmonary Critical Care Medicine  [facility]  [facility] - [address] [place] [address]

## 2023-05-16 NOTE — NURSING
Nurses Note -- 4 Eyes      5/16/2023   4:36 AM      Skin assessed during: Admit      [] No Altered Skin Integrity Present    [x]Prevention Measures Documented      [x] Yes- Altered Skin Integrity Present or Discovered   [] LDA Added if Not in Epic (Describe Wound)   [] New Altered Skin Integrity was Present on Admit and Documented in LDA   [x] Wound Image Taken    Wound Care Consulted? Yes    Attending Nurse:  Silva Radford RN     Second RN/Staff Member:  Criselda Mora RN

## 2023-05-16 NOTE — ANESTHESIA POSTPROCEDURE EVALUATION
Anesthesia Post Evaluation    Patient: Reji Plasencia    Procedure(s) Performed: Procedure(s) (LRB):  COLONOSCOPY (N/A)    Final Anesthesia Type: general      Patient location during evaluation: PACU  Patient participation: Yes- Able to Participate  Level of consciousness: awake and alert  Post-procedure vital signs: reviewed and stable  Pain management: adequate  Airway patency: patent      Anesthetic complications: no      Cardiovascular status: blood pressure returned to baseline  Respiratory status: unassisted  Hydration status: euvolemic  Follow-up not needed.          Vitals Value Taken Time   /84 05/14/23 1154   Temp ** 05/16/23 1344   Pulse 97 05/14/23 1154   Resp 21 05/14/23 1154   SpO2 98 % 05/14/23 1154   Vitals shown include unvalidated device data.      No case tracking events are documented in the log.      Pain/Robinson Score: Pain Rating Prior to Med Admin: 10 (5/16/2023  3:53 AM)  Pain Rating Post Med Admin: 2 (5/15/2023 11:18 PM)

## 2023-05-16 NOTE — NURSING
Artificial Intelligence Notification  [facility]  [address]  [place] [address]  Phone: [phone]     This documentation was triggered by an Artificial Intelligence Notification.     Admit Date: 2023   LOS: 10  Code Status: Full Code  : 1960  Age: 63 y.o.  Weight:   Wt Readings from Last 1 Encounters:   23 72.6 kg (160 lb)        Sex: male  Bed: [address] [place] [address] A  MRN: [id number]  Attending Physician: Darnell Franco MD     Date of Alert: 05/15/2023  Time AI Alert Received:              Vitals:    05/15/23 2218   BP:    Pulse:    Resp: 16   Temp:        Patient Condition: Spoke with nurse Dia, reviewed patient status. Informed that lasix recently given for fluid overload. HR elevated, metoprolol to be given IVP. Will continue to monitor. Instructed to call with any deterioration.

## 2023-05-16 NOTE — PROGRESS NOTES
[facility] - 8th Floor Med Surg  Neurosurgery  Progress Note    Subjective:     Interval History:  Transferred to ICU overnight.  Patient was in respiratory failure requiring intubation.  In volume overload.  On vasopressors.  Still needing MRIs.  Wound care addressing lumbar wound and sacral wound at this time.  The lumbar wound is expressing thick purulent drainage that was sampled and will be cultured.    History of Present Illness: 62-year-old male with significant history of polysubstance abuse/IV drug abuse, type 2 diabetes mellitus, untreated chronic hep C/cirrhosis.  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  Patient was being treated in [facility] with close follow-up with neurosurgery, Infectious Disease.  Hospital stay also was even full for cholecystitis for which he underwent cholecystostomy tube placement.  Patient signed out AMA from [facility] on 05/05, went home, called ambulance and presented to the hospital.  Per chart review patient was being treated with daptomycin and rifampin in [facility].  While hospitalized his urine drug screen was positive for amphetamines and there were concerns that he was using it while hospitalized.  Patient was tachycardic in the ED.  Otherwise hemodynamics stable.  Complained of bilateral lower extremity swelling. Labs significant for leukocytosis, anemia, hyperglycemia. UDS was positive for opiates, amphetamines.  Hospitalist team was consulted for admission. Restarted on IV Daptomycin, Rifampin, Zosyn. Echocardiogram on 5/6 showed normal LV systolic function, EF 50-55%, indeterminate diastolic function, mild RV enlargement, mild MR, PASP 19 mmHg with no mention of vegetations.  Blood cultures from 05/05 for positive for MRSA.  Repeat blood culture from 05/09  negative for 24 hours.  Patient awaiting MRI lumbar spine.  Noted to have LLE pain with B/L LE U/S showing fluid collection in left cough.  Awaiting CT L leg and thigh with contrast.  Also ordered CT chest with contrast to further evaluate right-sided oblong opacity on CXR which is thought to be mass/possible abscess/fissural fluid collection. Underwent debridement for sacral ulcer on 05/10 with wound care being done. Neurosurgery to make further recommendations once MRI lumbar spine is done. Noted to have elevated Cr of 1.78; DC diuretics. Possibly d/t ATN from ABX; consulted Nephrology. Ordered US Retroperitoneum, UA. Cancelled CT chest, CT L thigh/leg due to RUPERTO. Nephrology on board; ordered [id number] given anemia, renal dysfunction, petechial rash to evaluate for TTP.  Patient noted to have hematochezia by nurse on 05/12; consulted GI.  HGB/HCT noted to drop to 6.7/22.0; transfused 2 units PRBCs. NM bleeding scan ordered by GI which was negative. Continued to have hematochezia on 05/13, started on bowel prep for colonoscopy by GI.    Post-Op Info:  Procedure(s) (LRB):  COLONOSCOPY (N/A)   2 Days Post-Op      Medications:  Continuous Infusions:   dexmedeTOMIDine (Precedex) infusion (titrating)      fentanyl 50 mcg/hr (05/16/23 1200)    NORepinephrine bitartrate-D5W 0.06 mcg/kg/min (05/16/23 1200)    propofoL 30 mcg/kg/min (05/16/23 1200)     Scheduled Meds:   albumin human 25%  12.5 g Intravenous Q6H    collagenase   Topical (Top) BID    DAPTOmycin (CUBICIN) IV (PEDS and ADULTS)  6 mg/kg Intravenous Q48H    famotidine (PF)  20 mg Intravenous Q12H    insulin detemir U-100  12 Units Subcutaneous QHS    Lactobacillus acidophilus  1 capsule Oral TID WM    micafungin (MYCAMINE) IVPB  100 mg Intravenous Q24H    multivitamin  1 tablet Oral Daily    NORepinephrine 8 mg        rifAMpin  300 mg Oral Q12H    sodium chloride 0.9%  10 mL Intravenous Q6H    thiamine  100 mg Oral Daily    zinc oxide-cod liver oil   Topical (Top)  BID     PRN Meds:sodium chloride, sodium chloride, sodium chloride, sodium chloride, sodium chloride, sodium chloride, aluminum-magnesium hydroxide-simethicone, dextrose 10%, dextrose 10%, glucagon (human recombinant), glucose, glucose, HYDROmorphone, HYDROmorphone, insulin aspart U-100, loperamide, melatonin, metoprolol, ondansetron, ondansetron, oxyCODONE-acetaminophen, polyethylene glycol, prochlorperazine, prochlorperazine, senna-docusate 8.6-50 mg, sodium chloride 0.9%, Flushing PICC Protocol **AND** sodium chloride 0.9% **AND** sodium chloride 0.9%, sodium chloride 0.9%, tiZANidine     Review of Systems  Objective:     Weight: 72.6 kg (160 lb)  Body mass index is 25.06 kg/m².  Vital Signs (Most Recent):  Temp: 98 °F (36.7 °C) (05/16/23 0715)  Pulse: 82 (05/16/23 1200)  Resp: (!) 22 (05/16/23 1200)  BP: 101/77 (05/16/23 1200)  SpO2: (!) 94 % (05/16/23 1200) Vital Signs (24h Range):  Temp:  [97.8 °F (36.6 °C)-99.6 °F (37.6 °C)] 98 °F (36.7 °C)  Pulse:  [] 82  Resp:  [16-40] 22  SpO2:  [82 %-100 %] 94 %  BP: ()/() 101/77     Date 05/16/23 0700 - 05/17/23 0659   Shift 8563-6917 7248-1358 6972-1631 24 Hour Total   INTAKE   I.V.(mL/kg) 185.1(2.5)   185.1(2.5)   IV Piggyback 0   0   Shift Total(mL/kg) 185.1(2.5)   185.1(2.5)   OUTPUT   Urine(mL/kg/hr) 500   500   Drains 300   300   Other 150   150   Shift Total(mL/kg) 950(13.1)   950(13.1)   Weight (kg) 72.6 72.6 72.6 72.6              Vent Mode: A/C  Oxygen Concentration (%):  [] 40  Resp Rate Total:  [18 br/min-20 br/min] 20 br/min  Vt Set:  [450 mL] 450 mL  PEEP/CPAP:  [8 cmH20] 8 cmH20  Mean Airway Pressure:  [10 cfA73-16 cmH20] 10 cmH20             Urethral Catheter 05/12/23 1630 Latex 16 Fr. (Active)   $ Awad Insertion Bedside Insertion Performed 05/12/23 1630   Site Assessment Clean;Intact;Dry 05/14/23 2100   Collection Container Standard drainage bag 05/14/23 2100   Securement Method secured to top of thigh w/ adhesive device 05/14/23  "2100   Catheter Care Performed yes 05/14/23 2100   Reason for Continuing Urinary Catheterization Non-healing sacral/perineal wound 05/14/23 2100   CAUTI Prevention Bundle Securement Device in place with 1" slack 05/14/23 2100   Output (mL) 310 mL 05/15/23 0013            Drain/Device    Right lower flank (Active)   Insertion Site dry crusted drainage 05/14/23 2100   Drainage Characteristics/Odor Brown;Yellow 05/14/23 2100   Drainage Amount Moderate 05/14/23 2100   General Output (mL) 50 05/15/23 0517       Neurosurgery Physical Exam    Significant Labs:  Recent Labs   Lab 05/15/23  0422 05/16/23  0045 05/16/23  0656    137 136   K 3.5 4.0 4.8   CO2 24 24 19*   BUN 27.7* 27.4* 29.2*   CREATININE 2.36* 2.42* 2.60*   CALCIUM 7.0* 7.4* 7.3*   MG 1.70  --  1.90       Recent Labs   Lab 05/15/23  0422 05/16/23  0045   WBC 17.55* 26.17*   HGB 9.0* 10.5*   HCT 27.1* 32.4*    337       Recent Labs   Lab 05/16/23  0825   INR 1.41*     Microbiology Results (last 7 days)       Procedure Component Value Units Date/Time    Respiratory Culture [[id number]] Collected: 05/16/23 1158    Order Status: Sent Specimen: Sputum from Transtrachael aspirate Updated: 05/16/23 1215    Blood Culture [[id number]] Collected: 05/16/23 0825    Order Status: Resulted Specimen: Blood Updated: 05/16/23 0845    Blood Culture [[id number]] Collected: 05/16/23 0656    Order Status: Resulted Specimen: Blood Updated: 05/16/23 0700    Urine culture [[id number]]  (Abnormal) Collected: 05/12/23 1048    Order Status: Completed Specimen: Urine Updated: 05/15/23 1018     Urine Culture >/= 100,000 colonies/ml Candida albicans    Blood Culture [[id number]]  (Normal) Collected: 05/09/23 2104    Order Status: Completed Specimen: Blood Updated: 05/14/23 2200     CULTURE, BLOOD ([facility]) No Growth at 5 days    Blood culture #1 **CANNOT BE ORDERED STAT** [[id number]]  (Abnormal)  (Susceptibility) Collected: 05/05/23 2105    Order Status: Completed Specimen: Blood " Updated: 05/10/23 0706     CULTURE, BLOOD ([facility]) Methicillin resistant Staphylococcus aureus     GRAM STAIN Gram Positive Cocci, probable Staphylococcus      Seen in gram stain of broth only      2 of 2 bottles positive            Assessment/Plan:    MRSA bacteremia   MRSA cervical, lumbar spine osteomyelitis with bilateral psoas, paraspinal muscle abscess as well as lumbar epidural abscess status post drainage, laminectomy  Recent cholecystitis status post cholecystostomy tube placement   Sepsis secondary to above   RUPERTO 2/2 possible ATN d/t ABX vs TTP  Hematochezia  Tachycardia 2/2 Anemia  Polysubstance abuse/IV drug abuse   Decompensated cirrhosis with volume overload   Untreated chronic hep C   Type 2 diabetes mellitus with hyperglycemia  Acute Normocytic Anemia 2/2 TTP vs GI Bleed      Unable to obtain MRI at this time.  We will re-attempt to do this again today if possible unsure with his acuity at this time if he will be able to have this done.     Patient on daptomycin, rifampin, Zosyn.  Attempt MRI lumbar spine when able to.  I spoke with the nurse about still needing this when the patient is able.  Infectious Disease.    Continue supportive measures   SCDs     Cultures of purulent drainage from lumbar wound collected today.  Cardiology being consulted for CRISTY   On vasopressor therapy at this time in the ICU.    On mechanical ventilation          Active Diagnoses:    Diagnosis Date Noted POA    PRINCIPAL PROBLEM:  Sepsis [A41.9] 05/05/2023 Yes    Staphylococcus aureus bacteremia [R78.81, B95.61] 05/05/2023 Yes      Problems Resolved During this Admission:       LUIS Moore-BC  Neurosurgery  [facility] - 8th Floor Med Surg

## 2023-05-16 NOTE — H&P
[facility]  Pulmonary Critical Care Note    Patient Name: Reji Plasencia  MRN: [id number]  Admission Date: 5/5/2023  Hospital Length of Stay: 11 days  Code Status: Full Code  Attending Provider: Darnell Franco MD  Primary Care Provider: Primary Doctor No     Subjective:     HPI:   63-year-old male with extensive medical comorbidities including diabetes, untreated hepatitis-C, cirrhosis, IV drug use, tobacco use, current MRSA bacteremia and C-spine/L-spine osteomyelitis/diskitis with bilateral psoas and paraspinous muscle abscesses in addition to a lumbar epidural abscess who was originally admitted to [facility] on 05/05 after leaving [place] from [facility] after 3 weeks of hospitalization during which he received IV antibiotics (daptomycin/rifampin) and underwent bilateral lumbar laminectomy and foraminotomy with drainage of epidural abscess on 04/21 by Dr. Chang.  Previous hospital stay was also complicated by cholecystitis for which he underwent MRCP and cholecystostomy tube placement (4/19) in addition to paracentesis.  Also during previous hospitalization he had a positive urine drug screen on 04/27 for amphetamines suggesting drug use while hospitalized.      During his stay at [facility] he has been followed by Neurosurgery, Infectious Disease, Gastroenterology, and Nephrology.  Echocardiogram on 05/06 with EF 50-55%, no mention of vegetations.  Has been awaiting MRI lumbar spine and CT of the left lower extremity secondary to fluid collection on ultrasound which was 9 x 4 x 2 cm.  However these imaging modalities have not been able to be performed yet.  He underwent sacral ulcer debridement on 05/10.  Hematochezia on 05/12 resulted in transfusion of 4 units of blood and 1 unit FFP and nuclear medicine bleeding scan revealed radiotracer accumulation with most intense activity in ascending colon prompting colonoscopy which showed pancolonic diverticula without active bleed and anorectal  hyperemic mucosa.    He is being upgraded to ICU for tenuous respiratory status currently requiring BiPAP 14/6 with 100% FiO2 maintaining saturations in the low/mid 80s and near hypotension with tachycardia.  During examination he is awake and alert and able to converse with BiPAP mask and endorsing shortness of breath.  He is significantly fluid overloaded on recent CXR despite 40 mg of Lasix x2 earlier tonight with worsening renal function.  Denying any other symptoms or complaints at present aside from chronic back pains. CRNA has been paged for intubation.  Adding Levophed for hypotension.    Hospital Course/Significant events:  N/a    24 Hour Interval History:  N/a    History reviewed. No pertinent past medical history.    Past Surgical History:   Procedure Laterality Date    COLONOSCOPY N/A 5/14/2023    Procedure: COLONOSCOPY;  Surgeon: Shaneka Ayers MD;  Location: [facility];  Service: Gastroenterology;  Laterality: N/A;    DEBRIDEMENT OF SACRAL WOUND N/A 5/10/2023    Procedure: DEBRIDEMENT, WOUND, SACRUM;  Surgeon: Reggie Castañeda MD;  Location: [facility];  Service: General;  Laterality: N/A;       Social History     Socioeconomic History    Marital status: Single           Current Outpatient Medications   Medication Instructions    HYDROcodone-acetaminophen (NORCO)  mg per tablet 1 tablet, Oral, Every 6 hours PRN       Current Inpatient Medications   albumin human 25%  12.5 g Intravenous Q6H    collagenase   Topical (Top) BID    DAPTOmycin (CUBICIN) IV (PEDS and ADULTS)  6 mg/kg Intravenous Q48H    famotidine (PF)  20 mg Intravenous Q12H    insulin detemir U-100  12 Units Subcutaneous QHS    Lactobacillus acidophilus  1 capsule Oral TID WM    micafungin (MYCAMINE) IVPB  100 mg Intravenous Q24H    multivitamin  1 tablet Oral Daily    NORepinephrine 8 mg        rifAMpin  300 mg Oral Q12H    sodium chloride 0.9%  10 mL Intravenous Q6H    thiamine  100 mg Oral Daily    zinc oxide-cod liver oil   Topical (Top)  BID       Current Intravenous Infusions   dexmedeTOMIDine (Precedex) infusion (titrating)      fentanyl      NORepinephrine bitartrate-D5W 0.06 mcg/kg/min (05/16/23 0335)    propofoL 50 mcg/kg/min (05/16/23 0334)       ROS   Unable to obtain secondary to intubation    Objective:       Intake/Output Summary (Last 24 hours) at 5/16/2023 0212  Last data filed at 5/15/2023 2330  Gross per 24 hour   Intake 960 ml   Output 2325 ml   Net -1365 ml         Vital Signs (Most Recent):  Temp: 99.6 °F (37.6 °C) (05/15/23 2259)  Pulse: (!) 125 (05/16/23 0045)  Resp: (!) 30 (05/16/23 0116)  BP: (!) 93/55 (05/16/23 0154)  SpO2: (!) 87 % (05/16/23 0154)  Body mass index is 25.06 kg/m².  Weight: 72.6 kg (160 lb) Vital Signs (24h Range):  Temp:  [97.6 °F (36.4 °C)-99.6 °F (37.6 °C)] 99.6 °F (37.6 °C)  Pulse:  [] 125  Resp:  [16-40] 30  SpO2:  [87 %-98 %] 87 %  BP: ()/() 93/55     Physical Exam  General:  thin, well-developed, no acute distress, intubated ventilated sedated  HEENT: Normocephalic, atraumatic.  Neck: Supple. No obvious JVD.   Respiratory:  Coarse and rhonchus breath sounds bilaterally  Cardiovascular:  Sinus tachycardic.  No obvious M/G/R. Warm extremities. + extensive pitting edema.  Gastrointestinal:  Soft, nontender. bowel sounds present  Neurologic:  Intubated ventilated sedated      Lines/Drains/Airways       Peripherally Inserted Central Catheter Line  Duration             PICC Double Lumen 05/10/23 0817 right brachial 5 days              Drain  Duration                  Drain/Device    Right lower flank -- days         Urethral Catheter 05/12/23 1630 Latex 16 Fr. 3 days                    Significant Labs:    Lab Results   Component Value Date    WBC 26.17 (H) 05/16/2023    HGB 10.5 (L) 05/16/2023    HCT 32.4 (L) 05/16/2023    MCV 92.8 05/16/2023     05/16/2023         BMP  Lab Results   Component Value Date     05/16/2023    K 4.0 05/16/2023    CHLORIDE 101 05/16/2023    CO2 24  05/16/2023    BUN 27.4 (H) 05/16/2023    CREATININE 2.42 (H) 05/16/2023    CALCIUM 7.4 (L) 05/16/2023    AGAP 13.0 05/15/2023       ABG  No results for input(s): PH, PO2, PCO2, HCO3, BE in the last 168 hours.      Mechanical Ventilation Support:  Vent Mode: A/C (05/16/23 0239)  Set Rate: 18 BPM (05/16/23 0239)  Vt Set: 450 mL (05/16/23 0239)  PEEP/CPAP: 8 cmH20 (05/16/23 0239)  Oxygen Concentration (%): 100 (05/16/23 0239)  Peak Airway Pressure: 33 cmH20 (05/16/23 0239)  Total Ve: 8.1 L/m (05/16/23 0239)  F/VT Ratio<105 (RSBI): (!) 42.06 (05/16/23 0239)    Significant Imaging:  I have reviewed the pertinent imaging within the past 24 hours.    X-Ray Chest AP Portable         X-Ray Chest 1 View   Final Result      Congestive failure and progression of pleural effusions.         Electronically signed by: Roland Pierce   Date:    05/15/2023   Time:    20:23      NM GI Bleed Scan (Tagged RBC)   Final Result   Abnormal      Accumulation of radiotracer at the colon at 24 hours compatible with bleed.  Activity is most intense at the ascending colon.      This report was flagged in Epic as abnormal.         Electronically signed by: Tiffany Bashir   Date:    05/13/2023   Time:    10:31      US Retroperitoneal Limited   Final Result      1. Right kidney not visualized.   2. Visualized upper portion of the left kidney demonstrates no gross abnormality.   3. Of note the recent prior CT demonstrated no significant abnormality of the kidneys.         Electronically signed by: Brooks Mccain   Date:    05/12/2023   Time:    08:08      X-Ray Chest 1 View for Line/Tube Placement   Final Result      Optimal placement of the PICC line.         Electronically signed by: Roland Pierce   Date:    05/10/2023   Time:    08:55      X-Ray Chest 1 View   Final Result      As above.         Electronically signed by: Roland Pierce   Date:    05/06/2023   Time:    18:22      CT Lumbar Spine W Wo Contrast   Final Result      1. Multiple posterior  lumbar decompression laminectomies.  Posterior paraspinal fluid collection.  This may represent postsurgical seroma or hematoma without exclusion of infected collection.      2. Lumbar degenerative disc disease and spondylosis level by level discussed above.         Electronically signed by: Roland Pierce   Date:    05/06/2023   Time:    13:31      US Abdomen Limited   Final Result      1.  Hepatic lobular contour and coarsened parenchyma.      2.  Gallbladder could not be visualized with a cholecystostomy tube in place.      3.  Small ascites and right pleural effusion.         Electronically signed by: Roland Pierce   Date:    05/06/2023   Time:    11:31        Microbiology Results (last 7 days)       Procedure Component Value Units Date/Time    Respiratory Culture [[id number]]     Order Status: Sent Specimen: Sputum     Blood Culture [[id number]]     Order Status: Sent Specimen: Blood     Blood Culture [[id number]]     Order Status: Sent Specimen: Blood     Urine culture [[id number]]  (Abnormal) Collected: 05/12/23 1048    Order Status: Completed Specimen: Urine Updated: 05/15/23 1018     Urine Culture >/= 100,000 colonies/ml Candida albicans    Blood Culture [[id number]]  (Normal) Collected: 05/09/23 2104    Order Status: Completed Specimen: Blood Updated: 05/14/23 2200     CULTURE, BLOOD ([facility]) No Growth at 5 days    Blood culture #1 **CANNOT BE ORDERED STAT** [[id number]]  (Abnormal)  (Susceptibility) Collected: 05/05/23 2109    Order Status: Completed Specimen: Blood Updated: 05/10/23 0706     CULTURE, BLOOD ([facility]) Methicillin resistant Staphylococcus aureus     GRAM STAIN Gram Positive Cocci, probable Staphylococcus      Seen in gram stain of broth only      2 of 2 bottles positive    Blood culture #2 **CANNOT BE ORDERED STAT** [[id number]]  (Abnormal)  (Susceptibility) Collected: 05/05/23 2109    Order Status: Completed Specimen: Blood Updated: 05/09/23 0716     CULTURE, BLOOD ([facility]) Methicillin resistant  Staphylococcus aureus     GRAM STAIN Gram Positive Cocci, probable Staphylococcus      Seen in gram stain of broth only      1 of 2 Anaerobic bottles positive          Assessment/Plan:     Assessment    -Acute hypoxemic/hypercarbic respiratory failure requiring intubation, 5/16, secondary to bilateral pleural effusions/pulmonary edema  -Hypotension requiring pressor support, likely septic shock secondary to below   -MRSA bacteremia and osteomyelitis of the cervical/lumbar spine with bilateral psoas/paraspinal/epidural abscesses s/p laminectomy/foraminotomy/abscess drainage on 4/21  -Candida albicans UTI  -Cholecystitis s/p cholecystostomy tube placement, 4/19   -Sacral ulcer s/p debridement on 05/10   -Hematochezia s/p transfusions and colonoscopy revealing diverticula on 05/14  -RUPERTO, likely AIN versus ATN versus other  -Hypoalbuminemia/malnutrition  -Comorbidities: diabetes, untreated hepatitis-C, cirrhosis, IV drug use, tobacco use    Plan    -upgraded to ICU on 05/16 for worsening respiratory status; is now intubated ventilated sedated with Precedex/propofol, wean as tolerated, adjust per ABGs   -requiring Levophed for pressor support, wean as tolerated, maintain map greater than 65  -added albumin every 6 hours  -worsening leukocytosis noted, repeating blood culture x2 and respiratory culture, has been afebrile  -added micafungin for Candida UTI   -infectious Disease on board: Continue daptomycin and rifampin, no longer on Zosyn   -likely will need CRISTY for MRSA bacteremia, consider Cardiology consult  -neurosurgery is pending MRI of the lumbar spine  -nephrology following, potentially will require hemodialysis in the near future  -general surgery notes cholecystostomy tube to remain in place until roughly 6/6 and recommend cholangiogram prior to removal    DVT Prophylaxis: SCDs   GI Prophylaxis: Pepcid      32 minutes of critical care was time spent personally by me on the following activities: development of  treatment plan with patient or surrogate and bedside caregivers, discussions with consultants, evaluation of patient's response to treatment, examination of patient, ordering and performing treatments and interventions, ordering and review of laboratory studies, ordering and review of radiographic studies, pulse oximetry, re-evaluation of patient's condition.  This critical care time did not overlap with that of any other provider or involve time for any procedures.     Cici Nunez MD  Pulmonary Critical Care Medicine  [facility]

## 2023-05-16 NOTE — PROGRESS NOTES
[facility]  Wound Care    Patient Name:  Reji Plasencia   MRN:  [id number]  Date: 5/16/2023  Diagnosis: Sepsis    History:     History reviewed. No pertinent past medical history.    Social History     Socioeconomic History    Marital status: Single       Precautions:     Allergies as of 05/05/2023    (No Known Allergies)       [facility] Assessment Details/Treatment        05/16/23 1141        Altered Skin Integrity 05/06/23 1450 medial Sacral spine   Date First Assessed/Time First Assessed: 05/06/23 1450   Altered Skin Integrity Present on Admission - Did Patient arrive to the hospital with altered skin?: yes  Orientation: medial  Location: Sacral spine   Wound Image    Description of Altered Skin Integrity Full thickness tissue loss. Base is covered by slough and/or eschar in the wound bed   Dressing Appearance Dry;Intact;Clean   Drainage Amount Moderate   Drainage Characteristics/Odor Serosanguineous   Appearance Slough;Fibrin;Red   Tissue loss description Full thickness   Red (%), Wound Tissue Color 25 %   Yellow (%), Wound Tissue Color 75 %   Periwound Area Moist;Redness   Wound Edges Defined   Wound Length (cm) 4 cm   Wound Width (cm) 4.5 cm   Wound Depth (cm) 1 cm   Wound Volume (cm^3) 18 cm^3   Wound Surface Area (cm^2) 18 cm^2   Care Cleansed with:;Antimicrobial agent   Dressing Applied;Gauze, wet to moist;Absorptive Pad        Incision/Site 05/06/23 0450 Thoracic spine medial   Date First Assessed/Time First Assessed: 05/06/23 0450   Present Prior to Hospital Arrival?: Yes  Location: Thoracic spine  Orientation: medial  Additional Comments: from lumbar laminectomy at [facility]   Wound Image    Dressing Appearance Dry;Intact;Clean   Drainage Amount Moderate   Drainage Characteristics/Odor Purulent;Serosanguineous   Appearance Sutures intact;Moist;Yellow;Pink   Periwound Area Moist;Redness   Wound Edges Irregular   Wound Length (cm) 16 cm   Wound Width (cm) 0.5 cm   Wound Depth (cm)    (unknown)   Wound Surface Area (cm^2) 8 cm^2   Care Cleansed with:;Antimicrobial agent   Dressing Applied        Negative Pressure Wound Therapy  05/09/23 1000   Placement Date/Time: 05/09/23 1000   Location: Thoracic spine   NPWT Type Vacuum Therapy   Therapy Setting NPWT Continuous therapy   Pressure Setting NPWT 125 mmHg   Sponges Inserted NPWT Black   Sponges Removed NPWT Black     WOCN follow up wound vac dressing change to thoracic area. Patient now intubated. Purulent drainage noted. Pam Aly NP at bedside with nurse.  Cultures of purulent drainage from lumbar wound ordered today with continuation of wound vac per Neurosurgery. Wound vac reapplied with prevena setting @ 125 mm/ hg continuous. Nursing to continue with preventative measures. On ICU ALISON mattress. Will follow up.     05/16/2023

## 2023-05-16 NOTE — PROGRESS NOTES
[facility] Hospital Medicine Progress Note        Chief Complaint: Inpatient Follow-up for MRSA Spinal Osteomyelitis    Subjective:  History:  62-year-old male with significant history of polysubstance abuse/IV drug abuse, type 2 diabetes mellitus, untreated chronic hep C/cirrhosis.  Patient had a recent hospitalization to [facility] for MRSA bacteremia with MRSA C and L-spine osteomyelitis, diskitis, bilateral psoas and paraspinous muscle abscess as well as lumbar epidural abscess.  Patient was being treated in [facility] with close follow-up with neurosurgery, Infectious Disease.  Hospital stay also was even full for cholecystitis for which he underwent cholecystostomy tube placement.  Patient signed out AMA from [facility] on 05/05, went home, called ambulance and presented to the hospital.  Per chart review patient was being treated with daptomycin and rifampin in [facility].  While hospitalized his urine drug screen was positive for amphetamines and there were concerns that he was using it while hospitalized.  Patient was tachycardic in the ED.  Otherwise hemodynamics stable.  Complained of bilateral lower extremity swelling. Labs significant for leukocytosis, anemia, hyperglycemia. UDS was positive for opiates, amphetamines.  Hospitalist team was consulted for admission. Restarted on IV Daptomycin, Rifampin, Zosyn. Echocardiogram on 5/6 showed normal LV systolic function, EF 50-55%, indeterminate diastolic function, mild RV enlargement, mild MR, PASP 19 mmHg with no mention of vegetations.  Blood cultures from 05/05 for positive for MRSA.  Repeat blood culture from 05/09 negative for 24 hours.  Patient awaiting MRI lumbar spine.  Noted to have LLE pain with B/L LE U/S showing fluid collection in left cough.  Awaiting CT L leg and thigh with contrast.  Also ordered CT chest with contrast to further evaluate right-sided oblong opacity on CXR which is  thought to be mass/possible abscess/fissural fluid collection. Underwent debridement for sacral ulcer on 05/10 with wound care being done. Neurosurgery to make further recommendations once MRI lumbar spine is done. Noted to have elevated Cr of 1.78; DC diuretics. Possibly d/t ATN from ABX; consulted Nephrology. Ordered US Retroperitoneum, UA. Cancelled CT chest, CT L thigh/leg due to RUPERTO. Nephrology on board; ordered [id number] given anemia, renal dysfunction, petechial rash to evaluate for TTP.  Patient noted to have hematochezia by nurse on 05/12; consulted GI.  HGB/HCT noted to drop to 6.7/22.0; transfused 2 units PRBCs. NM bleeding scan ordered by GI which was negative. Continued to have hematochezia on 05/13, started on bowel prep for colonoscopy by GI. Underwent Colonoscopy which showed pancolonic diverticula without active bleeding, hyperemic mucosa and anorectal region without active bleeding.  Following ID recommendations.    Today, Mr. Plasencia stated he was doing well & had no new complaints. Continues to have sacral pain for which he is on PRN Dilaudid & Norco. HB/HCT stable at 9.0/27.1. BUN/creatinine improved to 27.7/2.36.  Patient unable to do MRI lumbar spine due to sacral pain and inability to lay on MRI table.    Physical Examination:  General appearance: Thin, chronically ill-appearing  male in no apparent distress.  HENT: Atraumatic head. Moist mucous membranes of oral cavity.  Eyes: Normal extraocular movements.   Neck: Supple.   Lungs: Clear to auscultation bilaterally. Tachypnea   Heart: Regular rate and rhythm. S1 and S2 present. No pedal edema.  Abdomen: Soft, non-distended, non-tender. RUQ Drain  Extremities:  1+ pitting pedal edema  Skin: No Rash.   Neuro:  Moving upper extremity without any difficulty, patient reports decreased range of motion lower extremity secondary to swelling  Psych/mental status: Appropriate mood and affect. Responds appropriately to questions    A/P:  MRSA  bacteremia   MRSA cervical, lumbar spine osteomyelitis with bilateral psoas, paraspinal muscle abscess as well as lumbar epidural abscess status post drainage, laminectomy  Recent cholecystitis status post cholecystostomy tube placement   Sepsis secondary to above   RUPERTO 2/2 possible ATN d/t ABX vs TTP  Hematochezia  Tachycardia 2/2 Anemia  Polysubstance abuse/IV drug abuse   Decompensated cirrhosis with volume overload   Untreated chronic hep C   Type 2 diabetes mellitus with hyperglycemia  Acute Normocytic Anemia 2/2 TTP vs GI Bleed     Patient continues to be admitted for close monitoring   Continues to have sacral pain with PRN meds ordered  Renal indices improving  Underwent sacral ulcer debridement with surgery on 05/10   Underwent Colonoscopy 05/14 without evidence of active bleeding  HB/HCT stable at 9.0/27.1  Surgery on board; following recommendations   ID on board; following recommendations  Nephrology consulted for RUPERTO likely secondary to ATN  [id number] ordered by Nephrology to evaluate for TTP; pending  GI consulted for hematochezia; following recs  Neurosurgery to make further recommendations once patient gets MRI L-spine  Patient continued on IV Daptomycin, IV Rifampin, IV Zosyn  Patient has cholecystostomy tube in place   Echocardiogram negative for vegetations; will defer to ID whether patient needs CRISTY  Venous ultrasound bilateral lower extremity negative for DVT  However showed 9 x 4 x 2 cm fluid collection in the left proximal through mid calf; will hold off on CT with contrast due to RUPERTO  DC Lasix and Aldactone due to RUPERTO  Levemir, sliding scale for diabetes mellitus  Continue monitoring patient's symptoms    DVT prophylaxis-Lovenox    VITAL SIGNS: 24 HRS MIN & MAX LAST   Temp  Min: 97.6 °F (36.4 °C)  Max: 98.8 °F (37.1 °C) 98.1 °F (36.7 °C)   BP  Min: 118/71  Max: 156/100 (!) 147/94   Pulse  Min: 99  Max: 116  (!) 113   Resp  Min: 16  Max: 23 16   SpO2  Min: 88 %  Max: 98 % (!) 94 %       All  diagnosis and differential diagnosis have been reviewed; assessment and plan has been documented; I have personally reviewed the labs and test results that are presently available; I have reviewed the patients medication list; I have reviewed the consulting providers response and recommendations. I have reviewed or attempted to review medical records based upon their availability.       Guerrero Alfaro MD   05/15/2023

## 2023-05-17 PROBLEM — E44.0 MODERATE MALNUTRITION: Status: ACTIVE | Noted: 2023-05-17

## 2023-05-17 LAB
ALBUMIN SERPL-MCNC: 1.8 G/DL (ref 3.4–4.8)
ALBUMIN/GLOB SERPL: 0.6 RATIO (ref 1.1–2)
ALLENS TEST BLOOD GAS (OHS): YES
ALP SERPL-CCNC: 62 UNIT/L (ref 40–150)
ALT SERPL-CCNC: 10 UNIT/L (ref 0–55)
AST SERPL-CCNC: 22 UNIT/L (ref 5–34)
BASE EXCESS BLD CALC-SCNC: 3 MMOL/L
BASOPHILS # BLD AUTO: 0.07 X10(3)/MCL
BASOPHILS NFR BLD AUTO: 0.5 %
BILIRUBIN DIRECT+TOT PNL SERPL-MCNC: 0.9 MG/DL
BLOOD GAS SAMPLE TYPE (OHS): ABNORMAL
BUN SERPL-MCNC: 28.7 MG/DL (ref 8.4–25.7)
CA-I BLD-SCNC: 1.01 MMOL/L (ref 1.12–1.23)
CALCIUM SERPL-MCNC: 7.5 MG/DL (ref 8.8–10)
CHLORIDE SERPL-SCNC: 101 MMOL/L (ref 98–107)
CO2 BLDA-SCNC: 28.3 MMOL/L
CO2 SERPL-SCNC: 23 MMOL/L (ref 23–31)
CREAT SERPL-MCNC: 2.47 MG/DL (ref 0.73–1.18)
DRAWN BY BLOOD GAS (OHS): ABNORMAL
EOSINOPHIL # BLD AUTO: 0.46 X10(3)/MCL (ref 0–0.9)
EOSINOPHIL NFR BLD AUTO: 3 %
ERYTHROCYTE [DISTWIDTH] IN BLOOD BY AUTOMATED COUNT: 16.5 % (ref 11.5–17)
FIO2 BLOOD GAS (OHS): 30 %
GFR SERPLBLD CREATININE-BSD FMLA CKD-EPI: 29 MLS/MIN/1.73/M2
GLOBULIN SER-MCNC: 2.9 GM/DL (ref 2.4–3.5)
GLUCOSE SERPL-MCNC: 89 MG/DL (ref 82–115)
GRAM STN SPEC: NORMAL
HCO3 BLDA-SCNC: 27.1 MMOL/L
HCT VFR BLD AUTO: 26.6 % (ref 42–52)
HGB BLD-MCNC: 8.7 G/DL (ref 14–18)
IMM GRANULOCYTES # BLD AUTO: 0.08 X10(3)/MCL (ref 0–0.04)
IMM GRANULOCYTES NFR BLD AUTO: 0.5 %
LYMPHOCYTES # BLD AUTO: 1.54 X10(3)/MCL (ref 0.6–4.6)
LYMPHOCYTES NFR BLD AUTO: 10 %
M AVIUM PARATB TISS QL ZN STN: NORMAL
MAGNESIUM SERPL-MCNC: 1.8 MG/DL (ref 1.6–2.6)
MCH RBC QN AUTO: 29.9 PG (ref 27–31)
MCHC RBC AUTO-ENTMCNC: 32.7 G/DL (ref 33–36)
MCV RBC AUTO: 91.4 FL (ref 80–94)
MECH RR (OHS): 18 B/MIN
MECH VT (OHS): 450 ML
MODE (OHS): AC
MONOCYTES # BLD AUTO: 0.4 X10(3)/MCL (ref 0.1–1.3)
MONOCYTES NFR BLD AUTO: 2.6 %
NEUTROPHILS # BLD AUTO: 12.87 X10(3)/MCL (ref 2.1–9.2)
NEUTROPHILS NFR BLD AUTO: 83.4 %
NRBC BLD AUTO-RTO: 0 %
PCO2 BLDA: 39 MMHG (ref 35–45)
PEEP (OHS): 8 CMH2O
PH BLDA: 7.45 [PH] (ref 7.35–7.45)
PHOSPHATE SERPL-MCNC: 5.3 MG/DL (ref 2.3–4.7)
PLATELET # BLD AUTO: 176 X10(3)/MCL (ref 130–400)
PMV BLD AUTO: 10.4 FL (ref 7.4–10.4)
PO2 BLDA: 75 MMHG (ref 80–100)
POCT GLUCOSE: 101 MG/DL (ref 70–110)
POCT GLUCOSE: 84 MG/DL (ref 70–110)
POCT GLUCOSE: 90 MG/DL (ref 70–110)
POCT GLUCOSE: 94 MG/DL (ref 70–110)
POTASSIUM BLOOD GAS (OHS): 3.1 MMOL/L (ref 3.5–5)
POTASSIUM SERPL-SCNC: 3.4 MMOL/L (ref 3.5–5.1)
PROT SERPL-MCNC: 4.7 GM/DL (ref 5.8–7.6)
PSYCHE PATHOLOGY RESULT: NORMAL
RBC # BLD AUTO: 2.91 X10(6)/MCL (ref 4.7–6.1)
RHODAMINE-AURAMINE STN SPEC: NORMAL
SAMPLE SITE BLOOD GAS (OHS): ABNORMAL
SAO2 % BLDA: 96 %
SODIUM BLOOD GAS (OHS): 137 MMOL/L (ref 137–145)
SODIUM SERPL-SCNC: 138 MMOL/L (ref 136–145)
WBC # SPEC AUTO: 15.42 X10(3)/MCL (ref 4.5–11.5)

## 2023-05-17 PROCEDURE — 85025 COMPLETE CBC W/AUTO DIFF WBC: CPT | Performed by: STUDENT IN AN ORGANIZED HEALTH CARE EDUCATION/TRAINING PROGRAM

## 2023-05-17 PROCEDURE — 27000207 HC ISOLATION

## 2023-05-17 PROCEDURE — 25000003 PHARM REV CODE 250: Performed by: STUDENT IN AN ORGANIZED HEALTH CARE EDUCATION/TRAINING PROGRAM

## 2023-05-17 PROCEDURE — 25000003 PHARM REV CODE 250: Performed by: INTERNAL MEDICINE

## 2023-05-17 PROCEDURE — P9047 ALBUMIN (HUMAN), 25%, 50ML: HCPCS | Mod: JG | Performed by: STUDENT IN AN ORGANIZED HEALTH CARE EDUCATION/TRAINING PROGRAM

## 2023-05-17 PROCEDURE — 36600 WITHDRAWAL OF ARTERIAL BLOOD: CPT

## 2023-05-17 PROCEDURE — 84100 ASSAY OF PHOSPHORUS: CPT | Performed by: STUDENT IN AN ORGANIZED HEALTH CARE EDUCATION/TRAINING PROGRAM

## 2023-05-17 PROCEDURE — A4216 STERILE WATER/SALINE, 10 ML: HCPCS | Performed by: STUDENT IN AN ORGANIZED HEALTH CARE EDUCATION/TRAINING PROGRAM

## 2023-05-17 PROCEDURE — 63600175 PHARM REV CODE 636 W HCPCS: Performed by: NURSE PRACTITIONER

## 2023-05-17 PROCEDURE — 83735 ASSAY OF MAGNESIUM: CPT | Performed by: STUDENT IN AN ORGANIZED HEALTH CARE EDUCATION/TRAINING PROGRAM

## 2023-05-17 PROCEDURE — 82803 BLOOD GASES ANY COMBINATION: CPT

## 2023-05-17 PROCEDURE — 20000000 HC ICU ROOM

## 2023-05-17 PROCEDURE — 63600175 PHARM REV CODE 636 W HCPCS: Performed by: STUDENT IN AN ORGANIZED HEALTH CARE EDUCATION/TRAINING PROGRAM

## 2023-05-17 PROCEDURE — 99900035 HC TECH TIME PER 15 MIN (STAT)

## 2023-05-17 PROCEDURE — 80053 COMPREHEN METABOLIC PANEL: CPT | Performed by: STUDENT IN AN ORGANIZED HEALTH CARE EDUCATION/TRAINING PROGRAM

## 2023-05-17 PROCEDURE — 27000221 HC OXYGEN, UP TO 24 HOURS

## 2023-05-17 PROCEDURE — 94761 N-INVAS EAR/PLS OXIMETRY MLT: CPT

## 2023-05-17 PROCEDURE — 94003 VENT MGMT INPAT SUBQ DAY: CPT

## 2023-05-17 RX ORDER — FENTANYL CITRATE 50 UG/ML
50 INJECTION, SOLUTION INTRAMUSCULAR; INTRAVENOUS
Status: DISCONTINUED | OUTPATIENT
Start: 2023-05-17 | End: 2023-05-17

## 2023-05-17 RX ORDER — LANOLIN ALCOHOL/MO/W.PET/CERES
400 CREAM (GRAM) TOPICAL ONCE
Status: COMPLETED | OUTPATIENT
Start: 2023-05-17 | End: 2023-05-17

## 2023-05-17 RX ORDER — FENTANYL CITRATE 50 UG/ML
50 INJECTION, SOLUTION INTRAMUSCULAR; INTRAVENOUS
Status: DISCONTINUED | OUTPATIENT
Start: 2023-05-17 | End: 2023-05-23 | Stop reason: HOSPADM

## 2023-05-17 RX ORDER — CALCIUM GLUCONATE 20 MG/ML
1 INJECTION, SOLUTION INTRAVENOUS
Status: COMPLETED | OUTPATIENT
Start: 2023-05-17 | End: 2023-05-17

## 2023-05-17 RX ORDER — FENTANYL CITRATE 50 UG/ML
25 INJECTION, SOLUTION INTRAMUSCULAR; INTRAVENOUS
Status: DISCONTINUED | OUTPATIENT
Start: 2023-05-17 | End: 2023-05-23 | Stop reason: HOSPADM

## 2023-05-17 RX ADMIN — DEXMEDETOMIDINE HYDROCHLORIDE 0.6 MCG/KG/HR: 400 INJECTION INTRAVENOUS at 08:05

## 2023-05-17 RX ADMIN — Medication 1 CAPSULE: at 06:05

## 2023-05-17 RX ADMIN — DEXMEDETOMIDINE HYDROCHLORIDE 0.4 MCG/KG/HR: 400 INJECTION INTRAVENOUS at 01:05

## 2023-05-17 RX ADMIN — ALBUMIN (HUMAN) 12.5 G: 12.5 SOLUTION INTRAVENOUS at 05:05

## 2023-05-17 RX ADMIN — MICAFUNGIN SODIUM 100 MG: 100 INJECTION, POWDER, LYOPHILIZED, FOR SOLUTION INTRAVENOUS at 03:05

## 2023-05-17 RX ADMIN — CALCIUM GLUCONATE 1 G: 20 INJECTION, SOLUTION INTRAVENOUS at 12:05

## 2023-05-17 RX ADMIN — SODIUM CHLORIDE, PRESERVATIVE FREE 10 ML: 5 INJECTION INTRAVENOUS at 12:05

## 2023-05-17 RX ADMIN — DAPTOMYCIN 435 MG: 500 INJECTION, POWDER, LYOPHILIZED, FOR SOLUTION INTRAVENOUS at 06:05

## 2023-05-17 RX ADMIN — Medication: at 08:05

## 2023-05-17 RX ADMIN — FENTANYL CITRATE 50 MCG: 50 INJECTION, SOLUTION INTRAMUSCULAR; INTRAVENOUS at 11:05

## 2023-05-17 RX ADMIN — DEXMEDETOMIDINE HYDROCHLORIDE 0.2 MCG/KG/HR: 400 INJECTION INTRAVENOUS at 09:05

## 2023-05-17 RX ADMIN — INSULIN DETEMIR 12 UNITS: 100 INJECTION, SOLUTION SUBCUTANEOUS at 08:05

## 2023-05-17 RX ADMIN — COLLAGENASE SANTYL: 250 OINTMENT TOPICAL at 08:05

## 2023-05-17 RX ADMIN — THIAMINE HCL TAB 100 MG 100 MG: 100 TAB at 08:05

## 2023-05-17 RX ADMIN — POTASSIUM BICARBONATE 50 MEQ: 977.5 TABLET, EFFERVESCENT ORAL at 06:05

## 2023-05-17 RX ADMIN — ALBUMIN (HUMAN) 12.5 G: 12.5 SOLUTION INTRAVENOUS at 06:05

## 2023-05-17 RX ADMIN — ALBUMIN (HUMAN) 12.5 G: 12.5 SOLUTION INTRAVENOUS at 11:05

## 2023-05-17 RX ADMIN — SODIUM CHLORIDE, PRESERVATIVE FREE 10 ML: 5 INJECTION INTRAVENOUS at 05:05

## 2023-05-17 RX ADMIN — THERA TABS 1 TABLET: TAB at 08:05

## 2023-05-17 RX ADMIN — Medication 1 CAPSULE: at 12:05

## 2023-05-17 RX ADMIN — DEXTROSE MONOHYDRATE 300 MG: 50 INJECTION, SOLUTION INTRAVENOUS at 08:05

## 2023-05-17 RX ADMIN — Medication 25 MCG/HR: at 06:05

## 2023-05-17 RX ADMIN — PROPOFOL 35 MCG/KG/MIN: 10 INJECTION, EMULSION INTRAVENOUS at 02:05

## 2023-05-17 RX ADMIN — CALCIUM GLUCONATE 1 G: 20 INJECTION, SOLUTION INTRAVENOUS at 01:05

## 2023-05-17 RX ADMIN — FAMOTIDINE 20 MG: 10 INJECTION, SOLUTION INTRAVENOUS at 08:05

## 2023-05-17 RX ADMIN — PROPOFOL 50 MCG/KG/MIN: 10 INJECTION, EMULSION INTRAVENOUS at 09:05

## 2023-05-17 RX ADMIN — Medication 1 CAPSULE: at 08:05

## 2023-05-17 RX ADMIN — Medication 400 MG: at 06:05

## 2023-05-17 RX ADMIN — FENTANYL CITRATE 50 MCG: 50 INJECTION, SOLUTION INTRAMUSCULAR; INTRAVENOUS at 01:05

## 2023-05-17 RX ADMIN — SODIUM CHLORIDE, PRESERVATIVE FREE 10 ML: 5 INJECTION INTRAVENOUS at 06:05

## 2023-05-17 RX ADMIN — PROPOFOL 40 MCG/KG/MIN: 10 INJECTION, EMULSION INTRAVENOUS at 01:05

## 2023-05-17 RX ADMIN — SODIUM CHLORIDE, PRESERVATIVE FREE 10 ML: 5 INJECTION INTRAVENOUS at 11:05

## 2023-05-17 RX ADMIN — ALBUMIN (HUMAN) 12.5 G: 12.5 SOLUTION INTRAVENOUS at 12:05

## 2023-05-17 NOTE — PLAN OF CARE
Problem: Adult Inpatient Plan of Care  Goal: Plan of Care Review  Outcome: Ongoing, Progressing  Goal: Optimal Comfort and Wellbeing  Outcome: Ongoing, Progressing     Problem: Glycemic Control Impaired (Sepsis/Septic Shock)  Goal: Blood Glucose Level Within Desired Range  Outcome: Ongoing, Progressing     Problem: Diabetes Comorbidity  Goal: Blood Glucose Level Within Targeted Range  Outcome: Ongoing, Progressing     Problem: Inability to Wean (Mechanical Ventilation, Invasive)  Goal: Mechanical Ventilation Liberation  Outcome: Ongoing, Progressing     Problem: Ventilator-Induced Lung Injury (Mechanical Ventilation, Invasive)  Goal: Absence of Ventilator-Induced Lung Injury  Outcome: Ongoing, Progressing

## 2023-05-17 NOTE — NURSING
Spoke with pt.'s son Clyde @ [phone] about evening update and plan of care for the day. All questions and concerns answered.

## 2023-05-17 NOTE — CONSULTS
Inpatient consult to Cardiology  Consult performed by: DIANE Gil  Consult ordered by: Cici Nunez MD  Reason for consult: CRISTY      [facility] - [facility] ICU  Cardiology  Consult Note    Patient Name: Reji Plasencia  MRN: [id number]  Admission Date: 5/5/2023  Hospital Length of Stay: 12 days  Code Status: Full Code   Attending Provider: Medhat Reece Jr., MD, *   Consulting Provider: DIANE Gil  Primary Care Physician: Primary Doctor No  Principal Problem:Sepsis    Patient information was obtained from past medical records, ER records, and primary team.     Subjective:   Consultation Reason: CRISTY    HPI:   Mr. Plasencia is a 63 year old male, unknown to [facility], admitted to ICU (Upgraded) due to tenuous respiratory status requiring BIPAP Support and high flow oxygen support. 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 Complicated by cholecystitis requiring meenu tube insertion. This hospitalization, patient noted to be MRSA bacteremia. [facility] is consulted to perform CRISTY.    PMH: DM, Untreated Hepatitis C, Cirrhosis, IV Drug Use, Tobacco Use, MRSA Bacteremia, C-Spine/L-Spine Osteomyelitis/Diskitis  PSH: Spinal Surgeries, Colonoscopy, Meenu Tube  Family History: Negative  Social History: Tobacco- Active Smoker, Alcohol- Negative, Substance Abuse- IV Drug Use    Previous Cardiac Diagnostics:   Venous Doppler (5.7.23):  There was no evidence of deep or superficial vein thrombosis in bilateral lower extremities.  A fluid collection measuring approximately 9 x 4 x 2 cm was identified in the left proximal through mid calf.  There was no venous or arterial flow to this structure.    Echocardiogram (5.6.23):  TDS due to lung interference.  The left ventricle is normal in size with low  normal systolic function.  The estimated ejection fraction is 50-55%.  Indeterminate left ventricular diastolic function.  Mild right ventricular enlargement with low normal right ventricular systolic function.  Mild mitral regurgitation.  Intermediate central venous pressure (8 mmHg).  The estimated PA systolic pressure is 19 mmHg.    History reviewed. No pertinent past medical history.    Past Surgical History:   Procedure Laterality Date    COLONOSCOPY N/A 5/14/2023    Procedure: COLONOSCOPY;  Surgeon: Shaneka Ayers MD;  Location: [facility] OR;  Service: Gastroenterology;  Laterality: N/A;    DEBRIDEMENT OF SACRAL WOUND N/A 5/10/2023    Procedure: DEBRIDEMENT, WOUND, SACRUM;  Surgeon: Reggie Castañeda MD;  Location: [facility] OR;  Service: General;  Laterality: N/A;       Review of patient's allergies indicates:  No Known Allergies    No current facility-administered medications on file prior to encounter.     Current Outpatient Medications on File Prior to Encounter   Medication Sig    HYDROcodone-acetaminophen (NORCO)  mg per tablet Take 1 tablet by mouth every 6 (six) hours as needed for Pain (severe pain only).     Review of Systems   Unable to perform ROS: Intubated     Objective:     Vital Signs (Most Recent):  Temp: 98.5 °F (36.9 °C) (05/17/23 0800)  Pulse: 71 (05/17/23 1000)  Resp: 20 (05/17/23 1000)  BP: 110/60 (05/17/23 1000)  SpO2: 95 % (05/17/23 1000) Vital Signs (24h Range):  Temp:  [97 °F (36.1 °C)-98.5 °F (36.9 °C)] 98.5 °F (36.9 °C)  Pulse:  [] 71  Resp:  [18-35] 20  SpO2:  [89 %-100 %] 95 %  BP: ()/(42-84) 110/60     Weight: 72.6 kg (160 lb)  Body mass index is 25.06 kg/m².    SpO2: 95 %         Intake/Output Summary (Last 24 hours) at 5/17/2023 1011  Last data filed at 5/17/2023 0800  Gross per 24 hour   Intake 1332.45 ml   Output 3155 ml   Net -1822.55 ml       Lines/Drains/Airways       Peripherally Inserted Central Catheter Line  Duration             PICC Double Lumen 05/10/23 0817  right brachial 7 days              Drain  Duration                  Drain/Device    Right lower flank -- days         Urethral Catheter 05/12/23 1630 Latex 16 Fr. 4 days         NG/OG Tube 05/16/23 0300 Center mouth 1 day              Airway  Duration                  Airway - Non-Surgical 05/16/23 0239 1 day                    Significant Labs:  Recent Results (from the past 72 hour(s))   POCT glucose    Collection Time: 05/14/23 12:37 PM   Result Value Ref Range    POCT Glucose 113 (H) 70 - 110 mg/dL   POCT glucose    Collection Time: 05/14/23  5:05 PM   Result Value Ref Range    POCT Glucose 204 (H) 70 - 110 mg/dL   POCT glucose    Collection Time: 05/14/23  9:12 PM   Result Value Ref Range    POCT Glucose 162 (H) 70 - 110 mg/dL   Basic Metabolic Panel    Collection Time: 05/15/23  4:22 AM   Result Value Ref Range    Sodium Level 137 136 - 145 mmol/L    Potassium Level 3.5 3.5 - 5.1 mmol/L    Chloride 100 98 - 107 mmol/L    Carbon Dioxide 24 23 - 31 mmol/L    Glucose Level 55 (L) 82 - 115 mg/dL    Blood Urea Nitrogen 27.7 (H) 8.4 - 25.7 mg/dL    Creatinine 2.36 (H) 0.73 - 1.18 mg/dL    BUN/Creatinine Ratio 12     Calcium Level Total 7.0 (L) 8.8 - 10.0 mg/dL    Anion Gap 13.0 mEq/L    eGFR 31 mls/min/1.73/m2   Magnesium    Collection Time: 05/15/23  4:22 AM   Result Value Ref Range    Magnesium Level 1.70 1.60 - 2.60 mg/dL   CBC with Differential    Collection Time: 05/15/23  4:22 AM   Result Value Ref Range    WBC 17.55 (H) 4.50 - 11.50 x10(3)/mcL    RBC 3.01 (L) 4.70 - 6.10 x10(6)/mcL    Hgb 9.0 (L) 14.0 - 18.0 g/dL    Hct 27.1 (L) 42.0 - 52.0 %    MCV 90.0 80.0 - 94.0 fL    MCH 29.9 27.0 - 31.0 pg    MCHC 33.2 33.0 - 36.0 g/dL    RDW 15.9 11.5 - 17.0 %    Platelet 238 130 - 400 x10(3)/mcL    MPV 10.2 7.4 - 10.4 fL    Neut % 84.6 %    Lymph % 8.5 %    Mono % 3.8 %    Eos % 1.4 %    Basophil % 0.5 %    Lymph # 1.49 0.6 - 4.6 x10(3)/mcL    Neut # 14.86 (H) 2.1 - 9.2 x10(3)/mcL    Mono # 0.67 0.1 - 1.3 x10(3)/mcL     Eos # 0.24 0 - 0.9 x10(3)/mcL    Baso # 0.08 <=0.2 x10(3)/mcL    IG# 0.21 (H) 0 - 0.04 x10(3)/mcL    IG% 1.2 %    NRBC% 0.0 %   POCT glucose    Collection Time: 05/15/23  6:53 AM   Result Value Ref Range    POCT Glucose 47 (LL) 70 - 110 mg/dL   POCT glucose    Collection Time: 05/15/23  7:26 AM   Result Value Ref Range    POCT Glucose 81 70 - 110 mg/dL   POCT glucose    Collection Time: 05/15/23 11:25 AM   Result Value Ref Range    POCT Glucose 71 70 - 110 mg/dL   POCT glucose    Collection Time: 05/15/23  6:03 PM   Result Value Ref Range    POCT Glucose 118 (H) 70 - 110 mg/dL   RT Blood Gas    Collection Time: 05/15/23  8:20 PM   Result Value Ref Range    Sample Type Arterial Blood     Sample site Right Radial Artery     Drawn by RF RRT     pH, Blood gas 7.420 7.350 - 7.450    pCO2, Blood gas 40.0 35.0 - 45.0 mmHg    pO2, Blood gas 72.0 (L) 80.0 - 100.0 mmHg    Sodium, Blood Gas 131 (L) 137 - 145 mmol/L    Potassium, Blood Gas 3.9 3.5 - 5.0 mmol/L    Calcium Level Ionized 1.02 (L) 1.12 - 1.23 mmol/L    TOC2, Blood gas 27.1 mmol/L    Base Excess, Blood gas 1.30 -2.00 - 2.00 mmol/L    sO2, Blood gas 94.6 %    HCO3, Blood gas 25.9 22.0 - 26.0 mmol/L    THb, Blood gas 11.1 (L) 12 - 16 g/dL    O2 Hb, Blood Gas 93.7 (L) 94.0 - 97.0 %    CO Hgb 2.5 (H) 0.5 - 1.5 %    Met Hgb 0.7 0.4 - 1.5 %    Allens Test Yes     Oxygen Device, Blood gas Face Mask     LPM 10.0    POCT glucose    Collection Time: 05/15/23 10:16 PM   Result Value Ref Range    POCT Glucose 124 (H) 70 - 110 mg/dL   Comprehensive Metabolic Panel    Collection Time: 05/16/23 12:45 AM   Result Value Ref Range    Sodium Level 137 136 - 145 mmol/L    Potassium Level 4.0 3.5 - 5.1 mmol/L    Chloride 101 98 - 107 mmol/L    Carbon Dioxide 24 23 - 31 mmol/L    Glucose Level 124 (H) 82 - 115 mg/dL    Blood Urea Nitrogen 27.4 (H) 8.4 - 25.7 mg/dL    Creatinine 2.42 (H) 0.73 - 1.18 mg/dL    Calcium Level Total 7.4 (L) 8.8 - 10.0 mg/dL    Protein Total 5.1 (L) 5.8 -  7.6 gm/dL    Albumin Level 1.2 (L) 3.4 - 4.8 g/dL    Globulin 3.9 (H) 2.4 - 3.5 gm/dL    Albumin/Globulin Ratio 0.3 (L) 1.1 - 2.0 ratio    Bilirubin Total 1.0 <=1.5 mg/dL    Alkaline Phosphatase 93 40 - 150 unit/L    Alanine Aminotransferase 14 0 - 55 unit/L    Aspartate Aminotransferase 30 5 - 34 unit/L    eGFR 30 mls/min/1.73/m2   D-Dimer, Quantitative    Collection Time: 05/16/23 12:45 AM   Result Value Ref Range    D-Dimer 7.97 (H) 0.00 - 0.50 ug/mL FEU   Lactic Acid, Plasma    Collection Time: 05/16/23 12:45 AM   Result Value Ref Range    Lactic Acid Level 2.2 0.5 - 2.2 mmol/L   CBC with Differential    Collection Time: 05/16/23 12:45 AM   Result Value Ref Range    WBC 26.17 (H) 4.50 - 11.50 x10(3)/mcL    RBC 3.49 (L) 4.70 - 6.10 x10(6)/mcL    Hgb 10.5 (L) 14.0 - 18.0 g/dL    Hct 32.4 (L) 42.0 - 52.0 %    MCV 92.8 80.0 - 94.0 fL    MCH 30.1 27.0 - 31.0 pg    MCHC 32.4 (L) 33.0 - 36.0 g/dL    RDW 16.6 11.5 - 17.0 %    Platelet 337 130 - 400 x10(3)/mcL    MPV 10.2 7.4 - 10.4 fL    NRBC% 0.0 %   Manual Differential    Collection Time: 05/16/23 12:45 AM   Result Value Ref Range    Neut Man 99 %    Lymph Man 1 %    Instr WBC 26.17 x10(3)/mcL    Abs Lymp 0.2617 (L) 0.6 - 4.6 x10(3)/mcL    Abs Neut 25.9083 (H) 2.1 - 9.2 x10(3)/mcL    RBC Morph Abnormal (A) Normal    Anisocyte 1+ (A) (none)    Poik 1+ (A) (none)    [place] Cells 1+ (A) (none)    Platelet Est Normal Normal, Adequate   RT Blood Gas    Collection Time: 05/16/23  2:13 AM   Result Value Ref Range    Sample Type Arterial Blood     Sample site Left Radial Artery     Drawn by mh,rrt     pH, Blood gas 7.350 7.350 - 7.450    pCO2, Blood gas 51.0 (HH) 35.0 - 45.0 mmHg    pO2, Blood gas 90.0 80.0 - 100.0 mmHg    Sodium, Blood Gas 133 (L) 137 - 145 mmol/L    Potassium, Blood Gas 3.8 3.5 - 5.0 mmol/L    Calcium Level Ionized 1.00 (L) 1.12 - 1.23 mmol/L    TOC2, Blood gas 29.8 mmol/L    Base Excess, Blood gas 1.80 mmol/L    sO2, Blood gas 97.0 %    HCO3, Blood gas 28.2  >=15.0 mmol/L    Allens Test Yes     MODE BiPAP     FIO2, Blood gas 100 %    IPAP 14 cmH2O    EPAP 6 cmH2O   RT Blood Gas    Collection Time: 05/16/23  5:44 AM   Result Value Ref Range    Sample Type Arterial Blood     Sample site Left Radial Artery     Drawn by sd rrt     pH, Blood gas 7.370 7.350 - 7.450    pCO2, Blood gas 50.0 (H) 35.0 - 45.0 mmHg    pO2, Blood gas 236.0 (H) 80.0 - 100.0 mmHg    Sodium, Blood Gas 134 (L) 137 - 145 mmol/L    Potassium, Blood Gas 3.5 3.5 - 5.0 mmol/L    Calcium Level Ionized 0.98 (L) 1.12 - 1.23 mmol/L    TOC2, Blood gas 30.4 mmol/L    Base Excess, Blood gas 2.80 mmol/L    sO2, Blood gas 100.0 %    HCO3, Blood gas 28.9 >=15.0 mmol/L    Allens Test Yes     MODE AC     FIO2, Blood gas 100 %    Mech Vt 450 ml    Mech RR 18 b/min    PEEP 8.0 cmH2O   Comprehensive Metabolic Panel    Collection Time: 05/16/23  6:56 AM   Result Value Ref Range    Sodium Level 136 136 - 145 mmol/L    Potassium Level 4.8 3.5 - 5.1 mmol/L    Chloride 103 98 - 107 mmol/L    Carbon Dioxide 19 (L) 23 - 31 mmol/L    Glucose Level 79 (L) 82 - 115 mg/dL    Blood Urea Nitrogen 29.2 (H) 8.4 - 25.7 mg/dL    Creatinine 2.60 (H) 0.73 - 1.18 mg/dL    Calcium Level Total 7.3 (L) 8.8 - 10.0 mg/dL    Protein Total 5.6 (L) 5.8 - 7.6 gm/dL    Albumin Level 1.3 (L) 3.4 - 4.8 g/dL    Globulin 4.3 (H) 2.4 - 3.5 gm/dL    Albumin/Globulin Ratio 0.3 (L) 1.1 - 2.0 ratio    Bilirubin Total 0.8 <=1.5 mg/dL    Alkaline Phosphatase 91 40 - 150 unit/L    Alanine Aminotransferase 13 0 - 55 unit/L    Aspartate Aminotransferase 50 (H) 5 - 34 unit/L    eGFR 27 mls/min/1.73/m2   Magnesium    Collection Time: 05/16/23  6:56 AM   Result Value Ref Range    Magnesium Level 1.90 1.60 - 2.60 mg/dL   Phosphorus    Collection Time: 05/16/23  6:56 AM   Result Value Ref Range    Phosphorus Level 5.9 (H) 2.3 - 4.7 mg/dL   Blood Culture    Collection Time: 05/16/23  6:56 AM    Specimen: Blood   Result Value Ref Range    CULTURE, BLOOD ([facility]) No Growth At  24 Hours    POCT glucose    Collection Time: 05/16/23  7:57 AM   Result Value Ref Range    POCT Glucose 76 70 - 110 mg/dL   Lactic Acid, Plasma    Collection Time: 05/16/23  8:25 AM   Result Value Ref Range    Lactic Acid Level 1.9 0.5 - 2.2 mmol/L   Blood Culture    Collection Time: 05/16/23  8:25 AM    Specimen: Blood   Result Value Ref Range    CULTURE, BLOOD ([facility]) No Growth At 24 Hours    Protime-INR    Collection Time: 05/16/23  8:25 AM   Result Value Ref Range    PT 17.1 (H) 12.5 - 14.5 seconds    INR 1.41 (H) 0.00 - 1.30   Respiratory Culture    Collection Time: 05/16/23 11:58 AM    Specimen: Transtrachael aspirate; Sputum   Result Value Ref Range    Respiratory Culture No Growth At 24 Hours     GRAM STAIN Quality 2+     GRAM STAIN No bacteria seen    Sodium, Random Urine    Collection Time: 05/16/23 11:58 AM   Result Value Ref Range    Urine Sodium 55.0 mmol/L   Creatinine, Random Urine    Collection Time: 05/16/23 11:58 AM   Result Value Ref Range    Urine Creatinine 36.3 (L) 63.0 - 166.0 mg/dL   Urinalysis    Collection Time: 05/16/23 11:58 AM   Result Value Ref Range    Color, UA Dark Yellow Yellow, Light-Yellow, Dark Yellow, Lexi, Straw    Appearance, UA Clear Clear    Specific Gravity, UA 1.012 1.005 - 1.030    pH, UA 5.0 5.0 - 8.5    Protein, UA 1+ (A) Negative mg/dL    Glucose, UA Negative Negative, Normal mg/dL    Ketones, UA Negative Negative mg/dL    Blood, UA 1+ (A) Negative unit/L    Bilirubin, UA Negative Negative mg/dL    Urobilinogen, UA 0.2 0.2, 1.0, Normal mg/dL    Nitrites, UA Negative Negative    Leukocyte Esterase, UA Trace (A) Negative unit/L   Urinalysis, Microscopic    Collection Time: 05/16/23 11:58 AM   Result Value Ref Range    RBC, UA 6 (H) <=5 /HPF    WBC, UA 5 <=5 /HPF    Squamous Epithelial Cells, UA <5 <=5 /HPF    Bacteria, UA None Seen None Seen, Rare, Occasional /HPF    Mucous, UA Small (A) None Seen /LPF    Yeast, UA Few (A) None Seen /HPF    Amporphous Urate Crystals, UA  Few (A) None Seen /HPF    Uric Acid Crystals, UA Moderate (A) None Seen /HPF   Drug Screen, Urine    Collection Time: 05/16/23 11:58 AM   Result Value Ref Range    Amphetamines, Urine Negative Negative    Barbituates, Urine Negative Negative    Benzodiazepine, Urine Negative Negative    Cannabinoids, Urine Negative Negative    Cocaine, Urine Negative Negative    Fentanyl, Urine Positive (A) Negative    MDMA, Urine Negative Negative    Opiates, Urine Positive (A) Negative    Phencyclidine, Urine Negative Negative    pH, Urine 5.0 3.0 - 11.0   POCT glucose    Collection Time: 05/16/23 12:09 PM   Result Value Ref Range    POCT Glucose 64 (L) 70 - 110 mg/dL   Wound Culture    Collection Time: 05/16/23  1:13 PM    Specimen: Lumbar; Wound   Result Value Ref Range    Wound Culture No Growth At 24 Hours    POCT glucose    Collection Time: 05/16/23  1:48 PM   Result Value Ref Range    POCT Glucose 96 70 - 110 mg/dL   AFB Smear    Collection Time: 05/16/23  1:59 PM    Specimen: Lung, Left; Pleural Fluid   Result Value Ref Range    AFB Smear No AFB seen (Direct smear only)    Body Fluid Culture    Collection Time: 05/16/23  1:59 PM    Specimen: Lung, Left; Body Fluid   Result Value Ref Range    Body Fluid Culture No Growth At 24 Hours    Gram Stain    Collection Time: 05/16/23  1:59 PM    Specimen: Pleural Fluid, Left; Body Fluid   Result Value Ref Range    GRAM STAIN No WBCs, No bacteria seen    pH, Body Fluid    Collection Time: 05/16/23  1:59 PM   Result Value Ref Range    pH Body Fluid 7.91    Glucose, Body Fluid    Collection Time: 05/16/23  1:59 PM   Result Value Ref Range    Glucose Body Fluid 92 mg/dL   Protein, Body Fluid    Collection Time: 05/16/23  1:59 PM   Result Value Ref Range    Protein Body Fluid 1.1 gm/dL   Cell Count, Body Fluid    Collection Time: 05/16/23  1:59 PM   Result Value Ref Range    WBC  /uL    Color BF Straw     Clarity BF Cloudy    LD, Body Fluid    Collection Time: 05/16/23  1:59 PM    Result Value Ref Range    Lactate Dehydrogenase Body Fluid 214 U/L   Differential, Body Fluid    Collection Time: 05/16/23  1:59 PM   Result Value Ref Range    Neutrophils % 96 %    Lymphocyte % 4 %   POCT glucose    Collection Time: 05/16/23  6:25 PM   Result Value Ref Range    POCT Glucose 79 70 - 110 mg/dL   POCT glucose    Collection Time: 05/16/23  8:52 PM   Result Value Ref Range    POCT Glucose 84 70 - 110 mg/dL   Comprehensive Metabolic Panel    Collection Time: 05/17/23  3:02 AM   Result Value Ref Range    Sodium Level 138 136 - 145 mmol/L    Potassium Level 3.4 (L) 3.5 - 5.1 mmol/L    Chloride 101 98 - 107 mmol/L    Carbon Dioxide 23 23 - 31 mmol/L    Glucose Level 89 82 - 115 mg/dL    Blood Urea Nitrogen 28.7 (H) 8.4 - 25.7 mg/dL    Creatinine 2.47 (H) 0.73 - 1.18 mg/dL    Calcium Level Total 7.5 (L) 8.8 - 10.0 mg/dL    Protein Total 4.7 (L) 5.8 - 7.6 gm/dL    Albumin Level 1.8 (L) 3.4 - 4.8 g/dL    Globulin 2.9 2.4 - 3.5 gm/dL    Albumin/Globulin Ratio 0.6 (L) 1.1 - 2.0 ratio    Bilirubin Total 0.9 <=1.5 mg/dL    Alkaline Phosphatase 62 40 - 150 unit/L    Alanine Aminotransferase 10 0 - 55 unit/L    Aspartate Aminotransferase 22 5 - 34 unit/L    eGFR 29 mls/min/1.73/m2   Magnesium    Collection Time: 05/17/23  3:02 AM   Result Value Ref Range    Magnesium Level 1.80 1.60 - 2.60 mg/dL   Phosphorus    Collection Time: 05/17/23  3:02 AM   Result Value Ref Range    Phosphorus Level 5.3 (H) 2.3 - 4.7 mg/dL   CBC with Differential    Collection Time: 05/17/23  3:02 AM   Result Value Ref Range    WBC 15.42 (H) 4.50 - 11.50 x10(3)/mcL    RBC 2.91 (L) 4.70 - 6.10 x10(6)/mcL    Hgb 8.7 (L) 14.0 - 18.0 g/dL    Hct 26.6 (L) 42.0 - 52.0 %    MCV 91.4 80.0 - 94.0 fL    MCH 29.9 27.0 - 31.0 pg    MCHC 32.7 (L) 33.0 - 36.0 g/dL    RDW 16.5 11.5 - 17.0 %    Platelet 176 130 - 400 x10(3)/mcL    MPV 10.4 7.4 - 10.4 fL    Neut % 83.4 %    Lymph % 10.0 %    Mono % 2.6 %    Eos % 3.0 %    Basophil % 0.5 %    Lymph # 1.54  0.6 - 4.6 x10(3)/mcL    Neut # 12.87 (H) 2.1 - 9.2 x10(3)/mcL    Mono # 0.40 0.1 - 1.3 x10(3)/mcL    Eos # 0.46 0 - 0.9 x10(3)/mcL    Baso # 0.07 <=0.2 x10(3)/mcL    IG# 0.08 (H) 0 - 0.04 x10(3)/mcL    IG% 0.5 %    NRBC% 0.0 %   RT Blood Gas    Collection Time: 05/17/23  5:36 AM   Result Value Ref Range    Sample Type Arterial Blood     Sample site Left Radial Artery     Drawn by sd rrt     pH, Blood gas 7.450 7.350 - 7.450    pCO2, Blood gas 39.0 35.0 - 45.0 mmHg    pO2, Blood gas 75.0 (L) 80.0 - 100.0 mmHg    Sodium, Blood Gas 137 137 - 145 mmol/L    Potassium, Blood Gas 3.1 (L) 3.5 - 5.0 mmol/L    Calcium Level Ionized 1.01 (L) 1.12 - 1.23 mmol/L    TOC2, Blood gas 28.3 mmol/L    Base Excess, Blood gas 3.00 mmol/L    sO2, Blood gas 96.0 %    HCO3, Blood gas 27.1 >=15.0 mmol/L    Allens Test Yes     MODE AC     FIO2, Blood gas 30 %    Mech Vt 450 ml    Mech RR 18 b/min    PEEP 8.0 cmH2O       Significant Imaging:  Imaging Results              CT Lumbar Spine W Wo Contrast (Final result)  Result time 05/06/23 13:31:53      Final result by Roland Pierce MD (05/06/23 13:31:53)                   Impression:      1. Multiple posterior lumbar decompression laminectomies.  Posterior paraspinal fluid collection.  This may represent postsurgical seroma or hematoma without exclusion of infected collection.    2. Lumbar degenerative disc disease and spondylosis level by level discussed above.      Electronically signed by: Roland Pierce  Date:    05/06/2023  Time:    13:31               Narrative:    EXAMINATION:  CT LUMBAR SPINE W WO CONTRAST    CLINICAL HISTORY:  Recent lumbar spine infection status post surgery;    TECHNIQUE:  Multidetector axial images were performed of the lumbar spine without contrast and the images were reformatted.    Dose length product of 2716 mGycm. Automated exposure control was utilized to minimize radiation dose.    COMPARISON:  None available    FINDINGS:  Patient is status post wide  decompression laminectomies from L1 to L5.  There are extensive posterior paraspinal soft inflammations and fluid collection containing several air locules.  The fluid collection with peripheral thin enhancement broadly abuts the thecal sac is from the superior endplate of L2 the inferior endplate of L5 with craniocaudal span of 8.5 cm and shows maximum anterior-posterior diameter of 2.5 cm and transverse diameter of 3.8 cm.  There is also left intrathecal small air locule on image 83 series 3.  These findings may represent postsurgical seroma or hematoma without exclusion of infected collection.  There are no lumbar vertebrae disc spaces irregularity or osteolytic destructive changes.  Several Schmorl nodes are seen involving lumbar vertebrae and the most conspicuous is of L3.  No acute fracture or malalignment.  Disc segmental analysis is given below:    At L1-L2, disc height is preserved.  Bilateral facet arthropathy without significant central canal stenosis.  There are no narrowings of the neural foramen.    At L2-L3, there is osteophyte disc complex which indents the ventral thecal sac.  Thecal sac is patent with decompression laminectomies.  There is mild narrowing of the right neural foramen caused by uncinate arthropathy and there is moderate narrowing of the left neural foramen.    At L3-L4, there is broad osteophyte disc complex which indents the ventral thecal sac.  Right paracentral spur like growth from vertebral body effaces the right lateral recess with compression upon the right exiting nerve root.  There also bilateral moderate spondylotic narrowings of the neural foramen.    At L4-L5, there is broad disc bulge which indents the ventral thecal sac.  Central canal is not stenosed with decompression laminectomies.  There is bilateral uncovertebral and facet arthropathy which encroaches onto the neural foramen.  There is mild narrowing of the right neural foramen and moderate narrowing of the left  neural canal.    At L5-S1, there is broad posterior vertebral spondylosis.  Central canal is not stenosed.  There are no significant narrowings of the neural foramen                                       US Abdomen Limited (Final result)  Result time 05/06/23 11:31:12      Final result by Roland Pierce MD (05/06/23 11:31:12)                   Impression:      1.  Hepatic lobular contour and coarsened parenchyma.    2.  Gallbladder could not be visualized with a cholecystostomy tube in place.    3.  Small ascites and right pleural effusion.      Electronically signed by: Roland Pierce  Date:    05/06/2023  Time:    11:31               Narrative:    EXAMINATION:  US ABDOMEN LIMITED    CLINICAL HISTORY:  Cholecystostomy tube placement April 19, 2023    TECHNIQUE:  Multiple real-time transverse and longitudinal sections were performed of the right abdomen by the sonographer. Select images were submitted for review.    COMPARISON:  None available    FINDINGS:  Liver shows lobular contour and coarsened parenchymal texture.  There is no delineation of discrete hepatic cystic or solid mass. Hepatic maximum diameter of 14.3 cm.  There is small amount of free fluid around the liver consistent with ascites.  There is unremarkable hepatopedal flow within the portal vein.  Pancreas is diffusely echogenic.  Incidental note is made of right-sided pleural effusion.    Gallbladder could not be visualized.  A cholecystostomy tube is in place.  Common bile duct caliber of 2.0 mm is within normal limits for the age. Sonographer reported negative Yusuf's sign.    Normally located right kidney length measures 11.2 x 5.0 x 5.6 cm. Right renal corticomedullary differentiation is unremarkable. No evidence of hydronephrosis.                                    EKG:        Telemetry:  Sinus Rhythm    Physical Exam  Vitals and nursing note reviewed.   HENT:      Head: Normocephalic.      Mouth/Throat:      Mouth: Mucous membranes are moist.    Cardiovascular:      Rate and Rhythm: Normal rate and regular rhythm.      Heart sounds: Normal heart sounds.      Comments: SR  Pulmonary:      Effort: Pulmonary effort is normal. No respiratory distress.      Comments: Intubated/Mechanical Ventilation  Abdominal:      Palpations: Abdomen is soft.   Skin:     General: Skin is warm and dry.     Home Medications:   No current facility-administered medications on file prior to encounter.     Current Outpatient Medications on File Prior to Encounter   Medication Sig Dispense Refill    HYDROcodone-acetaminophen (NORCO)  mg per tablet Take 1 tablet by mouth every 6 (six) hours as needed for Pain (severe pain only). 12 tablet 0       Current Inpatient Medications:    Current Facility-Administered Medications:     0.9%  NaCl infusion (for blood administration), , Intravenous, Q24H PRN, Guerrero Alfaro MD    0.9%  NaCl infusion (for blood administration), , Intravenous, Q24H PRN, Fred Dominguez DO, New Bag at 05/12/23 1640    0.9%  NaCl infusion (for blood administration), , Intravenous, Q24H PRN, LAURA Morris    0.9%  NaCl infusion (for blood administration), , Intravenous, Q24H PRN, Guerrero Alfaro MD    0.9%  NaCl infusion (for blood administration), , Intravenous, Q24H PRN, Guerrero Alfaro MD    0.9%  NaCl infusion (for blood administration), , Intravenous, Q24H PRN, Guerrero Alfaro MD    albumin human 25% bottle 12.5 g, 12.5 g, Intravenous, Q6H, Cici Nunez MD, Stopped at 05/17/23 0632    aluminum-magnesium hydroxide-simethicone 200-200-20 mg/5 mL suspension 30 mL, 30 mL, Oral, QID PRN, Nery Sepulveda MD    collagenase ointment, , Topical (Top), BID, Reggie Castañeda MD, Given at 05/17/23 0834    DAPTOmycin (CUBICIN) 435 mg in sodium chloride 0.9% SolP 50 mL IVPB, 6 mg/kg, Intravenous, Q48H, Darnell Franco MD, Stopped at 05/15/23 1834    dexmedetomidine (PRECEDEX) 400mcg/100mL 0.9% NaCL infusion, 0-1.4 mcg/kg/hr, Intravenous, Continuous,  Cici Nunez MD, Last Rate: 3.6 mL/hr at 05/17/23 0959, 0.2 mcg/kg/hr at 05/17/23 0959    dextrose 10% bolus 125 mL 125 mL, 12.5 g, Intravenous, PRN, LUIS Lackey-BC, Stopped at 05/16/23 1243    dextrose 10% bolus 250 mL 250 mL, 25 g, Intravenous, PRN, Melissa Mohan AGACNP-BC    famotidine (PF) injection 20 mg, 20 mg, Intravenous, Daily, Medhat Reece Jr., MD, FCCP, 20 mg at 05/17/23 0848    fentaNYL 2500 mcg in 0.9% sodium chloride 250 mL infusion premix (titrating), 0-200 mcg/hr, Intravenous, Continuous, Cici Nunez MD, Last Rate: 5 mL/hr at 05/16/23 1900, 50 mcg/hr at 05/16/23 1900    fentaNYL injection 25 mcg, 25 mcg, Intravenous, Q2H PRN, Steven Cyr DO    fentaNYL injection 50 mcg, 50 mcg, Intravenous, Q1H PRN, Mirta Ryan, JUANA    glucagon (human recombinant) injection 1 mg, 1 mg, Intramuscular, PRN, LAURA Lackey    glucose chewable tablet 16 g, 16 g, Oral, PRN, Mica Naidu MD    glucose chewable tablet 24 g, 24 g, Oral, PRN, Mica Naidu MD, 24 g at 05/08/23 0500    HYDROmorphone (PF) injection 0.4 mg, 0.4 mg, Intravenous, Q5 Min PRN, Kylah Ortiz MD, 0.4 mg at 05/10/23 1610    HYDROmorphone (PF) injection 0.5 mg, 0.5 mg, Intravenous, Q4H PRN, Guerrero Alfaro MD, 0.5 mg at 05/15/23 1228    insulin aspart U-100 injection 1-10 Units, 1-10 Units, Subcutaneous, QID (AC + HS) PRN, LUIS Lackey-BC, 1 Units at 05/14/23 2132    insulin detemir U-100 injection 12 Units, 12 Units, Subcutaneous, QHS, Darnell Franco MD, 12 Units at 05/15/23 2218    Lactobacillus acidophilus capsule 1 capsule, 1 capsule, Oral, TID WM, Nery Sepulveda MD, 1 capsule at 05/17/23 0833    loperamide capsule 2 mg, 2 mg, Oral, QID PRN, Guerrero Alfaro MD, 2 mg at 05/15/23 2217    melatonin tablet 6 mg, 6 mg, Oral, Nightly PRN, Nery Sepulveda MD    metoprolol injection 5 mg, 5 mg, Intravenous, Q4H PRN, Cici Nunez MD    micafungin 100 mg in sodium chloride 0.9 % 100 mL IVPB  (MB+), 100 mg, Intravenous, Q24H, Cici Nunez MD, Stopped at 05/17/23 0412    multivitamin tablet, 1 tablet, Oral, Daily, Nery Sepulveda MD, 1 tablet at 05/17/23 0833    NORepinephrine 8 mg in dextrose 5% 250 mL infusion, 0-3 mcg/kg/min, Intravenous, Continuous, Medhat Reece Jr., MD, FCCP, Last Rate: 8.2 mL/hr at 05/17/23 0947, 0.06 mcg/kg/min at 05/17/23 0947    ondansetron injection 4 mg, 4 mg, Intravenous, Q4H PRN, Nery Sepulveda MD, 4 mg at 05/06/23 0434    ondansetron injection 4 mg, 4 mg, Intravenous, Daily PRN, Kylah Ortiz MD    oxyCODONE-acetaminophen 5-325 mg per tablet 1 tablet, 1 tablet, Oral, Q6H PRN, Guerrero Alfaro MD, 1 tablet at 05/15/23 2218    polyethylene glycol packet 17 g, 17 g, Oral, BID PRN, Nery Sepulveda MD    prochlorperazine injection Soln 5 mg, 5 mg, Intravenous, Q6H PRN, Nery Sepulveda MD    prochlorperazine injection Soln 5 mg, 5 mg, Intravenous, Q30 Min PRN, Kylah Ortiz MD    propofol (DIPRIVAN) 10 mg/mL infusion, 0-50 mcg/kg/min, Intravenous, Continuous, Cici Nunez MD, Last Rate: 21.8 mL/hr at 05/17/23 0951, 50 mcg/kg/min at 05/17/23 0951    rifAMpin (RIFADIN) 300 mg in dextrose 5 % (D5W) 100 mL IVPB, 300 mg, Intravenous, BID, Pedro Luis Vigil DO, Stopped at 05/17/23 0948    senna-docusate 8.6-50 mg per tablet 2 tablet, 2 tablet, Oral, BID PRN, Nery Sepulveda MD    sodium chloride 0.9% flush 10 mL, 10 mL, Intravenous, PRN, Nery Sepulveda MD    Flushing PICC Protocol, , , Until Discontinued **AND** sodium chloride 0.9% flush 10 mL, 10 mL, Intravenous, Q6H, 10 mL at 05/17/23 0532 **AND** sodium chloride 0.9% flush 10 mL, 10 mL, Intravenous, PRN, Darnell Franco MD    sodium chloride 0.9% flush 10 mL, 10 mL, Intravenous, PRN, Cici Nunez MD    thiamine tablet 100 mg, 100 mg, Oral, Daily, Darnell Franco MD, 100 mg at 05/17/23 0833    tiZANidine tablet 4 mg, 4 mg, Oral, Q8H PRN, Nery Sepulveda MD    zinc oxide-cod liver oil 40 % paste, , Topical (Top), BID, Reggie MCMILLAN  MD Maddy, Given at 05/17/23 0834    VTE Risk Mitigation (From admission, onward)           Ordered     IP VTE LOW RISK PATIENT  Once         05/05/23 2318     Place sequential compression device  Until discontinued         05/05/23 2318                  Assessment:   SEE BELOW MDM FORMULATED BY ME (KELLEN LANDRY MD):     MRSA Bacteremia  Acute Hypoxemic/Hypercapnic Respiratory Failure Requiring Intubation/Mechanical Ventilation    - Bilateral Pleural Effusions/Pulmonary Edema (EF 55%) Status Post Left Thoracentesis (5.16.23)  Hypotension Requiring Vasopressor Support- Likely Septic Shock  Osteomyelitis of Cervial/Lumbar Spine with Bilateral Psoas/Paraspinal/Epidural Abscesses Status Post Laminectomy/Foreminotomy/Abscess Drainage on 4.21.23  UTI (Fungal)  Cholecystitis Status Post Meenu Tube Placement  Sacral Ulcer Status Post Debridement (5.10.23)  Heatochezia Status Post Transfusions/Colonoscopy Revealing Diverticula (5.14.23)  Acute Kidney Injury  Protein Calorie Malnutrition  DM II  Hepatitis C (Untreated)/Hepatic Cirrhosis  Nicotine Dependence/Tobacco Use/IV Drug Use    Plan:   Plan CRISTY Tomorrow (At bedside, while intubated)- Per Dr. Landry Re: Bacteremia Rule out IE  NPO Past Midnight  Consent Obtained (Via Phone)- from Clyde Delvalle (Son)  Consent on Chart.    Thank you for your consult.     DIANE Gil AND KELLEN LANDRY MD  Cardiology  [facility] - [facility] ICU  05/17/2023 10:11 AM       PHYSICIAN ADDENDUM:  PATIENT PERSONALLY SEEN AND PHYSICAL EXAMINATION WAS PERFORMED BY ME.  ABOVE MEDICAL DECISION-MAKING IS ALSO FORMULATED BY ME.  IN SUMMARY PATIENT IS PRIMARILY SEEN FOR ( INCLUDING ALL ABOVE IN ASSESSMENT) CRISTY EVALUATION FOR MRSA BACTEREMIA  CARDIOVASCULAR EXAM:  S1, S2 POSITIVE MURMUR  LUNGS:  FINE CRACKLES AT BASES.  EXTREMITIES:  1+ EDEMA BILATERALLY  PLAN IS TO DO CRISTY TOMORROW

## 2023-05-17 NOTE — PROGRESS NOTES
NEPHROLOGY: Progress      63 y.o. male with MRSA bacteremia.  Past medical history significant for polysubstance abuse, IV drug use, diabetes mellitus, untreated hepatitis-C, and cirrhosis.  He was recently hospitalized at [facility] for MRSA bacteremia due to C and L-spine osteomyelitis/diskitis, paraspinous muscle abscess, and lumbar epidural abscess.    He also had a cholecystostomy drain placed for cholecystitis.  Patient left A on 05/05/2023 and then presented to [facility] on 05/06/2023.  Blood cultures at admission were positive for MRSA.  UDS was also positive for amphetamines and opiates.  Neurosurgery and Infectious Disease have been following for bacteremia and spinal osteomyelitis/diskitis.  He has been getting daptomycin, rifampin, and Zosyn per ID recommendations.  At admission his renal function was normal with creatinine of 0.7.  Starting 05/10/2023 his renal function started to worsen with creatinine 1.0->1.7->1.9 at time of Nephrology consultation.  Labs also concerning with worsening anemia and hypoalbuminemia.  Patient had an episode of hematochezia on the 12th and was given 3 units of packed red cells.  The patient also has some pyuria and concerned for AIN, possibly related to Zosyn, in particular with development of petechial rash on the dorsum of his arms and legs .  The concomitant acute anemia with a hemoglobin is low as 5.9 could be a significant contributor to his RUPERTO.  Nuclear scan has located some accumulation of contrast in the ascending colon.  And colonoscopy revealed pan colonic diverticuli.   Patient was transferred to ICU due to respiratory failure requiring intubation.  Patient is grossly volume overloaded but third-spacing peripherally as well as into pleural and peritoneal spaces.  We will start IV albumin now in anticipation that patient will need  thoracentesis.     5/17/2023  he had left thoracentesis done and 1000 cc of fluid was drained.  Remains on the ventilator.  Thick clear secretions are coming out through the endotracheal tube.  Remains on norepinephrine.  Urine output is excellent.  Kidney functions are about the same as yesterday.            Current Facility-Administered Medications:     0.9%  NaCl infusion (for blood administration), , Intravenous, Q24H PRN, Guerrero Alfaro MD    0.9%  NaCl infusion (for blood administration), , Intravenous, Q24H PRN, Fred Dominguez DO, New Bag at 05/12/23 1640    0.9%  NaCl infusion (for blood administration), , Intravenous, Q24H PRN, Ambreen Hatfield AGACNP-BC    0.9%  NaCl infusion (for blood administration), , Intravenous, Q24H PRN, Guerrero Alfaro MD    0.9%  NaCl infusion (for blood administration), , Intravenous, Q24H PRN, Guerrero Alfaro MD    0.9%  NaCl infusion (for blood administration), , Intravenous, Q24H PRN, Guerrero Alfaro MD    albumin human 25% bottle 12.5 g, 12.5 g, Intravenous, Q6H, Cici Nunez MD, Stopped at 05/17/23 0632    aluminum-magnesium hydroxide-simethicone 200-200-20 mg/5 mL suspension 30 mL, 30 mL, Oral, QID PRN, Nery Sepulveda MD    collagenase ointment, , Topical (Top), BID, Reggie Castañeda MD, Given at 05/17/23 0834    DAPTOmycin (CUBICIN) 435 mg in sodium chloride 0.9% SolP 50 mL IVPB, 6 mg/kg, Intravenous, Q48H, Darnell Franco MD, Stopped at 05/15/23 1834    dexmedetomidine (PRECEDEX) 400mcg/100mL 0.9% NaCL infusion, 0-1.4 mcg/kg/hr, Intravenous, Continuous, Cici Nunez MD, Last Rate: 3.6 mL/hr at 05/17/23 0959, 0.2 mcg/kg/hr at 05/17/23 0959    dextrose 10% bolus 125 mL 125 mL, 12.5 g, Intravenous, PRN, LAURA Lackey, Stopped at 05/16/23 1243    dextrose 10% bolus 250 mL 250 mL, 25 g, Intravenous, PRN, LAURA Lackey    famotidine (PF) injection 20 mg, 20 mg, Intravenous, Daily, Medhat Reece Jr., MD, FCCP, 20 mg at 05/17/23 0848    fentaNYL  2500 mcg in 0.9% sodium chloride 250 mL infusion premix (titrating), 0-200 mcg/hr, Intravenous, Continuous, Cici Nunez MD, Last Rate: 5 mL/hr at 05/16/23 1900, 50 mcg/hr at 05/16/23 1900    fentaNYL injection 25 mcg, 25 mcg, Intravenous, Q2H PRN, Steven Cyr DO    fentaNYL injection 50 mcg, 50 mcg, Intravenous, Q1H PRN, JUANA Leung    glucagon (human recombinant) injection 1 mg, 1 mg, Intramuscular, PRN, Melissa Mohan AGACNP-BC    glucose chewable tablet 16 g, 16 g, Oral, PRN, Mica Naidu MD    glucose chewable tablet 24 g, 24 g, Oral, PRN, Mica Naidu MD, 24 g at 05/08/23 0500    HYDROmorphone (PF) injection 0.4 mg, 0.4 mg, Intravenous, Q5 Min PRN, Kylah Ortiz MD, 0.4 mg at 05/10/23 1610    HYDROmorphone (PF) injection 0.5 mg, 0.5 mg, Intravenous, Q4H PRN, Guerrero Alfaro MD, 0.5 mg at 05/15/23 1228    insulin aspart U-100 injection 1-10 Units, 1-10 Units, Subcutaneous, QID (AC + HS) PRN, Melissa Mohan AGACNP-BC, 1 Units at 05/14/23 2132    insulin detemir U-100 injection 12 Units, 12 Units, Subcutaneous, QHS, Darnell Franco MD, 12 Units at 05/15/23 2218    Lactobacillus acidophilus capsule 1 capsule, 1 capsule, Oral, TID WM, Nery Sepulveda MD, 1 capsule at 05/17/23 0833    loperamide capsule 2 mg, 2 mg, Oral, QID PRN, Guerrero Alfaro MD, 2 mg at 05/15/23 2217    melatonin tablet 6 mg, 6 mg, Oral, Nightly PRN, Nery Sepulveda MD    metoprolol injection 5 mg, 5 mg, Intravenous, Q4H PRN, Cici Nunez MD    micafungin 100 mg in sodium chloride 0.9 % 100 mL IVPB (MB+), 100 mg, Intravenous, Q24H, Cici Nunez MD, Stopped at 05/17/23 0412    multivitamin tablet, 1 tablet, Oral, Daily, Nery Sepulveda MD, 1 tablet at 05/17/23 0833    NORepinephrine 8 mg in dextrose 5% 250 mL infusion, 0-3 mcg/kg/min, Intravenous, Continuous, Medhat Reece Jr., MD, FCCP, Last Rate: 8.2 mL/hr at 05/17/23 0947, 0.06 mcg/kg/min at 05/17/23 0947    ondansetron injection 4 mg, 4 mg, Intravenous, Q4H PRN,  "Nery Sepulveda MD, 4 mg at 05/06/23 0434    ondansetron injection 4 mg, 4 mg, Intravenous, Daily PRN, Kylah Ortiz MD    oxyCODONE-acetaminophen 5-325 mg per tablet 1 tablet, 1 tablet, Oral, Q6H PRN, Guerrero Alfaro MD, 1 tablet at 05/15/23 2218    polyethylene glycol packet 17 g, 17 g, Oral, BID PRN, Nery Sepulveda MD    prochlorperazine injection Soln 5 mg, 5 mg, Intravenous, Q6H PRN, Nery Sepulveda MD    prochlorperazine injection Soln 5 mg, 5 mg, Intravenous, Q30 Min PRN, Kylah Ortiz MD    propofol (DIPRIVAN) 10 mg/mL infusion, 0-50 mcg/kg/min, Intravenous, Continuous, Cici Nunez MD, Last Rate: 21.8 mL/hr at 05/17/23 0951, 50 mcg/kg/min at 05/17/23 0951    rifAMpin (RIFADIN) 300 mg in dextrose 5 % (D5W) 100 mL IVPB, 300 mg, Intravenous, BID, Pedro Luis Vigil DO, Stopped at 05/17/23 0948    senna-docusate 8.6-50 mg per tablet 2 tablet, 2 tablet, Oral, BID PRN, Nery Sepulveda MD    sodium chloride 0.9% flush 10 mL, 10 mL, Intravenous, PRN, Nery Sepulveda MD    Flushing PICC Protocol, , , Until Discontinued **AND** sodium chloride 0.9% flush 10 mL, 10 mL, Intravenous, Q6H, 10 mL at 05/17/23 0532 **AND** sodium chloride 0.9% flush 10 mL, 10 mL, Intravenous, PRN, Darnell Franco MD    sodium chloride 0.9% flush 10 mL, 10 mL, Intravenous, PRN, Cici Nunez MD    thiamine tablet 100 mg, 100 mg, Oral, Daily, Darnell Franco MD, 100 mg at 05/17/23 0833    tiZANidine tablet 4 mg, 4 mg, Oral, Q8H PRN, Nery Sepulveda MD    zinc oxide-cod liver oil 40 % paste, , Topical (Top), BID, Reggie Castañeda MD, Given at 05/17/23 0834        /60 (BP Location: Left arm, Patient Position: Lying)   Pulse 108   Temp 98.5 °F (36.9 °C) (Oral)   Resp (!) 21   Ht 5' 7" (1.702 m)   Wt 72.6 kg (160 lb)   SpO2 96%   BMI 25.06 kg/m²     Physical Exam:    GEN:  Patient is somewhat chronically ill-appearing.  Patient is currently intubated and sedated.  He is on norepinephrine 0.06 mics per kilos per minute.   HEENT: " Atraumatic. EOMI, no icterus  NECK : No JVD  CARD : RRR s rub or gallop  LUNGS :  Bilateral coarse rhonchi.  ABD : Soft,non-tender. BS active ascites present.  Percutaneous cholecystostomy tube draining ascites  EXT :  1 + pitting edema.           Intake/Output Summary (Last 24 hours) at 5/17/2023 1031  Last data filed at 5/17/2023 0800  Gross per 24 hour   Intake 1332.45 ml   Output 3155 ml   Net -1822.55 ml         Laboratory:  Recent Results (from the past 24 hour(s))   Respiratory Culture    Collection Time: 05/16/23 11:58 AM    Specimen: Transtrachael aspirate; Sputum   Result Value Ref Range    Respiratory Culture No Growth At 24 Hours     GRAM STAIN Quality 2+     GRAM STAIN No bacteria seen    Sodium, Random Urine    Collection Time: 05/16/23 11:58 AM   Result Value Ref Range    Urine Sodium 55.0 mmol/L   Creatinine, Random Urine    Collection Time: 05/16/23 11:58 AM   Result Value Ref Range    Urine Creatinine 36.3 (L) 63.0 - 166.0 mg/dL   Urinalysis    Collection Time: 05/16/23 11:58 AM   Result Value Ref Range    Color, UA Dark Yellow Yellow, Light-Yellow, Dark Yellow, Lexi, Straw    Appearance, UA Clear Clear    Specific Gravity, UA 1.012 1.005 - 1.030    pH, UA 5.0 5.0 - 8.5    Protein, UA 1+ (A) Negative mg/dL    Glucose, UA Negative Negative, Normal mg/dL    Ketones, UA Negative Negative mg/dL    Blood, UA 1+ (A) Negative unit/L    Bilirubin, UA Negative Negative mg/dL    Urobilinogen, UA 0.2 0.2, 1.0, Normal mg/dL    Nitrites, UA Negative Negative    Leukocyte Esterase, UA Trace (A) Negative unit/L   Urinalysis, Microscopic    Collection Time: 05/16/23 11:58 AM   Result Value Ref Range    RBC, UA 6 (H) <=5 /HPF    WBC, UA 5 <=5 /HPF    Squamous Epithelial Cells, UA <5 <=5 /HPF    Bacteria, UA None Seen None Seen, Rare, Occasional /HPF    Mucous, UA Small (A) None Seen /LPF    Yeast, UA Few (A) None Seen /HPF    Amporphous Urate Crystals, UA Few (A) None Seen /HPF    Uric Acid Crystals, UA Moderate  (A) None Seen /HPF   Drug Screen, Urine    Collection Time: 05/16/23 11:58 AM   Result Value Ref Range    Amphetamines, Urine Negative Negative    Barbituates, Urine Negative Negative    Benzodiazepine, Urine Negative Negative    Cannabinoids, Urine Negative Negative    Cocaine, Urine Negative Negative    Fentanyl, Urine Positive (A) Negative    MDMA, Urine Negative Negative    Opiates, Urine Positive (A) Negative    Phencyclidine, Urine Negative Negative    pH, Urine 5.0 3.0 - 11.0   POCT glucose    Collection Time: 05/16/23 12:09 PM   Result Value Ref Range    POCT Glucose 64 (L) 70 - 110 mg/dL   Wound Culture    Collection Time: 05/16/23  1:13 PM    Specimen: Lumbar; Wound   Result Value Ref Range    Wound Culture No Growth At 24 Hours    POCT glucose    Collection Time: 05/16/23  1:48 PM   Result Value Ref Range    POCT Glucose 96 70 - 110 mg/dL   AFB Smear    Collection Time: 05/16/23  1:59 PM    Specimen: Lung, Left; Pleural Fluid   Result Value Ref Range    AFB Smear No AFB seen (Direct smear only)    Body Fluid Culture    Collection Time: 05/16/23  1:59 PM    Specimen: Lung, Left; Body Fluid   Result Value Ref Range    Body Fluid Culture No Growth At 24 Hours    Gram Stain    Collection Time: 05/16/23  1:59 PM    Specimen: Pleural Fluid, Left; Body Fluid   Result Value Ref Range    GRAM STAIN No WBCs, No bacteria seen    pH, Body Fluid    Collection Time: 05/16/23  1:59 PM   Result Value Ref Range    pH Body Fluid 7.91    Glucose, Body Fluid    Collection Time: 05/16/23  1:59 PM   Result Value Ref Range    Glucose Body Fluid 92 mg/dL   Protein, Body Fluid    Collection Time: 05/16/23  1:59 PM   Result Value Ref Range    Protein Body Fluid 1.1 gm/dL   Cell Count, Body Fluid    Collection Time: 05/16/23  1:59 PM   Result Value Ref Range    WBC  /uL    Color BF Straw     Clarity BF Cloudy    LD, Body Fluid    Collection Time: 05/16/23  1:59 PM   Result Value Ref Range    Lactate Dehydrogenase Body Fluid  214 U/L   Differential, Body Fluid    Collection Time: 05/16/23  1:59 PM   Result Value Ref Range    Neutrophils % 96 %    Lymphocyte % 4 %   POCT glucose    Collection Time: 05/16/23  6:25 PM   Result Value Ref Range    POCT Glucose 79 70 - 110 mg/dL   POCT glucose    Collection Time: 05/16/23  8:52 PM   Result Value Ref Range    POCT Glucose 84 70 - 110 mg/dL   Comprehensive Metabolic Panel    Collection Time: 05/17/23  3:02 AM   Result Value Ref Range    Sodium Level 138 136 - 145 mmol/L    Potassium Level 3.4 (L) 3.5 - 5.1 mmol/L    Chloride 101 98 - 107 mmol/L    Carbon Dioxide 23 23 - 31 mmol/L    Glucose Level 89 82 - 115 mg/dL    Blood Urea Nitrogen 28.7 (H) 8.4 - 25.7 mg/dL    Creatinine 2.47 (H) 0.73 - 1.18 mg/dL    Calcium Level Total 7.5 (L) 8.8 - 10.0 mg/dL    Protein Total 4.7 (L) 5.8 - 7.6 gm/dL    Albumin Level 1.8 (L) 3.4 - 4.8 g/dL    Globulin 2.9 2.4 - 3.5 gm/dL    Albumin/Globulin Ratio 0.6 (L) 1.1 - 2.0 ratio    Bilirubin Total 0.9 <=1.5 mg/dL    Alkaline Phosphatase 62 40 - 150 unit/L    Alanine Aminotransferase 10 0 - 55 unit/L    Aspartate Aminotransferase 22 5 - 34 unit/L    eGFR 29 mls/min/1.73/m2   Magnesium    Collection Time: 05/17/23  3:02 AM   Result Value Ref Range    Magnesium Level 1.80 1.60 - 2.60 mg/dL   Phosphorus    Collection Time: 05/17/23  3:02 AM   Result Value Ref Range    Phosphorus Level 5.3 (H) 2.3 - 4.7 mg/dL   CBC with Differential    Collection Time: 05/17/23  3:02 AM   Result Value Ref Range    WBC 15.42 (H) 4.50 - 11.50 x10(3)/mcL    RBC 2.91 (L) 4.70 - 6.10 x10(6)/mcL    Hgb 8.7 (L) 14.0 - 18.0 g/dL    Hct 26.6 (L) 42.0 - 52.0 %    MCV 91.4 80.0 - 94.0 fL    MCH 29.9 27.0 - 31.0 pg    MCHC 32.7 (L) 33.0 - 36.0 g/dL    RDW 16.5 11.5 - 17.0 %    Platelet 176 130 - 400 x10(3)/mcL    MPV 10.4 7.4 - 10.4 fL    Neut % 83.4 %    Lymph % 10.0 %    Mono % 2.6 %    Eos % 3.0 %    Basophil % 0.5 %    Lymph # 1.54 0.6 - 4.6 x10(3)/mcL    Neut # 12.87 (H) 2.1 - 9.2 x10(3)/mcL     Mono # 0.40 0.1 - 1.3 x10(3)/mcL    Eos # 0.46 0 - 0.9 x10(3)/mcL    Baso # 0.07 <=0.2 x10(3)/mcL    IG# 0.08 (H) 0 - 0.04 x10(3)/mcL    IG% 0.5 %    NRBC% 0.0 %   RT Blood Gas    Collection Time: 05/17/23  5:36 AM   Result Value Ref Range    Sample Type Arterial Blood     Sample site Left Radial Artery     Drawn by sd rrt     pH, Blood gas 7.450 7.350 - 7.450    pCO2, Blood gas 39.0 35.0 - 45.0 mmHg    pO2, Blood gas 75.0 (L) 80.0 - 100.0 mmHg    Sodium, Blood Gas 137 137 - 145 mmol/L    Potassium, Blood Gas 3.1 (L) 3.5 - 5.0 mmol/L    Calcium Level Ionized 1.01 (L) 1.12 - 1.23 mmol/L    TOC2, Blood gas 28.3 mmol/L    Base Excess, Blood gas 3.00 mmol/L    sO2, Blood gas 96.0 %    HCO3, Blood gas 27.1 >=15.0 mmol/L    Allens Test Yes     MODE AC     FIO2, Blood gas 30 %    Mech Vt 450 ml    Mech RR 18 b/min    PEEP 8.0 cmH2O         Assessment/Plan:  RUPERTO-nonoliguric probably secondary to ATN versus AIN.  Recent colonic diverticular bleed  Respiratory failure -bilateral pleural effusions-on ventilator  Cholecystostomy tube cholecystitis-draining PD fluid  MRSA bacteremia-osteomyelitis/psoas abscesses  Status post recent lumbar laminectomy and drainage   Polysubstance abuse   Multiple infectious sources-epidural abscess   Cervical and lumbar spine osteomyelitis/diskitis  Sacral ulcer debrided 5/10  Cirrhosis-untreated hep C  Type 2 diabetes mellitus      Recommendations  Excellent urine output.  Stable BUN and creatinine.  No need for any renal replacement therapy.  Patient definitely needs some nutritional support which will be decided by the intensivist.

## 2023-05-17 NOTE — PROGRESS NOTES
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 Chang.  Previous hospital stay was also complicated by cholecystitis for which he underwent MRCP and cholecystostomy tube placement (4/19) in addition to paracentesis.  Also during previous hospitalization he had a positive urine drug screen on 04/27 for amphetamines suggesting drug use while hospitalized.      During his stay at [facility] he has been followed by Neurosurgery, Infectious Disease, Gastroenterology, and Nephrology.  Echocardiogram on 05/06 with EF 50-55%, no mention of vegetations.  Has been awaiting MRI lumbar spine and CT of the left lower extremity secondary to fluid collection on ultrasound which was 9 x 4 x 2 cm.  However these imaging modalities have not been able to be performed yet.  He underwent sacral ulcer debridement on 05/10.  Hematochezia on 05/12 resulted in transfusion of 4 units of blood and 1 unit FFP and nuclear medicine bleeding scan revealed radiotracer accumulation with most intense activity in ascending colon prompting colonoscopy which showed pancolonic diverticula without active bleed and  anorectal hyperemic mucosa.    He is being upgraded to ICU for tenuous respiratory status currently requiring BiPAP 14/6 with 100% FiO2 maintaining saturations in the low/mid 80s and near hypotension with tachycardia.  During examination he is awake and alert and able to converse with BiPAP mask and endorsing shortness of breath.  He is significantly fluid overloaded on recent CXR despite 40 mg of Lasix x2 earlier tonight with worsening renal function.  Denying any other symptoms or complaints at present aside from chronic back pains. CRNA has been paged for intubation.  Adding Levophed for hypotension.    Hospital Course/Significant events:  Left-sided thoracentesis on 05/16 with removal of 1L    24 Hour Interval History:  No acute events overnight.  Hemodynamically stable.  Afebrile.  Intubated ventilated sedated with fentanyl and propofol.  Remains on Levophed at 0.03.  Leukocytosis improved.  Replacing potassium and magnesium.    History reviewed. No pertinent past medical history.    Past Surgical History:   Procedure Laterality Date    COLONOSCOPY N/A 5/14/2023    Procedure: COLONOSCOPY;  Surgeon: Shaneka Ayers MD;  Location: [facility];  Service: Gastroenterology;  Laterality: N/A;    DEBRIDEMENT OF SACRAL WOUND N/A 5/10/2023    Procedure: DEBRIDEMENT, WOUND, SACRUM;  Surgeon: Reggie Castañeda MD;  Location: [facility] OR;  Service: General;  Laterality: N/A;       Social History     Socioeconomic History    Marital status: Single           Current Outpatient Medications   Medication Instructions    HYDROcodone-acetaminophen (NORCO)  mg per tablet 1 tablet, Oral, Every 6 hours PRN       Current Inpatient Medications   albumin human 25%  12.5 g Intravenous Q6H    collagenase   Topical (Top) BID    DAPTOmycin (CUBICIN) IV (PEDS and ADULTS)  6 mg/kg Intravenous Q48H    famotidine (PF)  20 mg Intravenous Daily    insulin detemir U-100  12 Units Subcutaneous QHS    Lactobacillus acidophilus  1 capsule Oral TID WM     micafungin (MYCAMINE) IVPB  100 mg Intravenous Q24H    multivitamin  1 tablet Oral Daily    rifAMpin (RIFADIN) IVPB  300 mg Intravenous BID    sodium chloride 0.9%  10 mL Intravenous Q6H    thiamine  100 mg Oral Daily    zinc oxide-cod liver oil   Topical (Top) BID       Current Intravenous Infusions   dexmedeTOMIDine (Precedex) infusion (titrating)      fentanyl 50 mcg/hr (05/16/23 1900)    NORepinephrine bitartrate-D5W 0.03 mcg/kg/min (05/16/23 2300)    propofoL 35 mcg/kg/min (05/17/23 0245)       ROS   Unable to obtain secondary to intubation    Objective:       Intake/Output Summary (Last 24 hours) at 5/17/2023 0627  Last data filed at 5/17/2023 0600  Gross per 24 hour   Intake 1414.9 ml   Output 3355 ml   Net -1940.1 ml           Vital Signs (Most Recent):  Temp: 98.1 °F (36.7 °C) (05/17/23 0400)  Pulse: (!) 114 (05/17/23 0615)  Resp: (!) 24 (05/17/23 0615)  BP: 101/64 (05/17/23 0615)  SpO2: 98 % (05/17/23 0615)  Body mass index is 25.06 kg/m².  Weight: 72.6 kg (160 lb) Vital Signs (24h Range):  Temp:  [97 °F (36.1 °C)-98.1 °F (36.7 °C)] 98.1 °F (36.7 °C)  Pulse:  [] 114  Resp:  [18-31] 24  SpO2:  [89 %-100 %] 98 %  BP: ()/(42-84) 101/64     Physical Exam  General:  thin, well-developed, no acute distress, intubated ventilated sedated  HEENT: Normocephalic, atraumatic.  Neck: Supple. No obvious JVD.   Respiratory:  Coarse and breath sounds bilaterally, improved from yesterday  Cardiovascular:  Sinus tachycardic.  No obvious M/G/R. Warm extremities. + pitting edema.  Gastrointestinal:  Soft, nontender. bowel sounds present  Neurologic:  Intubated ventilated sedated.  Awakens and able to follow commands.      Lines/Drains/Airways       Peripherally Inserted Central Catheter Line  Duration             PICC Double Lumen 05/10/23 0817 right brachial 6 days              Drain  Duration                  Drain/Device    Right lower flank -- days         Urethral Catheter 05/12/23 1630 Latex 16 Fr. 4 days          NG/OG Tube 05/16/23 0300 Center mouth 1 day              Airway  Duration                  Airway - Non-Surgical 05/16/23 0239 1 day                    Significant Labs:    Lab Results   Component Value Date    WBC 15.42 (H) 05/17/2023    HGB 8.7 (L) 05/17/2023    HCT 26.6 (L) 05/17/2023    MCV 91.4 05/17/2023     05/17/2023         BMP  Lab Results   Component Value Date     05/17/2023    K 3.4 (L) 05/17/2023    CHLORIDE 101 05/17/2023    CO2 23 05/17/2023    BUN 28.7 (H) 05/17/2023    CREATININE 2.47 (H) 05/17/2023    CALCIUM 7.5 (L) 05/17/2023    AGAP 13.0 05/15/2023       ABG  No results for input(s): PH, PO2, PCO2, HCO3, BE in the last 168 hours.      Mechanical Ventilation Support:  Vent Mode: VOLUME A/C (05/17/23 0515)  Set Rate: 18 BPM (05/17/23 0515)  Vt Set: 450 mL (05/17/23 0515)  PEEP/CPAP: 8 cmH20 (05/17/23 0515)  Oxygen Concentration (%): 30 (05/17/23 0515)  Peak Airway Pressure: 26 cmH20 (05/17/23 0515)  Total Ve: 9.4 L/m (05/17/23 0515)  F/VT Ratio<105 (RSBI): (!) 51.47 (05/17/23 0515)    Significant Imaging:  I have reviewed the pertinent imaging within the past 24 hours.    US Non Rad Thoracentesis with Imaging, Aspiration Only   Final Result      XR Gastric tube check, non-radiologist performed   Final Result      Nasogastric tube with tip over the stomach.         Electronically signed by: Donna Washington   Date:    05/16/2023   Time:    07:16      X-Ray Chest 1 View   Final Result      Endotracheal tube with tip 3.8 cm above the erika.         Electronically signed by: Donna Washington   Date:    05/16/2023   Time:    06:11      X-Ray Chest AP Portable   Final Result      Increasing consolidative changes in the left upper lung.      No significant change from the [facility] interpretation.         Electronically signed by: Donna Washington   Date:    05/16/2023   Time:    06:18      X-Ray Chest 1 View   Final Result      Congestive failure and progression of pleural effusions.          Electronically signed by: Roland Pierce   Date:    05/15/2023   Time:    20:23      NM GI Bleed Scan (Tagged RBC)   Final Result   Abnormal      Accumulation of radiotracer at the colon at 24 hours compatible with bleed.  Activity is most intense at the ascending colon.      This report was flagged in Epic as abnormal.         Electronically signed by: Tiffany Bashir   Date:    05/13/2023   Time:    10:31      US Retroperitoneal Limited   Final Result      1. Right kidney not visualized.   2. Visualized upper portion of the left kidney demonstrates no gross abnormality.   3. Of note the recent prior CT demonstrated no significant abnormality of the kidneys.         Electronically signed by: Brooks Mccain   Date:    05/12/2023   Time:    08:08      X-Ray Chest 1 View for Line/Tube Placement   Final Result      Optimal placement of the PICC line.         Electronically signed by: Roland Pierce   Date:    05/10/2023   Time:    08:55      X-Ray Chest 1 View   Final Result      As above.         Electronically signed by: Roland Pierce   Date:    05/06/2023   Time:    18:22      CT Lumbar Spine W Wo Contrast   Final Result      1. Multiple posterior lumbar decompression laminectomies.  Posterior paraspinal fluid collection.  This may represent postsurgical seroma or hematoma without exclusion of infected collection.      2. Lumbar degenerative disc disease and spondylosis level by level discussed above.         Electronically signed by: Roland Pierce   Date:    05/06/2023   Time:    13:31      US Abdomen Limited   Final Result      1.  Hepatic lobular contour and coarsened parenchyma.      2.  Gallbladder could not be visualized with a cholecystostomy tube in place.      3.  Small ascites and right pleural effusion.         Electronically signed by: Roland Pierce   Date:    05/06/2023   Time:    11:31      X-Ray Chest 1 View    (Results Pending)     Microbiology Results (last 7 days)       Procedure Component Value  Units Date/Time    Mycobacteria and Nocardia Culture [[id number]] Collected: 05/16/23 1359    Order Status: Sent Specimen: Body Fluid from Lung, Left Updated: 05/16/23 1643    AFB Smear [[id number]] Collected: 05/16/23 1359    Order Status: Sent Specimen: Pleural Fluid from Lung, Left Updated: 05/16/23 1407    Fungal Culture [[id number]] Collected: 05/16/23 1359    Order Status: Sent Specimen: Body Fluid from Lung, Left Updated: 05/16/23 1407    Body Fluid Culture [[id number]] Collected: 05/16/23 1359    Order Status: Sent Specimen: Body Fluid from Lung, Left Updated: 05/16/23 1407    Gram Stain [[id number]] Collected: 05/16/23 1359    Order Status: Sent Specimen: Body Fluid from Pleural Fluid, Left Updated: 05/16/23 1407    Wound Culture [[id number]] Collected: 05/16/23 1313    Order Status: Sent Specimen: Wound from Lumbar Updated: 05/16/23 1324    Respiratory Culture [[id number]] Collected: 05/16/23 1158    Order Status: Sent Specimen: Sputum from Transtrachael aspirate Updated: 05/16/23 1215    Blood Culture [[id number]] Collected: 05/16/23 0825    Order Status: Resulted Specimen: Blood Updated: 05/16/23 0845    Blood Culture [[id number]] Collected: 05/16/23 0656    Order Status: Resulted Specimen: Blood Updated: 05/16/23 0700    Urine culture [[id number]]  (Abnormal) Collected: 05/12/23 1048    Order Status: Completed Specimen: Urine Updated: 05/15/23 1018     Urine Culture >/= 100,000 colonies/ml Candida albicans    Blood Culture [[id number]]  (Normal) Collected: 05/09/23 2104    Order Status: Completed Specimen: Blood Updated: 05/14/23 2200     CULTURE, BLOOD ([facility]) No Growth at 5 days    Blood culture #1 **CANNOT BE ORDERED STAT** [[id number]]  (Abnormal)  (Susceptibility) Collected: 05/05/23 2109    Order Status: Completed Specimen: Blood Updated: 05/10/23 0706     CULTURE, BLOOD ([facility]) Methicillin resistant Staphylococcus aureus     GRAM STAIN Gram Positive Cocci, probable Staphylococcus      Seen in gram stain of broth  only      2 of 2 bottles positive          Assessment/Plan:     Assessment    -Acute hypoxemic/hypercarbic respiratory failure requiring intubation, 5/16, secondary to bilateral pleural effusions/pulmonary edema s/p left thoracentesis on 05/16  -Hypotension requiring pressor support, likely septic shock secondary to below   -MRSA bacteremia and osteomyelitis of the cervical/lumbar spine with bilateral psoas/paraspinal/epidural abscesses s/p laminectomy/foraminotomy/abscess drainage on 4/21  -Candida albicans UTI  -Cholecystitis s/p cholecystostomy tube placement, 4/19   -Sacral ulcer s/p debridement on 05/10   -Hematochezia s/p transfusions and colonoscopy revealing diverticula on 05/14  -RUPERTO, likely AIN versus ATN versus other  -Hypoalbuminemia/malnutrition  -Comorbidities: diabetes, untreated hepatitis-C, cirrhosis, IV drug use, tobacco use    Plan    -remains in ICU level of care and is intubated ventilated sedated with propofol, wean as tolerated, adjust per ABGs   -requiring Levophed for pressor support, currently at 0.03, wean as tolerated, maintain map greater than 65  -continue albumin every 6 hours  -leukocytosis improved, afebrile, cultures pending   -continue daptomycin, micafungin, rifampin   -infectious Disease on board  -wound care following  -cardiology consulted in the setting of MRSA bacteremia for CRISTY  -neurosurgery is pending MRI of the lumbar spine  -nephrology following, potentially will require hemodialysis in the near future  -general surgery notes cholecystostomy tube to remain in place until roughly 6/6 and recommend cholangiogram prior to removal    DVT Prophylaxis: SCDs   GI Prophylaxis: Pepcid      32 minutes of critical care was time spent personally by me on the following activities: development of treatment plan with patient or surrogate and bedside caregivers, discussions with consultants, evaluation of patient's response to treatment, examination of patient, ordering and performing  treatments and interventions, ordering and review of laboratory studies, ordering and review of radiographic studies, pulse oximetry, re-evaluation of patient's condition.  This critical care time did not overlap with that of any other provider or involve time for any procedures.     Cici Nunez MD  Pulmonary Critical Care Medicine  [facility]

## 2023-05-17 NOTE — PROGRESS NOTES
Inpatient Nutrition Assessment    Admit Date: 5/5/2023   Total duration of encounter: 12 days     Nutrition Recommendation/Prescription     Start tube feeding when appropriate.  Tube feeding recommendation:   Peptamen Intense VHP goal rate 55 ml/hr to provide  1100 kcal/d (67% est needs, 102% with meds)  102 g protein/d (94% est needs)  924 ml free water/d  (calculations based on estimated 20 hr/d run time)     Communication of Recommendations: reviewed with nurse    Nutrition Assessment   Malnutrition Assessment/Nutrition-Focused Physical Exam    Malnutrition Context: other (see comments), chronic illness (does not meet criteria)  Malnutrition Level: moderate  Energy Intake (Malnutrition): less than 75% for greater than or equal to 1 month  Weight Loss (Malnutrition): other (see comments) (unable to obtain)  Subcutaneous Fat (Malnutrition): mild depletion  Orbital Region (Subcutaneous Fat Loss): mild depletion  Upper Arm Region (Subcutaneous Fat Loss): mild depletion     Muscle Mass (Malnutrition): mild depletion  [place] Region (Muscle Loss): mild depletion  Clavicle Bone Region (Muscle Loss): mild depletion                    Fluid Accumulation (Malnutrition): moderate (+2, +3 edema)        A minimum of two characteristics is recommended for diagnosis of either severe or non-severe malnutrition.     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 as well as lumbar epidural abscess    Nutrition-Related Medications: levophed @ 0.06mcg/kg/min, diprivan, detemir 12Units daily, lactobacillus acidophilus, MVI,  "thiamine    Calorie Containing IV Medications: Diprivan @ 22 ml/hr (provides 580 kcal/d)    Nutrition-Related Labs:  5/10/23: HGB/HCT: 8.7/27.7   5/12/23: HGB/HCT: 6.7/22.0  5/15/23: H/H 9.0/27.1, Bun 27.7, Crea 2.36, GFR 31, CBG's past 24hrs 113-204  5/17 BUN 28.7, Crea 2.47, Phos 5.3, GFR 29    Diet/PN Order: Diet NPO  Oral Supplement Order: none  Tube Feeding Order: none  Appetite/Oral Intake: not applicable/not applicable  Factors Affecting Nutritional Intake: on mechanical ventilation  Food/Anabaptism/Cultural Preferences: none reported  Food Allergies: none reported    Skin Integrity: drain/device(s), wound  Wound(s):      Altered Skin Integrity 05/06/23 1450 medial Sacral spine-Tissue loss description: Full thickness Stage 4 absess    Comments    5/10/23: Pt states eaten little of tray, has been getting 1 meal a day from outside due to poor dentition and difficulty chewing. Agreed to minced and moist diet modification when advanced. Pt reported moderate appetite prior to admission; typical intake 1 meal a day (soup), snacks on chips (lets dissolve in mouth). Denies nausea, vomiting, and diarrhea. BM every other day; solid with some straining. Does not take supplements.      5/12/23: RD attempted three times and patient out of room. Per RN, pt eating 25-50% of tray, focusing on mashable foods. Drinking juice. This afternoon patient now on clears after GI bleed scan pending results. Per EMR "reports of 2 large bowel movements overnight described as " bright red jelly like".    5/15/23: Patient underwent colonoscopy yesterday. Currently tolerating clear liquids diet. Patient wanting to begin solid foods today, discussed patient concerns with RN. Patient wanting to discontinue Boost Breeze supplement, however would like to continue Prosource supplement.     5/17/23: Pt now intubated. Plans for starting tube feeding. Noted no plans for HD and Phos elevated. Will use non-renal formula at this time since it will run " "at lower rate. Will monitor for need to change to renal formula.     Anthropometrics    Height: 5' 7.01" (170.2 cm) Height Method: Stated  Last Weight: 72.6 kg (160 lb) (23 1000) Weight Method: Standard Scale  BMI (Calculated): 25.1  BMI Classification: overweight (BMI 25-29.9)        Ideal Body Weight (IBW), Male: 148.06 lb     % Ideal Body Weight, Male (lb): 108.11 %                 Usual Body Weight (UBW), k.6 kg  % Usual Body Weight: 100.18     Usual Weight Provided By: patient    Wt Readings from Last 5 Encounters:   23 72.6 kg (160 lb)   23 72.6 kg (160 lb)   18 75.7 kg (166 lb 14.2 oz)     Weight Change(s) Since Admission:  Admit Weight: 72.6 kg (160 lb) (23 1300)  : Unable to get updated weight due to patient gone when returned with scale, current weight is self reported  5/15: Unable to get updated weight due to patient preferring to have his bed angled with his legs above his head at this time.   : no new    Estimated Needs    Weight Used For Calorie Calculations: 72.6 kg (160 lb 0.9 oz)  Energy Calorie Requirements (kcal): 1648kcal  Energy Need Method: [place]  Weight Used For Protein Calculations: 72.6 kg (160 lb 0.9 oz)  Protein Requirements: 109-131gm (1.5-1.8g/kg)  Fluid Requirements (mL): 1648ml (1ml/kcal)  Temp (24hrs), Av.1 °F (36.7 °C), Min:97.9 °F (36.6 °C), Max:98.5 °F (36.9 °C)    Vtot (L/Min) for [place] Equation Calculation: 8.4    Enteral Nutrition    Patient not receiving enteral nutrition at this time.    Parenteral Nutrition    Patient not receiving parenteral nutrition support at this time.    Evaluation of Received Nutrient Intake    Calories: not meeting estimated needs  Protein: not meeting estimated needs    Patient Education    Not applicable.    Nutrition Diagnosis   PES: Malnutrition related to  chronic illness, chronic IV drug use, and chewing problems as evidenced by less than 75% needs met for greater than 1 month, moderate " fat depletion, and moderate muscle depletion. (continues)    Interventions/Goals     Intervention(s): modified composition of enteral nutrition, modified rate of enteral nutrition, and collaboration with other providers  Goal: Meet greater than 75% of nutritional needs by follow-up. (goal progressing)    Monitoring & Evaluation     Dietitian will monitor energy intake.  Nutrition Risk/Follow-Up: high (follow-up in 1-4 days)   Please consult if re-assessment needed sooner.

## 2023-05-17 NOTE — PROGRESS NOTES
[facility] [address]  Neurosurgery  Progress Note    Subjective:     Interval History: No acute events overnight.  Hemodynamically stable.  Afebrile.  Intubated ventilated sedated with fentanyl and propofol.  Remains on Levophed at 0.03.  Leukocytosis improved.  Replacing potassium and magnesium.  Neuro exam limited d/t sedation    History of Present Illness: 62-year-old male with significant history of polysubstance abuse/IV drug abuse, type 2 diabetes mellitus, untreated chronic hep C/cirrhosis.  Patient had a recent hospitalization to [facility] for MRSA bacteremia with MRSA C and L-spine osteomyelitis, diskitis, bilateral psoas and paraspinous muscle abscess as well as lumbar epidural abscess.  Patient was being treated in [facility] with close follow-up with neurosurgery, Infectious Disease.  Hospital stay also was even full for cholecystitis for which he underwent cholecystostomy tube placement.  Patient signed out AMA from [facility] on 05/05, went home, called ambulance and presented to the hospital.  Per chart review patient was being treated with daptomycin and rifampin in [facility].  While hospitalized his urine drug screen was positive for amphetamines and there were concerns that he was using it while hospitalized.  Patient was tachycardic in the ED.  Otherwise hemodynamics stable.  Complained of bilateral lower extremity swelling. Labs significant for leukocytosis, anemia, hyperglycemia. UDS was positive for opiates, amphetamines.  Hospitalist team was consulted for admission. Restarted on IV Daptomycin, Rifampin, Zosyn. Echocardiogram on 5/6 showed normal LV systolic function, EF 50-55%, indeterminate diastolic function, mild RV enlargement, mild MR, PASP 19 mmHg with no mention of vegetations.  Blood cultures from 05/05 for positive for MRSA.  Repeat blood culture from 05/09 negative for 24 hours.  Patient awaiting MRI lumbar spine.  Noted to have LLE  pain with B/L LE U/S showing fluid collection in left cough.  Awaiting CT L leg and thigh with contrast.  Also ordered CT chest with contrast to further evaluate right-sided oblong opacity on CXR which is thought to be mass/possible abscess/fissural fluid collection. Underwent debridement for sacral ulcer on 05/10 with wound care being done. Neurosurgery to make further recommendations once MRI lumbar spine is done. Noted to have elevated Cr of 1.78; DC diuretics. Possibly d/t ATN from ABX; consulted Nephrology. Ordered US Retroperitoneum, UA. Cancelled CT chest, CT L thigh/leg due to RUPERTO. Nephrology on board; ordered [id number] given anemia, renal dysfunction, petechial rash to evaluate for TTP.  Patient noted to have hematochezia by nurse on 05/12; consulted GI.  HGB/HCT noted to drop to 6.7/22.0; transfused 2 units PRBCs. NM bleeding scan ordered by GI which was negative. Continued to have hematochezia on 05/13, started on bowel prep for colonoscopy by GI.    Post-Op Info:  Procedure(s) (LRB):  COLONOSCOPY (N/A)   3 Days Post-Op      Medications:  Continuous Infusions:   dexmedeTOMIDine (Precedex) infusion (titrating) 0.4 mcg/kg/hr (05/17/23 1403)    fentanyl 50 mcg/hr (05/17/23 1403)    NORepinephrine bitartrate-D5W 0.06 mcg/kg/min (05/17/23 1403)    propofoL 40 mcg/kg/min (05/17/23 1403)     Scheduled Meds:   albumin human 25%  12.5 g Intravenous Q6H    collagenase   Topical (Top) BID    DAPTOmycin (CUBICIN) IV (PEDS and ADULTS)  6 mg/kg Intravenous Q48H    famotidine (PF)  20 mg Intravenous Daily    insulin detemir U-100  12 Units Subcutaneous QHS    Lactobacillus acidophilus  1 capsule Oral TID WM    micafungin (MYCAMINE) IVPB  100 mg Intravenous Q24H    multivitamin  1 tablet Oral Daily    rifAMpin (RIFADIN) IVPB  300 mg Intravenous BID    sodium chloride 0.9%  10 mL Intravenous Q6H    thiamine  100 mg Oral Daily    zinc oxide-cod liver oil   Topical (Top) BID     PRN Meds:sodium chloride, sodium chloride,  sodium chloride, sodium chloride, sodium chloride, sodium chloride, aluminum-magnesium hydroxide-simethicone, dextrose 10%, dextrose 10%, fentaNYL, fentaNYL, glucagon (human recombinant), glucose, glucose, HYDROmorphone, HYDROmorphone, insulin aspart U-100, loperamide, melatonin, metoprolol, ondansetron, ondansetron, oxyCODONE-acetaminophen, polyethylene glycol, prochlorperazine, prochlorperazine, senna-docusate 8.6-50 mg, sodium chloride 0.9%, Flushing PICC Protocol **AND** sodium chloride 0.9% **AND** sodium chloride 0.9%, sodium chloride 0.9%, tiZANidine     Review of Systems  Objective:     Weight: 72.6 kg (160 lb)  Body mass index is 25.05 kg/m².  Vital Signs (Most Recent):  Temp: 99.4 °F (37.4 °C) (05/17/23 1200)  Pulse: 84 (05/17/23 1500)  Resp: 20 (05/17/23 1500)  BP: 106/70 (05/17/23 1500)  SpO2: 96 % (05/17/23 1500) Vital Signs (24h Range):  Temp:  [97.9 °F (36.6 °C)-99.4 °F (37.4 °C)] 99.4 °F (37.4 °C)  Pulse:  [] 84  Resp:  [18-35] 20  SpO2:  [92 %-100 %] 96 %  BP: ()/(42-86) 106/70     Date 05/17/23 0700 - 05/18/23 0659   Shift 9581-8902 7073-1398 5183-4399 24 Hour Total   INTAKE   I.V.(mL/kg) 363.4(5)   363.4(5)   NG/   150   IV Piggyback 191.7   191.7   Shift Total(mL/kg) 705.1(9.7)   705.1(9.7)   OUTPUT   Urine(mL/kg/hr) 325(0.6)   325   Other 55   55   Shift Total(mL/kg) 380(5.2)   380(5.2)   Weight (kg) 72.6 72.6 72.6 72.6              Vent Mode: A/C  Oxygen Concentration (%):  [30] 30  Resp Rate Total:  [18 br/min-35 br/min] 18 br/min  Vt Set:  [450 mL] 450 mL  PEEP/CPAP:  [8 cmH20] 8 cmH20  Mean Airway Pressure:  [9 cgL07-89 cmH20] 10 cmH20             NG/OG Tube 05/16/23 0300 Center mouth (Active)   Placement Check placement verified by distal tube length measurement 05/17/23 1200   Distal Tube Length (cm) 65 05/17/23 0800   Tolerance no signs/symptoms of discomfort 05/17/23 1200   Securement secured to commercial device 05/17/23 1200   Clamp Status/Tolerance unclamped 05/17/23  "0400   Suction Setting/Drainage Method suction at the bedside 05/17/23 1200   Insertion Site Appearance no redness, warmth, tenderness, skin breakdown, drainage 05/17/23 1200   Drainage Bile 05/17/23 0400   Feeding Type by pump 05/17/23 1403   Feeding Action feeding restarted 05/17/23 1403   Current Rate (mL/hr) 20 mL/hr 05/17/23 1403   Goal Rate (mL/hr) 55 mL/hr 05/17/23 1403   Intake (mL) 150 mL 05/17/23 1403   Tube Output(mL)(Include Discarded Residual) 450 mL 05/17/23 0600            Urethral Catheter 05/12/23 1630 Latex 16 Fr. (Active)   $ Awad Insertion Bedside Insertion Performed 05/12/23 1630   Site Assessment Clean;Intact 05/17/23 1200   Collection Container Standard drainage bag 05/17/23 1200   Securement Method secured to top of thigh w/ adhesive device 05/17/23 1200   Catheter Care Performed yes 05/17/23 1200   Reason for Continuing Urinary Catheterization Critically ill in ICU and requiring hourly monitoring of intake/output 05/17/23 1200   CAUTI Prevention Bundle Securement Device in place with 1" slack;Intact seal between catheter & drainage tubing;Drainage bag/urimeter off the floor;Sheeting clip in use;No dependent loops or kinks;Drainage bag/urimeter not overfilled (<2/3 full);Drainage bag/urimeter below bladder 05/17/23 0800   Output (mL) 325 mL 05/17/23 1403            Drain/Device    Right lower flank (Active)   Insertion Site dressing intact 05/17/23 1200   Drainage Characteristics/Odor Yellow;Tan 05/16/23 2000   Drainage Amount Small 05/16/23 2000   General Output (mL) 55 05/17/23 1403       Neurosurgery Physical Exam  ICU had to increase sedation d/t fighting the vent  With sedation held, he does open his eyes and wiggle toes bilateral  Incision continues with purulent drainage    Significant Labs:  Recent Labs   Lab 05/16/23  0045 05/16/23  0656 05/17/23  0302    136 138   K 4.0 4.8 3.4*   CO2 24 19* 23   BUN 27.4* 29.2* 28.7*   CREATININE 2.42* 2.60* 2.47*   CALCIUM 7.4* 7.3* 7.5* "   MG  --  1.90 1.80     Recent Labs   Lab 05/16/23  0045 05/17/23  0302   WBC 26.17* 15.42*   HGB 10.5* 8.7*   HCT 32.4* 26.6*    176     Recent Labs   Lab 05/16/23  0825   INR 1.41*     Microbiology Results (last 7 days)       Procedure Component Value Units Date/Time    Blood Culture [[id number]]  (Normal) Collected: 05/16/23 0825    Order Status: Completed Specimen: Blood Updated: 05/17/23 1000     CULTURE, BLOOD ([facility]) No Growth At 24 Hours    AFB Smear [[id number]] Collected: 05/16/23 1359    Order Status: Completed Specimen: Pleural Fluid from Lung, Left Updated: 05/17/23 0924     AFB Smear No AFB seen (Direct smear only)    Respiratory Culture [[id number]] Collected: 05/16/23 1158    Order Status: Completed Specimen: Sputum from Transtrachael aspirate Updated: 05/17/23 0859     Respiratory Culture No Growth At 24 Hours     GRAM STAIN Quality 2+      No bacteria seen    Blood Culture [[id number]]  (Normal) Collected: 05/16/23 0656    Order Status: Completed Specimen: Blood Updated: 05/17/23 0800     CULTURE, BLOOD ([facility]) No Growth At 24 Hours    Gram Stain [[id number]] Collected: 05/16/23 1359    Order Status: Completed Specimen: Body Fluid from Pleural Fluid, Left Updated: 05/17/23 0729     GRAM STAIN No WBCs, No bacteria seen    Wound Culture [[id number]] Collected: 05/16/23 1313    Order Status: Completed Specimen: Wound from Lumbar Updated: 05/17/23 0651     Wound Culture No Growth At 24 Hours    Body Fluid Culture [[id number]] Collected: 05/16/23 1359    Order Status: Completed Specimen: Body Fluid from Lung, Left Updated: 05/17/23 0650     Body Fluid Culture No Growth At 24 Hours    Mycobacteria and Nocardia Culture [[id number]] Collected: 05/16/23 1359    Order Status: Sent Specimen: Body Fluid from Lung, Left Updated: 05/16/23 1643    Fungal Culture [[id number]] Collected: 05/16/23 1359    Order Status: Sent Specimen: Body Fluid from Lung, Left Updated: 05/16/23 1407    Urine culture [[id number]]   (Abnormal) Collected: 05/12/23 1048    Order Status: Completed Specimen: Urine Updated: 05/15/23 1018     Urine Culture >/= 100,000 colonies/ml Candida albicans    Blood Culture [[id number]]  (Normal) Collected: 05/09/23 2104    Order Status: Completed Specimen: Blood Updated: 05/14/23 2200     CULTURE, BLOOD ([facility]) No Growth at 5 days            Significant Diagnostics:      Assessment/Plan:     Active Diagnoses:    Diagnosis Date Noted POA    PRINCIPAL PROBLEM:  Sepsis [A41.9] 05/05/2023 Yes    Moderate malnutrition [E44.0] 05/17/2023 Unknown    Staphylococcus aureus bacteremia [R78.81, B95.61] 05/05/2023 Yes      Problems Resolved During this Admission:     MRSA bacteremia   MRSA cervical, lumbar spine osteomyelitis with bilateral psoas, paraspinal muscle abscess as well as lumbar epidural abscess status post drainage, laminectomy  Recent cholecystitis status post cholecystostomy tube placement   Sepsis secondary to above   RUPERTO 2/2 possible ATN d/t ABX vs TTP  Hematochezia  Tachycardia 2/2 Anemia  Polysubstance abuse/IV drug abuse   Decompensated cirrhosis with volume overload   Untreated chronic hep C   Type 2 diabetes mellitus with hyperglycemia  Acute Normocytic Anemia 2/2 TTP vs GI Bleed        Unable to obtain MRI at this time.  They are attempting to get one today.   Patient on daptomycin, rifampin, Zosyn.  Infectious Disease for antibx treatment    Continue supportive measures   SCDs for DVT prophylaxis  Further recs to follow pending MRI. Patient would have to be able to tolerate supine positioning for surgery before proceeding.     DILCIA Nolasco  Neurosurgery  [facility] - [facility] ICU

## 2023-05-17 NOTE — NURSING
Nurses Note -- 4 Eyes      5/17/2023   7:57 AM      Skin assessed during: Q Shift Change      [] No Altered Skin Integrity Present    []Prevention Measures Documented      [x] Yes- Altered Skin Integrity Present or Discovered   [x] LDA Added if Not in Epic (Describe Wound)   [] New Altered Skin Integrity was Present on Admit and Documented in LDA   [] Wound Image Taken    Wound Care Consulted? No    Attending Nurse:  Mesha Fong RN     Second RN/Staff Member:  Jovani Leung RN

## 2023-05-18 LAB
ALBUMIN SERPL-MCNC: 2.6 G/DL (ref 3.4–4.8)
ALBUMIN/GLOB SERPL: 1.1 RATIO (ref 1.1–2)
ALLENS TEST BLOOD GAS (OHS): YES
ALLENS TEST BLOOD GAS (OHS): YES
ALP SERPL-CCNC: 55 UNIT/L (ref 40–150)
ALT SERPL-CCNC: 7 UNIT/L (ref 0–55)
AST SERPL-CCNC: 16 UNIT/L (ref 5–34)
BACTERIA SPEC CULT: ABNORMAL
BASE EXCESS BLD CALC-SCNC: 4.1 MMOL/L (ref -2–2)
BASE EXCESS BLD CALC-SCNC: 4.6 MMOL/L (ref -2–2)
BASOPHILS # BLD AUTO: 0.04 X10(3)/MCL
BASOPHILS NFR BLD AUTO: 0.3 %
BILIRUBIN DIRECT+TOT PNL SERPL-MCNC: 1 MG/DL
BLOOD GAS SAMPLE TYPE (OHS): ABNORMAL
BLOOD GAS SAMPLE TYPE (OHS): ABNORMAL
BSA FOR ECHO PROCEDURE: 1.85 M2
BUN SERPL-MCNC: 27 MG/DL (ref 8.4–25.7)
CA-I BLD-SCNC: 0.99 MMOL/L (ref 1.12–1.23)
CA-I BLD-SCNC: 1.09 MMOL/L (ref 1.12–1.23)
CALCIUM SERPL-MCNC: 8 MG/DL (ref 8.8–10)
CHLORIDE SERPL-SCNC: 103 MMOL/L (ref 98–107)
CO2 BLDA-SCNC: 29.5 MMOL/L
CO2 BLDA-SCNC: 29.8 MMOL/L
CO2 SERPL-SCNC: 24 MMOL/L (ref 23–31)
COHGB MFR BLDA: 2.1 % (ref 0.5–1.5)
CREAT SERPL-MCNC: 2.31 MG/DL (ref 0.73–1.18)
DRAWN BY BLOOD GAS (OHS): ABNORMAL
DRAWN BY BLOOD GAS (OHS): ABNORMAL
EOSINOPHIL # BLD AUTO: 0.29 X10(3)/MCL (ref 0–0.9)
EOSINOPHIL NFR BLD AUTO: 2.5 %
ERYTHROCYTE [DISTWIDTH] IN BLOOD BY AUTOMATED COUNT: 16.4 % (ref 11.5–17)
FIO2 BLOOD GAS (OHS): 30 %
FIO2 BLOOD GAS (OHS): 30 %
GFR SERPLBLD CREATININE-BSD FMLA CKD-EPI: 31 MLS/MIN/1.73/M2
GLOBULIN SER-MCNC: 2.3 GM/DL (ref 2.4–3.5)
GLUCOSE SERPL-MCNC: 99 MG/DL (ref 82–115)
GRAM STN SPEC: ABNORMAL
GRAM STN SPEC: ABNORMAL
HCO3 BLDA-SCNC: 28.3 MMOL/L (ref 22–26)
HCO3 BLDA-SCNC: 28.5 MMOL/L (ref 22–26)
HCT VFR BLD AUTO: 23.6 % (ref 42–52)
HGB BLD-MCNC: 7.7 G/DL (ref 14–18)
IMM GRANULOCYTES # BLD AUTO: 0.06 X10(3)/MCL (ref 0–0.04)
IMM GRANULOCYTES NFR BLD AUTO: 0.5 %
LYMPHOCYTES # BLD AUTO: 1.08 X10(3)/MCL (ref 0.6–4.6)
LYMPHOCYTES NFR BLD AUTO: 9.4 %
MAGNESIUM SERPL-MCNC: 1.9 MG/DL (ref 1.6–2.6)
MCH RBC QN AUTO: 30.3 PG (ref 27–31)
MCHC RBC AUTO-ENTMCNC: 32.6 G/DL (ref 33–36)
MCV RBC AUTO: 92.9 FL (ref 80–94)
MECH RR (OHS): 18 B/MIN
MECH VT (OHS): 450 ML
METHGB MFR BLDA: 0.8 % (ref 0.4–1.5)
MODE (OHS): ABNORMAL
MODE (OHS): AC
MONOCYTES # BLD AUTO: 0.29 X10(3)/MCL (ref 0.1–1.3)
MONOCYTES NFR BLD AUTO: 2.5 %
NEUTROPHILS # BLD AUTO: 9.78 X10(3)/MCL (ref 2.1–9.2)
NEUTROPHILS NFR BLD AUTO: 84.8 %
NRBC BLD AUTO-RTO: 0 %
O2 HB BLOOD GAS (OHS): 96.6 % (ref 94–97)
OXYGEN DEVICE BLOOD GAS (OHS): ABNORMAL
OXYHGB MFR BLDA: 10.2 G/DL (ref 12–16)
PCO2 BLDA: 38 MMHG (ref 35–45)
PCO2 BLDA: 41 MMHG (ref 35–45)
PEEP (OHS): 5 CMH2O
PEEP (OHS): 5 CMH2O
PH BLDA: 7.45 [PH] (ref 7.35–7.45)
PH BLDA: 7.48 [PH] (ref 7.35–7.45)
PHOSPHATE SERPL-MCNC: 4.5 MG/DL (ref 2.3–4.7)
PLATELET # BLD AUTO: 163 X10(3)/MCL (ref 130–400)
PMV BLD AUTO: 10.5 FL (ref 7.4–10.4)
PO2 BLDA: 110 MMHG (ref 80–100)
PO2 BLDA: 67 MMHG (ref 80–100)
POCT GLUCOSE: 102 MG/DL (ref 70–110)
POCT GLUCOSE: 123 MG/DL (ref 70–110)
POCT GLUCOSE: 76 MG/DL (ref 70–110)
POTASSIUM BLOOD GAS (OHS): 3.2 MMOL/L (ref 3.5–5)
POTASSIUM BLOOD GAS (OHS): 3.8 MMOL/L (ref 3.5–5)
POTASSIUM SERPL-SCNC: 3.4 MMOL/L (ref 3.5–5.1)
PROT SERPL-MCNC: 4.9 GM/DL (ref 5.8–7.6)
PS (OHS): 10 CMH2O
RBC # BLD AUTO: 2.54 X10(6)/MCL (ref 4.7–6.1)
SAMPLE SITE BLOOD GAS (OHS): ABNORMAL
SAMPLE SITE BLOOD GAS (OHS): ABNORMAL
SAO2 % BLDA: 94 %
SAO2 % BLDA: 98.5 %
SODIUM BLOOD GAS (OHS): 132 MMOL/L (ref 137–145)
SODIUM BLOOD GAS (OHS): 136 MMOL/L (ref 137–145)
SODIUM SERPL-SCNC: 138 MMOL/L (ref 136–145)
WBC # SPEC AUTO: 11.54 X10(3)/MCL (ref 4.5–11.5)

## 2023-05-18 PROCEDURE — 82803 BLOOD GASES ANY COMBINATION: CPT

## 2023-05-18 PROCEDURE — 27000221 HC OXYGEN, UP TO 24 HOURS

## 2023-05-18 PROCEDURE — 80053 COMPREHEN METABOLIC PANEL: CPT | Performed by: STUDENT IN AN ORGANIZED HEALTH CARE EDUCATION/TRAINING PROGRAM

## 2023-05-18 PROCEDURE — 85025 COMPLETE CBC W/AUTO DIFF WBC: CPT | Performed by: STUDENT IN AN ORGANIZED HEALTH CARE EDUCATION/TRAINING PROGRAM

## 2023-05-18 PROCEDURE — 63600175 PHARM REV CODE 636 W HCPCS: Performed by: STUDENT IN AN ORGANIZED HEALTH CARE EDUCATION/TRAINING PROGRAM

## 2023-05-18 PROCEDURE — 84100 ASSAY OF PHOSPHORUS: CPT | Performed by: STUDENT IN AN ORGANIZED HEALTH CARE EDUCATION/TRAINING PROGRAM

## 2023-05-18 PROCEDURE — 25000003 PHARM REV CODE 250: Performed by: INTERNAL MEDICINE

## 2023-05-18 PROCEDURE — A4216 STERILE WATER/SALINE, 10 ML: HCPCS | Performed by: STUDENT IN AN ORGANIZED HEALTH CARE EDUCATION/TRAINING PROGRAM

## 2023-05-18 PROCEDURE — 27200966 HC CLOSED SUCTION SYSTEM

## 2023-05-18 PROCEDURE — 25000003 PHARM REV CODE 250: Performed by: STUDENT IN AN ORGANIZED HEALTH CARE EDUCATION/TRAINING PROGRAM

## 2023-05-18 PROCEDURE — 83735 ASSAY OF MAGNESIUM: CPT | Performed by: STUDENT IN AN ORGANIZED HEALTH CARE EDUCATION/TRAINING PROGRAM

## 2023-05-18 PROCEDURE — 27000207 HC ISOLATION

## 2023-05-18 PROCEDURE — 99222 1ST HOSP IP/OBS MODERATE 55: CPT | Mod: ,,, | Performed by: NURSE PRACTITIONER

## 2023-05-18 PROCEDURE — 94761 N-INVAS EAR/PLS OXIMETRY MLT: CPT

## 2023-05-18 PROCEDURE — 25000003 PHARM REV CODE 250

## 2023-05-18 PROCEDURE — 99900026 HC AIRWAY MAINTENANCE (STAT)

## 2023-05-18 PROCEDURE — P9047 ALBUMIN (HUMAN), 25%, 50ML: HCPCS | Mod: JW,JG | Performed by: STUDENT IN AN ORGANIZED HEALTH CARE EDUCATION/TRAINING PROGRAM

## 2023-05-18 PROCEDURE — 99900035 HC TECH TIME PER 15 MIN (STAT)

## 2023-05-18 PROCEDURE — 20000000 HC ICU ROOM

## 2023-05-18 PROCEDURE — 99222 PR INITIAL HOSPITAL CARE,LEVL II: ICD-10-PCS | Mod: ,,, | Performed by: NURSE PRACTITIONER

## 2023-05-18 PROCEDURE — 94003 VENT MGMT INPAT SUBQ DAY: CPT

## 2023-05-18 PROCEDURE — 36600 WITHDRAWAL OF ARTERIAL BLOOD: CPT

## 2023-05-18 RX ORDER — LOPERAMIDE HYDROCHLORIDE 2 MG/1
2 CAPSULE ORAL 4 TIMES DAILY PRN
Status: DISCONTINUED | OUTPATIENT
Start: 2023-05-18 | End: 2023-05-23 | Stop reason: HOSPADM

## 2023-05-18 RX ORDER — IBUPROFEN 200 MG
24 TABLET ORAL
Status: DISCONTINUED | OUTPATIENT
Start: 2023-05-18 | End: 2023-05-23 | Stop reason: HOSPADM

## 2023-05-18 RX ORDER — IBUPROFEN 200 MG
16 TABLET ORAL
Status: DISCONTINUED | OUTPATIENT
Start: 2023-05-18 | End: 2023-05-23 | Stop reason: HOSPADM

## 2023-05-18 RX ORDER — POTASSIUM CHLORIDE 20 MEQ/1
40 TABLET, EXTENDED RELEASE ORAL ONCE
Status: DISCONTINUED | OUTPATIENT
Start: 2023-05-18 | End: 2023-05-18

## 2023-05-18 RX ORDER — MAG HYDROX/ALUMINUM HYD/SIMETH 200-200-20
30 SUSPENSION, ORAL (FINAL DOSE FORM) ORAL 4 TIMES DAILY PRN
Status: DISCONTINUED | OUTPATIENT
Start: 2023-05-18 | End: 2023-05-23 | Stop reason: HOSPADM

## 2023-05-18 RX ORDER — TIZANIDINE 4 MG/1
4 TABLET ORAL EVERY 8 HOURS PRN
Status: DISCONTINUED | OUTPATIENT
Start: 2023-05-18 | End: 2023-05-23 | Stop reason: HOSPADM

## 2023-05-18 RX ORDER — LACTOBACILLUS ACIDOPHILUS 500MM CELL
1 CAPSULE ORAL
Status: DISCONTINUED | OUTPATIENT
Start: 2023-05-18 | End: 2023-05-23 | Stop reason: HOSPADM

## 2023-05-18 RX ORDER — AMOXICILLIN 250 MG
2 CAPSULE ORAL 2 TIMES DAILY PRN
Status: DISCONTINUED | OUTPATIENT
Start: 2023-05-18 | End: 2023-05-23 | Stop reason: HOSPADM

## 2023-05-18 RX ORDER — TALC
6 POWDER (GRAM) TOPICAL NIGHTLY PRN
Status: DISCONTINUED | OUTPATIENT
Start: 2023-05-18 | End: 2023-05-23 | Stop reason: HOSPADM

## 2023-05-18 RX ORDER — THIAMINE HCL 100 MG
100 TABLET ORAL DAILY
Status: DISCONTINUED | OUTPATIENT
Start: 2023-05-19 | End: 2023-05-23 | Stop reason: HOSPADM

## 2023-05-18 RX ADMIN — SODIUM CHLORIDE, PRESERVATIVE FREE 10 ML: 5 INJECTION INTRAVENOUS at 11:05

## 2023-05-18 RX ADMIN — DEXMEDETOMIDINE HYDROCHLORIDE 0.8 MCG/KG/HR: 400 INJECTION INTRAVENOUS at 05:05

## 2023-05-18 RX ADMIN — FAMOTIDINE 20 MG: 10 INJECTION, SOLUTION INTRAVENOUS at 08:05

## 2023-05-18 RX ADMIN — MICAFUNGIN SODIUM 100 MG: 100 INJECTION, POWDER, LYOPHILIZED, FOR SOLUTION INTRAVENOUS at 03:05

## 2023-05-18 RX ADMIN — ALBUMIN (HUMAN) 12.5 G: 12.5 SOLUTION INTRAVENOUS at 11:05

## 2023-05-18 RX ADMIN — Medication 1 CAPSULE: at 11:05

## 2023-05-18 RX ADMIN — DEXMEDETOMIDINE HYDROCHLORIDE 1 MCG/KG/HR: 400 INJECTION INTRAVENOUS at 04:05

## 2023-05-18 RX ADMIN — DEXTROSE MONOHYDRATE 300 MG: 50 INJECTION, SOLUTION INTRAVENOUS at 09:05

## 2023-05-18 RX ADMIN — Medication 1 CAPSULE: at 09:05

## 2023-05-18 RX ADMIN — COLLAGENASE SANTYL: 250 OINTMENT TOPICAL at 08:05

## 2023-05-18 RX ADMIN — INSULIN DETEMIR 12 UNITS: 100 INJECTION, SOLUTION SUBCUTANEOUS at 09:05

## 2023-05-18 RX ADMIN — POTASSIUM BICARBONATE 40 MEQ: 391 TABLET, EFFERVESCENT ORAL at 09:05

## 2023-05-18 RX ADMIN — SODIUM CHLORIDE, PRESERVATIVE FREE 10 ML: 5 INJECTION INTRAVENOUS at 05:05

## 2023-05-18 RX ADMIN — Medication: at 08:05

## 2023-05-18 RX ADMIN — ALBUMIN (HUMAN) 12.5 G: 12.5 SOLUTION INTRAVENOUS at 05:05

## 2023-05-18 RX ADMIN — PROPOFOL 25 MCG/KG/MIN: 10 INJECTION, EMULSION INTRAVENOUS at 12:05

## 2023-05-18 RX ADMIN — THIAMINE HCL TAB 100 MG 100 MG: 100 TAB at 09:05

## 2023-05-18 RX ADMIN — Medication 1 CAPSULE: at 05:05

## 2023-05-18 RX ADMIN — PROPOFOL 20 MCG/KG/MIN: 10 INJECTION, EMULSION INTRAVENOUS at 05:05

## 2023-05-18 RX ADMIN — PROPOFOL 30 MCG/KG/MIN: 10 INJECTION, EMULSION INTRAVENOUS at 08:05

## 2023-05-18 RX ADMIN — DEXMEDETOMIDINE HYDROCHLORIDE 0.08 MCG/KG/HR: 400 INJECTION INTRAVENOUS at 10:05

## 2023-05-18 RX ADMIN — THERA TABS 1 TABLET: TAB at 09:05

## 2023-05-18 RX ADMIN — NOREPINEPHRINE BITARTRATE 0.04 MCG/KG/MIN: 8 INJECTION, SOLUTION INTRAVENOUS at 12:05

## 2023-05-18 NOTE — NURSING
Spoke with pt.'s son Clyde @ [phone] about evening update and plan of care for the day. All questions and concerns answered.

## 2023-05-18 NOTE — NURSING
Nurses Note -- 4 Eyes      5/18/2023   3:13 AM      Skin assessed during: Q Shift Change      [] No Altered Skin Integrity Present    [x]Prevention Measures Documented      [x] Yes- Altered Skin Integrity Present or Discovered   [] LDA Added if Not in Epic (Describe Wound)   [] New Altered Skin Integrity was Present on Admit and Documented in LDA   [] Wound Image Taken    Wound Care Consulted? Yes    Attending Nurse:  Krystina Washington RN     Second RN/Staff Member:  Mariano Champagne RN

## 2023-05-18 NOTE — PLAN OF CARE
Problem: Adult Inpatient Plan of Care  Goal: Plan of Care Review  Outcome: Ongoing, Progressing  Goal: Patient-Specific Goal (Individualized)  Outcome: Ongoing, Progressing  Goal: Absence of Hospital-Acquired Illness or Injury  Outcome: Ongoing, Progressing  Goal: Optimal Comfort and Wellbeing  Outcome: Ongoing, Progressing  Goal: Readiness for Transition of Care  Outcome: Ongoing, Progressing     Problem: Skin Injury Risk Increased  Goal: Skin Health and Integrity  Outcome: Ongoing, Progressing     Problem: Adjustment to Illness (Sepsis/Septic Shock)  Goal: Optimal Coping  Outcome: Ongoing, Progressing     Problem: Bleeding (Sepsis/Septic Shock)  Goal: Absence of Bleeding  Outcome: Ongoing, Progressing     Problem: Glycemic Control Impaired (Sepsis/Septic Shock)  Goal: Blood Glucose Level Within Desired Range  Outcome: Ongoing, Progressing     Problem: Infection Progression (Sepsis/Septic Shock)  Goal: Absence of Infection Signs and Symptoms  Outcome: Ongoing, Progressing     Problem: Nutrition Impaired (Sepsis/Septic Shock)  Goal: Optimal Nutrition Intake  Outcome: Ongoing, Progressing     Problem: Impaired Wound Healing  Goal: Optimal Wound Healing  Outcome: Ongoing, Progressing     Problem: Fall Injury Risk  Goal: Absence of Fall and Fall-Related Injury  Outcome: Ongoing, Progressing     Problem: Infection  Goal: Absence of Infection Signs and Symptoms  Outcome: Ongoing, Progressing     Problem: Diabetes Comorbidity  Goal: Blood Glucose Level Within Targeted Range  Outcome: Ongoing, Progressing     Problem: Communication Impairment (Mechanical Ventilation, Invasive)  Goal: Effective Communication  Outcome: Ongoing, Progressing     Problem: Device-Related Complication Risk (Mechanical Ventilation, Invasive)  Goal: Optimal Device Function  Outcome: Ongoing, Progressing     Problem: Inability to Wean (Mechanical Ventilation, Invasive)  Goal: Mechanical Ventilation Liberation  Outcome: Ongoing, Progressing      Problem: Nutrition Impairment (Mechanical Ventilation, Invasive)  Goal: Optimal Nutrition Delivery  Outcome: Ongoing, Progressing     Problem: Skin and Tissue Injury (Mechanical Ventilation, Invasive)  Goal: Absence of Device-Related Skin and Tissue Injury  Outcome: Ongoing, Progressing     Problem: Ventilator-Induced Lung Injury (Mechanical Ventilation, Invasive)  Goal: Absence of Ventilator-Induced Lung Injury  Outcome: Ongoing, Progressing     Problem: Communication Impairment (Artificial Airway)  Goal: Effective Communication  Outcome: Ongoing, Progressing     Problem: Device-Related Complication Risk (Artificial Airway)  Goal: Optimal Device Function  Outcome: Ongoing, Progressing     Problem: Skin and Tissue Injury (Artificial Airway)  Goal: Absence of Device-Related Skin or Tissue Injury  Outcome: Ongoing, Progressing     Problem: Coping Ineffective  Goal: Effective Coping  Outcome: Ongoing, Progressing

## 2023-05-18 NOTE — PROGRESS NOTES
[facility] [address] ICU  Pulmonary/Critical Care  Progress Note  5/18/2023    Patient Name: Reji Plasencia  MRN: [id number]  Admission Date: 5/5/2023  Code Status: Full Code      Subjective:     HPI:  63-year-old male with extensive medical comorbidities including diabetes, untreated hepatitis-C, cirrhosis, IV drug use, tobacco use, current MRSA bacteremia and C-spine/L-spine osteomyelitis/diskitis with bilateral psoas and paraspinous muscle abscesses in addition to a lumbar epidural abscess who was originally admitted to [facility] on 05/05 after leaving [place] from [facility] after 3 weeks of hospitalization during which he received IV antibiotics (daptomycin/rifampin) and underwent bilateral lumbar laminectomy and foraminotomy with drainage of epidural abscess on 04/21 by Dr. Chang.  Previous hospital stay was also complicated by cholecystitis for which he underwent MRCP and cholecystostomy tube placement (4/19) in addition to paracentesis.  Also during previous hospitalization he had a positive urine drug screen on 04/27 for amphetamines suggesting drug use while hospitalized.       During his stay at [facility] he has been followed by Neurosurgery, Infectious Disease, Gastroenterology, and Nephrology.  Echocardiogram on 05/06 with EF 50-55%, no mention of vegetations.  Has been awaiting MRI lumbar spine and CT of the left lower extremity secondary to fluid collection on ultrasound which was 9 x 4 x 2 cm.  However these imaging modalities have not been able to be performed yet.  He underwent sacral ulcer debridement on 05/10.  Hematochezia on 05/12 resulted in transfusion of 4 units of blood and 1 unit FFP and nuclear medicine bleeding scan revealed radiotracer accumulation with most intense activity in ascending colon prompting colonoscopy which showed pancolonic diverticula without active bleed and anorectal hyperemic mucosa.     He was upgraded to ICU for tenuous respiratory status, on  BiPAP 14/6 with 100% FiO2 maintaining saturations in the low/mid 80s and near hypotension with tachycardia.  His respiratory status further declined requiring intubation/mechanical ventilatory support.      24hr Interval History:  He is afebrile.  Intake 1785 cc, output 1355 cc over the previous 24 hours.  Cardiology has just performed CRISTY this a.m., noting ejection fraction of 10%, no evidence of valvular vegetations appreciated.  He continues sinus rhythm, on Levophed 0.04 microgram/kilogram per minute infusion.  He requires sedation with propofol at 20 microgram/kilogram per minute, Precedex 1 microgram/kilogram per hour, and fentanyl 25 microgram/hour infusions.    AC 18/450 cc/peep +5/30%    Scheduled Medications:   albumin human 25%  12.5 g Intravenous Q6H    collagenase   Topical (Top) BID    DAPTOmycin (CUBICIN) IV (PEDS and ADULTS)  6 mg/kg Intravenous Q48H    famotidine (PF)  20 mg Intravenous Daily    insulin detemir U-100  12 Units Subcutaneous QHS    Lactobacillus acidophilus  1 capsule Oral TID WM    micafungin (MYCAMINE) IVPB  100 mg Intravenous Q24H    multivitamin  1 tablet Oral Daily    potassium chloride  40 mEq Oral Once    rifAMpin (RIFADIN) IVPB  300 mg Intravenous BID    sodium chloride 0.9%  10 mL Intravenous Q6H    thiamine  100 mg Oral Daily    zinc oxide-cod liver oil   Topical (Top) BID     PRN Medications:  sodium chloride, sodium chloride, sodium chloride, sodium chloride, sodium chloride, sodium chloride, aluminum-magnesium hydroxide-simethicone, dextrose 10%, dextrose 10%, fentaNYL, fentaNYL, glucagon (human recombinant), glucose, glucose, HYDROmorphone, HYDROmorphone, insulin aspart U-100, loperamide, melatonin, metoprolol, ondansetron, ondansetron, oxyCODONE-acetaminophen, polyethylene glycol, prochlorperazine, prochlorperazine, senna-docusate 8.6-50 mg, sodium chloride 0.9%, Flushing PICC Protocol **AND** sodium chloride 0.9% **AND** sodium chloride 0.9%, sodium chloride 0.9%,  tiZANidine  Continuous Infusions:   dexmedeTOMIDine (Precedex) infusion (titrating) 1 mcg/kg/hr (05/18/23 0438)    fentanyl 25 mcg/hr (05/17/23 1855)    NORepinephrine bitartrate-D5W 0.04 mcg/kg/min (05/18/23 0228)    propofoL 30 mcg/kg/min (05/18/23 0813)       History reviewed. No pertinent past medical history.    Past Surgical History:   Procedure Laterality Date    COLONOSCOPY N/A 5/14/2023    Procedure: COLONOSCOPY;  Surgeon: Shaneka Ayers MD;  Location: [facility];  Service: Gastroenterology;  Laterality: N/A;    DEBRIDEMENT OF SACRAL WOUND N/A 5/10/2023    Procedure: DEBRIDEMENT, WOUND, SACRUM;  Surgeon: Reggie Castañeda MD;  Location: [facility];  Service: General;  Laterality: N/A;       Objective:     Input/output:    Intake/Output Summary (Last 24 hours) at 5/18/2023 0859  Last data filed at 5/18/2023 0623  Gross per 24 hour   Intake 1785.05 ml   Output 1355 ml   Net 430.05 ml       Vital Signs (Most Recent):  Temp: 98.8 °F (37.1 °C) (05/18/23 0400)  Pulse: 70 (05/18/23 0638)  Resp: 18 (05/18/23 0638)  BP: 91/67 (05/18/23 0630)  SpO2: 98 % (05/18/23 0638)  Body mass index is 25.05 kg/m².  Weight: 72.6 kg (160 lb) Vital Signs (24h Range):  Temp:  [98.6 °F (37 °C)-99.4 °F (37.4 °C)] 98.8 °F (37.1 °C)  Pulse:  [] 70  Resp:  [18-36] 18  SpO2:  [93 %-100 %] 98 %  BP: ()/(57-86) 91/67     Physical Exam  Constitutional:       Comments: Obtunded, sedated on propofol   HENT:      Mouth/Throat:      Comments: Endotracheal tube secured to external os  Eyes:      Pupils: Pupils are equal, round, and reactive to light.   Cardiovascular:      Rate and Rhythm: Normal rate and regular rhythm.      Heart sounds: No murmur heard.  Pulmonary:      Comments: Mild decreased breath sounds posterior bases bilateral, normal expiratory phase with no wheezes, few bilateral scattered rhonchi  Abdominal:      General: Bowel sounds are normal.      Palpations: Abdomen is soft.      Comments: Mild fluid wave   Musculoskeletal:       Right lower leg: Edema (1-2 cm pretibial) present.      Left lower leg: Edema (1-2 cm pretibial) present.      Comments: Bilateral upper extremity 1-2 cm edema   Lymphadenopathy:      Cervical: No cervical adenopathy.   Skin:     General: Skin is warm and dry.       Lines/Drains/Airways       Peripherally Inserted Central Catheter Line  Duration             PICC Double Lumen 05/10/23 0817 right brachial 8 days              Drain  Duration                  Drain/Device    Right lower flank -- days         Urethral Catheter 05/12/23 1630 Latex 16 Fr. 5 days         NG/OG Tube 05/16/23 0300 Center mouth 2 days              Airway  Duration                  Airway - Non-Surgical 05/16/23 0239 2 days                    Vent:  Vent Mode: A/C (05/18/23 0638)  Set Rate: 18 BPM (05/18/23 0638)  Vt Set: 450 mL (05/18/23 0638)  PEEP/CPAP: 5 cmH20 (05/18/23 0638)  Oxygen Concentration (%): 30 (05/18/23 0638)  Peak Airway Pressure: 28 cmH20 (05/18/23 0638)  Total Ve: 7.2 L/m (05/18/23 0638)  F/VT Ratio<105 (RSBI): (!) 44.89 (05/18/23 0638)    ABGs:  No results found for: PH, PO2, PCO2, [id number]      Significant Labs:    Lab Results   Component Value Date    WBC 11.54 (H) 05/18/2023    HGB 7.7 (L) 05/18/2023    HCT 23.6 (L) 05/18/2023    MCV 92.9 05/18/2023     05/18/2023         Recent Labs   Lab 05/18/23  0140      K 3.4*   CO2 24   BUN 27.0*   CREATININE 2.31*   CALCIUM 8.0*   MG 1.90   AST 16   ALT 7   ALKPHOS 55   ALBUMIN 2.6*     Imaging:   None today    Assessment:     Persistent methicillin-resistant Staphylococcus aureus sepsis with C-spine/L-spine osteomyelitis/diskitis, lumbar epidural abscess (post drainage of epidural abscess with bilateral lumbar laminectomy and foraminotomies 04/21/2023 at [facility]), bilateral psoas abscesses.  Septic shock with hypotension requiring vasopressor  Large bilateral pleural effusions, with superimposed pulmonary infiltrates.  Differential diagnostic possibilities include ARDS  versus infectious pneumonitis, less likely hydrostatic pulmonary edema.  Pleural fluid consistent with exudative by LDH, no evidence of empyema.  Severe cardiomyopathy with marked decreased LVSF (EF 10%), significant decrease over prior transthoracic echocardiogram.  No evidence of endocarditis by CRISTY 05/17/2023  Acute respiratory failure, intubated 05/16/2023, requiring ongoing mechanical ventilation.  Cholecystitis post percutaneous cholecystostomy tube placement at [facility] 04/19/2023.  Biliary drain remains in place.  Acute kidney injury, nonoliguric  Untreated hepatitis-C with cirrhosis and ascites  History of polysubstance and IV drug abuse up until this hospital stay    Plan:     Continue IV daptomycin, rifampin, and micafungin.  Infectious Disease service is following.  Continue vasopressors to maintain mean arterial pressure greater than or equal to 65 mm Hg.  Begin to decrease ventilator to spontaneous breathing trial as tolerated  His overall outlook appears very poor.  This is further compounded by his new finding of severe decreased LV SF on CRISTY this a.m..  Consult palliative care for assistance       35 minutes of critical care was time spent personally by me on the following activities: development of treatment plan with patient or surrogate and bedside caregivers, discussions with consultants, evaluation of patient's response to treatment, examination of patient, ordering and performing treatments and interventions, ordering and review of laboratory studies, ordering and review of radiographic studies, pulse oximetry, re-evaluation of patient's condition.  This patient demonstrates a high probability for further clinical decompensation due to ongoing critical illness.  Critical care time did not overlap with that of any other provider or involve time for any procedures.       Alexandra Reece MD, [facility]  Pulmonary/Critical Care

## 2023-05-18 NOTE — PROGRESS NOTES
[facility] [address]  Neurosurgery  Progress Note    Subjective:     Interval History: No acute events overnight. Afebrile. Intubated, sedated. Neuro exam limited by sedation but moves all extremities.     History of Present Illness: 62-year-old male with significant history of polysubstance abuse/IV drug abuse, type 2 diabetes mellitus, untreated chronic hep C/cirrhosis.  Patient had a recent hospitalization to [facility] for MRSA bacteremia with MRSA C and L-spine osteomyelitis, diskitis, bilateral psoas and paraspinous muscle abscess as well as lumbar epidural abscess.  Patient was being treated in [facility] with close follow-up with neurosurgery, Infectious Disease.  Hospital stay also was even full for cholecystitis for which he underwent cholecystostomy tube placement.  Patient signed out AMA from [facility] on 05/05, went home, called ambulance and presented to the hospital.  Per chart review patient was being treated with daptomycin and rifampin in [facility].  While hospitalized his urine drug screen was positive for amphetamines and there were concerns that he was using it while hospitalized.  Patient was tachycardic in the ED.  Otherwise hemodynamics stable.  Complained of bilateral lower extremity swelling. Labs significant for leukocytosis, anemia, hyperglycemia. UDS was positive for opiates, amphetamines.  Hospitalist team was consulted for admission. Restarted on IV Daptomycin, Rifampin, Zosyn. Echocardiogram on 5/6 showed normal LV systolic function, EF 50-55%, indeterminate diastolic function, mild RV enlargement, mild MR, PASP 19 mmHg with no mention of vegetations.  Blood cultures from 05/05 for positive for MRSA.  Repeat blood culture from 05/09 negative for 24 hours.  Patient awaiting MRI lumbar spine.  Noted to have LLE pain with B/L LE U/S showing fluid collection in left cough.  Awaiting CT L leg and thigh with contrast.  Also ordered CT chest  with contrast to further evaluate right-sided oblong opacity on CXR which is thought to be mass/possible abscess/fissural fluid collection. Underwent debridement for sacral ulcer on 05/10 with wound care being done. Neurosurgery to make further recommendations once MRI lumbar spine is done. Noted to have elevated Cr of 1.78; DC diuretics. Possibly d/t ATN from ABX; consulted Nephrology. Ordered US Retroperitoneum, UA. Cancelled CT chest, CT L thigh/leg due to RUPERTO. Nephrology on board; ordered [id number] given anemia, renal dysfunction, petechial rash to evaluate for TTP.  Patient noted to have hematochezia by nurse on 05/12; consulted GI.  HGB/HCT noted to drop to 6.7/22.0; transfused 2 units PRBCs. NM bleeding scan ordered by GI which was negative. Continued to have hematochezia on 05/13, started on bowel prep for colonoscopy by GI.    Post-Op Info:  Procedure(s) (LRB):  COLONOSCOPY (N/A)   4 Days Post-Op      Medications:  Continuous Infusions:   dexmedeTOMIDine (Precedex) infusion (titrating) 1 mcg/kg/hr (05/18/23 0438)    fentanyl 25 mcg/hr (05/17/23 4515)    NORepinephrine bitartrate-D5W 0.04 mcg/kg/min (05/18/23 0228)    propofoL 25 mcg/kg/min (05/18/23 0019)     Scheduled Meds:   albumin human 25%  12.5 g Intravenous Q6H    collagenase   Topical (Top) BID    DAPTOmycin (CUBICIN) IV (PEDS and ADULTS)  6 mg/kg Intravenous Q48H    famotidine (PF)  20 mg Intravenous Daily    insulin detemir U-100  12 Units Subcutaneous QHS    Lactobacillus acidophilus  1 capsule Oral TID WM    micafungin (MYCAMINE) IVPB  100 mg Intravenous Q24H    multivitamin  1 tablet Oral Daily    potassium chloride  40 mEq Oral Once    rifAMpin (RIFADIN) IVPB  300 mg Intravenous BID    sodium chloride 0.9%  10 mL Intravenous Q6H    thiamine  100 mg Oral Daily    zinc oxide-cod liver oil   Topical (Top) BID     PRN Meds:sodium chloride, sodium chloride, sodium chloride, sodium chloride, sodium chloride, sodium chloride, aluminum-magnesium  hydroxide-simethicone, dextrose 10%, dextrose 10%, fentaNYL, fentaNYL, glucagon (human recombinant), glucose, glucose, HYDROmorphone, HYDROmorphone, insulin aspart U-100, loperamide, melatonin, metoprolol, ondansetron, ondansetron, oxyCODONE-acetaminophen, polyethylene glycol, prochlorperazine, prochlorperazine, senna-docusate 8.6-50 mg, sodium chloride 0.9%, Flushing PICC Protocol **AND** sodium chloride 0.9% **AND** sodium chloride 0.9%, sodium chloride 0.9%, tiZANidine       Objective:     Weight: 72.6 kg (160 lb)  Body mass index is 25.05 kg/m².  Vital Signs (Most Recent):  Temp: 98.8 °F (37.1 °C) (05/18/23 0400)  Pulse: 70 (05/18/23 0638)  Resp: 18 (05/18/23 0638)  BP: 91/67 (05/18/23 0630)  SpO2: 98 % (05/18/23 0638) Vital Signs (24h Range):  Temp:  [98.5 °F (36.9 °C)-99.4 °F (37.4 °C)] 98.8 °F (37.1 °C)  Pulse:  [] 70  Resp:  [18-36] 18  SpO2:  [93 %-100 %] 98 %  BP: ()/(57-86) 91/67                Vent Mode: A/C  Oxygen Concentration (%):  [30] 30  Resp Rate Total:  [18 br/min-35 br/min] 18 br/min  Vt Set:  [450 mL] 450 mL  PEEP/CPAP:  [5 cmH20-8 cmH20] 5 cmH20  Mean Airway Pressure:  [9 cmH20-10 cmH20] 10 cmH20             NG/OG Tube 05/16/23 0300 Center mouth (Active)   Placement Check placement verified by aspirate characteristics 05/18/23 0400   Distal Tube Length (cm) 65 05/17/23 2000   Tolerance no signs/symptoms of discomfort 05/18/23 0400   Securement secured to commercial device 05/18/23 0400   Clamp Status/Tolerance unclamped 05/18/23 0400   Suction Setting/Drainage Method suction at the bedside 05/18/23 0400   Insertion Site Appearance no redness, warmth, tenderness, skin breakdown, drainage 05/18/23 0400   Drainage Bile 05/17/23 0400   Feeding Type continuous;by pump 05/18/23 0400   Feeding Action feeding continued 05/18/23 0400   Current Rate (mL/hr) 20 mL/hr 05/17/23 2000   Goal Rate (mL/hr) 55 mL/hr 05/17/23 2000   Intake (mL) 150 mL 05/17/23 1403   Tube Output(mL)(Include  "Discarded Residual) 450 mL 05/17/23 0600   Intake (mL) - Formula Tube Feeding 41 05/18/23 0000            Urethral Catheter 05/12/23 1630 Latex 16 Fr. (Active)   $ Awad Insertion Bedside Insertion Performed 05/12/23 1630   Site Assessment Clean;Intact 05/18/23 0400   Collection Container Standard drainage bag 05/18/23 0400   Securement Method secured to top of thigh w/ adhesive device 05/18/23 0400   Catheter Care Performed yes 05/18/23 0400   Reason for Continuing Urinary Catheterization Critically ill in ICU and requiring hourly monitoring of intake/output 05/18/23 0400   CAUTI Prevention Bundle Securement Device in place with 1" slack;Intact seal between catheter & drainage tubing;Drainage bag/urimeter off the floor;Sheeting clip in use;No dependent loops or kinks;Drainage bag/urimeter not overfilled (<2/3 full);Drainage bag/urimeter below bladder 05/17/23 2000   Output (mL) 100 mL 05/18/23 0600            Drain/Device    Right lower flank (Active)   Insertion Site dressing intact 05/18/23 0400   Drainage Characteristics/Odor Yellow;Brown 05/17/23 2000   Drainage Amount Small 05/17/23 2000   General Output (mL) 150 05/18/23 0600       Neurosurgery Physical Exam  Intubated, sedated.   Moves all extremities.   Wound vac in place to posterior lumbar incision, minimal output obtained.   Exam limited secondary to sedation.     Significant Labs:  Recent Labs   Lab 05/17/23  0302 05/18/23  0140    138   K 3.4* 3.4*   CO2 23 24   BUN 28.7* 27.0*   CREATININE 2.47* 2.31*   CALCIUM 7.5* 8.0*   MG 1.80 1.90     Recent Labs   Lab 05/17/23  0302 05/18/23  0140   WBC 15.42* 11.54*   HGB 8.7* 7.7*   HCT 26.6* 23.6*    163     Recent Labs   Lab 05/16/23  0825   INR 1.41*     Microbiology Results (last 7 days)       Procedure Component Value Units Date/Time    Blood Culture [[id number]]  (Normal) Collected: 05/16/23 0825    Order Status: Completed Specimen: Blood Updated: 05/17/23 1000     CULTURE, BLOOD ([facility]) No " Growth At 24 Hours    AFB Smear [[id number]] Collected: 05/16/23 1359    Order Status: Completed Specimen: Pleural Fluid from Lung, Left Updated: 05/17/23 0924     AFB Smear No AFB seen (Direct smear only)    Respiratory Culture [[id number]] Collected: 05/16/23 1158    Order Status: Completed Specimen: Sputum from Transtrachael aspirate Updated: 05/17/23 0859     Respiratory Culture No Growth At 24 Hours     GRAM STAIN Quality 2+      No bacteria seen    Blood Culture [[id number]]  (Normal) Collected: 05/16/23 0656    Order Status: Completed Specimen: Blood Updated: 05/17/23 0800     CULTURE, BLOOD ([facility]) No Growth At 24 Hours    Gram Stain [[id number]] Collected: 05/16/23 1359    Order Status: Completed Specimen: Body Fluid from Pleural Fluid, Left Updated: 05/17/23 0729     GRAM STAIN No WBCs, No bacteria seen    Wound Culture [[id number]] Collected: 05/16/23 1313    Order Status: Completed Specimen: Wound from Lumbar Updated: 05/17/23 0651     Wound Culture No Growth At 24 Hours    Body Fluid Culture [[id number]] Collected: 05/16/23 1359    Order Status: Completed Specimen: Body Fluid from Lung, Left Updated: 05/17/23 0650     Body Fluid Culture No Growth At 24 Hours    Mycobacteria and Nocardia Culture [[id number]] Collected: 05/16/23 1359    Order Status: Sent Specimen: Body Fluid from Lung, Left Updated: 05/16/23 1643    Fungal Culture [[id number]] Collected: 05/16/23 1359    Order Status: Sent Specimen: Body Fluid from Lung, Left Updated: 05/16/23 1407    Urine culture [[id number]]  (Abnormal) Collected: 05/12/23 1048    Order Status: Completed Specimen: Urine Updated: 05/15/23 1018     Urine Culture >/= 100,000 colonies/ml Candida albicans    Blood Culture [[id number]]  (Normal) Collected: 05/09/23 2104    Order Status: Completed Specimen: Blood Updated: 05/14/23 2200     CULTURE, BLOOD ([facility]) No Growth at 5 days              Assessment/Plan:     Active Diagnoses:    Diagnosis Date Noted POA    PRINCIPAL PROBLEM:   Sepsis [A41.9] 05/05/2023 Yes    Moderate malnutrition [E44.0] 05/17/2023 Unknown    Staphylococcus aureus bacteremia [R78.81, B95.61] 05/05/2023 Yes      Problems Resolved During this Admission:     -Wound cultures negative.   -Wound vac in place  -On antibiotic therapy per infectious disease.   -Supportive measures  -SCDs for DVT prophylaxis  -MRI lumbar spine pending. Patient has been unable to tolerate supine positioning for imaging.     DILCIA Swartz  Neurosurgery  [facility]

## 2023-05-18 NOTE — CONSULTS
Inpatient consult to Palliative Care  Consult performed by: DIANE Ramsey  Consult ordered by: Medhat Reece Jr., MD, [facility]      Patient Name: Reji Plasencia   MRN: [id number]   Admission Date: 5/5/2023   Hospital Length of Stay: 13   Attending Provider: Medhat Reece Jr., MD, *   Consulting Provider: Patricia CONTRERAS  Reason for Consult: Goals of Care  Primary Care Physician: Primary Doctor No     Principal Problem: Sepsis     Patient information was obtained from relative(s) and ER records.      Final diagnoses:  [R00.0] Tachycardia  [M46.20] Spinal abscess (Primary)  [R78.81] Bacteremia     Assessment/Plan:     I reviewed the patient and family's understanding of the seriousness of the illness and its expected prognosis. We discussed the patient's goals of care and treatment preferences. We discussed the difference between palliative and curative medicine. I explained the differences between home health, palliative and hospice care. I clarified current code status. I identified the surrogate decision maker or health care POA. I explained the difference between a living will (advanced directive) and LaPost. We discussed the patient's chosen code status as listed above and the contents of the LaPOST. I answered all questions and we formulated a plan including recommendations for symptom management and how to best achieve goals of care.       Advance Care Planning     Date: 05/18/2023    [facility]  I engaged the family in a conversation about advance care planning and we specifically addressed what the goals of care would be moving forward, in light of the patient's change in clinical status, specifically current condition.  We did specifically address the patient's likely prognosis, which is fair .  We explored the patient's values and preferences for future care.  The family endorses that what is most important right now is to focus on improvement in condition but with limits to invasive  therapies    Accordingly, we have decided that the best plan to meet the patient's goals includes continuing with treatment    Met with son Clyde--introduced service and discussed patient's history and current condition.  Clyde states that he only started a relationship with patient in the past year and that he knew he was his father from a paternity test.  States that he has 2 older sisters who he recently found out about who do not have contact with patient.  States that patient lives with his girlfriend and their 2 children--reports that they live next door to him, but he is a  and works in [employer] during the week and returns home on the weekend.      Reports that patient has been a drug abuser for a long time and that his biological mother and her SO  of complications of drug abuse as well.  Reports that patient's SO transported patient back home after he left AMA and that he discovered that patient left the hospital after receiving a call from his 15 yo brother that patient was back home.  Clyde states that he insisted that patient come back to the hospital.      Son expressed that he does not want girlfriend to be involved in any decisions.  Explained that he is next of kin and any other children that may want to be involved.  Discussed advanced directives and code status--son states that he does not want patient to receive cardiac resuscitation should he experience cardiac event.  Discussed that medical team will continue to wean back support with the goal of safe extubation.  Discussed patient's tenuous condition--son states that he is aware of how sick the patient is and is realistic about his health challenges.  Offered support and informed I would continue to follow.              History of Present Illness:     Patient is a 62 yo  male with complex medical history including substance abuse, untreated hepatitis C, cirrhosis, diabetes, current MRSA bacteremia and C spineL  spine osteomyelitis/diskitis with bilateral psoas and paraspinous muscle abscess in addition to a lumbar epidural abscess who was originally admitted to [facility] on 5/05 after leaving AMA from [facility] after 3 weeks of hospitalization during which he received IV antibiotics and underwent bilateral lumbar laminectomy and foraminotomy withdrainage of epidural abscess on 4/21 per Dr. Chang.  He also experienced cholecystitis for which he underwent MRCP and cholecystostomy tube placement in addition to paracentesis.  During previous hospitalization he had a positive urine drug screen on 4/27 for amphetanmines suggesting drun use while hospitalized.      Patient has been seen by neurosurgery, ID, GI, and nephrology.  Echocardiogram done prior on 5/06 showed EF of 50-55% with no vegetation, however, CRISTY performed today revealed EF of 10% with no evidence of vegetations.  Underwent sacral ulcer debridement on 5/10, and experienced hematochezia on 5/12 requiring 4 unites of PRBC's.  He experienced decompensation on the floor and was  and transferred on bipap but eventually required intubation and pressors.        Active Ambulatory Problems     Diagnosis Date Noted    Type 2 diabetes mellitus without complication, without long-term current use of insulin 04/13/2023    Tobacco user 05/16/2023    Osteomyelitis of lumbar spine 04/13/2023    Hepatitis C virus infection 05/16/2023     Resolved Ambulatory Problems     Diagnosis Date Noted    No Resolved Ambulatory Problems     No Additional Past Medical History        Past Surgical History:   Procedure Laterality Date    COLONOSCOPY N/A 5/14/2023    Procedure: COLONOSCOPY;  Surgeon: Shaneka Ayers MD;  Location: [facility] OR;  Service: Gastroenterology;  Laterality: N/A;    DEBRIDEMENT OF SACRAL WOUND N/A 5/10/2023    Procedure: DEBRIDEMENT, WOUND, SACRUM;  Surgeon: Reggie Castañeda MD;  Location: [facility] OR;  Service: General;  Laterality: N/A;        Review of patient's allergies  indicates:  No Known Allergies       Current Facility-Administered Medications:     0.9%  NaCl infusion (for blood administration), , Intravenous, Q24H PRN, Guerrero Alfaro MD    0.9%  NaCl infusion (for blood administration), , Intravenous, Q24H PRN, Fred Dominguez DO, New Bag at 05/12/23 1640    0.9%  NaCl infusion (for blood administration), , Intravenous, Q24H PRN, Ambreen Hatfield, AGACNP-BC    0.9%  NaCl infusion (for blood administration), , Intravenous, Q24H PRN, Guerrero Alfaro MD    0.9%  NaCl infusion (for blood administration), , Intravenous, Q24H PRN, Guerrero Alfaro MD    0.9%  NaCl infusion (for blood administration), , Intravenous, Q24H PRN, Guerrero Alfaro MD    albumin human 25% bottle 12.5 g, 12.5 g, Intravenous, Q6H, Cici Nunez MD, Stopped at 05/18/23 1243    aluminum-magnesium hydroxide-simethicone 200-200-20 mg/5 mL suspension 30 mL, 30 mL, Per OG tube, QID PRN, Medhat Reece Jr., MD, FCCP    collagenase ointment, , Topical (Top), BID, Reggie Castañeda MD, Given at 05/18/23 0815    DAPTOmycin (CUBICIN) 435 mg in sodium chloride 0.9% SolP 50 mL IVPB, 6 mg/kg, Intravenous, Q48H, Darnell Franco MD, Stopped at 05/17/23 1839    dexmedetomidine (PRECEDEX) 400mcg/100mL 0.9% NaCL infusion, 0-1.4 mcg/kg/hr, Intravenous, Continuous, Cici Nunez MD, Last Rate: 1.5 mL/hr at 05/18/23 1000, 0.08 mcg/kg/hr at 05/18/23 1000    dextrose 10% bolus 125 mL 125 mL, 12.5 g, Intravenous, PRN, Melissa Mohan, AGACNP-BC, Stopped at 05/16/23 1243    dextrose 10% bolus 250 mL 250 mL, 25 g, Intravenous, PRN, Melissa Mohan, AGACNP-BC    famotidine (PF) injection 20 mg, 20 mg, Intravenous, Daily, Medhat Reece Jr., MD, FCCP, 20 mg at 05/18/23 0811    fentaNYL 2500 mcg in 0.9% sodium chloride 250 mL infusion premix (titrating), 0-200 mcg/hr, Intravenous, Continuous, Cici Nunez MD, Last Rate: 2.5 mL/hr at 05/18/23 0700, 25 mcg/hr at 05/18/23 0700    fentaNYL injection 25 mcg, 25 mcg, Intravenous, Q2H  PRN, Steven Cyr DO    fentaNYL injection 50 mcg, 50 mcg, Intravenous, Q2H PRN, Mirta Ryan, ANP, 50 mcg at 05/17/23 1300    glucagon (human recombinant) injection 1 mg, 1 mg, Intramuscular, PRN, Melissa Mohan, AGACNP-BC    glucose chewable tablet 16 g, 16 g, Per OG tube, PRN, Medhat Reece Jr., MD, FCCP    glucose chewable tablet 24 g, 24 g, Per OG tube, PRN, Medhat Reece Jr., MD, FCCP    HYDROmorphone (PF) injection 0.5 mg, 0.5 mg, Intravenous, Q4H PRN, Guerrero Alfaro MD, 0.5 mg at 05/15/23 1228    insulin aspart U-100 injection 1-10 Units, 1-10 Units, Subcutaneous, QID (AC + HS) PRN, Melissa Mohan, AGACNP-BC, 1 Units at 05/14/23 2132    insulin detemir U-100 injection 12 Units, 12 Units, Subcutaneous, QHS, Darnell Franco MD, 12 Units at 05/17/23 2035    Lactobacillus acidophilus capsule 1 capsule, 1 capsule, Per NG/OG Tube, TID WM, Medhat Reece Jr., MD, FCCP, 1 capsule at 05/18/23 1143    loperamide capsule 2 mg, 2 mg, Per OG tube, QID PRN, Medhat Reece Jr., MD, FCCP    melatonin tablet 6 mg, 6 mg, Per OG tube, Nightly PRN, Medhat Reece Jr., MD, FCCP    metoprolol injection 5 mg, 5 mg, Intravenous, Q4H PRN, Cici Nunez MD    micafungin 100 mg in sodium chloride 0.9 % 100 mL IVPB (MB+), 100 mg, Intravenous, Q24H, Cici Nunez MD, Stopped at 05/18/23 0408    [START ON 5/19/2023] multivitamin tablet, 1 tablet, Per OG tube, Daily, Medhat Reece Jr., MD, FCCP    NORepinephrine 8 mg in dextrose 5% 250 mL infusion, 0-3 mcg/kg/min, Intravenous, Continuous, Medhat Reece Jr., MD, FCCP, Last Rate: 5.4 mL/hr at 05/18/23 1244, 0.04 mcg/kg/min at 05/18/23 1244    ondansetron injection 4 mg, 4 mg, Intravenous, Q4H PRN, Nery Sepulveda MD, 4 mg at 05/06/23 0434    oxyCODONE-acetaminophen 5-325 mg per tablet 1 tablet, 1 tablet, Oral, Q6H PRN, Guerrero Alfaro MD, 1 tablet at 05/15/23 2218    polyethylene glycol packet 17 g, 17 g, Oral, BID PRN, Nery Sepulveda MD    prochlorperazine injection Soln 5 mg, 5  "mg, Intravenous, Q6H PRN, Nery Sepulveda MD    propofol (DIPRIVAN) 10 mg/mL infusion, 0-50 mcg/kg/min, Intravenous, Continuous, Cici Nunez MD, Stopped at 05/18/23 1200    rifAMpin (RIFADIN) 300 mg in dextrose 5 % (D5W) 100 mL IVPB, 300 mg, Intravenous, BID, Pedro Luis Vigil DO, Stopped at 05/18/23 1036    senna-docusate 8.6-50 mg per tablet 2 tablet, 2 tablet, Per OG tube, BID PRN, Medhat Reece Jr., MD, FCCP    sodium chloride 0.9% flush 10 mL, 10 mL, Intravenous, PRN, Nery Sepulveda MD    Flushing PICC Protocol, , , Until Discontinued **AND** sodium chloride 0.9% flush 10 mL, 10 mL, Intravenous, Q6H, 10 mL at 05/18/23 1159 **AND** sodium chloride 0.9% flush 10 mL, 10 mL, Intravenous, PRN, Darnell Franco MD    sodium chloride 0.9% flush 10 mL, 10 mL, Intravenous, PRN, Cici Nunez MD    [START ON 5/19/2023] thiamine tablet 100 mg, 100 mg, Per OG tube, Daily, Medhat Reece Jr., MD, FCCP    tiZANidine tablet 4 mg, 4 mg, Per OG tube, Q8H PRN, Medhat Reece Jr., MD, FCCP    zinc oxide-cod liver oil 40 % paste, , Topical (Top), BID, Reggie Castañeda MD, Given at 05/18/23 0815     sodium chloride, sodium chloride, sodium chloride, sodium chloride, sodium chloride, sodium chloride, aluminum-magnesium hydroxide-simethicone, dextrose 10%, dextrose 10%, fentaNYL, fentaNYL, glucagon (human recombinant), glucose, glucose, HYDROmorphone, insulin aspart U-100, loperamide, melatonin, metoprolol, ondansetron, oxyCODONE-acetaminophen, polyethylene glycol, prochlorperazine, senna-docusate 8.6-50 mg, sodium chloride 0.9%, Flushing PICC Protocol **AND** sodium chloride 0.9% **AND** sodium chloride 0.9%, sodium chloride 0.9%, tiZANidine     History reviewed. No pertinent family history.     Review of Systems   Unable to perform ROS: Intubated          Objective:   /81   Pulse 83   Temp 97.9 °F (36.6 °C) (Oral)   Resp (!) 26   Ht 5' 7.01" (1.702 m)   Wt 72.6 kg (160 lb)   SpO2 100%   BMI 25.05 kg/m²  "     Physical Exam  Constitutional:       Appearance: He is ill-appearing.   HENT:      Head: Normocephalic.   Eyes:      Pupils: Pupils are equal, round, and reactive to light.   Cardiovascular:      Rate and Rhythm: Normal rate and regular rhythm.   Pulmonary:      Effort: Pulmonary effort is normal.   Abdominal:      Palpations: Abdomen is soft.   Musculoskeletal:      Comments: sarcopenia   Skin:     General: Skin is warm.   Neurological:      Comments: Follows commands         FAMILY CONTACTS:     Review of Symptoms  Review of Symptoms      Symptom Assessment (ESAS 0-10 Scale)  Pain:  0  Dyspnea:  0  Anxiety:  0  Nausea:  0  Depression:  0  Anorexia:  0  Fatigue:  0  Insomnia:  0  Restlessness:  0  Agitation:  0         Psychosocial/Cultural:   See Palliative Psychosocial Note: Yes  Patient lives with a significant other and has 2 younger children ages 12 and 15.  Son reports that patient has 2 daughters Katy and Bri who do not have relationship with him.  Patient has  child as well. Patient is disabled from oil field work.    **Primary  to Follow**  Palliative Care  Consult: No    Spiritual:  F - Usha and Belief:  Roman Catholic    Advance Care Planning   Advance Directives:   Do Not Resuscitate: partial--no cardiac resuscitation.      Decision Making:  Family answered questions  Goals of Care: The family endorses that what is most important right now is to focus on improvement in condition but with limits to invasive therapies    Accordingly, we have decided that the best plan to meet the patient's goals includes continuing with treatment        PAINAD: NA    Caregiver burden formerly assessed: Yes        > 50% of 60 min of encounter was spent in chart review, face to face discussion of goals of care, symptom assessment, coordination of care and emotional support.         Patricia Cash FNP, [facility]  Palliative Medicine  [facility] - Observation Unit

## 2023-05-18 NOTE — NURSING
Nurses Note -- 4 Eyes      5/18/2023   4:39 PM      Skin assessed during: Daily Assessment      [] No Altered Skin Integrity Present    []Prevention Measures Documented      [x] Yes- Altered Skin Integrity Present or Discovered   [] LDA Added if Not in Epic (Describe Wound)   [x] New Altered Skin Integrity was Present on Admit and Documented in LDA   [] Wound Image Taken    Wound Care Consulted? Yes    Attending Nurse:  Ulises Simon IV, RN     Second RN/Staff Member:  Jason MILLS RN

## 2023-05-18 NOTE — PROGRESS NOTES
"                                                                                                                        NEPHROLOGY: Progress      63 y.o. male with MRSA bacteremia.  Past medical history significant for polysubstance abuse, IV drug use, diabetes mellitus, untreated hepatitis-C, and cirrhosis.  He was recently hospitalized at [facility] for MRSA bacteremia due to C and L-spine osteomyelitis/diskitis, paraspinous muscle abscess, and lumbar epidural abscess.    He also had a cholecystostomy drain placed for cholecystitis.  Patient left AMA on 05/05/2023 and then presented to [facility] on 05/06/2023.  Blood cultures at admission were positive for MRSA.  UDS was also positive for amphetamines and opiates.  Neurosurgery and Infectious Disease have been following for bacteremia and spinal osteomyelitis/diskitis.  He has been getting daptomycin, rifampin, and micafungin per ID recommendations.      We are following the patient for non-oliguric acute kidney injury.  Patient had an episode of hematochezia requiring 3 units of packed red cells.  Colonoscopy revealed pan colonic diverticuli as the probable source.  Zosyn has now been discontinued which was considered to be a potential source of AIN.     Patient was transferred to ICU due to respiratory failure requiring intubation.  Left thoracentesis was done on May 17th with removal of 1 L of transudate.  Patient remains intubated and sedated.  Scheduled for CRISTY this morning.                       BP 91/67   Pulse 70   Temp 98.8 °F (37.1 °C) (Oral)   Resp 18   Ht 5' 7.01" (1.702 m)   Wt 72.6 kg (160 lb)   SpO2 98%   BMI 25.05 kg/m²     Physical Exam:    GEN:  Chronically ill-appearing.  Patient is currently intubated and sedated.  He is on norepinephrine 0.12 mics per kilos per minute.   HEENT: Atraumatic. EOMI, no icterus  NECK : No JVD  CARD : RRR s rub or gallop  LUNGS :  Bilateral coarse rhonchi with diminished breath sounds  ABD : " Soft,non-tender. BS active , ascites present.  Percutaneous cholecystostomy tube draining bilious fluid.  EXT :  2 to 3+ pitting edema.           Intake/Output Summary (Last 24 hours) at 5/18/2023 0838  Last data filed at 5/18/2023 0623  Gross per 24 hour   Intake 1785.05 ml   Output 1355 ml   Net 430.05 ml     All current medications have been reviewed.    Laboratory:  Recent Results (from the past 24 hour(s))   POCT glucose    Collection Time: 05/17/23  3:45 PM   Result Value Ref Range    POCT Glucose 94 70 - 110 mg/dL   POCT glucose    Collection Time: 05/17/23  8:28 PM   Result Value Ref Range    POCT Glucose 101 70 - 110 mg/dL   Phosphorus    Collection Time: 05/18/23  1:40 AM   Result Value Ref Range    Phosphorus Level 4.5 2.3 - 4.7 mg/dL   Magnesium    Collection Time: 05/18/23  1:40 AM   Result Value Ref Range    Magnesium Level 1.90 1.60 - 2.60 mg/dL   Comprehensive Metabolic Panel    Collection Time: 05/18/23  1:40 AM   Result Value Ref Range    Sodium Level 138 136 - 145 mmol/L    Potassium Level 3.4 (L) 3.5 - 5.1 mmol/L    Chloride 103 98 - 107 mmol/L    Carbon Dioxide 24 23 - 31 mmol/L    Glucose Level 99 82 - 115 mg/dL    Blood Urea Nitrogen 27.0 (H) 8.4 - 25.7 mg/dL    Creatinine 2.31 (H) 0.73 - 1.18 mg/dL    Calcium Level Total 8.0 (L) 8.8 - 10.0 mg/dL    Protein Total 4.9 (L) 5.8 - 7.6 gm/dL    Albumin Level 2.6 (L) 3.4 - 4.8 g/dL    Globulin 2.3 (L) 2.4 - 3.5 gm/dL    Albumin/Globulin Ratio 1.1 1.1 - 2.0 ratio    Bilirubin Total 1.0 <=1.5 mg/dL    Alkaline Phosphatase 55 40 - 150 unit/L    Alanine Aminotransferase 7 0 - 55 unit/L    Aspartate Aminotransferase 16 5 - 34 unit/L    eGFR 31 mls/min/1.73/m2   CBC with Differential    Collection Time: 05/18/23  1:40 AM   Result Value Ref Range    WBC 11.54 (H) 4.50 - 11.50 x10(3)/mcL    RBC 2.54 (L) 4.70 - 6.10 x10(6)/mcL    Hgb 7.7 (L) 14.0 - 18.0 g/dL    Hct 23.6 (L) 42.0 - 52.0 %    MCV 92.9 80.0 - 94.0 fL    MCH 30.3 27.0 - 31.0 pg    MCHC 32.6 (L)  33.0 - 36.0 g/dL    RDW 16.4 11.5 - 17.0 %    Platelet 163 130 - 400 x10(3)/mcL    MPV 10.5 (H) 7.4 - 10.4 fL    Neut % 84.8 %    Lymph % 9.4 %    Mono % 2.5 %    Eos % 2.5 %    Basophil % 0.3 %    Lymph # 1.08 0.6 - 4.6 x10(3)/mcL    Neut # 9.78 (H) 2.1 - 9.2 x10(3)/mcL    Mono # 0.29 0.1 - 1.3 x10(3)/mcL    Eos # 0.29 0 - 0.9 x10(3)/mcL    Baso # 0.04 <=0.2 x10(3)/mcL    IG# 0.06 (H) 0 - 0.04 x10(3)/mcL    IG% 0.5 %    NRBC% 0.0 %   RT Blood Gas    Collection Time: 05/18/23  5:42 AM   Result Value Ref Range    Sample Type Arterial Blood     Sample site Right Radial Artery     Drawn by TDL RRT     pH, Blood gas 7.450 7.350 - 7.450    pCO2, Blood gas 41.0 35.0 - 45.0 mmHg    pO2, Blood gas 110.0 (H) 80.0 - 100.0 mmHg    Sodium, Blood Gas 132 (L) 137 - 145 mmol/L    Potassium, Blood Gas 3.2 (L) 3.5 - 5.0 mmol/L    Calcium Level Ionized 0.99 (L) 1.12 - 1.23 mmol/L    TOC2, Blood gas 29.8 mmol/L    Base Excess, Blood gas 4.10 (H) -2.00 - 2.00 mmol/L    sO2, Blood gas 98.5 %    HCO3, Blood gas 28.5 (H) 22.0 - 26.0 mmol/L    THb, Blood gas 10.2 (L) 12 - 16 g/dL    O2 Hb, Blood Gas 96.6 94.0 - 97.0 %    CO Hgb 2.1 (H) 0.5 - 1.5 %    Met Hgb 0.8 0.4 - 1.5 %    Allens Test Yes     MODE AC     FIO2, Blood gas 30 %    Mech Vt 450 ml    Mech RR 18 b/min    PEEP 5.0 cmH2O         Assessment/Plan:  RUPERTO-nonoliguric -ATN versus AIN  Recent colonic diverticular bleed  Respiratory failure -s/p Lt pleurocentesis-on ventilator  Cholecystitis-s/p percutaneous cholecystostomy tube  MRSA bacteremia-osteomyelitis/psoas abscesses  S/p recent lumbar laminectomy/epidural abscess I&D  Polysubstance abuse   Cervical and lumbar spine osteomyelitis/diskitis  Bilateral psoas and paraspinous muscle   Sacral ulcer debrided 5/10  Cirrhosis-untreated hep C  Type 2 diabetes mellitus     Patient remains nonoliguric and slowly improving renal function.  He is currently tolerating his tube feedings.  Scheduled for CRISTY this morning.  Patient is on  sedation and norepinephrine delivering approximately 50 cc an hour plus his tube feedings.  We will continue to follow.      Adi Campos MD, FASN

## 2023-05-18 NOTE — PROGRESS NOTES
[facility] - [facility]  Cardiology  Progress Note    Patient Name: Reji Plasencia  MRN: [id number]  Admission Date: 5/5/2023  Hospital Length of Stay: 13 days  Code Status: Full Code   Attending Physician: Medhat Reece Jr., MD, *   Primary Care Physician: Primary Doctor No  Expected Discharge Date:   Principal Problem:Sepsis    Subjective:   Consultation Reason: CRISTY     HPI:   Mr. Plasencia is a 63 year old male, unknown to [facility], admitted to ICU (Upgraded) due to tenuous respiratory status requiring BIPAP Support and high flow oxygen support. 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 Complicated by cholecystitis requiring meenu tube insertion. This hospitalization, patient noted to be MRSA bacteremia. [facility] is consulted to perform CRISTY.    Hospital Course:   5.18.23: Underwent CRISTY This AM. Tolerated Well. Remains Intubated.      PMH: DM, Untreated Hepatitis C, Cirrhosis, IV Drug Use, Tobacco Use, MRSA Bacteremia, C-Spine/L-Spine Osteomyelitis/Diskitis  PSH: Spinal Surgeries, Colonoscopy, Meenu Tube  Family History: Negative  Social History: Tobacco- Active Smoker, Alcohol- Negative, Substance Abuse- IV Drug Use     Previous Cardiac Diagnostics:   CRISTY (5.18.23):  LV systolic function 10-15%.  No signs of intracardiac infection or valvular vegetation  No intracardiac thrombus is present.    Venous Doppler (5.7.23):  There was no evidence of deep or superficial vein thrombosis in bilateral lower extremities.  A fluid collection measuring approximately 9 x 4 x 2 cm was identified in the left proximal through mid calf.  There was no venous or arterial flow to this structure.     Echocardiogram (5.6.23):  TDS due to lung interference.  The left ventricle is normal in size with low normal  systolic function.  The estimated ejection fraction is 50-55%.  Indeterminate left ventricular diastolic function.  Mild right ventricular enlargement with low normal right ventricular systolic function.  Mild mitral regurgitation.  Intermediate central venous pressure (8 mmHg).  The estimated PA systolic pressure is 19 mmHg.    Review of Systems   Unable to perform ROS: Intubated     Objective:     Vital Signs (Most Recent):  Temp: 98.8 °F (37.1 °C) (05/18/23 0400)  Pulse: 70 (05/18/23 0638)  Resp: 18 (05/18/23 0638)  BP: 91/67 (05/18/23 0630)  SpO2: 98 % (05/18/23 0638) Vital Signs (24h Range):  Temp:  [98.6 °F (37 °C)-99.4 °F (37.4 °C)] 98.8 °F (37.1 °C)  Pulse:  [] 70  Resp:  [18-36] 18  SpO2:  [93 %-100 %] 98 %  BP: ()/(57-86) 91/67     Weight: 72.6 kg (160 lb)  Body mass index is 25.05 kg/m².    SpO2: 98 %         Intake/Output Summary (Last 24 hours) at 5/18/2023 1046  Last data filed at 5/18/2023 0623  Gross per 24 hour   Intake 1785.05 ml   Output 1355 ml   Net 430.05 ml       Lines/Drains/Airways       Peripherally Inserted Central Catheter Line  Duration             PICC Double Lumen 05/10/23 0817 right brachial 8 days              Drain  Duration                  Drain/Device    Right lower flank -- days         Urethral Catheter 05/12/23 1630 Latex 16 Fr. 5 days         NG/OG Tube 05/16/23 0300 Center mouth 2 days              Airway  Duration                  Airway - Non-Surgical 05/16/23 0239 2 days                    Significant Labs:   Recent Results (from the past 72 hour(s))   POCT glucose    Collection Time: 05/15/23 11:25 AM   Result Value Ref Range    POCT Glucose 71 70 - 110 mg/dL   POCT glucose    Collection Time: 05/15/23  6:03 PM   Result Value Ref Range    POCT Glucose 118 (H) 70 - 110 mg/dL   RT Blood Gas    Collection Time: 05/15/23  8:20 PM   Result Value Ref Range    Sample Type Arterial Blood     Sample site Right Radial Artery     Drawn by RF RRT     pH, Blood gas 7.420  7.350 - 7.450    pCO2, Blood gas 40.0 35.0 - 45.0 mmHg    pO2, Blood gas 72.0 (L) 80.0 - 100.0 mmHg    Sodium, Blood Gas 131 (L) 137 - 145 mmol/L    Potassium, Blood Gas 3.9 3.5 - 5.0 mmol/L    Calcium Level Ionized 1.02 (L) 1.12 - 1.23 mmol/L    TOC2, Blood gas 27.1 mmol/L    Base Excess, Blood gas 1.30 -2.00 - 2.00 mmol/L    sO2, Blood gas 94.6 %    HCO3, Blood gas 25.9 22.0 - 26.0 mmol/L    THb, Blood gas 11.1 (L) 12 - 16 g/dL    O2 Hb, Blood Gas 93.7 (L) 94.0 - 97.0 %    CO Hgb 2.5 (H) 0.5 - 1.5 %    Met Hgb 0.7 0.4 - 1.5 %    Allens Test Yes     Oxygen Device, Blood gas Face Mask     LPM 10.0    POCT glucose    Collection Time: 05/15/23 10:16 PM   Result Value Ref Range    POCT Glucose 124 (H) 70 - 110 mg/dL   Comprehensive Metabolic Panel    Collection Time: 05/16/23 12:45 AM   Result Value Ref Range    Sodium Level 137 136 - 145 mmol/L    Potassium Level 4.0 3.5 - 5.1 mmol/L    Chloride 101 98 - 107 mmol/L    Carbon Dioxide 24 23 - 31 mmol/L    Glucose Level 124 (H) 82 - 115 mg/dL    Blood Urea Nitrogen 27.4 (H) 8.4 - 25.7 mg/dL    Creatinine 2.42 (H) 0.73 - 1.18 mg/dL    Calcium Level Total 7.4 (L) 8.8 - 10.0 mg/dL    Protein Total 5.1 (L) 5.8 - 7.6 gm/dL    Albumin Level 1.2 (L) 3.4 - 4.8 g/dL    Globulin 3.9 (H) 2.4 - 3.5 gm/dL    Albumin/Globulin Ratio 0.3 (L) 1.1 - 2.0 ratio    Bilirubin Total 1.0 <=1.5 mg/dL    Alkaline Phosphatase 93 40 - 150 unit/L    Alanine Aminotransferase 14 0 - 55 unit/L    Aspartate Aminotransferase 30 5 - 34 unit/L    eGFR 30 mls/min/1.73/m2   D-Dimer, Quantitative    Collection Time: 05/16/23 12:45 AM   Result Value Ref Range    D-Dimer 7.97 (H) 0.00 - 0.50 ug/mL FEU   Lactic Acid, Plasma    Collection Time: 05/16/23 12:45 AM   Result Value Ref Range    Lactic Acid Level 2.2 0.5 - 2.2 mmol/L   CBC with Differential    Collection Time: 05/16/23 12:45 AM   Result Value Ref Range    WBC 26.17 (H) 4.50 - 11.50 x10(3)/mcL    RBC 3.49 (L) 4.70 - 6.10 x10(6)/mcL    Hgb 10.5 (L) 14.0  - 18.0 g/dL    Hct 32.4 (L) 42.0 - 52.0 %    MCV 92.8 80.0 - 94.0 fL    MCH 30.1 27.0 - 31.0 pg    MCHC 32.4 (L) 33.0 - 36.0 g/dL    RDW 16.6 11.5 - 17.0 %    Platelet 337 130 - 400 x10(3)/mcL    MPV 10.2 7.4 - 10.4 fL    NRBC% 0.0 %   Manual Differential    Collection Time: 05/16/23 12:45 AM   Result Value Ref Range    Neut Man 99 %    Lymph Man 1 %    Instr WBC 26.17 x10(3)/mcL    Abs Lymp 0.2617 (L) 0.6 - 4.6 x10(3)/mcL    Abs Neut 25.9083 (H) 2.1 - 9.2 x10(3)/mcL    RBC Morph Abnormal (A) Normal    Anisocyte 1+ (A) (none)    Poik 1+ (A) (none)    Suzette Cells 1+ (A) (none)    Platelet Est Normal Normal, Adequate   RT Blood Gas    Collection Time: 05/16/23  2:13 AM   Result Value Ref Range    Sample Type Arterial Blood     Sample site Left Radial Artery     Drawn by ,rrt     pH, Blood gas 7.350 7.350 - 7.450    pCO2, Blood gas 51.0 (HH) 35.0 - 45.0 mmHg    pO2, Blood gas 90.0 80.0 - 100.0 mmHg    Sodium, Blood Gas 133 (L) 137 - 145 mmol/L    Potassium, Blood Gas 3.8 3.5 - 5.0 mmol/L    Calcium Level Ionized 1.00 (L) 1.12 - 1.23 mmol/L    TOC2, Blood gas 29.8 mmol/L    Base Excess, Blood gas 1.80 mmol/L    sO2, Blood gas 97.0 %    HCO3, Blood gas 28.2 >=15.0 mmol/L    Allens Test Yes     MODE BiPAP     FIO2, Blood gas 100 %    IPAP 14 cmH2O    EPAP 6 cmH2O   RT Blood Gas    Collection Time: 05/16/23  5:44 AM   Result Value Ref Range    Sample Type Arterial Blood     Sample site Left Radial Artery     Drawn by sd rrt     pH, Blood gas 7.370 7.350 - 7.450    pCO2, Blood gas 50.0 (H) 35.0 - 45.0 mmHg    pO2, Blood gas 236.0 (H) 80.0 - 100.0 mmHg    Sodium, Blood Gas 134 (L) 137 - 145 mmol/L    Potassium, Blood Gas 3.5 3.5 - 5.0 mmol/L    Calcium Level Ionized 0.98 (L) 1.12 - 1.23 mmol/L    TOC2, Blood gas 30.4 mmol/L    Base Excess, Blood gas 2.80 mmol/L    sO2, Blood gas 100.0 %    HCO3, Blood gas 28.9 >=15.0 mmol/L    Allens Test Yes     MODE AC     FIO2, Blood gas 100 %    Mech Vt 450 ml    Mech RR 18 b/min     PEEP 8.0 cmH2O   Comprehensive Metabolic Panel    Collection Time: 05/16/23  6:56 AM   Result Value Ref Range    Sodium Level 136 136 - 145 mmol/L    Potassium Level 4.8 3.5 - 5.1 mmol/L    Chloride 103 98 - 107 mmol/L    Carbon Dioxide 19 (L) 23 - 31 mmol/L    Glucose Level 79 (L) 82 - 115 mg/dL    Blood Urea Nitrogen 29.2 (H) 8.4 - 25.7 mg/dL    Creatinine 2.60 (H) 0.73 - 1.18 mg/dL    Calcium Level Total 7.3 (L) 8.8 - 10.0 mg/dL    Protein Total 5.6 (L) 5.8 - 7.6 gm/dL    Albumin Level 1.3 (L) 3.4 - 4.8 g/dL    Globulin 4.3 (H) 2.4 - 3.5 gm/dL    Albumin/Globulin Ratio 0.3 (L) 1.1 - 2.0 ratio    Bilirubin Total 0.8 <=1.5 mg/dL    Alkaline Phosphatase 91 40 - 150 unit/L    Alanine Aminotransferase 13 0 - 55 unit/L    Aspartate Aminotransferase 50 (H) 5 - 34 unit/L    eGFR 27 mls/min/1.73/m2   Magnesium    Collection Time: 05/16/23  6:56 AM   Result Value Ref Range    Magnesium Level 1.90 1.60 - 2.60 mg/dL   Phosphorus    Collection Time: 05/16/23  6:56 AM   Result Value Ref Range    Phosphorus Level 5.9 (H) 2.3 - 4.7 mg/dL   Blood Culture    Collection Time: 05/16/23  6:56 AM    Specimen: Blood   Result Value Ref Range    CULTURE, BLOOD ([facility]) No Growth At 48 Hours    POCT glucose    Collection Time: 05/16/23  7:57 AM   Result Value Ref Range    POCT Glucose 76 70 - 110 mg/dL   Lactic Acid, Plasma    Collection Time: 05/16/23  8:25 AM   Result Value Ref Range    Lactic Acid Level 1.9 0.5 - 2.2 mmol/L   Blood Culture    Collection Time: 05/16/23  8:25 AM    Specimen: Blood   Result Value Ref Range    CULTURE, BLOOD ([facility]) No Growth At 48 Hours    Protime-INR    Collection Time: 05/16/23  8:25 AM   Result Value Ref Range    PT 17.1 (H) 12.5 - 14.5 seconds    INR 1.41 (H) 0.00 - 1.30   Respiratory Culture    Collection Time: 05/16/23 11:58 AM    Specimen: Transtrachael aspirate; Sputum   Result Value Ref Range    Respiratory Culture Few Yeast (A)     GRAM STAIN Quality 2+     GRAM STAIN No bacteria seen    Sodium,  Random Urine    Collection Time: 05/16/23 11:58 AM   Result Value Ref Range    Urine Sodium 55.0 mmol/L   Creatinine, Random Urine    Collection Time: 05/16/23 11:58 AM   Result Value Ref Range    Urine Creatinine 36.3 (L) 63.0 - 166.0 mg/dL   Urinalysis    Collection Time: 05/16/23 11:58 AM   Result Value Ref Range    Color, UA Dark Yellow Yellow, Light-Yellow, Dark Yellow, Lexi, Straw    Appearance, UA Clear Clear    Specific Gravity, UA 1.012 1.005 - 1.030    pH, UA 5.0 5.0 - 8.5    Protein, UA 1+ (A) Negative mg/dL    Glucose, UA Negative Negative, Normal mg/dL    Ketones, UA Negative Negative mg/dL    Blood, UA 1+ (A) Negative unit/L    Bilirubin, UA Negative Negative mg/dL    Urobilinogen, UA 0.2 0.2, 1.0, Normal mg/dL    Nitrites, UA Negative Negative    Leukocyte Esterase, UA Trace (A) Negative unit/L   Urinalysis, Microscopic    Collection Time: 05/16/23 11:58 AM   Result Value Ref Range    RBC, UA 6 (H) <=5 /HPF    WBC, UA 5 <=5 /HPF    Squamous Epithelial Cells, UA <5 <=5 /HPF    Bacteria, UA None Seen None Seen, Rare, Occasional /HPF    Mucous, UA Small (A) None Seen /LPF    Yeast, UA Few (A) None Seen /HPF    Amporphous Urate Crystals, UA Few (A) None Seen /HPF    Uric Acid Crystals, UA Moderate (A) None Seen /HPF   Drug Screen, Urine    Collection Time: 05/16/23 11:58 AM   Result Value Ref Range    Amphetamines, Urine Negative Negative    Barbituates, Urine Negative Negative    Benzodiazepine, Urine Negative Negative    Cannabinoids, Urine Negative Negative    Cocaine, Urine Negative Negative    Fentanyl, Urine Positive (A) Negative    MDMA, Urine Negative Negative    Opiates, Urine Positive (A) Negative    Phencyclidine, Urine Negative Negative    pH, Urine 5.0 3.0 - 11.0   POCT glucose    Collection Time: 05/16/23 12:09 PM   Result Value Ref Range    POCT Glucose 64 (L) 70 - 110 mg/dL   Wound Culture    Collection Time: 05/16/23  1:13 PM    Specimen: Lumbar; Wound   Result Value Ref Range    Wound  Culture No Growth At 24 Hours    POCT glucose    Collection Time: 05/16/23  1:48 PM   Result Value Ref Range    POCT Glucose 96 70 - 110 mg/dL   Cytology, Fluid/Wash/Brush    Collection Time: 05/16/23  1:59 PM   Result Value Ref Range    Pathology Result     AFB Smear    Collection Time: 05/16/23  1:59 PM    Specimen: Lung, Left; Pleural Fluid   Result Value Ref Range    AFB Smear No AFB seen (Direct smear only)    Body Fluid Culture    Collection Time: 05/16/23  1:59 PM    Specimen: Lung, Left; Body Fluid   Result Value Ref Range    Body Fluid Culture No Growth At 48 Hours    Gram Stain    Collection Time: 05/16/23  1:59 PM    Specimen: Pleural Fluid, Left; Body Fluid   Result Value Ref Range    GRAM STAIN No WBCs, No bacteria seen    pH, Body Fluid    Collection Time: 05/16/23  1:59 PM   Result Value Ref Range    pH Body Fluid 7.91    Glucose, Body Fluid    Collection Time: 05/16/23  1:59 PM   Result Value Ref Range    Glucose Body Fluid 92 mg/dL   Protein, Body Fluid    Collection Time: 05/16/23  1:59 PM   Result Value Ref Range    Protein Body Fluid 1.1 gm/dL   Cell Count, Body Fluid    Collection Time: 05/16/23  1:59 PM   Result Value Ref Range    WBC  /uL    Color BF Straw     Clarity BF Cloudy    LD, Body Fluid    Collection Time: 05/16/23  1:59 PM   Result Value Ref Range    Lactate Dehydrogenase Body Fluid 214 U/L   Differential, Body Fluid    Collection Time: 05/16/23  1:59 PM   Result Value Ref Range    Neutrophils % 96 %    Lymphocyte % 4 %   Acid Fast Smear    Collection Time: 05/16/23  1:59 PM   Result Value Ref Range    Acid Fast Smear For Mycobacterium SEE COMMENTS    POCT glucose    Collection Time: 05/16/23  6:25 PM   Result Value Ref Range    POCT Glucose 79 70 - 110 mg/dL   POCT glucose    Collection Time: 05/16/23  8:52 PM   Result Value Ref Range    POCT Glucose 84 70 - 110 mg/dL   Comprehensive Metabolic Panel    Collection Time: 05/17/23  3:02 AM   Result Value Ref Range    Sodium Level  138 136 - 145 mmol/L    Potassium Level 3.4 (L) 3.5 - 5.1 mmol/L    Chloride 101 98 - 107 mmol/L    Carbon Dioxide 23 23 - 31 mmol/L    Glucose Level 89 82 - 115 mg/dL    Blood Urea Nitrogen 28.7 (H) 8.4 - 25.7 mg/dL    Creatinine 2.47 (H) 0.73 - 1.18 mg/dL    Calcium Level Total 7.5 (L) 8.8 - 10.0 mg/dL    Protein Total 4.7 (L) 5.8 - 7.6 gm/dL    Albumin Level 1.8 (L) 3.4 - 4.8 g/dL    Globulin 2.9 2.4 - 3.5 gm/dL    Albumin/Globulin Ratio 0.6 (L) 1.1 - 2.0 ratio    Bilirubin Total 0.9 <=1.5 mg/dL    Alkaline Phosphatase 62 40 - 150 unit/L    Alanine Aminotransferase 10 0 - 55 unit/L    Aspartate Aminotransferase 22 5 - 34 unit/L    eGFR 29 mls/min/1.73/m2   Magnesium    Collection Time: 05/17/23  3:02 AM   Result Value Ref Range    Magnesium Level 1.80 1.60 - 2.60 mg/dL   Phosphorus    Collection Time: 05/17/23  3:02 AM   Result Value Ref Range    Phosphorus Level 5.3 (H) 2.3 - 4.7 mg/dL   CBC with Differential    Collection Time: 05/17/23  3:02 AM   Result Value Ref Range    WBC 15.42 (H) 4.50 - 11.50 x10(3)/mcL    RBC 2.91 (L) 4.70 - 6.10 x10(6)/mcL    Hgb 8.7 (L) 14.0 - 18.0 g/dL    Hct 26.6 (L) 42.0 - 52.0 %    MCV 91.4 80.0 - 94.0 fL    MCH 29.9 27.0 - 31.0 pg    MCHC 32.7 (L) 33.0 - 36.0 g/dL    RDW 16.5 11.5 - 17.0 %    Platelet 176 130 - 400 x10(3)/mcL    MPV 10.4 7.4 - 10.4 fL    Neut % 83.4 %    Lymph % 10.0 %    Mono % 2.6 %    Eos % 3.0 %    Basophil % 0.5 %    Lymph # 1.54 0.6 - 4.6 x10(3)/mcL    Neut # 12.87 (H) 2.1 - 9.2 x10(3)/mcL    Mono # 0.40 0.1 - 1.3 x10(3)/mcL    Eos # 0.46 0 - 0.9 x10(3)/mcL    Baso # 0.07 <=0.2 x10(3)/mcL    IG# 0.08 (H) 0 - 0.04 x10(3)/mcL    IG% 0.5 %    NRBC% 0.0 %   RT Blood Gas    Collection Time: 05/17/23  5:36 AM   Result Value Ref Range    Sample Type Arterial Blood     Sample site Left Radial Artery     Drawn by sd rrt     pH, Blood gas 7.450 7.350 - 7.450    pCO2, Blood gas 39.0 35.0 - 45.0 mmHg    pO2, Blood gas 75.0 (L) 80.0 - 100.0 mmHg    Sodium, Blood Gas 137  137 - 145 mmol/L    Potassium, Blood Gas 3.1 (L) 3.5 - 5.0 mmol/L    Calcium Level Ionized 1.01 (L) 1.12 - 1.23 mmol/L    TOC2, Blood gas 28.3 mmol/L    Base Excess, Blood gas 3.00 mmol/L    sO2, Blood gas 96.0 %    HCO3, Blood gas 27.1 >=15.0 mmol/L    Allens Test Yes     MODE AC     FIO2, Blood gas 30 %    Mech Vt 450 ml    Mech RR 18 b/min    PEEP 8.0 cmH2O   POCT glucose    Collection Time: 05/17/23  8:16 AM   Result Value Ref Range    POCT Glucose 90 70 - 110 mg/dL   POCT glucose    Collection Time: 05/17/23  3:45 PM   Result Value Ref Range    POCT Glucose 94 70 - 110 mg/dL   POCT glucose    Collection Time: 05/17/23  8:28 PM   Result Value Ref Range    POCT Glucose 101 70 - 110 mg/dL   Phosphorus    Collection Time: 05/18/23  1:40 AM   Result Value Ref Range    Phosphorus Level 4.5 2.3 - 4.7 mg/dL   Magnesium    Collection Time: 05/18/23  1:40 AM   Result Value Ref Range    Magnesium Level 1.90 1.60 - 2.60 mg/dL   Comprehensive Metabolic Panel    Collection Time: 05/18/23  1:40 AM   Result Value Ref Range    Sodium Level 138 136 - 145 mmol/L    Potassium Level 3.4 (L) 3.5 - 5.1 mmol/L    Chloride 103 98 - 107 mmol/L    Carbon Dioxide 24 23 - 31 mmol/L    Glucose Level 99 82 - 115 mg/dL    Blood Urea Nitrogen 27.0 (H) 8.4 - 25.7 mg/dL    Creatinine 2.31 (H) 0.73 - 1.18 mg/dL    Calcium Level Total 8.0 (L) 8.8 - 10.0 mg/dL    Protein Total 4.9 (L) 5.8 - 7.6 gm/dL    Albumin Level 2.6 (L) 3.4 - 4.8 g/dL    Globulin 2.3 (L) 2.4 - 3.5 gm/dL    Albumin/Globulin Ratio 1.1 1.1 - 2.0 ratio    Bilirubin Total 1.0 <=1.5 mg/dL    Alkaline Phosphatase 55 40 - 150 unit/L    Alanine Aminotransferase 7 0 - 55 unit/L    Aspartate Aminotransferase 16 5 - 34 unit/L    eGFR 31 mls/min/1.73/m2   CBC with Differential    Collection Time: 05/18/23  1:40 AM   Result Value Ref Range    WBC 11.54 (H) 4.50 - 11.50 x10(3)/mcL    RBC 2.54 (L) 4.70 - 6.10 x10(6)/mcL    Hgb 7.7 (L) 14.0 - 18.0 g/dL    Hct 23.6 (L) 42.0 - 52.0 %    MCV 92.9  80.0 - 94.0 fL    MCH 30.3 27.0 - 31.0 pg    MCHC 32.6 (L) 33.0 - 36.0 g/dL    RDW 16.4 11.5 - 17.0 %    Platelet 163 130 - 400 x10(3)/mcL    MPV 10.5 (H) 7.4 - 10.4 fL    Neut % 84.8 %    Lymph % 9.4 %    Mono % 2.5 %    Eos % 2.5 %    Basophil % 0.3 %    Lymph # 1.08 0.6 - 4.6 x10(3)/mcL    Neut # 9.78 (H) 2.1 - 9.2 x10(3)/mcL    Mono # 0.29 0.1 - 1.3 x10(3)/mcL    Eos # 0.29 0 - 0.9 x10(3)/mcL    Baso # 0.04 <=0.2 x10(3)/mcL    IG# 0.06 (H) 0 - 0.04 x10(3)/mcL    IG% 0.5 %    NRBC% 0.0 %   RT Blood Gas    Collection Time: 05/18/23  5:42 AM   Result Value Ref Range    Sample Type Arterial Blood     Sample site Right Radial Artery     Drawn by TDL RRT     pH, Blood gas 7.450 7.350 - 7.450    pCO2, Blood gas 41.0 35.0 - 45.0 mmHg    pO2, Blood gas 110.0 (H) 80.0 - 100.0 mmHg    Sodium, Blood Gas 132 (L) 137 - 145 mmol/L    Potassium, Blood Gas 3.2 (L) 3.5 - 5.0 mmol/L    Calcium Level Ionized 0.99 (L) 1.12 - 1.23 mmol/L    TOC2, Blood gas 29.8 mmol/L    Base Excess, Blood gas 4.10 (H) -2.00 - 2.00 mmol/L    sO2, Blood gas 98.5 %    HCO3, Blood gas 28.5 (H) 22.0 - 26.0 mmol/L    THb, Blood gas 10.2 (L) 12 - 16 g/dL    O2 Hb, Blood Gas 96.6 94.0 - 97.0 %    CO Hgb 2.1 (H) 0.5 - 1.5 %    Met Hgb 0.8 0.4 - 1.5 %    Allens Test Yes     MODE AC     FIO2, Blood gas 30 %    Mech Vt 450 ml    Mech RR 18 b/min    PEEP 5.0 cmH2O   Transesophageal echo (CRISTY)    Collection Time: 05/18/23  8:55 AM   Result Value Ref Range    BSA 1.85 m2     Telemetry:  SR    Physical Exam  Vitals and nursing note reviewed.   Constitutional:       General: He is not in acute distress.     Appearance: He is ill-appearing.   HENT:      Head: Normocephalic.   Cardiovascular:      Rate and Rhythm: Normal rate and regular rhythm.   Pulmonary:      Effort: Pulmonary effort is normal. No respiratory distress.      Comments: Intubated/Mechanical Ventilation    Current Inpatient Medications:    Current Facility-Administered Medications:     0.9%  NaCl  infusion (for blood administration), , Intravenous, Q24H PRN, Guerrero Alfaro MD    0.9%  NaCl infusion (for blood administration), , Intravenous, Q24H PRN, Fred Dominguez DO, New Bag at 05/12/23 1640    0.9%  NaCl infusion (for blood administration), , Intravenous, Q24H PRN, Ambreen Hatfield, AGACNP-BC    0.9%  NaCl infusion (for blood administration), , Intravenous, Q24H PRN, Guerrero Alfaro MD    0.9%  NaCl infusion (for blood administration), , Intravenous, Q24H PRN, Guerrero Alfaro MD    0.9%  NaCl infusion (for blood administration), , Intravenous, Q24H PRN, Guerrero Alfaro MD    albumin human 25% bottle 12.5 g, 12.5 g, Intravenous, Q6H, Cici Nunez MD, Stopped at 05/18/23 0623    aluminum-magnesium hydroxide-simethicone 200-200-20 mg/5 mL suspension 30 mL, 30 mL, Oral, QID PRN, Nery Sepulveda MD    collagenase ointment, , Topical (Top), BID, Reggie Castañeda MD, Given at 05/18/23 0815    DAPTOmycin (CUBICIN) 435 mg in sodium chloride 0.9% SolP 50 mL IVPB, 6 mg/kg, Intravenous, Q48H, Darnell Franco MD, Stopped at 05/17/23 1839    dexmedetomidine (PRECEDEX) 400mcg/100mL 0.9% NaCL infusion, 0-1.4 mcg/kg/hr, Intravenous, Continuous, Cici Nunez MD, Last Rate: 1.5 mL/hr at 05/18/23 1000, 0.08 mcg/kg/hr at 05/18/23 1000    dextrose 10% bolus 125 mL 125 mL, 12.5 g, Intravenous, PRN, Melissa Mohan, AGACNP-BC, Stopped at 05/16/23 1243    dextrose 10% bolus 250 mL 250 mL, 25 g, Intravenous, PRN, Melissa Mohan, AGACNP-BC    famotidine (PF) injection 20 mg, 20 mg, Intravenous, Daily, Medhat Reece Jr., MD, FCCP, 20 mg at 05/18/23 0811    fentaNYL 2500 mcg in 0.9% sodium chloride 250 mL infusion premix (titrating), 0-200 mcg/hr, Intravenous, Continuous, Cici Nunez MD, Last Rate: 2.5 mL/hr at 05/18/23 0700, 25 mcg/hr at 05/18/23 0700    fentaNYL injection 25 mcg, 25 mcg, Intravenous, Q2H PRN, Steven Cyr,     fentaNYL injection 50 mcg, 50 mcg, Intravenous, Q2H PRN, Mirta Ryan, ANP, 50 mcg at  05/17/23 1300    glucagon (human recombinant) injection 1 mg, 1 mg, Intramuscular, PRN, LAURA Lackey    glucose chewable tablet 16 g, 16 g, Oral, PRN, Mica Naidu MD    glucose chewable tablet 24 g, 24 g, Oral, PRN, Mica Naidu MD, 24 g at 05/08/23 0500    HYDROmorphone (PF) injection 0.5 mg, 0.5 mg, Intravenous, Q4H PRN, Guerrero Alfaro MD, 0.5 mg at 05/15/23 1228    insulin aspart U-100 injection 1-10 Units, 1-10 Units, Subcutaneous, QID (AC + HS) PRN, LAURA Lackey, 1 Units at 05/14/23 2132    insulin detemir U-100 injection 12 Units, 12 Units, Subcutaneous, QHS, Darnell Franco MD, 12 Units at 05/17/23 2035    Lactobacillus acidophilus capsule 1 capsule, 1 capsule, Oral, TID WM, Nery Sepulveda MD, 1 capsule at 05/18/23 0945    loperamide capsule 2 mg, 2 mg, Oral, QID PRN, Guerrero Alfaro MD, 2 mg at 05/15/23 2217    melatonin tablet 6 mg, 6 mg, Oral, Nightly PRN, Nery Sepulveda MD    metoprolol injection 5 mg, 5 mg, Intravenous, Q4H PRN, Cici Nunez MD    micafungin 100 mg in sodium chloride 0.9 % 100 mL IVPB (MB+), 100 mg, Intravenous, Q24H, Cici Nunez MD, Stopped at 05/18/23 0408    multivitamin tablet, 1 tablet, Oral, Daily, Nery Sepulveda MD, 1 tablet at 05/18/23 0945    NORepinephrine 8 mg in dextrose 5% 250 mL infusion, 0-3 mcg/kg/min, Intravenous, Continuous, Medhat Reece Jr., MD, FCCP, Last Rate: 2.7 mL/hr at 05/18/23 1000, 0.02 mcg/kg/min at 05/18/23 1000    ondansetron injection 4 mg, 4 mg, Intravenous, Q4H PRN, Nery Sepulveda MD, 4 mg at 05/06/23 0434    oxyCODONE-acetaminophen 5-325 mg per tablet 1 tablet, 1 tablet, Oral, Q6H PRN, Guerrero Alfaro MD, 1 tablet at 05/15/23 4316    polyethylene glycol packet 17 g, 17 g, Oral, BID PRN, Nery Sepulveda MD    prochlorperazine injection Soln 5 mg, 5 mg, Intravenous, Q6H PRN, Nery Sepulveda MD    propofol (DIPRIVAN) 10 mg/mL infusion, 0-50 mcg/kg/min, Intravenous, Continuous, Cici Nunez MD, Last Rate: 6.5 mL/hr at  05/18/23 1000, 15 mcg/kg/min at 05/18/23 1000    rifAMpin (RIFADIN) 300 mg in dextrose 5 % (D5W) 100 mL IVPB, 300 mg, Intravenous, BID, Pedro Luis Vigil, DO, Last Rate: 100 mL/hr at 05/18/23 0936, 300 mg at 05/18/23 0936    senna-docusate 8.6-50 mg per tablet 2 tablet, 2 tablet, Oral, BID PRN, Nery Sepulveda MD    sodium chloride 0.9% flush 10 mL, 10 mL, Intravenous, PRN, Nery Sepulveda MD    Flushing PICC Protocol, , , Until Discontinued **AND** sodium chloride 0.9% flush 10 mL, 10 mL, Intravenous, Q6H, 10 mL at 05/18/23 0523 **AND** sodium chloride 0.9% flush 10 mL, 10 mL, Intravenous, PRN, Darnell Franco MD    sodium chloride 0.9% flush 10 mL, 10 mL, Intravenous, PRN, Cici Nunez MD    thiamine tablet 100 mg, 100 mg, Oral, Daily, Darnell Franco MD, 100 mg at 05/18/23 0945    tiZANidine tablet 4 mg, 4 mg, Oral, Q8H PRN, Nery Sepulveda MD    zinc oxide-cod liver oil 40 % paste, , Topical (Top), BID, Reggie Castañeda MD, Given at 05/18/23 0815    VTE Risk Mitigation (From admission, onward)           Ordered     IP VTE LOW RISK PATIENT  Once         05/05/23 2318     Place sequential compression device  Until discontinued         05/05/23 2318                  Assessment:   See below MDM formulated by me (Garrick Rodriguez MD):   MRSA Bacteremia    - No CRISTY Evidence of Infective Endocarditis (5.18.23)  Acute Hypoxemic/Hypercapnic Respiratory Failure Requiring Intubation/Mechanical Ventilation    - Bilateral Pleural Effusions/Pulmonary Edema (EF 55%) Status Post Left Thoracentesis (5.16.23)  LVSD/Cardiomyopathy- EF 10-15% (CRISTY 5.18.23)  Hypotension Requiring Vasopressor Support- Likely Septic Shock  Osteomyelitis of Cervial/Lumbar Spine with Bilateral Psoas/Paraspinal/Epidural Abscesses Status Post Laminectomy/Foreminotomy/Abscess Drainage on 4.21.23  UTI (Fungal)  Cholecystitis Status Post Meenu Tube Placement  Sacral Ulcer Status Post Debridement (5.10.23)  Heatochezia Status Post Transfusions/Colonoscopy Revealing  Diverticula (5.14.23)  Acute Kidney Injury  Protein Calorie Malnutrition  DM II  Hepatitis C (Untreated)/Hepatic Cirrhosis  Nicotine Dependence/Tobacco Use/IV Drug Use    Plan:   PAXTON Performed, Findings Discussed with Intensivist.  Family Updated on PAXTON Findings.  Will be available.     DIANE Gil and Garrick Rodriguez MD  Cardiology  [facility] - [facility] ICU    Physician addendum:  Patient personally seen and physical examination was performed by me.  Above medical decision-making is also formulated by me.  In summary patient is primarily seen for ( including all above in assessment) for bacteremia.  Paxton performed.  Please see PAXTON report.  No vegetation noted.  Cardiovascular exam:  S1, S2, 1+ systolic murmur  Lungs:  Coarse crackles at bases.  Extremities:  + Edema bilaterally  We will sign off.  Garrick Rodriguez MD  Cardiologist    05/18/2023

## 2023-05-19 LAB
ALBUMIN SERPL-MCNC: 2.8 G/DL (ref 3.4–4.8)
ALBUMIN/GLOB SERPL: 1.3 RATIO (ref 1.1–2)
ALLENS TEST BLOOD GAS (OHS): YES
ALP SERPL-CCNC: 51 UNIT/L (ref 40–150)
ALT SERPL-CCNC: 6 UNIT/L (ref 0–55)
AST SERPL-CCNC: 15 UNIT/L (ref 5–34)
BASE EXCESS BLD CALC-SCNC: 4.6 MMOL/L
BASOPHILS # BLD AUTO: 0.05 X10(3)/MCL
BASOPHILS NFR BLD AUTO: 0.4 %
BILIRUBIN DIRECT+TOT PNL SERPL-MCNC: 0.9 MG/DL
BLOOD GAS SAMPLE TYPE (OHS): ABNORMAL
BUN SERPL-MCNC: 31.5 MG/DL (ref 8.4–25.7)
CA-I BLD-SCNC: 1.06 MMOL/L (ref 1.12–1.23)
CALCIUM SERPL-MCNC: 8.1 MG/DL (ref 8.8–10)
CHLORIDE SERPL-SCNC: 104 MMOL/L (ref 98–107)
CO2 BLDA-SCNC: 29.5 MMOL/L
CO2 SERPL-SCNC: 21 MMOL/L (ref 23–31)
CREAT SERPL-MCNC: 2.24 MG/DL (ref 0.73–1.18)
DRAWN BY BLOOD GAS (OHS): ABNORMAL
EOSINOPHIL # BLD AUTO: 0.23 X10(3)/MCL (ref 0–0.9)
EOSINOPHIL NFR BLD AUTO: 1.9 %
ERYTHROCYTE [DISTWIDTH] IN BLOOD BY AUTOMATED COUNT: 16.5 % (ref 11.5–17)
FIO2 BLOOD GAS (OHS): 30 %
GFR SERPLBLD CREATININE-BSD FMLA CKD-EPI: 33 MLS/MIN/1.73/M2
GLOBULIN SER-MCNC: 2.1 GM/DL (ref 2.4–3.5)
GLUCOSE SERPL-MCNC: 144 MG/DL (ref 82–115)
HCO3 BLDA-SCNC: 28.3 MMOL/L
HCT VFR BLD AUTO: 24.5 % (ref 42–52)
HGB BLD-MCNC: 7.9 G/DL (ref 14–18)
IMM GRANULOCYTES # BLD AUTO: 0.08 X10(3)/MCL (ref 0–0.04)
IMM GRANULOCYTES NFR BLD AUTO: 0.7 %
LYMPHOCYTES # BLD AUTO: 1.22 X10(3)/MCL (ref 0.6–4.6)
LYMPHOCYTES NFR BLD AUTO: 10 %
MAGNESIUM SERPL-MCNC: 1.9 MG/DL (ref 1.6–2.6)
MCH RBC QN AUTO: 29.9 PG (ref 27–31)
MCHC RBC AUTO-ENTMCNC: 32.2 G/DL (ref 33–36)
MCV RBC AUTO: 92.8 FL (ref 80–94)
MECH RR (OHS): 18 B/MIN
MECH VT (OHS): 480 ML
MODE (OHS): AC
MONOCYTES # BLD AUTO: 0.28 X10(3)/MCL (ref 0.1–1.3)
MONOCYTES NFR BLD AUTO: 2.3 %
NEUTROPHILS # BLD AUTO: 10.29 X10(3)/MCL (ref 2.1–9.2)
NEUTROPHILS NFR BLD AUTO: 84.7 %
NRBC BLD AUTO-RTO: 0 %
PCO2 BLDA: 38 MMHG (ref 35–45)
PEEP (OHS): 5 CMH2O
PH BLDA: 7.48 [PH] (ref 7.35–7.45)
PHOSPHATE SERPL-MCNC: 4.4 MG/DL (ref 2.3–4.7)
PLATELET # BLD AUTO: 167 X10(3)/MCL (ref 130–400)
PMV BLD AUTO: 10.6 FL (ref 7.4–10.4)
PO2 BLDA: 79 MMHG (ref 80–100)
POCT GLUCOSE: 237 MG/DL (ref 70–110)
POCT GLUCOSE: 246 MG/DL (ref 70–110)
POTASSIUM BLOOD GAS (OHS): 3.5 MMOL/L (ref 3.5–5)
POTASSIUM SERPL-SCNC: 3.8 MMOL/L (ref 3.5–5.1)
PROT SERPL-MCNC: 4.9 GM/DL (ref 5.8–7.6)
RBC # BLD AUTO: 2.64 X10(6)/MCL (ref 4.7–6.1)
SAMPLE SITE BLOOD GAS (OHS): ABNORMAL
SAO2 % BLDA: 96 %
SODIUM BLOOD GAS (OHS): 138 MMOL/L (ref 137–145)
SODIUM SERPL-SCNC: 138 MMOL/L (ref 136–145)
WBC # SPEC AUTO: 12.15 X10(3)/MCL (ref 4.5–11.5)

## 2023-05-19 PROCEDURE — 84100 ASSAY OF PHOSPHORUS: CPT | Performed by: INTERNAL MEDICINE

## 2023-05-19 PROCEDURE — 36600 WITHDRAWAL OF ARTERIAL BLOOD: CPT

## 2023-05-19 PROCEDURE — 63600175 PHARM REV CODE 636 W HCPCS: Performed by: NURSE PRACTITIONER

## 2023-05-19 PROCEDURE — 63600175 PHARM REV CODE 636 W HCPCS: Performed by: STUDENT IN AN ORGANIZED HEALTH CARE EDUCATION/TRAINING PROGRAM

## 2023-05-19 PROCEDURE — 27000207 HC ISOLATION

## 2023-05-19 PROCEDURE — 99232 SBSQ HOSP IP/OBS MODERATE 35: CPT | Mod: ,,, | Performed by: NURSE PRACTITIONER

## 2023-05-19 PROCEDURE — 25000003 PHARM REV CODE 250: Performed by: STUDENT IN AN ORGANIZED HEALTH CARE EDUCATION/TRAINING PROGRAM

## 2023-05-19 PROCEDURE — 99900026 HC AIRWAY MAINTENANCE (STAT)

## 2023-05-19 PROCEDURE — 63600175 PHARM REV CODE 636 W HCPCS: Performed by: INTERNAL MEDICINE

## 2023-05-19 PROCEDURE — 80053 COMPREHEN METABOLIC PANEL: CPT | Performed by: STUDENT IN AN ORGANIZED HEALTH CARE EDUCATION/TRAINING PROGRAM

## 2023-05-19 PROCEDURE — 83735 ASSAY OF MAGNESIUM: CPT | Performed by: INTERNAL MEDICINE

## 2023-05-19 PROCEDURE — 27000221 HC OXYGEN, UP TO 24 HOURS

## 2023-05-19 PROCEDURE — 63600175 PHARM REV CODE 636 W HCPCS: Mod: JG | Performed by: STUDENT IN AN ORGANIZED HEALTH CARE EDUCATION/TRAINING PROGRAM

## 2023-05-19 PROCEDURE — 99900035 HC TECH TIME PER 15 MIN (STAT)

## 2023-05-19 PROCEDURE — 94761 N-INVAS EAR/PLS OXIMETRY MLT: CPT

## 2023-05-19 PROCEDURE — 97605 NEG PRS WND THER DME<=50SQCM: CPT

## 2023-05-19 PROCEDURE — 20000000 HC ICU ROOM

## 2023-05-19 PROCEDURE — 25000003 PHARM REV CODE 250: Performed by: SURGERY

## 2023-05-19 PROCEDURE — 94003 VENT MGMT INPAT SUBQ DAY: CPT

## 2023-05-19 PROCEDURE — 99232 PR SUBSEQUENT HOSPITAL CARE,LEVL II: ICD-10-PCS | Mod: ,,, | Performed by: NURSE PRACTITIONER

## 2023-05-19 PROCEDURE — 25000003 PHARM REV CODE 250: Performed by: INTERNAL MEDICINE

## 2023-05-19 PROCEDURE — 25500020 PHARM REV CODE 255: Performed by: INTERNAL MEDICINE

## 2023-05-19 PROCEDURE — 85025 COMPLETE CBC W/AUTO DIFF WBC: CPT | Performed by: INTERNAL MEDICINE

## 2023-05-19 PROCEDURE — P9047 ALBUMIN (HUMAN), 25%, 50ML: HCPCS | Mod: JG | Performed by: STUDENT IN AN ORGANIZED HEALTH CARE EDUCATION/TRAINING PROGRAM

## 2023-05-19 PROCEDURE — A9577 INJ MULTIHANCE: HCPCS | Performed by: INTERNAL MEDICINE

## 2023-05-19 PROCEDURE — A4216 STERILE WATER/SALINE, 10 ML: HCPCS | Performed by: STUDENT IN AN ORGANIZED HEALTH CARE EDUCATION/TRAINING PROGRAM

## 2023-05-19 PROCEDURE — 82803 BLOOD GASES ANY COMBINATION: CPT

## 2023-05-19 RX ORDER — FUROSEMIDE 10 MG/ML
40 INJECTION INTRAMUSCULAR; INTRAVENOUS EVERY 8 HOURS
Status: COMPLETED | OUTPATIENT
Start: 2023-05-19 | End: 2023-05-19

## 2023-05-19 RX ORDER — MICONAZOLE NITRATE 2 %
POWDER (GRAM) TOPICAL 2 TIMES DAILY
Status: DISCONTINUED | OUTPATIENT
Start: 2023-05-19 | End: 2023-05-23 | Stop reason: HOSPADM

## 2023-05-19 RX ADMIN — PROPOFOL 20 MCG/KG/MIN: 10 INJECTION, EMULSION INTRAVENOUS at 07:05

## 2023-05-19 RX ADMIN — MICONAZOLE NITRATE 2 % TOPICAL POWDER: at 10:05

## 2023-05-19 RX ADMIN — SODIUM CHLORIDE, PRESERVATIVE FREE 10 ML: 5 INJECTION INTRAVENOUS at 12:05

## 2023-05-19 RX ADMIN — DAPTOMYCIN 435 MG: 500 INJECTION, POWDER, LYOPHILIZED, FOR SOLUTION INTRAVENOUS at 05:05

## 2023-05-19 RX ADMIN — DEXTROSE MONOHYDRATE 300 MG: 50 INJECTION, SOLUTION INTRAVENOUS at 07:05

## 2023-05-19 RX ADMIN — SODIUM CHLORIDE, PRESERVATIVE FREE 10 ML: 5 INJECTION INTRAVENOUS at 05:05

## 2023-05-19 RX ADMIN — INSULIN ASPART 4 UNITS: 100 INJECTION, SOLUTION INTRAVENOUS; SUBCUTANEOUS at 12:05

## 2023-05-19 RX ADMIN — FUROSEMIDE 40 MG: 10 INJECTION, SOLUTION INTRAMUSCULAR; INTRAVENOUS at 09:05

## 2023-05-19 RX ADMIN — ALBUMIN (HUMAN) 12.5 G: 12.5 SOLUTION INTRAVENOUS at 12:05

## 2023-05-19 RX ADMIN — Medication: at 07:05

## 2023-05-19 RX ADMIN — PROPOFOL 50 MCG/KG/MIN: 10 INJECTION, EMULSION INTRAVENOUS at 02:05

## 2023-05-19 RX ADMIN — DEXTROSE MONOHYDRATE 300 MG: 50 INJECTION, SOLUTION INTRAVENOUS at 10:05

## 2023-05-19 RX ADMIN — DEXMEDETOMIDINE HYDROCHLORIDE 1.4 MCG/KG/HR: 400 INJECTION INTRAVENOUS at 12:05

## 2023-05-19 RX ADMIN — Medication 1 CAPSULE: at 12:05

## 2023-05-19 RX ADMIN — COLLAGENASE SANTYL: 250 OINTMENT TOPICAL at 07:05

## 2023-05-19 RX ADMIN — FAMOTIDINE 20 MG: 10 INJECTION, SOLUTION INTRAVENOUS at 07:05

## 2023-05-19 RX ADMIN — ALBUMIN (HUMAN) 12.5 G: 12.5 SOLUTION INTRAVENOUS at 05:05

## 2023-05-19 RX ADMIN — MICAFUNGIN SODIUM 100 MG: 100 INJECTION, POWDER, LYOPHILIZED, FOR SOLUTION INTRAVENOUS at 03:05

## 2023-05-19 RX ADMIN — Medication 1 CAPSULE: at 05:05

## 2023-05-19 RX ADMIN — INSULIN ASPART 4 UNITS: 100 INJECTION, SOLUTION INTRAVENOUS; SUBCUTANEOUS at 05:05

## 2023-05-19 RX ADMIN — Medication: at 08:05

## 2023-05-19 RX ADMIN — COLLAGENASE SANTYL: 250 OINTMENT TOPICAL at 08:05

## 2023-05-19 RX ADMIN — THIAMINE HCL TAB 100 MG 100 MG: 100 TAB at 08:05

## 2023-05-19 RX ADMIN — DEXMEDETOMIDINE HYDROCHLORIDE 0.6 MCG/KG/HR: 400 INJECTION INTRAVENOUS at 10:05

## 2023-05-19 RX ADMIN — FUROSEMIDE 40 MG: 10 INJECTION, SOLUTION INTRAMUSCULAR; INTRAVENOUS at 02:05

## 2023-05-19 RX ADMIN — DEXMEDETOMIDINE HYDROCHLORIDE 1 MCG/KG/HR: 400 INJECTION INTRAVENOUS at 01:05

## 2023-05-19 RX ADMIN — Medication 1 CAPSULE: at 07:05

## 2023-05-19 RX ADMIN — FUROSEMIDE 40 MG: 10 INJECTION, SOLUTION INTRAMUSCULAR; INTRAVENOUS at 10:05

## 2023-05-19 RX ADMIN — MICONAZOLE NITRATE 2 % TOPICAL POWDER: at 08:05

## 2023-05-19 RX ADMIN — FENTANYL CITRATE 50 MCG: 50 INJECTION, SOLUTION INTRAMUSCULAR; INTRAVENOUS at 05:05

## 2023-05-19 RX ADMIN — GADOBENATE DIMEGLUMINE 15 ML: 529 INJECTION, SOLUTION INTRAVENOUS at 04:05

## 2023-05-19 RX ADMIN — THERA TABS 1 TABLET: TAB at 08:05

## 2023-05-19 RX ADMIN — INSULIN DETEMIR 12 UNITS: 100 INJECTION, SOLUTION SUBCUTANEOUS at 08:05

## 2023-05-19 RX ADMIN — FENTANYL CITRATE 50 MCG: 50 INJECTION, SOLUTION INTRAMUSCULAR; INTRAVENOUS at 08:05

## 2023-05-19 NOTE — NURSING
Nurses Note -- 4 Eyes      5/19/2023   4:26 PM      Skin assessed during: Daily Assessment      [] No Altered Skin Integrity Present    [x]Prevention Measures Documented      [x] Yes- Altered Skin Integrity Present or Discovered   [] LDA Added if Not in Epic (Describe Wound)   [] New Altered Skin Integrity was Present on Admit and Documented in LDA   [] Wound Image Taken    Wound Care Consulted? Yes    Attending Nurse:  Ulises Simon IV, RN     Second RN/Staff Member:  Olga MARTELL RN

## 2023-05-19 NOTE — CONSULTS
Consults    Patient Name: Reji Plasencia   MRN: [id number]   Admission Date: 5/5/2023   Hospital Length of Stay: 14   Attending Provider: Medhat Reece Jr., MD, *   Consulting Provider: Patricia CONTRERAS  Reason for Consult: Goals of Care  Primary Care Physician:  Primary Doctor No     Principal Problem: Sepsis       Final diagnoses:  [R00.0] Tachycardia  [M46.20] Spinal abscess (Primary)  [R78.81] Bacteremia      Assessment/Plan:     I reviewed the patient and family's understanding of the seriousness of the illness and its expected prognosis. We discussed the patient's goals of care and treatment preferences.        Advance Care Planning     Date: 05/19/2023    [facility]  I engaged the family in a conversation about advance care planning and we specifically addressed what the goals of care would be moving forward, in light of the patient's change in clinical status, specifically current condition.  We did specifically address the patient's likely prognosis, which is poor.  We explored the patient's values and preferences for future care.  The family endorses that what is most important right now is to focus on improvement in condition but with limits to invasive therapies    Accordingly, we have decided that the best plan to meet the patient's goals includes continuing with treatment          Spoke to son Clyde who asked about MRI--discussed monitoring patient's infections. Discussed severity of patient's condition--son verbalized understanding.  Informed that patient had never gotten treatment for Hep C and did not take insulin for 3 years.  Son asking for patient's younger children ages 15 and 12 to visit.  Discussed that I would speak with ICU staff concerning this.  Offered support and informed that I would continue to follow.     Interval History:     Patient remains ventilated with plans for MRI today. Has experienced significant agitation with weaning of sedation. I am continuing to follow for assistance  with plan of care.       Active Ambulatory Problems     Diagnosis Date Noted    Type 2 diabetes mellitus without complication, without long-term current use of insulin 04/13/2023    Tobacco user 05/16/2023    Osteomyelitis of lumbar spine 04/13/2023    Hepatitis C virus infection 05/16/2023     Resolved Ambulatory Problems     Diagnosis Date Noted    No Resolved Ambulatory Problems     No Additional Past Medical History        Past Surgical History:   Procedure Laterality Date    COLONOSCOPY N/A 5/14/2023    Procedure: COLONOSCOPY;  Surgeon: Shaneka Ayers MD;  Location: [facility] OR;  Service: Gastroenterology;  Laterality: N/A;    DEBRIDEMENT OF SACRAL WOUND N/A 5/10/2023    Procedure: DEBRIDEMENT, WOUND, SACRUM;  Surgeon: Reggie Castañeda MD;  Location: [facility] OR;  Service: General;  Laterality: N/A;        Review of patient's allergies indicates:  No Known Allergies       Current Facility-Administered Medications:     0.9%  NaCl infusion (for blood administration), , Intravenous, Q24H PRN, Guerrero Alfaro MD    0.9%  NaCl infusion (for blood administration), , Intravenous, Q24H PRN, Fred Dominguez DO, New Bag at 05/12/23 1640    0.9%  NaCl infusion (for blood administration), , Intravenous, Q24H PRN, Ambreen Hatfield AGACNP-BC    0.9%  NaCl infusion (for blood administration), , Intravenous, Q24H PRN, Guerrero Alfaro MD    0.9%  NaCl infusion (for blood administration), , Intravenous, Q24H PRN, Guerrero Alfaro MD    0.9%  NaCl infusion (for blood administration), , Intravenous, Q24H PRN, Guerrero Alfaro MD    albumin human 25% bottle 12.5 g, 12.5 g, Intravenous, Q6H, Cici Nunez MD, Stopped at 05/19/23 1306    aluminum-magnesium hydroxide-simethicone 200-200-20 mg/5 mL suspension 30 mL, 30 mL, Per OG tube, QID PRN, Medhat Reece Jr., MD, FCCP    collagenase ointment, , Topical (Top), BID, Reggie Castañeda MD, Given at 05/19/23 0743    DAPTOmycin (CUBICIN) 435 mg in sodium chloride 0.9% SolP 50 mL IVPB, 6 mg/kg,  Intravenous, Q48H, Darnell Franco MD, Stopped at 05/17/23 1839    dexmedetomidine (PRECEDEX) 400mcg/100mL 0.9% NaCL infusion, 0-1.4 mcg/kg/hr, Intravenous, Continuous, Cici Nunez MD, Stopped at 05/19/23 1300    dextrose 10% bolus 125 mL 125 mL, 12.5 g, Intravenous, PRN, Melissa Mohan, AGACNP-BC, Stopped at 05/16/23 1243    dextrose 10% bolus 250 mL 250 mL, 25 g, Intravenous, PRN, Melissa Mohan, AGACNP-BC    famotidine (PF) injection 20 mg, 20 mg, Intravenous, Daily, Medhat Reece Jr., MD, FCCP, 20 mg at 05/19/23 0743    fentaNYL 2500 mcg in 0.9% sodium chloride 250 mL infusion premix (titrating), 0-200 mcg/hr, Intravenous, Continuous, Cici Nunez MD, Stopped at 05/19/23 1000    fentaNYL injection 25 mcg, 25 mcg, Intravenous, Q2H PRN, Steven Cyr, DO    fentaNYL injection 50 mcg, 50 mcg, Intravenous, Q2H PRN, Mirta Ryan, ANP, 50 mcg at 05/17/23 1300    furosemide injection 40 mg, 40 mg, Intravenous, Q8H, Adi Campos MD, 40 mg at 05/19/23 1428    glucagon (human recombinant) injection 1 mg, 1 mg, Intramuscular, PRN, Melissa Mohan, AGACNP-BC    glucose chewable tablet 16 g, 16 g, Per OG tube, PRN, Medhat Reece Jr., MD, FCCP    glucose chewable tablet 24 g, 24 g, Per OG tube, PRN, Medhat Reece Jr., MD, FCCP    HYDROmorphone (PF) injection 0.5 mg, 0.5 mg, Intravenous, Q4H PRN, Guerrero Alfaro MD, 0.5 mg at 05/15/23 1228    insulin aspart U-100 injection 1-10 Units, 1-10 Units, Subcutaneous, QID (AC + HS) PRN, Melissa Mohan AGACNP-BC, 4 Units at 05/19/23 1221    insulin detemir U-100 injection 12 Units, 12 Units, Subcutaneous, QHS, Darnell Franco MD, 12 Units at 05/18/23 2129    Lactobacillus acidophilus capsule 1 capsule, 1 capsule, Per NG/OG Tube, TID WM, Medhat Reece Jr., MD, FCCP, 1 capsule at 05/19/23 1212    loperamide capsule 2 mg, 2 mg, Per OG tube, QID PRN, Medhat Reece Jr., MD, FCCP    melatonin tablet 6 mg, 6 mg, Per OG tube, Nightly PRN, Medhat Reece Jr., MD, FCCP     metoprolol injection 5 mg, 5 mg, Intravenous, Q4H PRN, Cici Nunez MD    micafungin 100 mg in sodium chloride 0.9 % 100 mL IVPB (MB+), 100 mg, Intravenous, Q24H, Cici Nunez MD, Stopped at 05/19/23 0400    miconazole NITRATE 2 % top powder, , Topical (Top), BID, Reggie Castañeda MD, Given at 05/19/23 1052    multivitamin tablet, 1 tablet, Per OG tube, Daily, Medhat Reece Jr., MD, FCCP, 1 tablet at 05/19/23 0822    NORepinephrine 8 mg in dextrose 5% 250 mL infusion, 0-3 mcg/kg/min, Intravenous, Continuous, Medhat Reece Jr., MD, FCCP, Last Rate: 2.7 mL/hr at 05/19/23 0100, 0.02 mcg/kg/min at 05/19/23 0100    ondansetron injection 4 mg, 4 mg, Intravenous, Q4H PRN, Nery Sepulveda MD, 4 mg at 05/06/23 0434    oxyCODONE-acetaminophen 5-325 mg per tablet 1 tablet, 1 tablet, Oral, Q6H PRN, Guerrero Alfaro MD, 1 tablet at 05/15/23 2218    polyethylene glycol packet 17 g, 17 g, Oral, BID PRN, Nery Sepulveda MD    prochlorperazine injection Soln 5 mg, 5 mg, Intravenous, Q6H PRN, Nery Sepulveda MD    propofol (DIPRIVAN) 10 mg/mL infusion, 0-50 mcg/kg/min, Intravenous, Continuous, Cici Nunez MD, Last Rate: 21.8 mL/hr at 05/19/23 1431, 50 mcg/kg/min at 05/19/23 1431    rifAMpin (RIFADIN) 300 mg in dextrose 5 % (D5W) 100 mL IVPB, 300 mg, Intravenous, BID, Pedro Luis Vigil DO, Stopped at 05/19/23 0843    senna-docusate 8.6-50 mg per tablet 2 tablet, 2 tablet, Per OG tube, BID PRN, Medhat Reece Jr., MD, FCCP    sodium chloride 0.9% flush 10 mL, 10 mL, Intravenous, PRN, Nery Sepulveda MD    Flushing PICC Protocol, , , Until Discontinued **AND** sodium chloride 0.9% flush 10 mL, 10 mL, Intravenous, Q6H, 10 mL at 05/19/23 1206 **AND** sodium chloride 0.9% flush 10 mL, 10 mL, Intravenous, PRN, Darnell Franco MD    sodium chloride 0.9% flush 10 mL, 10 mL, Intravenous, PRN, Cici Nunez MD    thiamine tablet 100 mg, 100 mg, Per OG tube, Daily, Medhat Reece Jr., MD, FCCP, 100 mg at 05/19/23 0822    tiZANidine  "tablet 4 mg, 4 mg, Per OG tube, Q8H PRN, Medhat Reece Jr., MD, FCCP    zinc oxide-cod liver oil 40 % paste, , Topical (Top), BID, Reggie Castañeda MD, Given at 05/19/23 0743     sodium chloride, sodium chloride, sodium chloride, sodium chloride, sodium chloride, sodium chloride, aluminum-magnesium hydroxide-simethicone, dextrose 10%, dextrose 10%, fentaNYL, fentaNYL, glucagon (human recombinant), glucose, glucose, HYDROmorphone, insulin aspart U-100, loperamide, melatonin, metoprolol, ondansetron, oxyCODONE-acetaminophen, polyethylene glycol, prochlorperazine, senna-docusate 8.6-50 mg, sodium chloride 0.9%, Flushing PICC Protocol **AND** sodium chloride 0.9% **AND** sodium chloride 0.9%, sodium chloride 0.9%, tiZANidine     History reviewed. No pertinent family history.       Review of Systems   Unable to perform ROS: Acuity of condition          Objective:   BP 92/64   Pulse 87   Temp 99 °F (37.2 °C) (Oral)   Resp (!) 22   Ht 5' 7.01" (1.702 m)   Wt 72.6 kg (160 lb)   SpO2 100%   BMI 25.05 kg/m²      Physical Exam   Constitutional: He appears ill.   HENT:   Head: Normocephalic.   Eyes: Pupils are equal, round, and reactive to light.   Cardiovascular: Normal rate. Pulmonary:      Breath sounds: Rhonchi present.     Abdominal: Soft.   Musculoskeletal:      Comments: sarcopenia   Skin: Skin is warm.        Review of Symptoms  Review of Symptoms      Symptom Assessment (ESAS 0-10 Scale)  Pain:  0  Dyspnea:  0  Anxiety:  0  Nausea:  0  Depression:  0  Anorexia:  0  Fatigue:  0  Insomnia:  0  Restlessness:  0  Agitation:  0         Psychosocial/Cultural:   See Palliative Psychosocial Note: Yes  **Primary  to Follow**  Palliative Care  Consult: No    Advance Care Planning   Advance Directives:   Goals of Care: What is most important right now is to focus on improvement in condition but with limits to invasive therapies. Accordingly, we have decided that the best plan to meet the " patient's goals includes continuing with treatment.        PAINAD: NA    Caregiver burden formerly assessed: Yes      No results displayed because visit has over 200 results.               > 50% of 30 min of encounter was spent in chart review, face to face discussion of goals of care, symptom assessment, coordination of care and emotional support.    Patricia CONTRERAS, [facility]  Palliative Medicine  [facility] - Observation Unit

## 2023-05-19 NOTE — NURSING
Nurses Note -- 4 Eyes      5/19/2023   4:56 AM      Skin assessed during: Q Shift Change      [] No Altered Skin Integrity Present    [x]Prevention Measures Documented      [x] Yes- Altered Skin Integrity Present or Discovered   [] LDA Added if Not in Epic (Describe Wound)   [] New Altered Skin Integrity was Present on Admit and Documented in LDA   [] Wound Image Taken    Wound Care Consulted? Yes    Attending Nurse:  Krystina Washington RN     Second RN/Staff Member:  Malia Champagne RN

## 2023-05-19 NOTE — PROGRESS NOTES
[facility] [address] ICU  Pulmonary/Critical Care  Progress Note  5/19/2023    Patient Name: Reji Plasencia  MRN: [id number]  Admission Date: 5/5/2023  Code Status: Partial Code      Subjective:     HPI:  63-year-old male with extensive medical comorbidities including diabetes, untreated hepatitis-C, cirrhosis, IV drug use, tobacco use, current MRSA bacteremia and C-spine/L-spine osteomyelitis/diskitis with bilateral psoas and paraspinous muscle abscesses in addition to a lumbar epidural abscess who was originally admitted to [facility] on 05/05 after leaving [place] from [facility] after 3 weeks of hospitalization during which he received IV antibiotics (daptomycin/rifampin) and underwent bilateral lumbar laminectomy and foraminotomy with drainage of epidural abscess on 04/21 by Dr. Chang.  Previous hospital stay was also complicated by cholecystitis for which he underwent MRCP and cholecystostomy tube placement (4/19) in addition to paracentesis.  Also during previous hospitalization he had a positive urine drug screen on 04/27 for amphetamines suggesting drug use while hospitalized.       During his stay at [facility] he has been followed by Neurosurgery, Infectious Disease, Gastroenterology, and Nephrology.  Echocardiogram on 05/06 with EF 50-55%, no mention of vegetations.  Has been awaiting MRI lumbar spine and CT of the left lower extremity secondary to fluid collection on ultrasound which was 9 x 4 x 2 cm.  However these imaging modalities have not been able to be performed yet.  He underwent sacral ulcer debridement on 05/10.  Hematochezia on 05/12 resulted in transfusion of 4 units of blood and 1 unit FFP and nuclear medicine bleeding scan revealed radiotracer accumulation with most intense activity in ascending colon prompting colonoscopy which showed pancolonic diverticula without active bleed and anorectal hyperemic mucosa.     He was upgraded to ICU for tenuous respiratory status,  on BiPAP 14/6 with 100% FiO2 maintaining saturations in the low/mid 80s and near hypotension with tachycardia.  His respiratory status further declined requiring intubation/mechanical ventilatory support.    24hr Interval History:  He is afebrile.  Intake 2179 cc, output 990 cc over the previous 24 hours.   He continues sinus rhythm, on Levophed 0.02 microgram/kilogram per minute infusion.  He requires sedation with propofol at 10 microgram/kilogram per minute, Precedex 1.4 microgram/kilogram per hour, and fentanyl 25 microgram/hour infusions.    Vent settings remain AC 18/450 cc/peep +5/30%    Scheduled Medications:   albumin human 25%  12.5 g Intravenous Q6H    collagenase   Topical (Top) BID    DAPTOmycin (CUBICIN) IV (PEDS and ADULTS)  6 mg/kg Intravenous Q48H    famotidine (PF)  20 mg Intravenous Daily    insulin detemir U-100  12 Units Subcutaneous QHS    Lactobacillus acidophilus  1 capsule Per NG/OG Tube TID WM    micafungin (MYCAMINE) IVPB  100 mg Intravenous Q24H    multivitamin  1 tablet Per OG tube Daily    rifAMpin (RIFADIN) IVPB  300 mg Intravenous BID    sodium chloride 0.9%  10 mL Intravenous Q6H    thiamine  100 mg Per OG tube Daily    zinc oxide-cod liver oil   Topical (Top) BID     PRN Medications:  sodium chloride, sodium chloride, sodium chloride, sodium chloride, sodium chloride, sodium chloride, aluminum-magnesium hydroxide-simethicone, dextrose 10%, dextrose 10%, fentaNYL, fentaNYL, glucagon (human recombinant), glucose, glucose, HYDROmorphone, insulin aspart U-100, loperamide, melatonin, metoprolol, ondansetron, oxyCODONE-acetaminophen, polyethylene glycol, prochlorperazine, senna-docusate 8.6-50 mg, sodium chloride 0.9%, Flushing PICC Protocol **AND** sodium chloride 0.9% **AND** sodium chloride 0.9%, sodium chloride 0.9%, tiZANidine  Continuous Infusions:   dexmedeTOMIDine (Precedex) infusion (titrating) 1.4 mcg/kg/hr (05/19/23 0636)    fentanyl 25 mcg/hr (05/18/23 1351)    NORepinephrine  bitartrate-D5W 0.02 mcg/kg/min (05/19/23 0100)    propofoL 10 mcg/kg/min (05/19/23 0620)       History reviewed. No pertinent past medical history.    Past Surgical History:   Procedure Laterality Date    COLONOSCOPY N/A 5/14/2023    Procedure: COLONOSCOPY;  Surgeon: Shaneka Ayers MD;  Location: [facility] OR;  Service: Gastroenterology;  Laterality: N/A;    DEBRIDEMENT OF SACRAL WOUND N/A 5/10/2023    Procedure: DEBRIDEMENT, WOUND, SACRUM;  Surgeon: Reggie Castañeda MD;  Location: [facility] OR;  Service: General;  Laterality: N/A;       Objective:     Input/output:    Intake/Output Summary (Last 24 hours) at 5/19/2023 0640  Last data filed at 5/19/2023 0600  Gross per 24 hour   Intake 2179 ml   Output 990 ml   Net 1189 ml       Vital Signs (Most Recent):  Temp: 98.1 °F (36.7 °C) (05/19/23 0400)  Pulse: 97 (05/19/23 0630)  Resp: (!) 29 (05/19/23 0630)  BP: 109/71 (05/19/23 0630)  SpO2: 100 % (05/19/23 0630)  Body mass index is 25.05 kg/m².  Weight: 72.6 kg (160 lb) Vital Signs (24h Range):  Temp:  [97.7 °F (36.5 °C)-98.5 °F (36.9 °C)] 98.1 °F (36.7 °C)  Pulse:  [] 97  Resp:  [18-36] 29  SpO2:  [93 %-100 %] 100 %  BP: ()/() 109/71     Physical Exam  Constitutional:       Comments: Awake and alert. Appears anxious.    HENT:      Mouth/Throat:      Comments: Endotracheal tube secured to external os  Eyes:      Pupils: Pupils are equal, round, and reactive to light.   Cardiovascular:      Rate and Rhythm: Normal rate and regular rhythm.      Heart sounds: No murmur heard.  Pulmonary:      Comments: Mild decreased breath sounds posterior bases bilateral, normal expiratory phase with no wheezes, few bilateral scattered rhonchi  Abdominal:      General: Bowel sounds are normal.      Palpations: Abdomen is soft.      Comments: Mild fluid wave   Musculoskeletal:      Right lower leg: Edema (1-2 cm pretibial) present.      Left lower leg: Edema (1-2 cm pretibial) present.      Comments: Bilateral upper extremity 1-2 cm  edema   Lymphadenopathy:      Cervical: No cervical adenopathy.   Skin:     General: Skin is warm and dry.   Neurological:      Comments: Awake and alert. Shakes head yes to feeling sob and uncomfortable. Denies any pain.        Lines/Drains/Airways       Peripherally Inserted Central Catheter Line  Duration             PICC Double Lumen 05/10/23 0817 right brachial 8 days              Drain  Duration                  Drain/Device    Right lower flank -- days         Urethral Catheter 05/12/23 1630 Latex 16 Fr. 6 days         NG/OG Tube 05/16/23 0300 Center mouth 3 days              Airway  Duration                  Airway - Non-Surgical 05/16/23 0239 3 days                    Vent:  Vent Mode: A/C (05/19/23 0330)  Set Rate: 18 BPM (05/19/23 0330)  Vt Set: 450 mL (05/19/23 0330)  Pressure Support: 10 cmH20 (05/18/23 1225)  PEEP/CPAP: 5 cmH20 (05/19/23 0330)  Oxygen Concentration (%): 30 (05/19/23 0400)  Peak Airway Pressure: 28 cmH20 (05/19/23 0330)  Total Ve: 11.1 L/m (05/19/23 0330)  F/VT Ratio<105 (RSBI): (!) 54.23 (05/19/23 0330)    ABGs:  No results found for: PH, PO2, PCO2, [id number]      Significant Labs:    Lab Results   Component Value Date    WBC 12.15 (H) 05/19/2023    HGB 7.9 (L) 05/19/2023    HCT 24.5 (L) 05/19/2023    MCV 92.8 05/19/2023     05/19/2023         Recent Labs   Lab 05/19/23  0127      K 3.8   CO2 21*   BUN 31.5*   CREATININE 2.24*   CALCIUM 8.1*   MG 1.90   AST 15   ALT 6   ALKPHOS 51   ALBUMIN 2.8*       Imaging:   CXR this AM appears similar but somewhat improved from prior CXR 5/17.    Assessment:     Persistent methicillin-resistant Staphylococcus aureus sepsis with C-spine/L-spine osteomyelitis/diskitis, lumbar epidural abscess (post drainage of epidural abscess with bilateral lumbar laminectomy and foraminotomies 04/21/2023 at [facility]), bilateral psoas abscesses.  Septic shock with hypotension requiring vasopressor  Large bilateral pleural effusions, with superimposed  pulmonary infiltrates.  Differential diagnostic possibilities include ARDS versus infectious pneumonitis, less likely hydrostatic pulmonary edema.  Pleural fluid consistent with exudative by LDH, no evidence of empyema.  Severe cardiomyopathy with marked decreased LVSF (EF 10%), significant decrease over prior transthoracic echocardiogram.  No evidence of endocarditis by CRISTY 05/17/2023  Acute respiratory failure, intubated 05/16/2023, requiring ongoing mechanical ventilation.  Cholecystitis post percutaneous cholecystostomy tube placement at [facility] 04/19/2023.  Biliary drain remains in place.  Acute kidney injury, nonoliguric  Untreated hepatitis-C with cirrhosis and ascites  History of polysubstance and IV drug abuse up until this hospital stay    Plan:     Continue IV daptomycin, rifampin, and micafungin.  Infectious Disease service is following.  Continue vasopressors to maintain mean arterial pressure of 65 or greater.  Wean ventilator to spontaneous breathing trial as tolerated  His overall outlook appears very poor.  This is further compounded by his new finding of severe decreased LV SF on CRISTY, no vegetation noted. Cardiology signed off.   palliative care following       32 minutes of critical care was time spent personally by me on the following activities: development of treatment plan with patient or surrogate and bedside caregivers, discussions with consultants, evaluation of patient's response to treatment, examination of patient, ordering and performing treatments and interventions, ordering and review of laboratory studies, ordering and review of radiographic studies, pulse oximetry, re-evaluation of patient's condition.  This patient demonstrates a high probability for further clinical decompensation due to ongoing critical illness.  Critical care time did not overlap with that of any other provider or involve time for any procedures.       Olvin Hobbs, DO  Pulmonary/Critical Care

## 2023-05-19 NOTE — PROGRESS NOTES
Inpatient Nutrition Assessment    Admit Date: 5/5/2023   Total duration of encounter: 14 days     Nutrition Recommendation/Prescription     Tube feeding recommendation:   Peptamen Intense VHP goal rate 70 ml/hr to provide  1400 kcal/d (85% est needs, 100% with meds)  130 g protein/d (100% est needs)  1176 ml free water/d (71% est needs)  (calculations based on estimated 20 hr/d run time)     Communication of Recommendations: reviewed with nurse    Nutrition Assessment   Malnutrition Assessment/Nutrition-Focused Physical Exam    Malnutrition Context: other (see comments), chronic illness (does not meet criteria)  Malnutrition Level: moderate  Energy Intake (Malnutrition): less than 75% for greater than or equal to 1 month  Weight Loss (Malnutrition): other (see comments) (unable to obtain)  Subcutaneous Fat (Malnutrition): mild depletion  Orbital Region (Subcutaneous Fat Loss): mild depletion  Upper Arm Region (Subcutaneous Fat Loss): mild depletion     Muscle Mass (Malnutrition): mild depletion  [place] Region (Muscle Loss): mild depletion  Clavicle Bone Region (Muscle Loss): mild depletion                    Fluid Accumulation (Malnutrition): moderate (+2, +3 edema)        A minimum of two characteristics is recommended for diagnosis of either severe or non-severe malnutrition.     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 as well as lumbar epidural abscess    Nutrition-Related Medications: levophed @ 0.02mcg/kg/min, diprivan, furosemide, detemir 12 Units daily, lactobacillus, MVI, thiamine    Calorie  "Containing IV Medications: Diprivan @ 9 ml/hr (provides 240 kcal/d)    Nutrition-Related Labs:  5/10/23: HGB/HCT: 8.7/27.7   5/12/23: HGB/HCT: 6.7/22.0  5/15/23: H/H 9.0/27.1, Bun 27.7, Crea 2.36, GFR 31, CBG's past 24hrs 113-204  5/17 BUN 28.7, Crea 2.47, Phos 5.3, GFR 29  5/19 BUN 31.5, Crea 2.24, Glu 144    Diet/PN Order: Diet NPO  Oral Supplement Order: none  Tube Feeding Order:  Peptamen Intense VHP (see below for calculation)  Appetite/Oral Intake: not applicable/not applicable  Factors Affecting Nutritional Intake: on mechanical ventilation  Food/Jainism/Cultural Preferences: none reported  Food Allergies: none reported    Skin Integrity: drain/device(s), wound  Wound(s):      Altered Skin Integrity 05/06/23 1450 medial Sacral spine-Tissue loss description: Full thickness Stage 4 absess    Comments    5/10/23: Pt states eaten little of tray, has been getting 1 meal a day from outside due to poor dentition and difficulty chewing. Agreed to minced and moist diet modification when advanced. Pt reported moderate appetite prior to admission; typical intake 1 meal a day (soup), snacks on chips (lets dissolve in mouth). Denies nausea, vomiting, and diarrhea. BM every other day; solid with some straining. Does not take supplements.      5/12/23: RD attempted three times and patient out of room. Per RN, pt eating 25-50% of tray, focusing on mashable foods. Drinking juice. This afternoon patient now on clears after GI bleed scan pending results. Per EMR "reports of 2 large bowel movements overnight described as " bright red jelly like".    5/15/23: Patient underwent colonoscopy yesterday. Currently tolerating clear liquids diet. Patient wanting to begin solid foods today, discussed patient concerns with RN. Patient wanting to discontinue Boost Breeze supplement, however would like to continue Prosource supplement.     5/17/23: Pt now intubated. Plans for starting tube feeding. Noted no plans for HD and Phos elevated. " "Will use non-renal formula at this time since it will run at lower rate. Will monitor for need to change to renal formula.     23: Tube feeding continues, tolerated per RN. Receiving less kcal from meds.     Anthropometrics    Height: 5' 7.01" (170.2 cm) Height Method: Stated  Last Weight: 72.6 kg (160 lb) (23 1000) Weight Method: Standard Scale  BMI (Calculated): 25.1  BMI Classification: overweight (BMI 25-29.9)        Ideal Body Weight (IBW), Male: 148.06 lb     % Ideal Body Weight, Male (lb): 108.11 %                 Usual Body Weight (UBW), k.6 kg  % Usual Body Weight: 100.18     Usual Weight Provided By: patient    Wt Readings from Last 5 Encounters:   23 72.6 kg (160 lb)   23 72.6 kg (160 lb)   18 75.7 kg (166 lb 14.2 oz)     Weight Change(s) Since Admission:  Admit Weight: 72.6 kg (160 lb) (23 1300)  : Unable to get updated weight due to patient gone when returned with scale, current weight is self reported  5/15: Unable to get updated weight due to patient preferring to have his bed angled with his legs above his head at this time.   : no new    Estimated Needs    Weight Used For Calorie Calculations: 72.6 kg (160 lb 0.9 oz)  Energy Calorie Requirements (kcal): 1648kcal  Energy Need Method: [facility]  Weight Used For Protein Calculations: 72.6 kg (160 lb 0.9 oz)  Protein Requirements: 109-131gm (1.5-1.8g/kg)  Fluid Requirements (mL): 1648ml (1ml/kcal)  Temp (24hrs), Av.4 °F (36.9 °C), Min:98.1 °F (36.7 °C), Max:99.4 °F (37.4 °C)    Vtot (L/Min) for [place] Equation Calculation: 8.4    Enteral Nutrition    Formula: Peptamen Intense VHP  Rate/Volume: 55ml/hr  Water Flushes: 50ml q4hr  Additives/Modulars: none at this time  Route: orogastric tube  Method: continuous  Total Nutrition Provided by Tube Feeding, Additives, and Flushes:  Calories Provided  1100 kcal/d, 67% needs   Protein Provided  102 g/d, 94% needs   Fluid Provided  924 ml/d, N/A% needs "   Continuous feeding calculations based on estimated 20 hr/d run time unless otherwise stated.    Parenteral Nutrition    Patient not receiving parenteral nutrition support at this time.    Evaluation of Received Nutrient Intake    Calories: not meeting estimated needs  Protein: not meeting estimated needs    Patient Education    Not applicable.    Nutrition Diagnosis   PES: Malnutrition related to  chronic illness, chronic IV drug use, and chewing problems as evidenced by less than 75% needs met for greater than 1 month, moderate fat depletion, and moderate muscle depletion. (continues)    Interventions/Goals     Intervention(s): modified composition of enteral nutrition, modified rate of enteral nutrition, and collaboration with other providers  Goal: Meet greater than 75% of nutritional needs by follow-up. (goal progressing)    Monitoring & Evaluation     Dietitian will monitor energy intake.  Nutrition Risk/Follow-Up: high (follow-up in 1-4 days)   Please consult if re-assessment needed sooner.

## 2023-05-19 NOTE — PROGRESS NOTES
[facility] [address] ICU  Wound Care    Patient Name:  Reji Plasencia   MRN:  [id number]  Date: 5/19/2023  Diagnosis: Sepsis    History:     History reviewed. No pertinent past medical history.    Social History     Socioeconomic History    Marital status: Single       Precautions:     Allergies as of 05/05/2023    (No Known Allergies)       WO Assessment Details/Treatment        05/19/23 1150        Incision/Site 05/06/23 0450 Thoracic spine medial   Date First Assessed/Time First Assessed: 05/06/23 0450   Present Prior to Hospital Arrival?: Yes  Location: Thoracic spine  Orientation: medial  Additional Comments: from lumbar laminectomy at [facility]   Wound Image    Dressing Appearance Moist drainage   Drainage Amount Small   Drainage Characteristics/Odor Purulent;Sanguineous   Appearance Yellow;Moist;Red   Periwound Area Moist;Redness   Wound Edges Irregular   Wound Length (cm) 16 cm   Wound Width (cm) 0.5 cm   Wound Surface Area (cm^2) 8 cm^2   Care Cleansed with:;Sterile normal saline   Dressing Applied        Negative Pressure Wound Therapy  05/09/23 1000   Placement Date/Time: 05/09/23 1000   Location: Thoracic spine   NPWT Type Incision Management   Therapy Setting NPWT Continuous therapy   Pressure Setting NPWT 125 mmHg   Sponges Inserted NPWT Black   Sponges Removed NPWT Black     WOCN follow up. Nurse at bedside. Wound vac dressing changed per neurosurgery orders.  Nursing to continue with preventative measures. On ICU ALISON mattress. Will follow up.     05/19/2023

## 2023-05-19 NOTE — PROGRESS NOTES
[facility] [address]  Neurosurgery  Progress Note    Subjective:     Interval History: NAEs. Intubated, sedated. Neuro exam limited by sedation. Sedation increased secondary to decreased cardiac and pulmonary function but was moving all extremities to exam prior to sedation increase    Post-Op Info:  Procedure(s) (LRB):  COLONOSCOPY (N/A)   5 Days Post-Op      Medications:  Continuous Infusions:   dexmedeTOMIDine (Precedex) infusion (titrating) 1.4 mcg/kg/hr (05/19/23 0636)    fentanyl 25 mcg/hr (05/18/23 1351)    NORepinephrine bitartrate-D5W 0.02 mcg/kg/min (05/19/23 0100)    propofoL 20 mcg/kg/min (05/19/23 0743)     Scheduled Meds:   albumin human 25%  12.5 g Intravenous Q6H    collagenase   Topical (Top) BID    DAPTOmycin (CUBICIN) IV (PEDS and ADULTS)  6 mg/kg Intravenous Q48H    famotidine (PF)  20 mg Intravenous Daily    insulin detemir U-100  12 Units Subcutaneous QHS    Lactobacillus acidophilus  1 capsule Per NG/OG Tube TID WM    micafungin (MYCAMINE) IVPB  100 mg Intravenous Q24H    multivitamin  1 tablet Per OG tube Daily    rifAMpin (RIFADIN) IVPB  300 mg Intravenous BID    sodium chloride 0.9%  10 mL Intravenous Q6H    thiamine  100 mg Per OG tube Daily    zinc oxide-cod liver oil   Topical (Top) BID     PRN Meds:sodium chloride, sodium chloride, sodium chloride, sodium chloride, sodium chloride, sodium chloride, aluminum-magnesium hydroxide-simethicone, dextrose 10%, dextrose 10%, fentaNYL, fentaNYL, glucagon (human recombinant), glucose, glucose, HYDROmorphone, insulin aspart U-100, loperamide, melatonin, metoprolol, ondansetron, oxyCODONE-acetaminophen, polyethylene glycol, prochlorperazine, senna-docusate 8.6-50 mg, sodium chloride 0.9%, Flushing PICC Protocol **AND** sodium chloride 0.9% **AND** sodium chloride 0.9%, sodium chloride 0.9%, tiZANidine       Objective:     Weight: 72.6 kg (160 lb)  Body mass index is 25.05 kg/m².  Vital Signs (Most Recent):  Temp: 98.1 °F (36.7  °C) (05/19/23 0400)  Pulse: 91 (05/19/23 0700)  Resp: (!) 28 (05/19/23 0700)  BP: 91/60 (05/19/23 0700)  SpO2: 95 % (05/19/23 0700) Vital Signs (24h Range):  Temp:  [97.7 °F (36.5 °C)-98.5 °F (36.9 °C)] 98.1 °F (36.7 °C)  Pulse:  [] 91  Resp:  [18-36] 28  SpO2:  [93 %-100 %] 95 %  BP: ()/() 91/60                Vent Mode: A/C  Oxygen Concentration (%):  [30-40] 30  Resp Rate Total:  [21 br/min-28 br/min] 24 br/min  Vt Set:  [450 mL] 450 mL  PEEP/CPAP:  [5 cmH20] 5 cmH20  Pressure Support:  [10 cmH20] 10 cmH20  Mean Airway Pressure:  [8 qrB67-04 cmH20] 10 cmH20             NG/OG Tube 05/16/23 0300 Center mouth (Active)   Placement Check placement verified by aspirate characteristics 05/18/23 2000   Distal Tube Length (cm) 65 05/18/23 2000   Tolerance no signs/symptoms of discomfort 05/18/23 2000   Securement secured to commercial device 05/18/23 2000   Clamp Status/Tolerance unclamped 05/18/23 2000   Suction Setting/Drainage Method suction at the bedside 05/18/23 2000   Insertion Site Appearance no redness, warmth, tenderness, skin breakdown, drainage 05/18/23 2000   Drainage Bile 05/18/23 1600   Feeding Type continuous;by pump 05/18/23 2000   Feeding Action feeding continued 05/18/23 2000   Current Rate (mL/hr) 55 mL/hr 05/18/23 2000   Goal Rate (mL/hr) 55 mL/hr 05/18/23 2000   Intake (mL) 120 mL 05/18/23 1400   Water Bolus (mL) 0 mL 05/18/23 1400   Tube Output(mL)(Include Discarded Residual) 450 mL 05/17/23 0600   Intake (mL) - Formula Tube Feeding 360 05/19/23 0600            Urethral Catheter 05/12/23 1630 Latex 16 Fr. (Active)   $ Awad Insertion Bedside Insertion Performed 05/12/23 1630   Site Assessment Clean;Intact 05/18/23 2000   Collection Container Standard drainage bag 05/18/23 2000   Securement Method secured to top of thigh w/ adhesive device 05/18/23 2000   Catheter Care Performed yes 05/18/23 2000   Reason for Continuing Urinary Catheterization Critically ill in ICU and requiring  "hourly monitoring of intake/output 05/18/23 2000   CAUTI Prevention Bundle Securement Device in place with 1" slack;Intact seal between catheter & drainage tubing;Drainage bag/urimeter off the floor;Sheeting clip in use;No dependent loops or kinks;Drainage bag/urimeter not overfilled (<2/3 full);Drainage bag/urimeter below bladder 05/18/23 2000   Output (mL) 100 mL 05/19/23 0600            Drain/Device    Right lower flank (Active)   Insertion Site dressing intact 05/18/23 2000   Drainage Characteristics/Odor Yellow;Brown 05/18/23 2000   Drainage Amount Scant 05/18/23 2000   General Output (mL) 30 05/19/23 0600       Neurosurgery Physical Exam  Intubated, sedated  Wound vac in place with no new output in the past 24 hours.  Examination limited by sedation.     Significant Labs:  Recent Labs   Lab 05/18/23  0140 05/19/23  0127    138   K 3.4* 3.8   CO2 24 21*   BUN 27.0* 31.5*   CREATININE 2.31* 2.24*   CALCIUM 8.0* 8.1*   MG 1.90 1.90     Recent Labs   Lab 05/18/23  0140 05/19/23  0127   WBC 11.54* 12.15*   HGB 7.7* 7.9*   HCT 23.6* 24.5*    167     No results for input(s): LABPT, INR, APTT in the last 48 hours.  Microbiology Results (last 7 days)       Procedure Component Value Units Date/Time    Blood Culture [[id number]]  (Normal) Collected: 05/16/23 0656    Order Status: Completed Specimen: Blood Updated: 05/19/23 0800     CULTURE, BLOOD ([facility]) No Growth At 72 Hours    Wound Culture [[id number]]  (Abnormal) Collected: 05/16/23 1313    Order Status: Completed Specimen: Wound from Lumbar Updated: 05/18/23 1305     Wound Culture Few Candida albicans     Comment: Susceptibility sent to reference lab. Results to follow on separate report.       Narrative:      For sensitivity results refer to [id number].    Respiratory Culture [[id number]]  (Abnormal) Collected: 05/16/23 1158    Order Status: Completed Specimen: Sputum from Transtrachael aspirate Updated: 05/18/23 1013     Respiratory Culture Few Yeast "     GRAM STAIN Quality 2+      No bacteria seen    Blood Culture [[id number]]  (Normal) Collected: 05/16/23 0825    Order Status: Completed Specimen: Blood Updated: 05/18/23 1000     CULTURE, BLOOD ([facility]) No Growth At 48 Hours    Body Fluid Culture [[id number]] Collected: 05/16/23 1359    Order Status: Completed Specimen: Body Fluid from Lung, Left Updated: 05/18/23 0827     Body Fluid Culture No Growth At 48 Hours    AFB Smear [[id number]] Collected: 05/16/23 1359    Order Status: Completed Specimen: Pleural Fluid from Lung, Left Updated: 05/17/23 0924     AFB Smear No AFB seen (Direct smear only)    Gram Stain [[id number]] Collected: 05/16/23 1359    Order Status: Completed Specimen: Body Fluid from Pleural Fluid, Left Updated: 05/17/23 0729     GRAM STAIN No WBCs, No bacteria seen    Mycobacteria and Nocardia Culture [[id number]] Collected: 05/16/23 1359    Order Status: Sent Specimen: Body Fluid from Lung, Left Updated: 05/16/23 1643    Fungal Culture [[id number]] Collected: 05/16/23 1359    Order Status: Sent Specimen: Body Fluid from Lung, Left Updated: 05/16/23 1407    Urine culture [[id number]]  (Abnormal) Collected: 05/12/23 1048    Order Status: Completed Specimen: Urine Updated: 05/15/23 1018     Urine Culture >/= 100,000 colonies/ml Candida albicans    Blood Culture [[id number]]  (Normal) Collected: 05/09/23 2104    Order Status: Completed Specimen: Blood Updated: 05/14/23 2200     CULTURE, BLOOD ([facility]) No Growth at 5 days              Assessment/Plan:     Active Diagnoses:    Diagnosis Date Noted POA    PRINCIPAL PROBLEM:  Sepsis [A41.9] 05/05/2023 Yes    Moderate malnutrition [E44.0] 05/17/2023 Yes    Staphylococcus aureus bacteremia [R78.81, B95.61] 05/05/2023 Yes      Problems Resolved During this Admission:     -MRI lumbar with and without pending. Hopeful to get it done today with patient intubated and sedated.   -Wound vac in place  -Antibiotics per infectious disease  -Supportive  measures  -SCDs      DILCIA Swartz  Neurosurgery  [facility] - [address] [place] [address]

## 2023-05-19 NOTE — PROGRESS NOTES
"                                                                                                                        NEPHROLOGY: Progress      63 y.o. male with MRSA bacteremia.  Past medical history significant for polysubstance abuse, IV drug use, diabetes mellitus, untreated hepatitis-C, and cirrhosis.  He was recently hospitalized at [facility] for MRSA bacteremia due to C and L-spine osteomyelitis/diskitis, paraspinous muscle abscess, and lumbar epidural abscess.    He also had a cholecystostomy drain placed for cholecystitis.  Patient left AMA on 05/05/2023 and then presented to [facility] on 05/06/2023.  Blood cultures at admission were positive for MRSA.  UDS was also positive for amphetamines and opiates.  Neurosurgery and Infectious Disease have been following for bacteremia and spinal osteomyelitis/diskitis.  He has been getting daptomycin, rifampin, and micafungin per ID recommendations.      We are following the patient for non-oliguric acute kidney injury.  Patient had an episode of hematochezia requiring 3 units of packed red cells.  Colonoscopy revealed pan colonic diverticuli as the probable source.  Zosyn has now been discontinued which was considered to be a potential source of AIN.     Patient was transferred to ICU due to respiratory failure requiring intubation.  Left thoracentesis was done on May 17th with removal of 1 L of transudate.  Patient remains intubated and sedated.  Scheduled for CRISTY this morning.                       /69   Pulse 92   Temp 99.4 °F (37.4 °C) (Oral)   Resp (!) 29   Ht 5' 7.01" (1.702 m)   Wt 72.6 kg (160 lb)   SpO2 99%   BMI 25.05 kg/m²     Physical Exam:    GEN:  Chronically ill-appearing.  Patient is currently intubated and sedated.  He is on norepinephrine 0.12 mics per kilos per minute.   HEENT: Atraumatic. EOMI, no icterus  NECK : No JVD  CARD : RRR s rub or gallop  LUNGS :  Bilateral coarse rhonchi with diminished breath sounds  ABD : " Soft,non-tender. BS active , ascites present.  Percutaneous cholecystostomy tube draining bilious fluid.  EXT :  2 to 3+ pitting edema.           Intake/Output Summary (Last 24 hours) at 5/19/2023 0850  Last data filed at 5/19/2023 0800  Gross per 24 hour   Intake 2179 ml   Output 945 ml   Net 1234 ml     All current medications have been reviewed.    Laboratory:  Recent Results (from the past 24 hour(s))   Transesophageal echo (CRISTY)    Collection Time: 05/18/23  8:55 AM   Result Value Ref Range    BSA 1.85 m2   POCT glucose    Collection Time: 05/18/23 12:10 PM   Result Value Ref Range    POCT Glucose 76 70 - 110 mg/dL   RT Blood Gas    Collection Time: 05/18/23  1:28 PM   Result Value Ref Range    Sample Type Arterial Blood     Sample site Left Radial Artery     Drawn by gal rt     pH, Blood gas 7.480 (H) 7.350 - 7.450    pCO2, Blood gas 38.0 35.0 - 45.0 mmHg    pO2, Blood gas 67.0 (L) 80.0 - 100.0 mmHg    Sodium, Blood Gas 136 (L) 137 - 145 mmol/L    Potassium, Blood Gas 3.8 3.5 - 5.0 mmol/L    Calcium Level Ionized 1.09 (L) 1.12 - 1.23 mmol/L    TOC2, Blood gas 29.5 mmol/L    Base Excess, Blood gas 4.60 (H) -2.00 - 2.00 mmol/L    sO2, Blood gas 94.0 %    HCO3, Blood gas 28.3 (H) 22.0 - 26.0 mmol/L    Allens Test Yes     MODE CPAP     Oxygen Device, Blood gas Ventilator     FIO2, Blood gas 30 %    PEEP 5.0 cmH2O    PS 10.0 cmH2O   POCT glucose    Collection Time: 05/18/23  5:19 PM   Result Value Ref Range    POCT Glucose 102 70 - 110 mg/dL   POCT glucose    Collection Time: 05/18/23  9:26 PM   Result Value Ref Range    POCT Glucose 123 (H) 70 - 110 mg/dL   Comprehensive Metabolic Panel    Collection Time: 05/19/23  1:27 AM   Result Value Ref Range    Sodium Level 138 136 - 145 mmol/L    Potassium Level 3.8 3.5 - 5.1 mmol/L    Chloride 104 98 - 107 mmol/L    Carbon Dioxide 21 (L) 23 - 31 mmol/L    Glucose Level 144 (H) 82 - 115 mg/dL    Blood Urea Nitrogen 31.5 (H) 8.4 - 25.7 mg/dL    Creatinine 2.24 (H) 0.73 -  1.18 mg/dL    Calcium Level Total 8.1 (L) 8.8 - 10.0 mg/dL    Protein Total 4.9 (L) 5.8 - 7.6 gm/dL    Albumin Level 2.8 (L) 3.4 - 4.8 g/dL    Globulin 2.1 (L) 2.4 - 3.5 gm/dL    Albumin/Globulin Ratio 1.3 1.1 - 2.0 ratio    Bilirubin Total 0.9 <=1.5 mg/dL    Alkaline Phosphatase 51 40 - 150 unit/L    Alanine Aminotransferase 6 0 - 55 unit/L    Aspartate Aminotransferase 15 5 - 34 unit/L    eGFR 33 mls/min/1.73/m2   Magnesium    Collection Time: 05/19/23  1:27 AM   Result Value Ref Range    Magnesium Level 1.90 1.60 - 2.60 mg/dL   Phosphorus    Collection Time: 05/19/23  1:27 AM   Result Value Ref Range    Phosphorus Level 4.4 2.3 - 4.7 mg/dL   CBC with Differential    Collection Time: 05/19/23  1:27 AM   Result Value Ref Range    WBC 12.15 (H) 4.50 - 11.50 x10(3)/mcL    RBC 2.64 (L) 4.70 - 6.10 x10(6)/mcL    Hgb 7.9 (L) 14.0 - 18.0 g/dL    Hct 24.5 (L) 42.0 - 52.0 %    MCV 92.8 80.0 - 94.0 fL    MCH 29.9 27.0 - 31.0 pg    MCHC 32.2 (L) 33.0 - 36.0 g/dL    RDW 16.5 11.5 - 17.0 %    Platelet 167 130 - 400 x10(3)/mcL    MPV 10.6 (H) 7.4 - 10.4 fL    Neut % 84.7 %    Lymph % 10.0 %    Mono % 2.3 %    Eos % 1.9 %    Basophil % 0.4 %    Lymph # 1.22 0.6 - 4.6 x10(3)/mcL    Neut # 10.29 (H) 2.1 - 9.2 x10(3)/mcL    Mono # 0.28 0.1 - 1.3 x10(3)/mcL    Eos # 0.23 0 - 0.9 x10(3)/mcL    Baso # 0.05 <=0.2 x10(3)/mcL    IG# 0.08 (H) 0 - 0.04 x10(3)/mcL    IG% 0.7 %    NRBC% 0.0 %   RT Blood Gas    Collection Time: 05/19/23  4:25 AM   Result Value Ref Range    Sample Type Arterial Blood     Sample site Right Radial Artery     Drawn by ALBANIA GUAJARDO     pH, Blood gas 7.480 (H) 7.350 - 7.450    pCO2, Blood gas 38.0 35.0 - 45.0 mmHg    pO2, Blood gas 79.0 (L) 80.0 - 100.0 mmHg    Sodium, Blood Gas 138 137 - 145 mmol/L    Potassium, Blood Gas 3.5 3.5 - 5.0 mmol/L    Calcium Level Ionized 1.06 (L) 1.12 - 1.23 mmol/L    TOC2, Blood gas 29.5 mmol/L    Base Excess, Blood gas 4.60 mmol/L    sO2, Blood gas 96.0 %    HCO3, Blood gas 28.3 >=15.0  mmol/L    Allens Test Yes     MODE AC     FIO2, Blood gas 30 %    Mech Vt 480 ml    Mech RR 18 b/min    PEEP 5.0 cmH2O         Assessment/Plan:  RUPERTO-nonoliguric -ATN versus AIN-creatinine is stable  Recent colonic diverticular bleed  Respiratory failure -s/p Lt pleurocentesis-on ventilator  Cholecystitis-s/p percutaneous cholecystostomy tube  MRSA bacteremia-osteomyelitis/psoas abscesses  S/p recent lumbar laminectomy/epidural abscess I&D  Polysubstance abuse   HFrEF-10-15%  Cervical and lumbar spine osteomyelitis/diskitis  Bilateral psoas and paraspinous muscle   Sacral ulcer debrided 5/10  Cirrhosis-untreated hep C  Type 2 diabetes mellitus     Patient remains in multiorgan system failure.  Hepatic, cardiac, renal and respiratory systems.  Patient is not likely to survive these current events.  For this moment I have no further recommendations other than cautious additional diuresis.  He is still receiving albumin.  Renal function is stable.  He is currently tolerating his tube feedings.  In my opinion patient condition is irreversible.  Patient is on sedation and norepinephrine delivering approximately 50 cc an hour plus his tube feedings.  We will continue to follow.      Adi Campos MD, NALDO

## 2023-05-20 LAB
ALBUMIN SERPL-MCNC: 2.8 G/DL (ref 3.4–4.8)
ALBUMIN/GLOB SERPL: 1.2 RATIO (ref 1.1–2)
ALLENS TEST BLOOD GAS (OHS): ABNORMAL
ALP SERPL-CCNC: 48 UNIT/L (ref 40–150)
ALT SERPL-CCNC: 6 UNIT/L (ref 0–55)
AST SERPL-CCNC: 13 UNIT/L (ref 5–34)
BASE EXCESS BLD CALC-SCNC: 7.5 MMOL/L (ref -2–2)
BASOPHILS # BLD AUTO: 0.07 X10(3)/MCL
BASOPHILS NFR BLD AUTO: 0.7 %
BILIRUBIN DIRECT+TOT PNL SERPL-MCNC: 0.8 MG/DL
BLOOD GAS SAMPLE TYPE (OHS): ABNORMAL
BUN SERPL-MCNC: 37.5 MG/DL (ref 8.4–25.7)
CA-I BLD-SCNC: 1.12 MMOL/L (ref 1.12–1.23)
CALCIUM SERPL-MCNC: 8.4 MG/DL (ref 8.8–10)
CHLORIDE SERPL-SCNC: 104 MMOL/L (ref 98–107)
CK SERPL-CCNC: 13 U/L (ref 30–200)
CO2 BLDA-SCNC: 32.3 MMOL/L
CO2 SERPL-SCNC: 24 MMOL/L (ref 23–31)
COHGB MFR BLDA: 2 % (ref 0.5–1.5)
CREAT SERPL-MCNC: 2.18 MG/DL (ref 0.73–1.18)
DRAWN BY BLOOD GAS (OHS): ABNORMAL
EOSINOPHIL # BLD AUTO: 0.29 X10(3)/MCL (ref 0–0.9)
EOSINOPHIL NFR BLD AUTO: 2.7 %
ERYTHROCYTE [DISTWIDTH] IN BLOOD BY AUTOMATED COUNT: 16.3 % (ref 11.5–17)
FIO2 BLOOD GAS (OHS): 35 %
GFR SERPLBLD CREATININE-BSD FMLA CKD-EPI: 34 MLS/MIN/1.73/M2
GLOBULIN SER-MCNC: 2.3 GM/DL (ref 2.4–3.5)
GLUCOSE SERPL-MCNC: 219 MG/DL (ref 82–115)
HCO3 BLDA-SCNC: 31.1 MMOL/L (ref 22–26)
HCT VFR BLD AUTO: 25.9 % (ref 42–52)
HGB BLD-MCNC: 8.2 G/DL (ref 14–18)
IMM GRANULOCYTES # BLD AUTO: 0.08 X10(3)/MCL (ref 0–0.04)
IMM GRANULOCYTES NFR BLD AUTO: 0.7 %
LYMPHOCYTES # BLD AUTO: 1.36 X10(3)/MCL (ref 0.6–4.6)
LYMPHOCYTES NFR BLD AUTO: 12.6 %
MAGNESIUM SERPL-MCNC: 1.9 MG/DL (ref 1.6–2.6)
MCH RBC QN AUTO: 29.7 PG (ref 27–31)
MCHC RBC AUTO-ENTMCNC: 31.7 G/DL (ref 33–36)
MCV RBC AUTO: 93.8 FL (ref 80–94)
MECH RR (OHS): 22 B/MIN
MECH VT (OHS): 470 ML
METHGB MFR BLDA: 1.2 % (ref 0.4–1.5)
MODE (OHS): AC
MONOCYTES # BLD AUTO: 0.48 X10(3)/MCL (ref 0.1–1.3)
MONOCYTES NFR BLD AUTO: 4.5 %
NEUTROPHILS # BLD AUTO: 8.48 X10(3)/MCL (ref 2.1–9.2)
NEUTROPHILS NFR BLD AUTO: 78.8 %
NRBC BLD AUTO-RTO: 0 %
O2 HB BLOOD GAS (OHS): 96.1 % (ref 94–97)
OXYGEN DEVICE BLOOD GAS (OHS): ABNORMAL
OXYHGB MFR BLDA: 10.4 G/DL (ref 12–16)
PCO2 BLDA: 39 MMHG (ref 35–45)
PEEP (OHS): 5 CMH2O
PH BLDA: 7.51 [PH] (ref 7.35–7.45)
PHOSPHATE SERPL-MCNC: 3.3 MG/DL (ref 2.3–4.7)
PLATELET # BLD AUTO: 159 X10(3)/MCL (ref 130–400)
PMV BLD AUTO: 10.3 FL (ref 7.4–10.4)
PO2 BLDA: 112 MMHG (ref 80–100)
POCT GLUCOSE: 183 MG/DL (ref 70–110)
POCT GLUCOSE: 210 MG/DL (ref 70–110)
POCT GLUCOSE: 229 MG/DL (ref 70–110)
POCT GLUCOSE: 241 MG/DL (ref 70–110)
POTASSIUM BLOOD GAS (OHS): 3.1 MMOL/L (ref 3.5–5)
POTASSIUM SERPL-SCNC: 3.3 MMOL/L (ref 3.5–5.1)
PROT SERPL-MCNC: 5.1 GM/DL (ref 5.8–7.6)
RBC # BLD AUTO: 2.76 X10(6)/MCL (ref 4.7–6.1)
SAMPLE SITE BLOOD GAS (OHS): ABNORMAL
SAO2 % BLDA: 98.8 %
SODIUM BLOOD GAS (OHS): 135 MMOL/L (ref 137–145)
SODIUM SERPL-SCNC: 137 MMOL/L (ref 136–145)
WBC # SPEC AUTO: 10.76 X10(3)/MCL (ref 4.5–11.5)

## 2023-05-20 PROCEDURE — 25000003 PHARM REV CODE 250: Performed by: INTERNAL MEDICINE

## 2023-05-20 PROCEDURE — 25000003 PHARM REV CODE 250: Performed by: STUDENT IN AN ORGANIZED HEALTH CARE EDUCATION/TRAINING PROGRAM

## 2023-05-20 PROCEDURE — 63600175 PHARM REV CODE 636 W HCPCS: Performed by: NURSE PRACTITIONER

## 2023-05-20 PROCEDURE — 20000000 HC ICU ROOM

## 2023-05-20 PROCEDURE — 63600175 PHARM REV CODE 636 W HCPCS: Performed by: STUDENT IN AN ORGANIZED HEALTH CARE EDUCATION/TRAINING PROGRAM

## 2023-05-20 PROCEDURE — 27000221 HC OXYGEN, UP TO 24 HOURS

## 2023-05-20 PROCEDURE — 85025 COMPLETE CBC W/AUTO DIFF WBC: CPT | Performed by: STUDENT IN AN ORGANIZED HEALTH CARE EDUCATION/TRAINING PROGRAM

## 2023-05-20 PROCEDURE — 27000207 HC ISOLATION

## 2023-05-20 PROCEDURE — 80053 COMPREHEN METABOLIC PANEL: CPT | Performed by: STUDENT IN AN ORGANIZED HEALTH CARE EDUCATION/TRAINING PROGRAM

## 2023-05-20 PROCEDURE — 27200966 HC CLOSED SUCTION SYSTEM

## 2023-05-20 PROCEDURE — 99900035 HC TECH TIME PER 15 MIN (STAT)

## 2023-05-20 PROCEDURE — P9047 ALBUMIN (HUMAN), 25%, 50ML: HCPCS | Mod: JG | Performed by: STUDENT IN AN ORGANIZED HEALTH CARE EDUCATION/TRAINING PROGRAM

## 2023-05-20 PROCEDURE — 94761 N-INVAS EAR/PLS OXIMETRY MLT: CPT

## 2023-05-20 PROCEDURE — 63600175 PHARM REV CODE 636 W HCPCS: Mod: JG | Performed by: STUDENT IN AN ORGANIZED HEALTH CARE EDUCATION/TRAINING PROGRAM

## 2023-05-20 PROCEDURE — 82550 ASSAY OF CK (CPK): CPT | Performed by: NURSE PRACTITIONER

## 2023-05-20 PROCEDURE — 83735 ASSAY OF MAGNESIUM: CPT | Performed by: STUDENT IN AN ORGANIZED HEALTH CARE EDUCATION/TRAINING PROGRAM

## 2023-05-20 PROCEDURE — A4216 STERILE WATER/SALINE, 10 ML: HCPCS | Performed by: STUDENT IN AN ORGANIZED HEALTH CARE EDUCATION/TRAINING PROGRAM

## 2023-05-20 PROCEDURE — 99900026 HC AIRWAY MAINTENANCE (STAT)

## 2023-05-20 PROCEDURE — 84100 ASSAY OF PHOSPHORUS: CPT | Performed by: STUDENT IN AN ORGANIZED HEALTH CARE EDUCATION/TRAINING PROGRAM

## 2023-05-20 PROCEDURE — 94003 VENT MGMT INPAT SUBQ DAY: CPT

## 2023-05-20 RX ORDER — OXYCODONE HYDROCHLORIDE 5 MG/1
5 TABLET ORAL EVERY 6 HOURS
Status: DISCONTINUED | OUTPATIENT
Start: 2023-05-20 | End: 2023-05-23 | Stop reason: HOSPADM

## 2023-05-20 RX ORDER — POTASSIUM CHLORIDE 14.9 MG/ML
40 INJECTION INTRAVENOUS ONCE
Status: COMPLETED | OUTPATIENT
Start: 2023-05-20 | End: 2023-05-20

## 2023-05-20 RX ORDER — MUPIROCIN 20 MG/G
OINTMENT TOPICAL 2 TIMES DAILY
Status: CANCELLED | OUTPATIENT
Start: 2023-05-20 | End: 2023-05-25

## 2023-05-20 RX ADMIN — FENTANYL CITRATE 50 MCG: 50 INJECTION, SOLUTION INTRAMUSCULAR; INTRAVENOUS at 12:05

## 2023-05-20 RX ADMIN — OXYCODONE HYDROCHLORIDE 5 MG: 5 TABLET ORAL at 12:05

## 2023-05-20 RX ADMIN — Medication: at 09:05

## 2023-05-20 RX ADMIN — ALBUMIN (HUMAN) 12.5 G: 12.5 SOLUTION INTRAVENOUS at 12:05

## 2023-05-20 RX ADMIN — FAMOTIDINE 20 MG: 10 INJECTION, SOLUTION INTRAVENOUS at 08:05

## 2023-05-20 RX ADMIN — THIAMINE HCL TAB 100 MG 100 MG: 100 TAB at 08:05

## 2023-05-20 RX ADMIN — MICONAZOLE NITRATE 2 % TOPICAL POWDER: at 09:05

## 2023-05-20 RX ADMIN — POTASSIUM CHLORIDE 40 MEQ: 14.9 INJECTION, SOLUTION INTRAVENOUS at 06:05

## 2023-05-20 RX ADMIN — PROPOFOL 40 MCG/KG/MIN: 10 INJECTION, EMULSION INTRAVENOUS at 11:05

## 2023-05-20 RX ADMIN — MICONAZOLE NITRATE 2 % TOPICAL POWDER: at 08:05

## 2023-05-20 RX ADMIN — PROPOFOL 20 MCG/KG/MIN: 10 INJECTION, EMULSION INTRAVENOUS at 02:05

## 2023-05-20 RX ADMIN — SODIUM CHLORIDE, PRESERVATIVE FREE 10 ML: 5 INJECTION INTRAVENOUS at 12:05

## 2023-05-20 RX ADMIN — ALBUMIN (HUMAN) 12.5 G: 12.5 SOLUTION INTRAVENOUS at 11:05

## 2023-05-20 RX ADMIN — Medication 1 CAPSULE: at 05:05

## 2023-05-20 RX ADMIN — DEXTROSE MONOHYDRATE 300 MG: 50 INJECTION, SOLUTION INTRAVENOUS at 08:05

## 2023-05-20 RX ADMIN — INSULIN ASPART 2 UNITS: 100 INJECTION, SOLUTION INTRAVENOUS; SUBCUTANEOUS at 05:05

## 2023-05-20 RX ADMIN — Medication 1 CAPSULE: at 08:05

## 2023-05-20 RX ADMIN — PROPOFOL 20 MCG/KG/MIN: 10 INJECTION, EMULSION INTRAVENOUS at 06:05

## 2023-05-20 RX ADMIN — INSULIN ASPART 4 UNITS: 100 INJECTION, SOLUTION INTRAVENOUS; SUBCUTANEOUS at 12:05

## 2023-05-20 RX ADMIN — COLLAGENASE SANTYL: 250 OINTMENT TOPICAL at 08:05

## 2023-05-20 RX ADMIN — OXYCODONE HYDROCHLORIDE 5 MG: 5 TABLET ORAL at 05:05

## 2023-05-20 RX ADMIN — ALBUMIN (HUMAN) 12.5 G: 12.5 SOLUTION INTRAVENOUS at 05:05

## 2023-05-20 RX ADMIN — THERA TABS 1 TABLET: TAB at 08:05

## 2023-05-20 RX ADMIN — COLLAGENASE SANTYL: 250 OINTMENT TOPICAL at 09:05

## 2023-05-20 RX ADMIN — DEXTROSE MONOHYDRATE 300 MG: 50 INJECTION, SOLUTION INTRAVENOUS at 09:05

## 2023-05-20 RX ADMIN — INSULIN DETEMIR 12 UNITS: 100 INJECTION, SOLUTION SUBCUTANEOUS at 09:05

## 2023-05-20 RX ADMIN — DEXMEDETOMIDINE HYDROCHLORIDE 0.6 MCG/KG/HR: 400 INJECTION INTRAVENOUS at 03:05

## 2023-05-20 RX ADMIN — Medication: at 08:05

## 2023-05-20 RX ADMIN — SODIUM CHLORIDE, PRESERVATIVE FREE 10 ML: 5 INJECTION INTRAVENOUS at 05:05

## 2023-05-20 RX ADMIN — Medication 1 CAPSULE: at 12:05

## 2023-05-20 RX ADMIN — INSULIN ASPART 4 UNITS: 100 INJECTION, SOLUTION INTRAVENOUS; SUBCUTANEOUS at 06:05

## 2023-05-20 RX ADMIN — SODIUM CHLORIDE, PRESERVATIVE FREE 10 ML: 5 INJECTION INTRAVENOUS at 06:05

## 2023-05-20 RX ADMIN — DEXMEDETOMIDINE HYDROCHLORIDE 0.6 MCG/KG/HR: 400 INJECTION INTRAVENOUS at 06:05

## 2023-05-20 RX ADMIN — MICAFUNGIN SODIUM 100 MG: 100 INJECTION, POWDER, LYOPHILIZED, FOR SOLUTION INTRAVENOUS at 03:05

## 2023-05-20 NOTE — PROGRESS NOTES
[facility] [address]  Neurosurgery  Progress Note    Subjective:     Interval History:  No acute issues.  No events overnight.  Intubated, sedated mechanically ventilated.  MRI was actually obtained yesterday.      MRI lumbar spine with and without contrast performed on 05/19/2023:  Impression:       1. Evaluation significantly limited by artifact.   2. Persistent but decreased size of ventral epidural abscess at L4-L5, with a stable dorsal epidural abscess at L5-S1.   3. Rim enhancing fluid in the laminectomy bed and subcutaneous soft tissues.   4. Residual fluid collections in the right retroperitoneum and psoas muscle.   5. Mild narrowing of the spinal canal at L3 through S1.        Neurological exam remains difficult to obtain as patient is sedated at this time.  Post-Op Info:  Procedure(s) (LRB):  COLONOSCOPY (N/A)   6 Days Post-Op      Medications:  Continuous Infusions:   dexmedeTOMIDine (Precedex) infusion (titrating) 0.6 mcg/kg/hr (05/20/23 0613)    fentanyl Stopped (05/19/23 1000)    NORepinephrine bitartrate-D5W 0.02 mcg/kg/min (05/19/23 0100)    propofoL 40 mcg/kg/min (05/20/23 1152)     Scheduled Meds:   albumin human 25%  12.5 g Intravenous Q6H    collagenase   Topical (Top) BID    DAPTOmycin (CUBICIN) IV (PEDS and ADULTS)  6 mg/kg Intravenous Q48H    famotidine (PF)  20 mg Intravenous Daily    insulin detemir U-100  12 Units Subcutaneous QHS    Lactobacillus acidophilus  1 capsule Per NG/OG Tube TID WM    micafungin (MYCAMINE) IVPB  100 mg Intravenous Q24H    miconazole NITRATE 2 %   Topical (Top) BID    multivitamin  1 tablet Per OG tube Daily    oxyCODONE  5 mg Oral Q6H    rifAMpin (RIFADIN) IVPB  300 mg Intravenous BID    sodium chloride 0.9%  10 mL Intravenous Q6H    thiamine  100 mg Per OG tube Daily    zinc oxide-cod liver oil   Topical (Top) BID     PRN Meds:sodium chloride, sodium chloride, sodium chloride, sodium chloride, sodium chloride, sodium chloride,  aluminum-magnesium hydroxide-simethicone, dextrose 10%, dextrose 10%, fentaNYL, fentaNYL, glucagon (human recombinant), glucose, glucose, HYDROmorphone, insulin aspart U-100, loperamide, melatonin, metoprolol, ondansetron, oxyCODONE-acetaminophen, polyethylene glycol, prochlorperazine, senna-docusate 8.6-50 mg, sodium chloride 0.9%, Flushing PICC Protocol **AND** sodium chloride 0.9% **AND** sodium chloride 0.9%, sodium chloride 0.9%, tiZANidine       Objective:     Weight: 72.6 kg (160 lb)  Body mass index is 25.05 kg/m².  Vital Signs (Most Recent):  Temp: 98.8 °F (37.1 °C) (05/20/23 0800)  Pulse: 95 (05/20/23 1000)  Resp: 16 (05/20/23 1256)  BP: 100/66 (05/20/23 1000)  SpO2: 98 % (05/20/23 1000) Vital Signs (24h Range):  Temp:  [97.7 °F (36.5 °C)-99.1 °F (37.3 °C)] 98.8 °F (37.1 °C)  Pulse:  [] 95  Resp:  [16-43] 16  SpO2:  [95 %-100 %] 98 %  BP: ()/(60-79) 100/66     Date 05/20/23 0700 - 05/21/23 0659   Shift 8980-3799 5273-3849 3779-8515 24 Hour Total   INTAKE   Other 100   100   Shift Total(mL/kg) 100(1.4)   100(1.4)   OUTPUT   Urine(mL/kg/hr) 150   150   Shift Total(mL/kg) 150(2.1)   150(2.1)   Weight (kg) 72.6 72.6 72.6 72.6              Vent Mode: A/C  Oxygen Concentration (%):  [30] 30  Resp Rate Total:  [18 br/min-23 br/min] 23 br/min  Vt Set:  [450 mL] 450 mL  PEEP/CPAP:  [5 cmH20] 5 cmH20  Mean Airway Pressure:  [10 ylA66-40 cmH20] 10 cmH20             NG/OG Tube 05/16/23 0300 Center mouth (Active)   Placement Check placement verified by aspirate characteristics 05/18/23 2000   Distal Tube Length (cm) 65 05/18/23 2000   Tolerance no signs/symptoms of discomfort 05/18/23 2000   Securement secured to commercial device 05/18/23 2000   Clamp Status/Tolerance unclamped 05/18/23 2000   Suction Setting/Drainage Method suction at the bedside 05/18/23 2000   Insertion Site Appearance no redness, warmth, tenderness, skin breakdown, drainage 05/18/23 2000   Drainage Bile 05/18/23 1600   Feeding Type  "continuous;by pump 05/18/23 2000   Feeding Action feeding continued 05/18/23 2000   Current Rate (mL/hr) 55 mL/hr 05/18/23 2000   Goal Rate (mL/hr) 55 mL/hr 05/18/23 2000   Intake (mL) 120 mL 05/18/23 1400   Water Bolus (mL) 0 mL 05/18/23 1400   Tube Output(mL)(Include Discarded Residual) 450 mL 05/17/23 0600   Intake (mL) - Formula Tube Feeding 360 05/19/23 0600            Urethral Catheter 05/12/23 1630 Latex 16 Fr. (Active)   $ Awad Insertion Bedside Insertion Performed 05/12/23 1630   Site Assessment Clean;Intact 05/18/23 2000   Collection Container Standard drainage bag 05/18/23 2000   Securement Method secured to top of thigh w/ adhesive device 05/18/23 2000   Catheter Care Performed yes 05/18/23 2000   Reason for Continuing Urinary Catheterization Critically ill in ICU and requiring hourly monitoring of intake/output 05/18/23 2000   CAUTI Prevention Bundle Securement Device in place with 1" slack;Intact seal between catheter & drainage tubing;Drainage bag/urimeter off the floor;Sheeting clip in use;No dependent loops or kinks;Drainage bag/urimeter not overfilled (<2/3 full);Drainage bag/urimeter below bladder 05/18/23 2000   Output (mL) 100 mL 05/19/23 0600            Drain/Device    Right lower flank (Active)   Insertion Site dressing intact 05/18/23 2000   Drainage Characteristics/Odor Yellow;Brown 05/18/23 2000   Drainage Amount Scant 05/18/23 2000   General Output (mL) 30 05/19/23 0600       Neurosurgery Physical Exam  Intubated, sedated    Examination limited by sedation.     Significant Labs:  Recent Labs   Lab 05/19/23 0127 05/20/23 0226    137   K 3.8 3.3*   CO2 21* 24   BUN 31.5* 37.5*   CREATININE 2.24* 2.18*   CALCIUM 8.1* 8.4*   MG 1.90 1.90       Recent Labs   Lab 05/19/23 0127 05/20/23 0226   WBC 12.15* 10.76   HGB 7.9* 8.2*   HCT 24.5* 25.9*    159       No results for input(s): LABPT, INR, APTT in the last 48 hours.  Microbiology Results (last 7 days)       Procedure " Component Value Units Date/Time    Blood Culture [[id number]]  (Normal) Collected: 05/16/23 0825    Order Status: Completed Specimen: Blood Updated: 05/20/23 1000     CULTURE, BLOOD ([facility]) No Growth At 96 Hours    Body Fluid Culture [[id number]] Collected: 05/16/23 1359    Order Status: Completed Specimen: Body Fluid from Lung, Left Updated: 05/20/23 0945     Body Fluid Culture No growth at 4 days    Blood Culture [[id number]]  (Normal) Collected: 05/16/23 0656    Order Status: Completed Specimen: Blood Updated: 05/20/23 0800     CULTURE, BLOOD ([facility]) No Growth At 96 Hours    Wound Culture [[id number]]  (Abnormal) Collected: 05/16/23 1313    Order Status: Completed Specimen: Wound from Lumbar Updated: 05/18/23 1305     Wound Culture Few Candida albicans     Comment: Susceptibility sent to reference lab. Results to follow on separate report.       Narrative:      For sensitivity results refer to [address] [place], [place] [id number].    Respiratory Culture [[id number]]  (Abnormal) Collected: 05/16/23 1158    Order Status: Completed Specimen: Sputum from Transtrachael aspirate Updated: 05/18/23 1013     Respiratory Culture Few Yeast     GRAM STAIN Quality 2+      No bacteria seen    AFB Smear [[id number]] Collected: 05/16/23 1359    Order Status: Completed Specimen: Pleural Fluid from Lung, Left Updated: 05/17/23 0924     AFB Smear No AFB seen (Direct smear only)    Gram Stain [[id number]] Collected: 05/16/23 1359    Order Status: Completed Specimen: Body Fluid from Pleural Fluid, Left Updated: 05/17/23 0729     GRAM STAIN No WBCs, No bacteria seen    Mycobacteria and Nocardia Culture [[id number]] Collected: 05/16/23 1359    Order Status: Sent Specimen: Body Fluid from Lung, Left Updated: 05/16/23 1643    Fungal Culture [[id number]] Collected: 05/16/23 1359    Order Status: Sent Specimen: Body Fluid from Lung, Left Updated: 05/16/23 1407    Urine culture [[id number]]  (Abnormal) Collected: 05/12/23 1048    Order Status: Completed Specimen: Urine  Updated: 05/15/23 1018     Urine Culture >/= 100,000 colonies/ml Candida albicans    Blood Culture [[id number]]  (Normal) Collected: 05/09/23 2104    Order Status: Completed Specimen: Blood Updated: 05/14/23 2200     CULTURE, BLOOD ([facility]) No Growth at 5 days              Assessment/Plan:    MRI able to be obtained yesterday.Persistent but decreased size of ventral epidural abscess at L4-L5, with a stable dorsal epidural abscess at L5-S1.     Patient continues on mechanical ventilation at this time.    Wound VAC in place  Infectious disease and antibiotics   Supportive measures   SCDs   Pain control            Active Diagnoses:    Diagnosis Date Noted POA    PRINCIPAL PROBLEM:  Sepsis [A41.9] 05/05/2023 Yes    Moderate malnutrition [E44.0] 05/17/2023 Yes    Staphylococcus aureus bacteremia [R78.81, B95.61] 05/05/2023 Yes      Problems Resolved During this Admission:     -MRI lumbar with and without pending. Hopeful to get it done today with patient intubated and sedated.   -Wound vac in place  -Antibiotics per infectious disease  -Supportive measures  -SCDs      LUIS Moore-BC  Neurosurgery  [facility] - [facility]    Review of Systems

## 2023-05-20 NOTE — PROGRESS NOTES
NEPHROLOGY PROGRESS NOTE      Patient Demographics:  Name:  Reji Plasencia  Age: 63 y.o.  MRN:  [id number]  Admission Date: 5/5/2023      Subjective:      Remains sedated on vent    Renal status better  Excellent output    CRISTY 5/19 NEGATIVE for Vegetations  EF 10-15%    Current Facility-Administered Medications   Medication Dose Route Frequency Provider Last Rate Last Admin    0.9%  NaCl infusion (for blood administration)   Intravenous Q24H PRN Guerrero Alfaro MD        0.9%  NaCl infusion (for blood administration)   Intravenous Q24H PRN Fred Dominguez, DO   New Bag at 05/12/23 1640    0.9%  NaCl infusion (for blood administration)   Intravenous Q24H PRN MARYJO MorrisCNP-BC        0.9%  NaCl infusion (for blood administration)   Intravenous Q24H PRN Guerrero Alfaro MD        0.9%  NaCl infusion (for blood administration)   Intravenous Q24H PRN Guerrero Alfaro MD        0.9%  NaCl infusion (for blood administration)   Intravenous Q24H PRN Guerrero Alfaro MD        albumin human 25% bottle 12.5 g  12.5 g Intravenous Q6H Ccii Nunez MD   Stopped at 05/20/23 0650    aluminum-magnesium hydroxide-simethicone 200-200-20 mg/5 mL suspension 30 mL  30 mL Per OG tube QID PRN Medhat Reece Jr., MD, FCCP        collagenase ointment   Topical (Top) BID Reggie Castañeda MD   Given at 05/20/23 0946    DAPTOmycin (CUBICIN) 435 mg in sodium chloride 0.9% SolP 50 mL IVPB  6 mg/kg Intravenous Q48H Darnell Franco MD   Stopped at 05/19/23 1818    dexmedetomidine (PRECEDEX) 400mcg/100mL 0.9% NaCL infusion  0-1.4 mcg/kg/hr Intravenous Continuous Cici Nunez MD 10.9 mL/hr at 05/20/23 0613 0.6 mcg/kg/hr at 05/20/23 0613    dextrose 10% bolus 125 mL 125 mL  12.5 g Intravenous PRN Melissa Mohan, AGACNP-BC   Stopped at 05/16/23 1243    dextrose 10% bolus 250 mL 250 mL  25 g Intravenous PRN Melissa Mohan AGACNP-BC        famotidine (PF) injection 20 mg  20 mg Intravenous Daily Medhat Reece Jr., MD, FCCP   20 mg at  05/20/23 0828    fentaNYL 2500 mcg in 0.9% sodium chloride 250 mL infusion premix (titrating)  0-200 mcg/hr Intravenous Continuous Cici Nunez MD   Stopped at 05/19/23 1000    fentaNYL injection 25 mcg  25 mcg Intravenous Q2H PRN Steven Cyr DO        fentaNYL injection 50 mcg  50 mcg Intravenous Q2H PRN Mirta Ryan, ANP   50 mcg at 05/20/23 0009    glucagon (human recombinant) injection 1 mg  1 mg Intramuscular PRN Melissa Mohan AGACNP-BC        glucose chewable tablet 16 g  16 g Per OG tube PRN Medhat Reece Jr., MD, FCCP        glucose chewable tablet 24 g  24 g Per OG tube PRN Medhat Reece Jr., MD, FCCP        HYDROmorphone (PF) injection 0.5 mg  0.5 mg Intravenous Q4H PRN Guerrero Alfaro MD   0.5 mg at 05/15/23 1228    insulin aspart U-100 injection 1-10 Units  1-10 Units Subcutaneous QID (AC + HS) PRN MARYJO LackeyCNP-BC   4 Units at 05/20/23 0631    insulin detemir U-100 injection 12 Units  12 Units Subcutaneous QHS Darnell Franco MD   12 Units at 05/19/23 2015    Lactobacillus acidophilus capsule 1 capsule  1 capsule Per NG/OG Tube TID WM Medhat Reece Jr., MD, FCCP   1 capsule at 05/20/23 0829    loperamide capsule 2 mg  2 mg Per OG tube QID PRN Medhat Reece Jr., MD, FCCP        melatonin tablet 6 mg  6 mg Per OG tube Nightly PRN Medhat Reece Jr., MD, FCCP        metoprolol injection 5 mg  5 mg Intravenous Q4H PRN Cici Nunez MD        micafungin 100 mg in sodium chloride 0.9 % 100 mL IVPB (MB+)  100 mg Intravenous Q24H Cici Nunez MD   Stopped at 05/20/23 0447    miconazole NITRATE 2 % top powder   Topical (Top) BID Reggie Castañeda MD   Given at 05/20/23 0946    multivitamin tablet  1 tablet Per OG tube Daily Medhat Reece Jr., MD, FCCP   1 tablet at 05/20/23 0828    NORepinephrine 8 mg in dextrose 5% 250 mL infusion  0-3 mcg/kg/min Intravenous Continuous Medhat Reece Jr., MD, FCCP 2.7 mL/hr at 05/19/23 0100 0.02 mcg/kg/min at 05/19/23 0100    ondansetron  "injection 4 mg  4 mg Intravenous Q4H PRN Nery Sepulveda MD   4 mg at 05/06/23 0434    oxyCODONE immediate release tablet 5 mg  5 mg Oral Q6H William Flynn MD        oxyCODONE-acetaminophen 5-325 mg per tablet 1 tablet  1 tablet Oral Q6H PRN Guerrero Alfaro MD   1 tablet at 05/15/23 2218    polyethylene glycol packet 17 g  17 g Oral BID PRN Nery Sepulveda MD        prochlorperazine injection Soln 5 mg  5 mg Intravenous Q6H PRN Nery Sepulveda MD        propofol (DIPRIVAN) 10 mg/mL infusion  0-50 mcg/kg/min Intravenous Continuous Cici Nunez MD 8.7 mL/hr at 05/20/23 0250 20 mcg/kg/min at 05/20/23 0250    rifAMpin (RIFADIN) 300 mg in dextrose 5 % (D5W) 100 mL IVPB  300 mg Intravenous BID Pedro Luis Vigil DO   Stopped at 05/20/23 0930    senna-docusate 8.6-50 mg per tablet 2 tablet  2 tablet Per OG tube BID PRN Medhat Reece Jr., MD, FCCP        sodium chloride 0.9% flush 10 mL  10 mL Intravenous PRN Nery Sepulveda MD        sodium chloride 0.9% flush 10 mL  10 mL Intravenous Q6H Darnell Franco MD   10 mL at 05/20/23 0550    And    sodium chloride 0.9% flush 10 mL  10 mL Intravenous PRN Darnell Franco MD        sodium chloride 0.9% flush 10 mL  10 mL Intravenous PRN Cici Nunez MD        thiamine tablet 100 mg  100 mg Per OG tube Daily Medhat Reece Jr., MD, FCCP   100 mg at 05/20/23 0828    tiZANidine tablet 4 mg  4 mg Per OG tube Q8H PRN Medhat Reece Jr., MD, FCCP        zinc oxide-cod liver oil 40 % paste   Topical (Top) BID Reggie Castañeda MD   Given at 05/20/23 0946           Review of Systems   Unable to perform ROS: Intubated       Objective:    /69   Pulse 98   Temp 98.8 °F (37.1 °C) (Oral)   Resp (!) 21   Ht 5' 7.01" (1.702 m)   Wt 72.6 kg (160 lb)   SpO2 96%   BMI 25.05 kg/m²       Intake/Output Summary (Last 24 hours) at 5/20/2023 1008  Last data filed at 5/20/2023 0609  Gross per 24 hour   Intake 2569 ml   Output 3420 ml   Net -851 ml             Physical Exam  Vitals reviewed. "   Constitutional:       Appearance: Normal appearance.   HENT:      Head: Normocephalic and atraumatic.      Nose: Nose normal.   Cardiovascular:      Rate and Rhythm: Normal rate and regular rhythm.      Pulses: Normal pulses.      Heart sounds: Normal heart sounds.   Pulmonary:      Comments: On vent  diminished  Abdominal:      General: Bowel sounds are normal.      Palpations: Abdomen is soft.   Musculoskeletal:         General: Normal range of motion.      Cervical back: Normal range of motion.      Comments: Sig deconditioning   Skin:     General: Skin is warm and dry.          Inpatient Diagnostics:  Recent Results (from the past 24 hour(s))   POCT glucose    Collection Time: 05/19/23 12:17 PM   Result Value Ref Range    POCT Glucose 246 (H) 70 - 110 mg/dL   POCT glucose    Collection Time: 05/19/23  5:52 PM   Result Value Ref Range    POCT Glucose 237 (H) 70 - 110 mg/dL   POCT glucose    Collection Time: 05/20/23 12:03 AM   Result Value Ref Range    POCT Glucose 229 (H) 70 - 110 mg/dL   Comprehensive Metabolic Panel    Collection Time: 05/20/23  2:26 AM   Result Value Ref Range    Sodium Level 137 136 - 145 mmol/L    Potassium Level 3.3 (L) 3.5 - 5.1 mmol/L    Chloride 104 98 - 107 mmol/L    Carbon Dioxide 24 23 - 31 mmol/L    Glucose Level 219 (H) 82 - 115 mg/dL    Blood Urea Nitrogen 37.5 (H) 8.4 - 25.7 mg/dL    Creatinine 2.18 (H) 0.73 - 1.18 mg/dL    Calcium Level Total 8.4 (L) 8.8 - 10.0 mg/dL    Protein Total 5.1 (L) 5.8 - 7.6 gm/dL    Albumin Level 2.8 (L) 3.4 - 4.8 g/dL    Globulin 2.3 (L) 2.4 - 3.5 gm/dL    Albumin/Globulin Ratio 1.2 1.1 - 2.0 ratio    Bilirubin Total 0.8 <=1.5 mg/dL    Alkaline Phosphatase 48 40 - 150 unit/L    Alanine Aminotransferase 6 0 - 55 unit/L    Aspartate Aminotransferase 13 5 - 34 unit/L    eGFR 34 mls/min/1.73/m2   Magnesium    Collection Time: 05/20/23  2:26 AM   Result Value Ref Range    Magnesium Level 1.90 1.60 - 2.60 mg/dL   Phosphorus    Collection Time: 05/20/23   2:26 AM   Result Value Ref Range    Phosphorus Level 3.3 2.3 - 4.7 mg/dL   CBC with Differential    Collection Time: 05/20/23  2:26 AM   Result Value Ref Range    WBC 10.76 4.50 - 11.50 x10(3)/mcL    RBC 2.76 (L) 4.70 - 6.10 x10(6)/mcL    Hgb 8.2 (L) 14.0 - 18.0 g/dL    Hct 25.9 (L) 42.0 - 52.0 %    MCV 93.8 80.0 - 94.0 fL    MCH 29.7 27.0 - 31.0 pg    MCHC 31.7 (L) 33.0 - 36.0 g/dL    RDW 16.3 11.5 - 17.0 %    Platelet 159 130 - 400 x10(3)/mcL    MPV 10.3 7.4 - 10.4 fL    Neut % 78.8 %    Lymph % 12.6 %    Mono % 4.5 %    Eos % 2.7 %    Basophil % 0.7 %    Lymph # 1.36 0.6 - 4.6 x10(3)/mcL    Neut # 8.48 2.1 - 9.2 x10(3)/mcL    Mono # 0.48 0.1 - 1.3 x10(3)/mcL    Eos # 0.29 0 - 0.9 x10(3)/mcL    Baso # 0.07 <=0.2 x10(3)/mcL    IG# 0.08 (H) 0 - 0.04 x10(3)/mcL    IG% 0.7 %    NRBC% 0.0 %   RT Blood Gas    Collection Time: 05/20/23  4:03 AM   Result Value Ref Range    Sample Type Arterial Blood     Sample site Arterial Line     Drawn by ned,maryt     pH, Blood gas 7.510 (H) 7.350 - 7.450    pCO2, Blood gas 39.0 35.0 - 45.0 mmHg    pO2, Blood gas 112.0 (H) 80.0 - 100.0 mmHg    Sodium, Blood Gas 135 (L) 137 - 145 mmol/L    Potassium, Blood Gas 3.1 (L) 3.5 - 5.0 mmol/L    Calcium Level Ionized 1.12 1.12 - 1.23 mmol/L    TOC2, Blood gas 32.3 mmol/L    Base Excess, Blood gas 7.50 (H) -2.00 - 2.00 mmol/L    sO2, Blood gas 98.8 %    HCO3, Blood gas 31.1 (H) 22.0 - 26.0 mmol/L    THb, Blood gas 10.4 (L) 12 - 16 g/dL    O2 Hb, Blood Gas 96.1 94.0 - 97.0 %    CO Hgb 2.0 (H) 0.5 - 1.5 %    Met Hgb 1.2 0.4 - 1.5 %    Allens Test N/A     MODE AC     Oxygen Device, Blood gas Ventilator     FIO2, Blood gas 35 %    Mech Vt 470 ml    Mech RR 22 b/min    PEEP 5.0 cmH2O   POCT glucose    Collection Time: 05/20/23  6:29 AM   Result Value Ref Range    POCT Glucose 210 (H) 70 - 110 mg/dL       A/P--NE 05/20    1---RUPERTO/ ATN/ Infectious Process---Indices better/ Excellent output  2---Resp Failure--per Pulm  3---MRSA Bacteremia--Cont Cubicin/  Rifampin--BC 5/16 NEGATIVE  CRISTY NEGATIVE for Vegetations  4---Cervical/ Lumbar Spine Osteomyelitis---Cont Cubicin  5---Cardiomyopathy---per Cardiology  6---Type II DM--Cont same management  7---Sepsis--Wean Pressor--per Pulm  8---Sacral Wound/ Debridement done 5/10--Cont wound care  9---Anemia secondary to Acute Illness/ Fe Def---Follow H&H  10--Hypokalemia---K given/ Follow Mg    IV--Levophed gtt        Precedex gtt        Diprivan gtt    TF--In Progress  Free H20 50cc q 4 hours    ORDERS:  CMP/Mg in am          Mary Grace Lynn DNP, ANP-C    5/20/2023

## 2023-05-20 NOTE — PROGRESS NOTES
[facility] [address] ICU  Pulmonary/Critical Care  Progress Note  5/20/2023    Patient Name: Reji Plasencia  MRN: [id number]  Admission Date: 5/5/2023  Code Status: Partial Code      Subjective:     HPI:  63-year-old male with extensive medical comorbidities including diabetes, untreated hepatitis-C, cirrhosis, IV drug use, tobacco use, current MRSA bacteremia and C-spine/L-spine osteomyelitis/diskitis with bilateral psoas and paraspinous muscle abscesses in addition to a lumbar epidural abscess who was originally admitted to [facility] on 05/05 after [facility] [place] [facility] weeks of hospitalization during which he received IV antibiotics (daptomycin/rifampin) and underwent bilateral lumbar laminectomy and foraminotomy with drainage of epidural abscess on 04/21 by Dr. Chang.  Previous hospital stay was also complicated by cholecystitis for which he underwent MRCP and cholecystostomy tube placement (4/19) in addition to paracentesis.  Also during previous hospitalization he had a positive urine drug screen on 04/27 for amphetamines suggesting drug use while hospitalized.       During his stay at [facility] he has been followed by Neurosurgery, Infectious Disease, Gastroenterology, and Nephrology.  Echocardiogram on 05/06 with EF 50-55%, no mention of vegetations.  Has been awaiting MRI lumbar spine and CT of the left lower extremity secondary to fluid collection on ultrasound which was 9 x 4 x 2 cm.  However these imaging modalities have not been able to be performed yet.  He underwent sacral ulcer debridement on 05/10.  Hematochezia on 05/12 resulted in transfusion of 4 units of blood and 1 unit FFP and nuclear medicine bleeding scan revealed radiotracer accumulation with most intense activity in ascending colon prompting colonoscopy which showed pancolonic diverticula without active bleed and anorectal hyperemic mucosa.     He was upgraded to ICU on 5/16 for tenuous respiratory  status, on BiPAP 14/6 with 100% FiO2 maintaining saturations in the low/mid 80s and near hypotension with tachycardia.  His respiratory status further declined requiring intubation/mechanical ventilatory support.    Significant events:  CRISTY:  05/18/2022:  LVSF 10-15% with no signs of intracardiac infection vegetation or thrombus    24hr Interval History:  No acute events overnight.  Afebrile.  Hemodynamically stable.  Remains intubated ventilated sedated with fentanyl Precedex and propofol.  Currently on Levophed at 0.01. Leukocytosis improved.  Replacing potassium.  MRI yesterday evening with persistent but decreased size of ventral epidural abscess and stable dorsal epidural abscess with rim enhancing fluid in laminectomy bed and residual fluid collection in right retroperitoneum and psoas muscle.  Neurosurgery following.  Awake and alert despite sedation.    Scheduled Medications:   albumin human 25%  12.5 g Intravenous Q6H    collagenase   Topical (Top) BID    DAPTOmycin (CUBICIN) IV (PEDS and ADULTS)  6 mg/kg Intravenous Q48H    famotidine (PF)  20 mg Intravenous Daily    insulin detemir U-100  12 Units Subcutaneous QHS    Lactobacillus acidophilus  1 capsule Per NG/OG Tube TID WM    micafungin (MYCAMINE) IVPB  100 mg Intravenous Q24H    miconazole NITRATE 2 %   Topical (Top) BID    multivitamin  1 tablet Per OG tube Daily    rifAMpin (RIFADIN) IVPB  300 mg Intravenous BID    sodium chloride 0.9%  10 mL Intravenous Q6H    thiamine  100 mg Per OG tube Daily    zinc oxide-cod liver oil   Topical (Top) BID       Continuous Infusions:   dexmedeTOMIDine (Precedex) infusion (titrating) 0.6 mcg/kg/hr (05/19/23 2237)    fentanyl Stopped (05/19/23 1000)    NORepinephrine bitartrate-D5W 0.02 mcg/kg/min (05/19/23 0100)    propofoL 20 mcg/kg/min (05/20/23 0250)       History reviewed. No pertinent past medical history.    Past Surgical History:   Procedure Laterality Date    COLONOSCOPY N/A 5/14/2023    Procedure:  COLONOSCOPY;  Surgeon: Shaneka Ayers MD;  Location: [facility] OR;  Service: Gastroenterology;  Laterality: N/A;    DEBRIDEMENT OF SACRAL WOUND N/A 5/10/2023    Procedure: DEBRIDEMENT, WOUND, SACRUM;  Surgeon: Reggie Castañeda MD;  Location: [facility] OR;  Service: General;  Laterality: N/A;       Objective:     Input/output:    Intake/Output Summary (Last 24 hours) at 5/20/2023 0523  Last data filed at 5/20/2023 0447  Gross per 24 hour   Intake 1972.9 ml   Output 1630 ml   Net 342.9 ml         Vital Signs (Most Recent):  Temp: 98.8 °F (37.1 °C) (05/20/23 0352)  Pulse: 99 (05/20/23 0430)  Resp: 20 (05/20/23 0430)  BP: 97/65 (05/20/23 0430)  SpO2: 97 % (05/20/23 0500)  Body mass index is 25.05 kg/m².  Weight: 72.6 kg (160 lb) Vital Signs (24h Range):  Temp:  [97.7 °F (36.5 °C)-99.4 °F (37.4 °C)] 98.8 °F (37.1 °C)  Pulse:  [] 99  Resp:  [18-43] 20  SpO2:  [95 %-100 %] 97 %  BP: ()/(60-79) 97/65     Physical Exam  Constitutional:       Comments: Awake and alert. Appears anxious.    HENT:      Mouth/Throat:      Comments: Endotracheal tube secured to external os  Eyes:      Pupils: Pupils are equal, round, and reactive to light.   Cardiovascular:      Rate and Rhythm: Normal rate and regular rhythm.      Heart sounds: No murmur heard.  Pulmonary:      Comments: Mild decreased breath sounds posterior bases bilateral, normal expiratory phase with no wheezes, few bilateral scattered rhonchi  Abdominal:      General: Bowel sounds are normal.      Palpations: Abdomen is soft.      Comments: Mild fluid wave   Musculoskeletal:      Right lower leg: Edema (1-2 cm pretibial) present.      Left lower leg: Edema (1-2 cm pretibial) present.      Comments: Bilateral upper extremity 1-2 cm edema   Lymphadenopathy:      Cervical: No cervical adenopathy.   Skin:     General: Skin is warm and dry.   Neurological:      Comments: Awake and alert. Shakes head yes to feeling sob and uncomfortable. Denies any pain.        Lines/Drains/Airways        Peripherally Inserted Central Catheter Line  Duration             PICC Double Lumen 05/10/23 0817 right brachial 9 days              Drain  Duration                  Drain/Device    Right lower flank -- days         Urethral Catheter 05/12/23 1630 Latex 16 Fr. 7 days         NG/OG Tube 05/16/23 0300 Center mouth 4 days              Airway  Duration                  Airway - Non-Surgical 05/16/23 0239 4 days                    Vent:  Vent Mode: A/C (05/20/23 0500)  Set Rate: 18 BPM (05/20/23 0500)  Vt Set: 450 mL (05/20/23 0500)  Pressure Support: 10 cmH20 (05/18/23 1225)  PEEP/CPAP: 5 cmH20 (05/20/23 0500)  Oxygen Concentration (%): 30 (05/20/23 0500)  Peak Airway Pressure: 27 cmH20 (05/20/23 0500)  Total Ve: 9.3 L/m (05/20/23 0300)  F/VT Ratio<105 (RSBI): (!) 55.02 (05/20/23 0300)    ABGs:  No results found for: PH, PO2, PCO2, [id number]      Significant Labs:    Lab Results   Component Value Date    WBC 10.76 05/20/2023    HGB 8.2 (L) 05/20/2023    HCT 25.9 (L) 05/20/2023    MCV 93.8 05/20/2023     05/20/2023         Recent Labs   Lab 05/20/23  0226      K 3.3*   CO2 24   BUN 37.5*   CREATININE 2.18*   CALCIUM 8.4*   MG 1.90   AST 13   ALT 6   ALKPHOS 48   ALBUMIN 2.8*       Imaging:   CXR this AM appears similar but somewhat improved from prior CXR 5/17.    Assessment:     Persistent methicillin-resistant Staphylococcus aureus sepsis with C-spine/L-spine osteomyelitis/diskitis, lumbar epidural abscess (post drainage of epidural abscess with bilateral lumbar laminectomy and foraminotomies 04/21/2023 at [facility]), bilateral psoas abscesses.  Candida albicans UTI  Septic shock with hypotension requiring vasopressor  Pleural effusions, with superimposed pulmonary infiltrates s/p left thoracentesis on 05/16 consistent with exudative effusion   Severe cardiomyopathy with marked decreased LVSF (EF 10-15%), significant decrease over prior transthoracic echocardiogram without evidence of endocarditis by CRISTY  05/17/2023  Acute respiratory failure, intubated 05/16/2023, requiring ongoing mechanical ventilation.  Cholecystitis post percutaneous cholecystostomy tube placement at [facility] 04/19/2023.  Biliary drain remains in place.  Sacral ulcer s/p debridement on 05/10  Hematochezia s/p transfusions/colonoscopy with diverticula on 05/14  Acute kidney injury, nonoliguric  Untreated hepatitis-C with cirrhosis and ascites  History of polysubstance and IV drug abuse up until this hospital stay    Plan:     Continue IV daptomycin, rifampin, and micafungin.  Unsure if infectious Disease is still following.  Leukocytosis improved.  Afebrile.  Continue vasopressors to maintain mean arterial pressure of 65 or greater.  Presently on Levophed at 0.01.  Wean ventilator to spontaneous breathing trial as tolerated.   Currently requiring fentanyl Precedex and propofol for sedation, wean as tolerated  MRI yesterday with persistent epidural abscess and rim enhancing fluid in laminectomy bed/subcutaneous soft tissue  palliative care, Nephrology, Neurosurgery, and wound care following  Replaced potassium  Remains on albumin    32 minutes of critical care was time spent personally by me on the following activities: development of treatment plan with patient or surrogate and bedside caregivers, discussions with consultants, evaluation of patient's response to treatment, examination of patient, ordering and performing treatments and interventions, ordering and review of laboratory studies, ordering and review of radiographic studies, pulse oximetry, re-evaluation of patient's condition.  This patient demonstrates a high probability for further clinical decompensation due to ongoing critical illness.  Critical care time did not overlap with that of any other provider or involve time for any procedures.     Cici Nunez MD  Pulmonary/Critical Care

## 2023-05-21 LAB
ALBUMIN SERPL-MCNC: 3 G/DL (ref 3.4–4.8)
ALBUMIN/GLOB SERPL: 1.2 RATIO (ref 1.1–2)
ALLENS TEST BLOOD GAS (OHS): YES
ALP SERPL-CCNC: 56 UNIT/L (ref 40–150)
ALT SERPL-CCNC: 8 UNIT/L (ref 0–55)
AST SERPL-CCNC: 18 UNIT/L (ref 5–34)
BACTERIA BLD CULT: NORMAL
BACTERIA BLD CULT: NORMAL
BACTERIA FLD CULT: NORMAL
BASE EXCESS BLD CALC-SCNC: 7 MMOL/L (ref -2–2)
BASOPHILS # BLD AUTO: 0.07 X10(3)/MCL
BASOPHILS NFR BLD AUTO: 0.5 %
BILIRUBIN DIRECT+TOT PNL SERPL-MCNC: 0.9 MG/DL
BLOOD GAS SAMPLE TYPE (OHS): ABNORMAL
BUN SERPL-MCNC: 42.2 MG/DL (ref 8.4–25.7)
CA-I BLD-SCNC: 1.14 MMOL/L (ref 1.12–1.23)
CALCIUM SERPL-MCNC: 8.2 MG/DL (ref 8.8–10)
CHLORIDE SERPL-SCNC: 105 MMOL/L (ref 98–107)
CO2 BLDA-SCNC: 31 MMOL/L
CO2 SERPL-SCNC: 22 MMOL/L (ref 23–31)
COHGB MFR BLDA: 2.3 % (ref 0.5–1.5)
CREAT SERPL-MCNC: 1.96 MG/DL (ref 0.73–1.18)
DRAWN BY BLOOD GAS (OHS): ABNORMAL
EOSINOPHIL # BLD AUTO: 0.33 X10(3)/MCL (ref 0–0.9)
EOSINOPHIL NFR BLD AUTO: 2.6 %
ERYTHROCYTE [DISTWIDTH] IN BLOOD BY AUTOMATED COUNT: 16.3 % (ref 11.5–17)
FIO2 BLOOD GAS (OHS): 30 %
GFR SERPLBLD CREATININE-BSD FMLA CKD-EPI: 38 MLS/MIN/1.73/M2
GLOBULIN SER-MCNC: 2.5 GM/DL (ref 2.4–3.5)
GLUCOSE SERPL-MCNC: 181 MG/DL (ref 82–115)
HCO3 BLDA-SCNC: 29.9 MMOL/L (ref 22–26)
HCT VFR BLD AUTO: 25.9 % (ref 42–52)
HGB BLD-MCNC: 8.4 G/DL (ref 14–18)
IMM GRANULOCYTES # BLD AUTO: 0.07 X10(3)/MCL (ref 0–0.04)
IMM GRANULOCYTES NFR BLD AUTO: 0.5 %
LYMPHOCYTES # BLD AUTO: 1.18 X10(3)/MCL (ref 0.6–4.6)
LYMPHOCYTES NFR BLD AUTO: 9.2 %
MAGNESIUM SERPL-MCNC: 1.9 MG/DL (ref 1.6–2.6)
MCH RBC QN AUTO: 30.3 PG (ref 27–31)
MCHC RBC AUTO-ENTMCNC: 32.4 G/DL (ref 33–36)
MCV RBC AUTO: 93.5 FL (ref 80–94)
MECH VT (OHS): 450 ML
METHGB MFR BLDA: 1.1 % (ref 0.4–1.5)
MODE (OHS): AC
MONOCYTES # BLD AUTO: 0.67 X10(3)/MCL (ref 0.1–1.3)
MONOCYTES NFR BLD AUTO: 5.2 %
NEUTROPHILS # BLD AUTO: 10.5 X10(3)/MCL (ref 2.1–9.2)
NEUTROPHILS NFR BLD AUTO: 82 %
NRBC BLD AUTO-RTO: 0 %
O2 HB BLOOD GAS (OHS): 93.8 % (ref 94–97)
OXYGEN DEVICE BLOOD GAS (OHS): ABNORMAL
OXYHGB MFR BLDA: 8.7 G/DL (ref 12–16)
PCO2 BLDA: 35 MMHG (ref 35–45)
PEEP (OHS): 5 CMH2O
PH BLDA: 7.54 [PH] (ref 7.35–7.45)
PLATELET # BLD AUTO: 173 X10(3)/MCL (ref 130–400)
PMV BLD AUTO: 10.6 FL (ref 7.4–10.4)
PO2 BLDA: 69 MMHG (ref 80–100)
POCT GLUCOSE: 148 MG/DL (ref 70–110)
POCT GLUCOSE: 187 MG/DL (ref 70–110)
POCT GLUCOSE: 206 MG/DL (ref 70–110)
POCT GLUCOSE: 219 MG/DL (ref 70–110)
POTASSIUM BLOOD GAS (OHS): 3.3 MMOL/L (ref 3.5–5)
POTASSIUM SERPL-SCNC: 3.5 MMOL/L (ref 3.5–5.1)
PROT SERPL-MCNC: 5.5 GM/DL (ref 5.8–7.6)
RBC # BLD AUTO: 2.77 X10(6)/MCL (ref 4.7–6.1)
SAMPLE SITE BLOOD GAS (OHS): ABNORMAL
SAO2 % BLDA: 95.6 %
SODIUM BLOOD GAS (OHS): 136 MMOL/L (ref 137–145)
SODIUM SERPL-SCNC: 140 MMOL/L (ref 136–145)
SPONT RR (OHS): 10 B/MIN
WBC # SPEC AUTO: 12.82 X10(3)/MCL (ref 4.5–11.5)

## 2023-05-21 PROCEDURE — P9047 ALBUMIN (HUMAN), 25%, 50ML: HCPCS | Mod: JG | Performed by: STUDENT IN AN ORGANIZED HEALTH CARE EDUCATION/TRAINING PROGRAM

## 2023-05-21 PROCEDURE — 80053 COMPREHEN METABOLIC PANEL: CPT | Performed by: STUDENT IN AN ORGANIZED HEALTH CARE EDUCATION/TRAINING PROGRAM

## 2023-05-21 PROCEDURE — 20000000 HC ICU ROOM

## 2023-05-21 PROCEDURE — 27200966 HC CLOSED SUCTION SYSTEM

## 2023-05-21 PROCEDURE — 63600175 PHARM REV CODE 636 W HCPCS: Performed by: INTERNAL MEDICINE

## 2023-05-21 PROCEDURE — 25000003 PHARM REV CODE 250: Performed by: INTERNAL MEDICINE

## 2023-05-21 PROCEDURE — 85025 COMPLETE CBC W/AUTO DIFF WBC: CPT | Performed by: STUDENT IN AN ORGANIZED HEALTH CARE EDUCATION/TRAINING PROGRAM

## 2023-05-21 PROCEDURE — 83735 ASSAY OF MAGNESIUM: CPT | Performed by: STUDENT IN AN ORGANIZED HEALTH CARE EDUCATION/TRAINING PROGRAM

## 2023-05-21 PROCEDURE — 94003 VENT MGMT INPAT SUBQ DAY: CPT

## 2023-05-21 PROCEDURE — 36600 WITHDRAWAL OF ARTERIAL BLOOD: CPT

## 2023-05-21 PROCEDURE — 25000003 PHARM REV CODE 250: Performed by: STUDENT IN AN ORGANIZED HEALTH CARE EDUCATION/TRAINING PROGRAM

## 2023-05-21 PROCEDURE — 99900035 HC TECH TIME PER 15 MIN (STAT)

## 2023-05-21 PROCEDURE — 94761 N-INVAS EAR/PLS OXIMETRY MLT: CPT

## 2023-05-21 PROCEDURE — 27000207 HC ISOLATION

## 2023-05-21 PROCEDURE — 99900026 HC AIRWAY MAINTENANCE (STAT)

## 2023-05-21 PROCEDURE — 63600175 PHARM REV CODE 636 W HCPCS: Performed by: STUDENT IN AN ORGANIZED HEALTH CARE EDUCATION/TRAINING PROGRAM

## 2023-05-21 PROCEDURE — 82803 BLOOD GASES ANY COMBINATION: CPT

## 2023-05-21 PROCEDURE — 63600175 PHARM REV CODE 636 W HCPCS: Mod: JG | Performed by: STUDENT IN AN ORGANIZED HEALTH CARE EDUCATION/TRAINING PROGRAM

## 2023-05-21 PROCEDURE — 27000221 HC OXYGEN, UP TO 24 HOURS

## 2023-05-21 PROCEDURE — A4216 STERILE WATER/SALINE, 10 ML: HCPCS | Performed by: STUDENT IN AN ORGANIZED HEALTH CARE EDUCATION/TRAINING PROGRAM

## 2023-05-21 PROCEDURE — 63600175 PHARM REV CODE 636 W HCPCS: Performed by: NURSE PRACTITIONER

## 2023-05-21 RX ADMIN — DEXMEDETOMIDINE HYDROCHLORIDE 0.4 MCG/KG/HR: 400 INJECTION INTRAVENOUS at 07:05

## 2023-05-21 RX ADMIN — THERA TABS 1 TABLET: TAB at 08:05

## 2023-05-21 RX ADMIN — Medication: at 09:05

## 2023-05-21 RX ADMIN — THIAMINE HCL TAB 100 MG 100 MG: 100 TAB at 08:05

## 2023-05-21 RX ADMIN — SODIUM CHLORIDE, PRESERVATIVE FREE 10 ML: 5 INJECTION INTRAVENOUS at 12:05

## 2023-05-21 RX ADMIN — INSULIN ASPART 2 UNITS: 100 INJECTION, SOLUTION INTRAVENOUS; SUBCUTANEOUS at 12:05

## 2023-05-21 RX ADMIN — FAMOTIDINE 20 MG: 10 INJECTION, SOLUTION INTRAVENOUS at 08:05

## 2023-05-21 RX ADMIN — Medication 1 CAPSULE: at 05:05

## 2023-05-21 RX ADMIN — DEXMEDETOMIDINE HYDROCHLORIDE 0.6 MCG/KG/HR: 400 INJECTION INTRAVENOUS at 05:05

## 2023-05-21 RX ADMIN — COLLAGENASE SANTYL: 250 OINTMENT TOPICAL at 09:05

## 2023-05-21 RX ADMIN — INSULIN ASPART 2 UNITS: 100 INJECTION, SOLUTION INTRAVENOUS; SUBCUTANEOUS at 06:05

## 2023-05-21 RX ADMIN — DAPTOMYCIN 435 MG: 500 INJECTION, POWDER, LYOPHILIZED, FOR SOLUTION INTRAVENOUS at 06:05

## 2023-05-21 RX ADMIN — SODIUM CHLORIDE, PRESERVATIVE FREE 10 ML: 5 INJECTION INTRAVENOUS at 05:05

## 2023-05-21 RX ADMIN — Medication 1 CAPSULE: at 12:05

## 2023-05-21 RX ADMIN — PROPOFOL 35 MCG/KG/MIN: 10 INJECTION, EMULSION INTRAVENOUS at 07:05

## 2023-05-21 RX ADMIN — PROPOFOL 20 MCG/KG/MIN: 10 INJECTION, EMULSION INTRAVENOUS at 03:05

## 2023-05-21 RX ADMIN — OXYCODONE HYDROCHLORIDE 5 MG: 5 TABLET ORAL at 12:05

## 2023-05-21 RX ADMIN — DEXMEDETOMIDINE HYDROCHLORIDE 1.4 MCG/KG/HR: 400 INJECTION INTRAVENOUS at 02:05

## 2023-05-21 RX ADMIN — NOREPINEPHRINE BITARTRATE 0.02 MCG/KG/MIN: 8 INJECTION, SOLUTION INTRAVENOUS at 05:05

## 2023-05-21 RX ADMIN — MICONAZOLE NITRATE 2 % TOPICAL POWDER: at 09:05

## 2023-05-21 RX ADMIN — OXYCODONE HYDROCHLORIDE 5 MG: 5 TABLET ORAL at 05:05

## 2023-05-21 RX ADMIN — DEXMEDETOMIDINE HYDROCHLORIDE 0.6 MCG/KG/HR: 400 INJECTION INTRAVENOUS at 12:05

## 2023-05-21 RX ADMIN — INSULIN ASPART 4 UNITS: 100 INJECTION, SOLUTION INTRAVENOUS; SUBCUTANEOUS at 12:05

## 2023-05-21 RX ADMIN — ALBUMIN (HUMAN) 12.5 G: 12.5 SOLUTION INTRAVENOUS at 05:05

## 2023-05-21 RX ADMIN — POTASSIUM BICARBONATE 50 MEQ: 977.5 TABLET, EFFERVESCENT ORAL at 09:05

## 2023-05-21 RX ADMIN — DEXTROSE MONOHYDRATE 300 MG: 50 INJECTION, SOLUTION INTRAVENOUS at 08:05

## 2023-05-21 RX ADMIN — DEXTROSE MONOHYDRATE 300 MG: 50 INJECTION, SOLUTION INTRAVENOUS at 09:05

## 2023-05-21 RX ADMIN — Medication 1 CAPSULE: at 08:05

## 2023-05-21 RX ADMIN — ALBUMIN (HUMAN) 12.5 G: 12.5 SOLUTION INTRAVENOUS at 12:05

## 2023-05-21 RX ADMIN — MICAFUNGIN SODIUM 100 MG: 100 INJECTION, POWDER, LYOPHILIZED, FOR SOLUTION INTRAVENOUS at 03:05

## 2023-05-21 RX ADMIN — INSULIN DETEMIR 12 UNITS: 100 INJECTION, SOLUTION SUBCUTANEOUS at 09:05

## 2023-05-21 NOTE — PROGRESS NOTES
Infectious Diseases Progress Note  62-year-old male with past medical history of DM type 2, untreated hepatitis-C, decompensated liver cirrhosis, IV drug use, known to my service from his hospitalization at our [facility] before he left [facility] on 05/05/2023 and presented to this facility [facility] later the same day, reporting not being satisfied with the care over at our [facility].  While he was there we were treating him for MRSA bacteremia, with associated C-spine-L-spine osteomyelitis/diskitis/bilateral psoas, paraspinal muscle and epidural abscesses.  He is status post lumbar laminectomy with drainage of epidural abscess by Neurosurgery on 04/21.  He also was found to have acute cholecystitis and status post cholecystostomy tube placement.  He was on antibiotic coverage with vancomycin, rifampin for MRSA and had completed a course of Zosyn which was to cover for cholecystitis.  He however continued to have persistent bacteremia with multiple repeated positive blood cultures, and hence was placed on daptomycin with discontinuation of vancomycin.  On presentation he is noted to have no fevers but did have leukocytosis of 17.1 which is normalized.  CRP 76, ESR 7, anemic with low albumin.  Urine toxicology test positive for opiates and amphetamines.  Blood cultures from 05/05 with MRSA per BCID panel.  Urine culture with [phone] colonies of candida albicans.  He is currently on antibiotic coverage with daptomycin, rifampin and Zosyn.       Subjective:  Remains in ICU. Intubated and on vent. No new complaints reported. Afebrile     Review of Systems   Unable to perform ROS: Intubated     Review of patient's allergies indicates:  No Known Allergies    History reviewed. No pertinent past medical history.    Past Surgical History:   Procedure Laterality Date    COLONOSCOPY N/A 5/14/2023    Procedure: COLONOSCOPY;  Surgeon: Shaneka Ayers MD;  Location: [facility]  "OR;  Service: Gastroenterology;  Laterality: N/A;    DEBRIDEMENT OF SACRAL WOUND N/A 5/10/2023    Procedure: DEBRIDEMENT, WOUND, SACRUM;  Surgeon: Reggie Castañeda MD;  Location: [facility] OR;  Service: General;  Laterality: N/A;       Social History     Socioeconomic History    Marital status: Single         Scheduled Meds:   albumin human 25%  12.5 g Intravenous Q6H    collagenase   Topical (Top) BID    DAPTOmycin (CUBICIN) IV (PEDS and ADULTS)  6 mg/kg Intravenous Q48H    famotidine (PF)  20 mg Intravenous Daily    insulin detemir U-100  12 Units Subcutaneous QHS    Lactobacillus acidophilus  1 capsule Per NG/OG Tube TID WM    micafungin (MYCAMINE) IVPB  100 mg Intravenous Q24H    miconazole NITRATE 2 %   Topical (Top) BID    multivitamin  1 tablet Per OG tube Daily    oxyCODONE  5 mg Oral Q6H    rifAMpin (RIFADIN) IVPB  300 mg Intravenous BID    sodium chloride 0.9%  10 mL Intravenous Q6H    thiamine  100 mg Per OG tube Daily    zinc oxide-cod liver oil   Topical (Top) BID     Continuous Infusions:   dexmedeTOMIDine (Precedex) infusion (titrating) 0.6 mcg/kg/hr (05/20/23 1551)    fentanyl Stopped (05/19/23 1000)    NORepinephrine bitartrate-D5W 0.02 mcg/kg/min (05/20/23 0800)    propofoL 20 mcg/kg/min (05/20/23 1856)     PRN Meds:sodium chloride, sodium chloride, sodium chloride, sodium chloride, sodium chloride, sodium chloride, aluminum-magnesium hydroxide-simethicone, dextrose 10%, dextrose 10%, fentaNYL, fentaNYL, glucagon (human recombinant), glucose, glucose, HYDROmorphone, insulin aspart U-100, loperamide, melatonin, metoprolol, ondansetron, oxyCODONE-acetaminophen, polyethylene glycol, prochlorperazine, senna-docusate 8.6-50 mg, sodium chloride 0.9%, Flushing PICC Protocol **AND** sodium chloride 0.9% **AND** sodium chloride 0.9%, sodium chloride 0.9%, tiZANidine    Objective:  /72   Pulse 93   Temp 97.9 °F (36.6 °C) (Oral)   Resp (!) 29   Ht 5' 7.01" (1.702 m)   Wt 72.6 kg (160 lb)   SpO2 96%   " BMI 25.05 kg/m²     Physical Exam:   Physical Exam  Vitals reviewed.   Constitutional:       Appearance: He is ill-appearing.   HENT:      Head: Normocephalic.   Cardiovascular:      Rate and Rhythm: Normal rate and regular rhythm.   Pulmonary:      Effort: Pulmonary effort is normal. No respiratory distress.      Breath sounds: No wheezing.      Comments: B/L BS coarse. Intubated and on vent  Abdominal:      General: Bowel sounds are normal. There is no distension.      Palpations: Abdomen is soft.      Tenderness: There is no abdominal tenderness.      Comments: OGT   Musculoskeletal:      Cervical back: Normal range of motion.      Comments: No movement in extremities at this time   Skin:     Findings: No rash.   Neurological:      Comments: Unable to fully assess, pt intubated and on vent       Imaging  5/20/23 MRI L-Spine w/wo  Impression:       1. Evaluation significantly limited by artifact.   2. Persistent but decreased size of ventral epidural abscess at L4-L5, with a stable dorsal epidural abscess at L5-S1.   3. Rim enhancing fluid in the laminectomy bed and subcutaneous soft tissues.   4. Residual fluid collections in the right retroperitoneum and psoas muscle.   5. Mild narrowing of the spinal canal at L3 through S1.        Lab Review   Recent Results (from the past 24 hour(s))   POCT glucose    Collection Time: 05/20/23 12:03 AM   Result Value Ref Range    POCT Glucose 229 (H) 70 - 110 mg/dL   Comprehensive Metabolic Panel    Collection Time: 05/20/23  2:26 AM   Result Value Ref Range    Sodium Level 137 136 - 145 mmol/L    Potassium Level 3.3 (L) 3.5 - 5.1 mmol/L    Chloride 104 98 - 107 mmol/L    Carbon Dioxide 24 23 - 31 mmol/L    Glucose Level 219 (H) 82 - 115 mg/dL    Blood Urea Nitrogen 37.5 (H) 8.4 - 25.7 mg/dL    Creatinine 2.18 (H) 0.73 - 1.18 mg/dL    Calcium Level Total 8.4 (L) 8.8 - 10.0 mg/dL    Protein Total 5.1 (L) 5.8 - 7.6 gm/dL    Albumin Level 2.8 (L) 3.4 - 4.8 g/dL    Globulin 2.3  (L) 2.4 - 3.5 gm/dL    Albumin/Globulin Ratio 1.2 1.1 - 2.0 ratio    Bilirubin Total 0.8 <=1.5 mg/dL    Alkaline Phosphatase 48 40 - 150 unit/L    Alanine Aminotransferase 6 0 - 55 unit/L    Aspartate Aminotransferase 13 5 - 34 unit/L    eGFR 34 mls/min/1.73/m2   Magnesium    Collection Time: 05/20/23  2:26 AM   Result Value Ref Range    Magnesium Level 1.90 1.60 - 2.60 mg/dL   Phosphorus    Collection Time: 05/20/23  2:26 AM   Result Value Ref Range    Phosphorus Level 3.3 2.3 - 4.7 mg/dL   CBC with Differential    Collection Time: 05/20/23  2:26 AM   Result Value Ref Range    WBC 10.76 4.50 - 11.50 x10(3)/mcL    RBC 2.76 (L) 4.70 - 6.10 x10(6)/mcL    Hgb 8.2 (L) 14.0 - 18.0 g/dL    Hct 25.9 (L) 42.0 - 52.0 %    MCV 93.8 80.0 - 94.0 fL    MCH 29.7 27.0 - 31.0 pg    MCHC 31.7 (L) 33.0 - 36.0 g/dL    RDW 16.3 11.5 - 17.0 %    Platelet 159 130 - 400 x10(3)/mcL    MPV 10.3 7.4 - 10.4 fL    Neut % 78.8 %    Lymph % 12.6 %    Mono % 4.5 %    Eos % 2.7 %    Basophil % 0.7 %    Lymph # 1.36 0.6 - 4.6 x10(3)/mcL    Neut # 8.48 2.1 - 9.2 x10(3)/mcL    Mono # 0.48 0.1 - 1.3 x10(3)/mcL    Eos # 0.29 0 - 0.9 x10(3)/mcL    Baso # 0.07 <=0.2 x10(3)/mcL    IG# 0.08 (H) 0 - 0.04 x10(3)/mcL    IG% 0.7 %    NRBC% 0.0 %   RT Blood Gas    Collection Time: 05/20/23  4:03 AM   Result Value Ref Range    Sample Type Arterial Blood     Sample site Arterial Line     Drawn by smb,lrt     pH, Blood gas 7.510 (H) 7.350 - 7.450    pCO2, Blood gas 39.0 35.0 - 45.0 mmHg    pO2, Blood gas 112.0 (H) 80.0 - 100.0 mmHg    Sodium, Blood Gas 135 (L) 137 - 145 mmol/L    Potassium, Blood Gas 3.1 (L) 3.5 - 5.0 mmol/L    Calcium Level Ionized 1.12 1.12 - 1.23 mmol/L    TOC2, Blood gas 32.3 mmol/L    Base Excess, Blood gas 7.50 (H) -2.00 - 2.00 mmol/L    sO2, Blood gas 98.8 %    HCO3, Blood gas 31.1 (H) 22.0 - 26.0 mmol/L    THb, Blood gas 10.4 (L) 12 - 16 g/dL    O2 Hb, Blood Gas 96.1 94.0 - 97.0 %    CO Hgb 2.0 (H) 0.5 - 1.5 %    Met Hgb 1.2 0.4 - 1.5 %     Allens Test N/A     MODE AC     Oxygen Device, Blood gas Ventilator     FIO2, Blood gas 35 %    Mech Vt 470 ml    Mech RR 22 b/min    PEEP 5.0 cmH2O   POCT glucose    Collection Time: 05/20/23  6:29 AM   Result Value Ref Range    POCT Glucose 210 (H) 70 - 110 mg/dL   POCT glucose    Collection Time: 05/20/23 12:49 PM   Result Value Ref Range    POCT Glucose 241 (H) 70 - 110 mg/dL   POCT glucose    Collection Time: 05/20/23  5:51 PM   Result Value Ref Range    POCT Glucose 183 (H) 70 - 110 mg/dL       Assessment/Plan:  1. MRSA bacteremia  2. L-spine/C-spine MRSA osteomyelitis/diskitis/psoas/paraspinal/epidural abscesses  3. Cholecystitis status post cholecystotomy tube   4. Candiduria  5. Elevated CRP  6. Chronic hepatitis-C   7. Decompensated liver cirrhosis   8. Polysubstance abuse/IVDU   9. Diabetes type 2  10. Anemia   11. Protein calorie malnutrition       -Continue Daptomycin #15, Rifampin #15 and Zosyn #15  -Will need a 6-8 week course of IV antibiotics with likely subsequent transitioning to oral anti MRSA coverage. Follow weekly inflammatory markers  -5/20 CPK 13 from 21  -Afebrile with leukocytosis resolved, follow  -5/5 blood cultures with MRSA 2/2 sets. 5/9 blood cultures negative and 5/20 blood cultures negative thus far, follow  -Renal dysfunction improved and Crea normalized. Nephrology on board, inputs noted  -Continue wound care  -Surgery consulted, inputs noted. S/P sacral wound debridement 5/10  -Neurosurgery on board. MRI L-Spine w/wo contrast with findings of stable epidural abscess, rim enhancing fluid in the laminectomy bed and subcutaneous soft tissues, residual fluid collection in R retroperitoneum and psoas muscle.   -Plan to keep cholecystostomy tube in place for 4-6 weeks   -Vent management and ICU support per Intensivist  -Discussed with patient and nursing staff

## 2023-05-21 NOTE — PROGRESS NOTES
[facility] [address] ICU  Pulmonary/Critical Care  Progress Note  5/21/2023    Patient Name: Reji Plasencia  MRN: [id number]  Admission Date: 5/5/2023  Code Status: Partial Code      Subjective:     HPI:  63-year-old male with extensive medical comorbidities including diabetes, untreated hepatitis-C, cirrhosis, IV drug use, tobacco use, current MRSA bacteremia and C-spine/L-spine osteomyelitis/diskitis with bilateral psoas and paraspinous muscle abscesses in addition to a lumbar epidural abscess who was originally admitted to [facility] on 05/05 after [facility] after 3 weeks of hospitalization during which he received IV antibiotics (daptomycin/rifampin) and underwent bilateral lumbar laminectomy and foraminotomy with drainage of epidural abscess on 04/21 by Dr. Chang.  Previous hospital stay was also complicated by cholecystitis for which he underwent MRCP and cholecystostomy tube placement (4/19) in addition to paracentesis.  Also during previous hospitalization he had a positive urine drug screen on 04/27 for amphetamines suggesting drug use while hospitalized.       During his stay at [facility] he has been followed by Neurosurgery, Infectious Disease, Gastroenterology, and Nephrology.  Echocardiogram on 05/06 with EF 50-55%, no mention of vegetations.  Has been awaiting MRI lumbar spine and CT of the left lower extremity secondary to fluid collection on ultrasound which was 9 x 4 x 2 cm.  However these imaging modalities have not been able to be performed yet.  He underwent sacral ulcer debridement on 05/10.  Hematochezia on 05/12 resulted in transfusion of 4 units of blood and 1 unit FFP and nuclear medicine bleeding scan revealed radiotracer accumulation with most intense activity in ascending colon prompting colonoscopy which showed pancolonic diverticula without active bleed and anorectal hyperemic mucosa.     He was upgraded to ICU on 5/16 for tenuous respiratory  status, on BiPAP 14/6 with 100% FiO2 maintaining saturations in the low/mid 80s and near hypotension with tachycardia.  His respiratory status further declined requiring intubation/mechanical ventilatory support.    Significant events:  CRISTY:  05/18/2022:  LVSF 10-15% with no signs of intracardiac infection vegetation or thrombus    24hr Interval History:  No acute events overnight.  Afebrile.  Hemodynamically stable.  Remains intubated ventilated sedated with Precedex and propofol.  Currently on Levophed at 0.02.  Lab work stable.  Renal function slightly improved.  Remains on tube feeds with free water flushes. Replacing potassium. Awake and alert despite sedation.    Scheduled Medications:   albumin human 25%  12.5 g Intravenous Q6H    collagenase   Topical (Top) BID    DAPTOmycin (CUBICIN) IV (PEDS and ADULTS)  6 mg/kg Intravenous Q48H    famotidine (PF)  20 mg Intravenous Daily    insulin detemir U-100  12 Units Subcutaneous QHS    Lactobacillus acidophilus  1 capsule Per NG/OG Tube TID WM    micafungin (MYCAMINE) IVPB  100 mg Intravenous Q24H    miconazole NITRATE 2 %   Topical (Top) BID    multivitamin  1 tablet Per OG tube Daily    oxyCODONE  5 mg Oral Q6H    rifAMpin (RIFADIN) IVPB  300 mg Intravenous BID    sodium chloride 0.9%  10 mL Intravenous Q6H    thiamine  100 mg Per OG tube Daily    zinc oxide-cod liver oil   Topical (Top) BID       Continuous Infusions:   dexmedeTOMIDine (Precedex) infusion (titrating) 0.6 mcg/kg/hr (05/21/23 0548)    fentanyl Stopped (05/19/23 1000)    NORepinephrine bitartrate-D5W 0.02 mcg/kg/min (05/20/23 0800)    propofoL 20 mcg/kg/min (05/21/23 0314)       History reviewed. No pertinent past medical history.    Past Surgical History:   Procedure Laterality Date    COLONOSCOPY N/A 5/14/2023    Procedure: COLONOSCOPY;  Surgeon: Shaneka Ayers MD;  Location: [facility];  Service: Gastroenterology;  Laterality: N/A;    DEBRIDEMENT OF SACRAL WOUND N/A 5/10/2023    Procedure: DEBRIDEMENT,  WOUND, SACRUM;  Surgeon: Reggie Castañeda MD;  Location: [facility] OR;  Service: General;  Laterality: N/A;       Objective:     Input/output:    Intake/Output Summary (Last 24 hours) at 5/21/2023 0717  Last data filed at 5/21/2023 0528  Gross per 24 hour   Intake 2677.4 ml   Output 1265 ml   Net 1412.4 ml         Vital Signs (Most Recent):  Temp: 98.8 °F (37.1 °C) (05/21/23 0500)  Pulse: 101 (05/21/23 0630)  Resp: (!) 25 (05/21/23 0630)  BP: 113/63 (05/21/23 0630)  SpO2: 100 % (05/21/23 0630)  Body mass index is 25.05 kg/m².  Weight: 72.6 kg (160 lb) Vital Signs (24h Range):  Temp:  [97.9 °F (36.6 °C)-98.8 °F (37.1 °C)] 98.8 °F (37.1 °C)  Pulse:  [] 101  Resp:  [14-34] 25  SpO2:  [93 %-100 %] 100 %  BP: ()/(63-83) 113/63     Physical Exam  Constitutional:       Comments: Awake and alert. Appears anxious.    HENT:      Mouth/Throat:      Comments: Endotracheal tube secured to external os  Eyes:      Pupils: Pupils are equal, round, and reactive to light.   Cardiovascular:      Rate and Rhythm: Normal rate and regular rhythm.      Heart sounds: No murmur heard.  Pulmonary:      Comments: Mild decreased breath sounds posterior bases bilateral, normal expiratory phase with no wheezes, few bilateral scattered rhonchi  Abdominal:      General: Bowel sounds are normal.      Palpations: Abdomen is soft.      Comments: Mild fluid wave   Musculoskeletal:      Right lower leg: Edema (1-2 cm pretibial) present.      Left lower leg: Edema (1-2 cm pretibial) present.      Comments: Bilateral upper extremity 1-2 cm edema   Lymphadenopathy:      Cervical: No cervical adenopathy.   Skin:     General: Skin is warm and dry.   Neurological:      Comments: Awake and alert. Shakes head yes to feeling sob and uncomfortable. Denies any pain.        Lines/Drains/Airways       Peripherally Inserted Central Catheter Line  Duration             PICC Double Lumen 05/10/23 0817 right brachial 10 days              Drain  Duration                   Drain/Device    Right lower flank -- days         Urethral Catheter 05/12/23 1630 Latex 16 Fr. 8 days         NG/OG Tube 05/16/23 0300 Center mouth 5 days              Airway  Duration                  Airway - Non-Surgical 05/16/23 0239 5 days                    Vent:  Vent Mode: A/C (05/21/23 0600)  Set Rate: 10 BPM (05/21/23 0600)  Vt Set: 450 mL (05/21/23 0600)  Pressure Support: 10 cmH20 (05/18/23 1225)  PEEP/CPAP: 5 cmH20 (05/21/23 0600)  Oxygen Concentration (%): 35 (05/21/23 0600)  Peak Airway Pressure: 26 cmH20 (05/21/23 0600)  Total Ve: 14.2 L/m (05/21/23 0600)  F/VT Ratio<105 (RSBI): (!) 60 (05/21/23 0600)    ABGs:  No results found for: PH, PO2, PCO2, VLG0WQB      Significant Labs:    Lab Results   Component Value Date    WBC 12.82 (H) 05/21/2023    HGB 8.4 (L) 05/21/2023    HCT 25.9 (L) 05/21/2023    MCV 93.5 05/21/2023     05/21/2023         Recent Labs   Lab 05/21/23  0201      K 3.5   CO2 22*   BUN 42.2*   CREATININE 1.96*   CALCIUM 8.2*   MG 1.90   AST 18   ALT 8   ALKPHOS 56   ALBUMIN 3.0*       Imaging:   CXR this AM appears similar but somewhat improved from prior CXR 5/17.    Assessment:     Persistent methicillin-resistant Staphylococcus aureus sepsis with C-spine/L-spine osteomyelitis/diskitis, lumbar epidural abscess (post drainage of epidural abscess with bilateral lumbar laminectomy and foraminotomies 04/21/2023 at [facility]), bilateral psoas abscesses.  Candida albicans UTI  Septic shock with hypotension requiring vasopressor  Pleural effusions, with superimposed pulmonary infiltrates s/p left thoracentesis on 05/16 consistent with exudative effusion   Severe cardiomyopathy with marked decreased LVSF (EF 10-15%), significant decrease over prior transthoracic echocardiogram without evidence of endocarditis by CRISTY 05/17/2023  Acute respiratory failure, intubated 05/16/2023, requiring ongoing mechanical ventilation.  Cholecystitis post percutaneous cholecystostomy tube  placement at [facility] 04/19/2023.  Biliary drain remains in place until 6/6/23 with cholangiogram prior to removal  Sacral ulcer s/p debridement on 05/10  Hematochezia s/p transfusions/colonoscopy with diverticula on 05/14  Acute kidney injury, nonoliguric  Untreated hepatitis-C with cirrhosis and ascites  History of polysubstance and IV drug abuse up until this hospital stay    Plan:     Continue IV daptomycin, rifampin, and micafungin.  Infectious Disease following.  Leukocytosis stable.  Afebrile  Continue vasopressors to maintain mean arterial pressure of 65 or greater.  Presently on Levophed at 0.02  Wean ventilator to spontaneous breathing trial as tolerated.   Currently requiring Precedex and propofol for sedation, wean as tolerated  palliative care, Nephrology, Neurosurgery, Infectious Disease, and wound care following  Replaced potassium  Remains on albumin    GI prophylaxis: Pepcid    32 minutes of critical care was time spent personally by me on the following activities: development of treatment plan with patient or surrogate and bedside caregivers, discussions with consultants, evaluation of patient's response to treatment, examination of patient, ordering and performing treatments and interventions, ordering and review of laboratory studies, ordering and review of radiographic studies, pulse oximetry, re-evaluation of patient's condition.  This patient demonstrates a high probability for further clinical decompensation due to ongoing critical illness.  Critical care time did not overlap with that of any other provider or involve time for any procedures.     Cici Nunez MD  Pulmonary/Critical Care

## 2023-05-21 NOTE — PROGRESS NOTES
NEPHROLOGY PROGRESS NOTE      Patient Demographics:  Name:  Reji Plasencia  Age: 63 y.o.  MRN:  [id number]  Admission Date: 5/5/2023      Subjective:      Remains sedated on vent    Renal status improved  Output declined a bit    Current Facility-Administered Medications   Medication Dose Route Frequency Provider Last Rate Last Admin    0.9%  NaCl infusion (for blood administration)   Intravenous Q24H PRN Guerrero Alfaro MD        0.9%  NaCl infusion (for blood administration)   Intravenous Q24H PRN Fred Dominguez, DO   New Bag at 05/12/23 1640    0.9%  NaCl infusion (for blood administration)   Intravenous Q24H PRN Ambreen Hatfield AGACNP-BC        0.9%  NaCl infusion (for blood administration)   Intravenous Q24H PRN Guerrero Alfaro MD        0.9%  NaCl infusion (for blood administration)   Intravenous Q24H PRN Guerrero Alfaro MD        0.9%  NaCl infusion (for blood administration)   Intravenous Q24H PRN Guerrero Alfaro MD        albumin human 25% bottle 12.5 g  12.5 g Intravenous Q6H Cici Nunez MD   Stopped at 05/21/23 0637    aluminum-magnesium hydroxide-simethicone 200-200-20 mg/5 mL suspension 30 mL  30 mL Per OG tube QID PRN Medhat Reece Jr., MD, FCCP        collagenase ointment   Topical (Top) BID Reggie Castañeda MD   Given at 05/21/23 0907    DAPTOmycin (CUBICIN) 435 mg in sodium chloride 0.9% SolP 50 mL IVPB  6 mg/kg Intravenous Q48H Darnell Franco MD   Stopped at 05/19/23 1818    dexmedetomidine (PRECEDEX) 400mcg/100mL 0.9% NaCL infusion  0-1.4 mcg/kg/hr Intravenous Continuous Cici Nunez MD 10.9 mL/hr at 05/21/23 0548 0.6 mcg/kg/hr at 05/21/23 0548    dextrose 10% bolus 125 mL 125 mL  12.5 g Intravenous PRN Melissa Mohan AGACNP-BC   Stopped at 05/16/23 1243    dextrose 10% bolus 250 mL 250 mL  25 g Intravenous PRN Melissa Mohan AGACNP-BC        famotidine (PF) injection 20 mg  20 mg Intravenous Daily Medhat Reece Jr., MD, FCCP   20 mg at 05/21/23 0850    fentaNYL 2500 mcg in 0.9%  sodium chloride 250 mL infusion premix (titrating)  0-200 mcg/hr Intravenous Continuous Cici Nunez MD   Stopped at 05/19/23 1000    fentaNYL injection 25 mcg  25 mcg Intravenous Q2H PRN Steven Cyr DO        fentaNYL injection 50 mcg  50 mcg Intravenous Q2H PRN Mirta Ryan, ANP   50 mcg at 05/20/23 0009    glucagon (human recombinant) injection 1 mg  1 mg Intramuscular PRN Melissa Mohan, AGACNP-BC        glucose chewable tablet 16 g  16 g Per OG tube PRN Medhat Reece Jr., MD, FCCP        glucose chewable tablet 24 g  24 g Per OG tube PRN Medhat Reece Jr., MD, FCCP        HYDROmorphone (PF) injection 0.5 mg  0.5 mg Intravenous Q4H PRN Guerrero Alfaro MD   0.5 mg at 05/15/23 1228    insulin aspart U-100 injection 1-10 Units  1-10 Units Subcutaneous QID (AC + HS) PRN Melissa Mohan AGACNP-BC   2 Units at 05/21/23 0014    insulin detemir U-100 injection 12 Units  12 Units Subcutaneous QHS Darnell Franco MD   12 Units at 05/20/23 2100    Lactobacillus acidophilus capsule 1 capsule  1 capsule Per NG/OG Tube TID WM Medhat Reece Jr., MD, FCCP   1 capsule at 05/21/23 0850    loperamide capsule 2 mg  2 mg Per OG tube QID PRN Medhat Reece Jr., MD, FCCP        melatonin tablet 6 mg  6 mg Per OG tube Nightly PRN Medhat Reece Jr., MD, FCCP        metoprolol injection 5 mg  5 mg Intravenous Q4H PRN Cici Nunez MD        micafungin 100 mg in sodium chloride 0.9 % 100 mL IVPB (MB+)  100 mg Intravenous Q24H Cici Nunez MD   Stopped at 05/21/23 0415    miconazole NITRATE 2 % top powder   Topical (Top) BID Reggie Castañeda MD   Given at 05/21/23 0907    multivitamin tablet  1 tablet Per OG tube Daily Medhat Reece Jr., MD, FCCP   1 tablet at 05/21/23 0850    NORepinephrine 8 mg in dextrose 5% 250 mL infusion  0-3 mcg/kg/min Intravenous Continuous Medhat Reece Jr., MD, FCCP 2.7 mL/hr at 05/20/23 0800 0.02 mcg/kg/min at 05/20/23 0800    ondansetron injection 4 mg  4 mg Intravenous Q4H PRN Ali M  "MD Derick   4 mg at 05/06/23 0434    oxyCODONE immediate release tablet 5 mg  5 mg Oral Q6H William Flynn MD   5 mg at 05/21/23 0537    oxyCODONE-acetaminophen 5-325 mg per tablet 1 tablet  1 tablet Oral Q6H PRN Guerrero Alfaro MD   1 tablet at 05/15/23 2218    polyethylene glycol packet 17 g  17 g Oral BID PRN Nery Sepulveda MD        prochlorperazine injection Soln 5 mg  5 mg Intravenous Q6H PRN Nery Sepulveda MD        propofol (DIPRIVAN) 10 mg/mL infusion  0-50 mcg/kg/min Intravenous Continuous Cici Nunez MD 8.7 mL/hr at 05/21/23 0314 20 mcg/kg/min at 05/21/23 0314    rifAMpin (RIFADIN) 300 mg in dextrose 5 % (D5W) 100 mL IVPB  300 mg Intravenous BID Pedro Luis Vigil  mL/hr at 05/21/23 0851 300 mg at 05/21/23 0851    senna-docusate 8.6-50 mg per tablet 2 tablet  2 tablet Per OG tube BID PRN Medhat Reece Jr., MD, FCCP        sodium chloride 0.9% flush 10 mL  10 mL Intravenous PRN Nery Sepulveda MD        sodium chloride 0.9% flush 10 mL  10 mL Intravenous Q6H Darnell Franco MD   10 mL at 05/21/23 0537    And    sodium chloride 0.9% flush 10 mL  10 mL Intravenous PRN Darnell Franco MD        sodium chloride 0.9% flush 10 mL  10 mL Intravenous PRN Cici Nunez MD        thiamine tablet 100 mg  100 mg Per OG tube Daily Medhat Reece Jr., MD, FCCP   100 mg at 05/21/23 0850    tiZANidine tablet 4 mg  4 mg Per OG tube Q8H PRN Medhat Reece Jr., MD, FCCP        zinc oxide-cod liver oil 40 % paste   Topical (Top) BID Reggie Castañeda MD   Given at 05/21/23 0907           Review of Systems   Unable to perform ROS: Intubated       Objective:    /69   Pulse 98   Temp 99.1 °F (37.3 °C) (Oral)   Resp (!) 25   Ht 5' 7.01" (1.702 m)   Wt 72.6 kg (160 lb)   SpO2 100%   BMI 25.05 kg/m²       Intake/Output Summary (Last 24 hours) at 5/21/2023 0940  Last data filed at 5/21/2023 0800  Gross per 24 hour   Intake 2577.4 ml   Output 1115 ml   Net 1462.4 ml             Physical Exam  Vitals reviewed. "   Constitutional:       Appearance: Normal appearance.   HENT:      Head: Normocephalic and atraumatic.      Nose: Nose normal.   Cardiovascular:      Rate and Rhythm: Normal rate and regular rhythm.      Pulses: Normal pulses.      Heart sounds: Normal heart sounds.   Pulmonary:      Comments: On vent  Diminished bases  Abdominal:      General: Bowel sounds are normal.      Palpations: Abdomen is soft.   Musculoskeletal:         General: Normal range of motion.      Cervical back: Normal range of motion.      Right lower leg: Edema present.      Left lower leg: Edema present.      Comments: Sig deconditioning   Skin:     General: Skin is warm and dry.          Inpatient Diagnostics:  Recent Results (from the past 24 hour(s))   POCT glucose    Collection Time: 05/20/23 12:49 PM   Result Value Ref Range    POCT Glucose 241 (H) 70 - 110 mg/dL   POCT glucose    Collection Time: 05/20/23  5:51 PM   Result Value Ref Range    POCT Glucose 183 (H) 70 - 110 mg/dL   CK    Collection Time: 05/20/23  9:41 PM   Result Value Ref Range    Creatine Kinase 13 (L) 30 - 200 U/L   POCT glucose    Collection Time: 05/21/23 12:12 AM   Result Value Ref Range    POCT Glucose 206 (H) 70 - 110 mg/dL   Comprehensive Metabolic Panel    Collection Time: 05/21/23  2:01 AM   Result Value Ref Range    Sodium Level 140 136 - 145 mmol/L    Potassium Level 3.5 3.5 - 5.1 mmol/L    Chloride 105 98 - 107 mmol/L    Carbon Dioxide 22 (L) 23 - 31 mmol/L    Glucose Level 181 (H) 82 - 115 mg/dL    Blood Urea Nitrogen 42.2 (H) 8.4 - 25.7 mg/dL    Creatinine 1.96 (H) 0.73 - 1.18 mg/dL    Calcium Level Total 8.2 (L) 8.8 - 10.0 mg/dL    Protein Total 5.5 (L) 5.8 - 7.6 gm/dL    Albumin Level 3.0 (L) 3.4 - 4.8 g/dL    Globulin 2.5 2.4 - 3.5 gm/dL    Albumin/Globulin Ratio 1.2 1.1 - 2.0 ratio    Bilirubin Total 0.9 <=1.5 mg/dL    Alkaline Phosphatase 56 40 - 150 unit/L    Alanine Aminotransferase 8 0 - 55 unit/L    Aspartate Aminotransferase 18 5 - 34 unit/L     eGFR 38 mls/min/1.73/m2   Magnesium    Collection Time: 05/21/23  2:01 AM   Result Value Ref Range    Magnesium Level 1.90 1.60 - 2.60 mg/dL   CBC with Differential    Collection Time: 05/21/23  2:01 AM   Result Value Ref Range    WBC 12.82 (H) 4.50 - 11.50 x10(3)/mcL    RBC 2.77 (L) 4.70 - 6.10 x10(6)/mcL    Hgb 8.4 (L) 14.0 - 18.0 g/dL    Hct 25.9 (L) 42.0 - 52.0 %    MCV 93.5 80.0 - 94.0 fL    MCH 30.3 27.0 - 31.0 pg    MCHC 32.4 (L) 33.0 - 36.0 g/dL    RDW 16.3 11.5 - 17.0 %    Platelet 173 130 - 400 x10(3)/mcL    MPV 10.6 (H) 7.4 - 10.4 fL    Neut % 82.0 %    Lymph % 9.2 %    Mono % 5.2 %    Eos % 2.6 %    Basophil % 0.5 %    Lymph # 1.18 0.6 - 4.6 x10(3)/mcL    Neut # 10.50 (H) 2.1 - 9.2 x10(3)/mcL    Mono # 0.67 0.1 - 1.3 x10(3)/mcL    Eos # 0.33 0 - 0.9 x10(3)/mcL    Baso # 0.07 <=0.2 x10(3)/mcL    IG# 0.07 (H) 0 - 0.04 x10(3)/mcL    IG% 0.5 %    NRBC% 0.0 %   RT Blood Gas    Collection Time: 05/21/23  4:33 AM   Result Value Ref Range    Sample Type Arterial Blood     Sample site Left Brachial Artery     Drawn by ned,maryt     pH, Blood gas 7.540 (H) 7.350 - 7.450    pCO2, Blood gas 35.0 35.0 - 45.0 mmHg    pO2, Blood gas 69.0 (L) 80.0 - 100.0 mmHg    Sodium, Blood Gas 136 (L) 137 - 145 mmol/L    Potassium, Blood Gas 3.3 (L) 3.5 - 5.0 mmol/L    Calcium Level Ionized 1.14 1.12 - 1.23 mmol/L    TOC2, Blood gas 31.0 mmol/L    Base Excess, Blood gas 7.00 (H) -2.00 - 2.00 mmol/L    sO2, Blood gas 95.6 %    HCO3, Blood gas 29.9 (H) 22.0 - 26.0 mmol/L    THb, Blood gas 8.7 (L) 12 - 16 g/dL    O2 Hb, Blood Gas 93.8 (L) 94.0 - 97.0 %    CO Hgb 2.3 (H) 0.5 - 1.5 %    Met Hgb 1.1 0.4 - 1.5 %    Allens Test Yes     MODE AC     Oxygen Device, Blood gas Ventilator     FIO2, Blood gas 30 %    Mech Vt 450 ml    Spont RR 10 b/min    PEEP 5.0 cmH2O   POCT glucose    Collection Time: 05/21/23  5:45 AM   Result Value Ref Range    POCT Glucose 148 (H) 70 - 110 mg/dL     A/P--NE 05/21 1---RUPERTO/ ATN/ Infectious Process---Indices  better again today-- Decreased output  2---Resp Failure--per Pulm  3---MRSA Bacteremia--Cont Cubicin/ Rifampin--BC 5/16 NEGATIVE  CRISTY NEGATIVE for Vegetations  4---Cervical/ Lumbar Spine Osteomyelitis---Cont Cubicin  5---Cardiomyopathy---per Cardiology  6---Type II DM--Cont same management  7---Sepsis--Wean Pressor--per Pulm  8---Sacral Wound/ Debridement done 5/10--Cont wound care  9---Anemia secondary to Acute Illness/ Fe Def---Follow H&H  10--Hypokalemia---Resolved/ Mg WNL     IV--Levophed gtt        Precedex gtt        Diprivan gtt---OFF     TF--In Progress  Free H20 50cc q 4 hours     ORDERS:  CMP/Mg in am  Increase Free H20             Mary Grace Lynn DNP, ANP-C    5/21/2023

## 2023-05-22 LAB
ALBUMIN SERPL-MCNC: 3 G/DL (ref 3.4–4.8)
ALBUMIN/GLOB SERPL: 1.2 RATIO (ref 1.1–2)
ALLENS TEST BLOOD GAS (OHS): YES
ALP SERPL-CCNC: 59 UNIT/L (ref 40–150)
ALT SERPL-CCNC: 10 UNIT/L (ref 0–55)
AST SERPL-CCNC: 18 UNIT/L (ref 5–34)
BASE EXCESS BLD CALC-SCNC: 5.9 MMOL/L
BASOPHILS # BLD AUTO: 0.08 X10(3)/MCL
BASOPHILS NFR BLD AUTO: 0.7 %
BILIRUBIN DIRECT+TOT PNL SERPL-MCNC: 0.9 MG/DL
BLOOD GAS SAMPLE TYPE (OHS): ABNORMAL
BUN SERPL-MCNC: 45.2 MG/DL (ref 8.4–25.7)
CA-I BLD-SCNC: 1.11 MMOL/L (ref 1.12–1.23)
CALCIUM SERPL-MCNC: 8.2 MG/DL (ref 8.8–10)
CHLORIDE SERPL-SCNC: 104 MMOL/L (ref 98–107)
CO2 BLDA-SCNC: 30.9 MMOL/L
CO2 SERPL-SCNC: 24 MMOL/L (ref 23–31)
CREAT SERPL-MCNC: 1.74 MG/DL (ref 0.73–1.18)
DRAWN BY BLOOD GAS (OHS): ABNORMAL
EOSINOPHIL # BLD AUTO: 0.48 X10(3)/MCL (ref 0–0.9)
EOSINOPHIL NFR BLD AUTO: 4.1 %
ERYTHROCYTE [DISTWIDTH] IN BLOOD BY AUTOMATED COUNT: 16.1 % (ref 11.5–17)
FIO2 BLOOD GAS (OHS): 30 %
GFR SERPLBLD CREATININE-BSD FMLA CKD-EPI: 44 MLS/MIN/1.73/M2
GLOBULIN SER-MCNC: 2.6 GM/DL (ref 2.4–3.5)
GLUCOSE SERPL-MCNC: 194 MG/DL (ref 82–115)
HCO3 BLDA-SCNC: 29.7 MMOL/L
HCT VFR BLD AUTO: 24.7 % (ref 42–52)
HGB BLD-MCNC: 7.7 G/DL (ref 14–18)
IMM GRANULOCYTES # BLD AUTO: 0.07 X10(3)/MCL (ref 0–0.04)
IMM GRANULOCYTES NFR BLD AUTO: 0.6 %
LYMPHOCYTES # BLD AUTO: 1.13 X10(3)/MCL (ref 0.6–4.6)
LYMPHOCYTES NFR BLD AUTO: 9.7 %
MAGNESIUM SERPL-MCNC: 2 MG/DL (ref 1.6–2.6)
MCH RBC QN AUTO: 29.8 PG (ref 27–31)
MCHC RBC AUTO-ENTMCNC: 31.2 G/DL (ref 33–36)
MCV RBC AUTO: 95.7 FL (ref 80–94)
MECH RR (OHS): 14 B/MIN
MECH VT (OHS): 450 ML
MODE (OHS): AC
MONOCYTES # BLD AUTO: 0.6 X10(3)/MCL (ref 0.1–1.3)
MONOCYTES NFR BLD AUTO: 5.2 %
NEUTROPHILS # BLD AUTO: 9.23 X10(3)/MCL (ref 2.1–9.2)
NEUTROPHILS NFR BLD AUTO: 79.7 %
NRBC BLD AUTO-RTO: 0 %
PCO2 BLDA: 39 MMHG (ref 35–45)
PEEP (OHS): 5 CMH2O
PH BLDA: 7.49 [PH] (ref 7.35–7.45)
PLATELET # BLD AUTO: 179 X10(3)/MCL (ref 130–400)
PMV BLD AUTO: 10.9 FL (ref 7.4–10.4)
PO2 BLDA: 92 MMHG (ref 80–100)
POCT GLUCOSE: 137 MG/DL (ref 70–110)
POCT GLUCOSE: 204 MG/DL (ref 70–110)
POCT GLUCOSE: 223 MG/DL (ref 70–110)
POTASSIUM BLOOD GAS (OHS): 3.6 MMOL/L (ref 3.5–5)
POTASSIUM SERPL-SCNC: 3.8 MMOL/L (ref 3.5–5.1)
PROT SERPL-MCNC: 5.6 GM/DL (ref 5.8–7.6)
RBC # BLD AUTO: 2.58 X10(6)/MCL (ref 4.7–6.1)
SAMPLE SITE BLOOD GAS (OHS): ABNORMAL
SAO2 % BLDA: 98 %
SODIUM SERPL-SCNC: 139 MMOL/L (ref 136–145)
WBC # SPEC AUTO: 11.59 X10(3)/MCL (ref 4.5–11.5)

## 2023-05-22 PROCEDURE — 94003 VENT MGMT INPAT SUBQ DAY: CPT

## 2023-05-22 PROCEDURE — 63600175 PHARM REV CODE 636 W HCPCS

## 2023-05-22 PROCEDURE — 63600175 PHARM REV CODE 636 W HCPCS: Mod: JG | Performed by: STUDENT IN AN ORGANIZED HEALTH CARE EDUCATION/TRAINING PROGRAM

## 2023-05-22 PROCEDURE — 27000207 HC ISOLATION

## 2023-05-22 PROCEDURE — 63600175 PHARM REV CODE 636 W HCPCS: Performed by: STUDENT IN AN ORGANIZED HEALTH CARE EDUCATION/TRAINING PROGRAM

## 2023-05-22 PROCEDURE — 99233 PR SUBSEQUENT HOSPITAL CARE,LEVL III: ICD-10-PCS | Mod: ,,, | Performed by: NURSE PRACTITIONER

## 2023-05-22 PROCEDURE — 80053 COMPREHEN METABOLIC PANEL: CPT | Performed by: STUDENT IN AN ORGANIZED HEALTH CARE EDUCATION/TRAINING PROGRAM

## 2023-05-22 PROCEDURE — 63600175 PHARM REV CODE 636 W HCPCS: Performed by: NURSE PRACTITIONER

## 2023-05-22 PROCEDURE — 25000003 PHARM REV CODE 250: Performed by: INTERNAL MEDICINE

## 2023-05-22 PROCEDURE — 36600 WITHDRAWAL OF ARTERIAL BLOOD: CPT

## 2023-05-22 PROCEDURE — 83735 ASSAY OF MAGNESIUM: CPT | Performed by: STUDENT IN AN ORGANIZED HEALTH CARE EDUCATION/TRAINING PROGRAM

## 2023-05-22 PROCEDURE — 99900035 HC TECH TIME PER 15 MIN (STAT)

## 2023-05-22 PROCEDURE — 27000221 HC OXYGEN, UP TO 24 HOURS

## 2023-05-22 PROCEDURE — 25000003 PHARM REV CODE 250: Performed by: STUDENT IN AN ORGANIZED HEALTH CARE EDUCATION/TRAINING PROGRAM

## 2023-05-22 PROCEDURE — 25000003 PHARM REV CODE 250: Performed by: SURGERY

## 2023-05-22 PROCEDURE — 63600175 PHARM REV CODE 636 W HCPCS: Performed by: INTERNAL MEDICINE

## 2023-05-22 PROCEDURE — A4216 STERILE WATER/SALINE, 10 ML: HCPCS | Performed by: STUDENT IN AN ORGANIZED HEALTH CARE EDUCATION/TRAINING PROGRAM

## 2023-05-22 PROCEDURE — 85025 COMPLETE CBC W/AUTO DIFF WBC: CPT | Performed by: STUDENT IN AN ORGANIZED HEALTH CARE EDUCATION/TRAINING PROGRAM

## 2023-05-22 PROCEDURE — 20000000 HC ICU ROOM

## 2023-05-22 PROCEDURE — 99900026 HC AIRWAY MAINTENANCE (STAT)

## 2023-05-22 PROCEDURE — 94761 N-INVAS EAR/PLS OXIMETRY MLT: CPT

## 2023-05-22 PROCEDURE — 82803 BLOOD GASES ANY COMBINATION: CPT

## 2023-05-22 PROCEDURE — 99233 SBSQ HOSP IP/OBS HIGH 50: CPT | Mod: ,,, | Performed by: NURSE PRACTITIONER

## 2023-05-22 PROCEDURE — P9047 ALBUMIN (HUMAN), 25%, 50ML: HCPCS | Mod: JG | Performed by: STUDENT IN AN ORGANIZED HEALTH CARE EDUCATION/TRAINING PROGRAM

## 2023-05-22 RX ORDER — FUROSEMIDE 10 MG/ML
40 INJECTION INTRAMUSCULAR; INTRAVENOUS
Status: DISCONTINUED | OUTPATIENT
Start: 2023-05-22 | End: 2023-05-23 | Stop reason: HOSPADM

## 2023-05-22 RX ORDER — GLYCOPYRROLATE 0.2 MG/ML
0.2 INJECTION INTRAMUSCULAR; INTRAVENOUS EVERY 6 HOURS PRN
Status: DISCONTINUED | OUTPATIENT
Start: 2023-05-22 | End: 2023-05-23 | Stop reason: HOSPADM

## 2023-05-22 RX ORDER — HYDROMORPHONE HYDROCHLORIDE 2 MG/ML
1 INJECTION, SOLUTION INTRAMUSCULAR; INTRAVENOUS; SUBCUTANEOUS
Status: DISCONTINUED | OUTPATIENT
Start: 2023-05-22 | End: 2023-05-23

## 2023-05-22 RX ORDER — LORAZEPAM 2 MG/ML
1 INJECTION INTRAMUSCULAR EVERY 4 HOURS PRN
Status: DISCONTINUED | OUTPATIENT
Start: 2023-05-22 | End: 2023-05-23

## 2023-05-22 RX ADMIN — DAPTOMYCIN 435 MG: 500 INJECTION, POWDER, LYOPHILIZED, FOR SOLUTION INTRAVENOUS at 05:05

## 2023-05-22 RX ADMIN — ALBUMIN (HUMAN) 12.5 G: 12.5 SOLUTION INTRAVENOUS at 05:05

## 2023-05-22 RX ADMIN — MICAFUNGIN SODIUM 100 MG: 100 INJECTION, POWDER, LYOPHILIZED, FOR SOLUTION INTRAVENOUS at 04:05

## 2023-05-22 RX ADMIN — HYDROMORPHONE HYDROCHLORIDE 1 MG: 2 INJECTION, SOLUTION INTRAMUSCULAR; INTRAVENOUS; SUBCUTANEOUS at 11:05

## 2023-05-22 RX ADMIN — COLLAGENASE SANTYL: 250 OINTMENT TOPICAL at 08:05

## 2023-05-22 RX ADMIN — FUROSEMIDE 40 MG: 10 INJECTION, SOLUTION INTRAMUSCULAR; INTRAVENOUS at 11:05

## 2023-05-22 RX ADMIN — FENTANYL CITRATE 25 MCG: 50 INJECTION, SOLUTION INTRAMUSCULAR; INTRAVENOUS at 02:05

## 2023-05-22 RX ADMIN — Medication: at 08:05

## 2023-05-22 RX ADMIN — SODIUM CHLORIDE, PRESERVATIVE FREE 10 ML: 5 INJECTION INTRAVENOUS at 05:05

## 2023-05-22 RX ADMIN — INSULIN DETEMIR 12 UNITS: 100 INJECTION, SOLUTION SUBCUTANEOUS at 08:05

## 2023-05-22 RX ADMIN — THIAMINE HCL TAB 100 MG 100 MG: 100 TAB at 08:05

## 2023-05-22 RX ADMIN — FAMOTIDINE 20 MG: 10 INJECTION, SOLUTION INTRAVENOUS at 08:05

## 2023-05-22 RX ADMIN — DEXTROSE MONOHYDRATE 300 MG: 50 INJECTION, SOLUTION INTRAVENOUS at 10:05

## 2023-05-22 RX ADMIN — HYDROMORPHONE HYDROCHLORIDE 0.5 MG: 2 INJECTION, SOLUTION INTRAMUSCULAR; INTRAVENOUS; SUBCUTANEOUS at 03:05

## 2023-05-22 RX ADMIN — INSULIN ASPART 2 UNITS: 100 INJECTION, SOLUTION INTRAVENOUS; SUBCUTANEOUS at 12:05

## 2023-05-22 RX ADMIN — ALBUMIN (HUMAN) 12.5 G: 12.5 SOLUTION INTRAVENOUS at 12:05

## 2023-05-22 RX ADMIN — LORAZEPAM 1 MG: 2 INJECTION INTRAMUSCULAR; INTRAVENOUS at 03:05

## 2023-05-22 RX ADMIN — SODIUM CHLORIDE, PRESERVATIVE FREE 10 ML: 5 INJECTION INTRAVENOUS at 12:05

## 2023-05-22 RX ADMIN — DEXMEDETOMIDINE HYDROCHLORIDE 1.4 MCG/KG/HR: 400 INJECTION INTRAVENOUS at 04:05

## 2023-05-22 RX ADMIN — PROPOFOL 25 MCG/KG/MIN: 10 INJECTION, EMULSION INTRAVENOUS at 01:05

## 2023-05-22 RX ADMIN — OXYCODONE HYDROCHLORIDE 5 MG: 5 TABLET ORAL at 12:05

## 2023-05-22 RX ADMIN — Medication: at 09:05

## 2023-05-22 RX ADMIN — OXYCODONE HYDROCHLORIDE 5 MG: 5 TABLET ORAL at 05:05

## 2023-05-22 RX ADMIN — DEXTROSE MONOHYDRATE 300 MG: 50 INJECTION, SOLUTION INTRAVENOUS at 08:05

## 2023-05-22 RX ADMIN — OXYCODONE HYDROCHLORIDE 5 MG: 5 TABLET ORAL at 11:05

## 2023-05-22 RX ADMIN — ALBUMIN (HUMAN) 12.5 G: 12.5 SOLUTION INTRAVENOUS at 11:05

## 2023-05-22 RX ADMIN — Medication 1 CAPSULE: at 08:05

## 2023-05-22 RX ADMIN — HYDROMORPHONE HYDROCHLORIDE 1 MG: 2 INJECTION, SOLUTION INTRAMUSCULAR; INTRAVENOUS; SUBCUTANEOUS at 08:05

## 2023-05-22 RX ADMIN — MICONAZOLE NITRATE 2 % TOPICAL POWDER: at 08:05

## 2023-05-22 RX ADMIN — DEXMEDETOMIDINE HYDROCHLORIDE 0.8 MCG/KG/HR: 400 INJECTION INTRAVENOUS at 08:05

## 2023-05-22 RX ADMIN — THERA TABS 1 TABLET: TAB at 08:05

## 2023-05-22 RX ADMIN — INSULIN ASPART 4 UNITS: 100 INJECTION, SOLUTION INTRAVENOUS; SUBCUTANEOUS at 11:05

## 2023-05-22 RX ADMIN — DEXMEDETOMIDINE HYDROCHLORIDE 0.8 MCG/KG/HR: 400 INJECTION INTRAVENOUS at 12:05

## 2023-05-22 RX ADMIN — Medication 1 CAPSULE: at 11:05

## 2023-05-22 NOTE — CONSULTS
"Consults    Patient Name: Reji Plasencia   MRN: [id number]   Admission Date: 5/5/2023   Hospital Length of Stay: 17   Attending Provider: Medhat Reece Jr., MD, *   Consulting Provider: Patricia CONTRERAS  Reason for Consult: Goals of Care  Primary Care Physician:  Primary Doctor No     Principal Problem: Sepsis       Final diagnoses:  [R00.0] Tachycardia  [M46.20] Spinal abscess (Primary)  [R78.81] Bacteremia      Assessment/Plan:     I reviewed the patient and family's understanding of the seriousness of the illness and its expected prognosis. We discussed the patient's goals of care and treatment preferences.        Advance Care Planning     Date: 05/22/2023    [facility]  I engaged the patient and family in a conversation about advance care planning and we specifically addressed what the goals of care would be moving forward, in light of the patient's change in clinical status, specifically current condition.  We did specifically address the patient's likely prognosis, which is poor.  We explored the patient's values and preferences for future care.  The patient and family endorses that what is most important right now is to focus on comfort and QOL     Accordingly, we have decided that the best plan to meet the patient's goals includes pivot to comfort-focused care    Spoke with patient and son about goals of care--patient reports that he is "done" and that he wants "comfort."   Son informs that patient does not want to be reintubated and wants his pain controlled.  Discussed with Dr. Flynn and Dr. Hoang and will institute pain and anxiety regimen and discuss goals further.  Talked to son and patient again to inform that I will adjust medications and speak with them further concerning comfort measures with transition to hospice as an option.              Recommendations:     Pain: dilaudid 1 mg Q 2 PRN pain and dyspnea      Interval History:     Patient lying in bed with family present and recently extubated " experiencing tachycardia and dyspnea. Family had spoken to intensivist this weekend and reports that patient does not want further surgery and would not want to be reintubated.  I am continuing to follow for assistance.       Active Ambulatory Problems     Diagnosis Date Noted    Type 2 diabetes mellitus without complication, without long-term current use of insulin 04/13/2023    Tobacco user 05/16/2023    Osteomyelitis of lumbar spine 04/13/2023    Hepatitis C virus infection 05/16/2023     Resolved Ambulatory Problems     Diagnosis Date Noted    No Resolved Ambulatory Problems     No Additional Past Medical History        Past Surgical History:   Procedure Laterality Date    COLONOSCOPY N/A 5/14/2023    Procedure: COLONOSCOPY;  Surgeon: Shaneka Ayers MD;  Location: [facility];  Service: Gastroenterology;  Laterality: N/A;    DEBRIDEMENT OF SACRAL WOUND N/A 5/10/2023    Procedure: DEBRIDEMENT, WOUND, SACRUM;  Surgeon: Reggie Castañeda MD;  Location: [facility] OR;  Service: General;  Laterality: N/A;        Review of patient's allergies indicates:  No Known Allergies       Current Facility-Administered Medications:     0.9%  NaCl infusion (for blood administration), , Intravenous, Q24H PRN, Guerrero Alfaro MD    0.9%  NaCl infusion (for blood administration), , Intravenous, Q24H PRN, Fred Dominguez, , New Bag at 05/12/23 1640    0.9%  NaCl infusion (for blood administration), , Intravenous, Q24H PRN, Ambreen Hatfield AGACNP-BC    0.9%  NaCl infusion (for blood administration), , Intravenous, Q24H PRN, Guerrero Alfaro MD    0.9%  NaCl infusion (for blood administration), , Intravenous, Q24H PRN, Guerrero Alfaro MD    0.9%  NaCl infusion (for blood administration), , Intravenous, Q24H PRN, Guerrero Alfaro MD    albumin human 25% bottle 12.5 g, 12.5 g, Intravenous, Q6H, Cici Nunez MD, Stopped at 05/22/23 1238    aluminum-magnesium hydroxide-simethicone 200-200-20 mg/5 mL suspension 30 mL, 30 mL, Per OG tube, QID PRN,  Medhat Reece Jr., MD, FCCP    collagenase ointment, , Topical (Top), BID, Reggie Castañeda MD, Given at 05/22/23 0849    DAPTOmycin (CUBICIN) 435 mg in sodium chloride 0.9% SolP 50 mL IVPB, 6 mg/kg, Intravenous, Q24H, Medhat Reece Jr., MD, FCCP, Stopped at 05/21/23 1900    dexmedetomidine (PRECEDEX) 400mcg/100mL 0.9% NaCL infusion, 0-1.4 mcg/kg/hr, Intravenous, Continuous, Cici Nunez MD, Last Rate: 14.5 mL/hr at 05/22/23 0824, 0.8 mcg/kg/hr at 05/22/23 0824    dextrose 10% bolus 125 mL 125 mL, 12.5 g, Intravenous, PRN, MARYJO LackeyCNP-BC, Stopped at 05/16/23 1243    dextrose 10% bolus 250 mL 250 mL, 25 g, Intravenous, PRN, Melissa Mohan AGACNP-BC    famotidine (PF) injection 20 mg, 20 mg, Intravenous, Daily, Medhat Reece Jr., MD, FCCP, 20 mg at 05/22/23 0816    fentaNYL 2500 mcg in 0.9% sodium chloride 250 mL infusion premix (titrating), 0-200 mcg/hr, Intravenous, Continuous, Cici Nunez MD, Stopped at 05/19/23 1000    fentaNYL injection 25 mcg, 25 mcg, Intravenous, Q2H PRN, Steven Cyr DO, 25 mcg at 05/22/23 1446    fentaNYL injection 50 mcg, 50 mcg, Intravenous, Q2H PRN, JUANA Leung, 50 mcg at 05/20/23 0009    furosemide injection 40 mg, 40 mg, Intravenous, Q12H, Adi Campos MD, 40 mg at 05/22/23 1138    glucagon (human recombinant) injection 1 mg, 1 mg, Intramuscular, PRN, MARYJO LackeyCNCHONG-BC    glucose chewable tablet 16 g, 16 g, Per OG tube, PRN, Medhat Reece Jr., MD, FCCP    glucose chewable tablet 24 g, 24 g, Per OG tube, PRN, Medhat Reece Jr., MD, FCCP    HYDROmorphone (PF) injection 0.5 mg, 0.5 mg, Intravenous, Q4H PRN, Guerrero Alfaro MD, 0.5 mg at 05/15/23 1228    HYDROmorphone (PF) injection 1 mg, 1 mg, Intravenous, Q2H PRN, Patricia Cash, FNCHONG    insulin aspart U-100 injection 1-10 Units, 1-10 Units, Subcutaneous, QID (AC + HS) PRN, Melissa Mohan, AGACNP-BC, 4 Units at 05/22/23 1145    insulin detemir U-100 injection 12 Units, 12 Units,  Subcutaneous, QHS, Darnell Franco MD, 12 Units at 05/21/23 2111    Lactobacillus acidophilus capsule 1 capsule, 1 capsule, Per NG/OG Tube, TID WM, Medhat Reece Jr., MD, FCCP, 1 capsule at 05/22/23 1141    loperamide capsule 2 mg, 2 mg, Per OG tube, QID PRN, Medhat Reece Jr., MD, FCCP    LORazepam injection 1 mg, 1 mg, Intravenous, Q4H PRN, Patricia Cash, FNP    melatonin tablet 6 mg, 6 mg, Per OG tube, Nightly PRN, Medhat Reece Jr., MD, FCCP    metoprolol injection 5 mg, 5 mg, Intravenous, Q4H PRN, Cici Nunez MD    micafungin 100 mg in sodium chloride 0.9 % 100 mL IVPB (MB+), 100 mg, Intravenous, Q24H, Cici Nunez MD, Stopped at 05/22/23 0507    miconazole NITRATE 2 % top powder, , Topical (Top), BID, Reggie Castañeda MD, Given at 05/22/23 0849    multivitamin tablet, 1 tablet, Per OG tube, Daily, Medhat Reece Jr., MD, FCCP, 1 tablet at 05/22/23 0816    NORepinephrine 8 mg in dextrose 5% 250 mL infusion, 0-3 mcg/kg/min, Intravenous, Continuous, Medhat Reece Jr., MD, FCCP, Last Rate: 0.04 mL/hr at 05/22/23 0626, 0.001 mcg/kg/min at 05/22/23 0626    ondansetron injection 4 mg, 4 mg, Intravenous, Q4H PRN, Nery Sepulveda MD, 4 mg at 05/06/23 0434    oxyCODONE immediate release tablet 5 mg, 5 mg, Oral, Q6H, William Flynn MD, 5 mg at 05/22/23 1137    polyethylene glycol packet 17 g, 17 g, Oral, BID PRN, Nery Sepulveda MD    prochlorperazine injection Soln 5 mg, 5 mg, Intravenous, Q6H PRN, Nery Sepulveda MD    propofol (DIPRIVAN) 10 mg/mL infusion, 0-50 mcg/kg/min, Intravenous, Continuous, Cici Nunez MD, Last Rate: 8.7 mL/hr at 05/22/23 0537, 20 mcg/kg/min at 05/22/23 0537    rifAMpin (RIFADIN) 300 mg in dextrose 5 % (D5W) 100 mL IVPB, 300 mg, Intravenous, BID, Pedro Luis Vigil DO, Stopped at 05/22/23 0925    senna-docusate 8.6-50 mg per tablet 2 tablet, 2 tablet, Per OG tube, BID PRN, Medhat Reece Jr., MD, FCCP    sodium chloride 0.9% flush 10 mL, 10 mL, Intravenous, PRN, Ali M  "MD Derick    Flushing PICC Protocol, , , Until Discontinued **AND** sodium chloride 0.9% flush 10 mL, 10 mL, Intravenous, Q6H, 10 mL at 05/22/23 1202 **AND** sodium chloride 0.9% flush 10 mL, 10 mL, Intravenous, PRN, Darnell Franco MD    sodium chloride 0.9% flush 10 mL, 10 mL, Intravenous, PRN, Cici Nunez MD    thiamine tablet 100 mg, 100 mg, Per OG tube, Daily, Medhat Reece Jr., MD, FCCP, 100 mg at 05/22/23 0816    tiZANidine tablet 4 mg, 4 mg, Per OG tube, Q8H PRN, Medhat Reece Jr., MD, FCCP    zinc oxide-cod liver oil 40 % paste, , Topical (Top), BID, Reggie Castañeda MD, Given at 05/22/23 0849     sodium chloride, sodium chloride, sodium chloride, sodium chloride, sodium chloride, sodium chloride, aluminum-magnesium hydroxide-simethicone, dextrose 10%, dextrose 10%, fentaNYL, fentaNYL, glucagon (human recombinant), glucose, glucose, HYDROmorphone, HYDROmorphone, insulin aspart U-100, loperamide, lorazepam, melatonin, metoprolol, ondansetron, polyethylene glycol, prochlorperazine, senna-docusate 8.6-50 mg, sodium chloride 0.9%, Flushing PICC Protocol **AND** sodium chloride 0.9% **AND** sodium chloride 0.9%, sodium chloride 0.9%, tiZANidine     History reviewed. No pertinent family history.       Review of Systems   Constitutional:  Positive for appetite change and fatigue.   Musculoskeletal:  Positive for back pain.          Objective:   BP (!) 168/118   Pulse (!) 145   Temp 99.1 °F (37.3 °C) (Oral)   Resp (!) 32   Ht 5' 7.01" (1.702 m)   Wt 72.6 kg (160 lb)   SpO2 (!) 91%   BMI 25.05 kg/m²      Physical Exam   Constitutional: He is oriented to person, place, and time. He appears ill.   HENT:   Head: Normocephalic.   Eyes: Pupils are equal, round, and reactive to light.   Cardiovascular: An irregular rhythm present. Tachycardia present. Pulmonary:      Effort: Respiratory distress present.      Breath sounds: Rhonchi present.     Abdominal: Soft.   Musculoskeletal:      Comments: sarcopenia "   Neurological: He is oriented to person, place, and time.   Skin: Skin is warm.        Review of Symptoms  Review of Symptoms      Symptom Assessment (ESAS 0-10 Scale)  Pain:  0  Dyspnea:  0  Anxiety:  0  Nausea:  0  Depression:  0  Anorexia:  0  Fatigue:  0  Insomnia:  0  Restlessness:  0  Agitation:  0         Psychosocial/Cultural:   See Palliative Psychosocial Note: Yes  **Primary  to Follow**  Palliative Care  Consult: No    Advance Care Planning   Advance Directives:   Do Not Resuscitate Status: Yes      Decision Making:  Patient answered questions and Family answered questions  Goals of Care: The patient and family endorses that what is most important right now is to focus on comfort and QOL     Accordingly, we have decided that the best plan to meet the patient's goals includes pivot to comfort-focused care          PAINAD: NA    Caregiver burden formerly assessed: Yes      No results displayed because visit has over 200 results.               > 50% of 35 min of encounter was spent in chart review, face to face discussion of goals of care, symptom assessment, coordination of care and emotional support.    Patricia Cash FNP, [facility]  Palliative Medicine  [facility] - Observation Unit

## 2023-05-22 NOTE — PROGRESS NOTES
"                                                                                                                        NEPHROLOGY: Progress      63 y.o. male with MRSA bacteremia.  Past medical history significant for polysubstance abuse, IV drug use, diabetes mellitus, untreated hepatitis-C, and cirrhosis.  He was recently hospitalized at [facility] for MRSA bacteremia due to C and L-spine osteomyelitis/diskitis, paraspinous muscle abscess, and lumbar epidural abscess.    He also had a cholecystostomy drain placed for cholecystitis.  Patient left AMA on 05/05/2023 and then presented to [facility] on 05/06/2023.  Blood cultures at admission were positive for MRSA.  UDS was also positive for amphetamines and opiates.  Neurosurgery and Infectious Disease have been following for bacteremia and spinal osteomyelitis/diskitis.  He has been getting daptomycin, rifampin, and micafungin per ID recommendations.      We are following the patient for non-oliguric acute kidney injury which seems to be very stable.  Patient had an episode of hematochezia requiring 3 units of packed red cells.  Colonoscopy revealed pan colonic diverticuli as the probable source.      Patient was transferred to ICU due to respiratory failure requiring intubation.  Left thoracentesis was done on May 17th with removal of 1 L of transudate.  Patient remains intubated and sedated.  Paxton performed on the 19th revealed EF of 10-15% with severe global hypokinesis and no evidence of vegetations.                       /72   Pulse 82   Temp 98.8 °F (37.1 °C) (Oral)   Resp (!) 22   Ht 5' 7.01" (1.702 m)   Wt 72.6 kg (160 lb)   SpO2 100%   BMI 25.05 kg/m²     Physical Exam:    GEN:  Chronically ill-appearing.  Patient is currently intubated and sedated.  He is arousable. He is on very low-dose norepinephrine 0.02 mics per kilos per minute.   HEENT: Atraumatic. EOMI, no icterus, patient does look in my direction when spoken to.  NECK : No " JVD  CARD : RRR s rub or gallop  LUNGS :  Bilateral coarse rhonchi with diminished breath sounds  ABD : Soft,non-tender. BS active , ascites present.  Percutaneous cholecystostomy tube draining bilious fluid.  EXT :  2 to 3+ pitting edema.           Intake/Output Summary (Last 24 hours) at 5/22/2023 1048  Last data filed at 5/22/2023 0821  Gross per 24 hour   Intake 1958.3 ml   Output 1426 ml   Net 532.3 ml     All current medications have been reviewed.    Laboratory:  Recent Results (from the past 24 hour(s))   POCT glucose    Collection Time: 05/21/23 12:18 PM   Result Value Ref Range    POCT Glucose 219 (H) 70 - 110 mg/dL   POCT glucose    Collection Time: 05/21/23  6:06 PM   Result Value Ref Range    POCT Glucose 187 (H) 70 - 110 mg/dL   POCT glucose    Collection Time: 05/22/23 12:23 AM   Result Value Ref Range    POCT Glucose 223 (H) 70 - 110 mg/dL   Comprehensive Metabolic Panel    Collection Time: 05/22/23 12:55 AM   Result Value Ref Range    Sodium Level 139 136 - 145 mmol/L    Potassium Level 3.8 3.5 - 5.1 mmol/L    Chloride 104 98 - 107 mmol/L    Carbon Dioxide 24 23 - 31 mmol/L    Glucose Level 194 (H) 82 - 115 mg/dL    Blood Urea Nitrogen 45.2 (H) 8.4 - 25.7 mg/dL    Creatinine 1.74 (H) 0.73 - 1.18 mg/dL    Calcium Level Total 8.2 (L) 8.8 - 10.0 mg/dL    Protein Total 5.6 (L) 5.8 - 7.6 gm/dL    Albumin Level 3.0 (L) 3.4 - 4.8 g/dL    Globulin 2.6 2.4 - 3.5 gm/dL    Albumin/Globulin Ratio 1.2 1.1 - 2.0 ratio    Bilirubin Total 0.9 <=1.5 mg/dL    Alkaline Phosphatase 59 40 - 150 unit/L    Alanine Aminotransferase 10 0 - 55 unit/L    Aspartate Aminotransferase 18 5 - 34 unit/L    eGFR 44 mls/min/1.73/m2   Magnesium    Collection Time: 05/22/23 12:55 AM   Result Value Ref Range    Magnesium Level 2.00 1.60 - 2.60 mg/dL   CBC with Differential    Collection Time: 05/22/23 12:55 AM   Result Value Ref Range    WBC 11.59 (H) 4.50 - 11.50 x10(3)/mcL    RBC 2.58 (L) 4.70 - 6.10 x10(6)/mcL    Hgb 7.7 (L) 14.0 -  18.0 g/dL    Hct 24.7 (L) 42.0 - 52.0 %    MCV 95.7 (H) 80.0 - 94.0 fL    MCH 29.8 27.0 - 31.0 pg    MCHC 31.2 (L) 33.0 - 36.0 g/dL    RDW 16.1 11.5 - 17.0 %    Platelet 179 130 - 400 x10(3)/mcL    MPV 10.9 (H) 7.4 - 10.4 fL    Neut % 79.7 %    Lymph % 9.7 %    Mono % 5.2 %    Eos % 4.1 %    Basophil % 0.7 %    Lymph # 1.13 0.6 - 4.6 x10(3)/mcL    Neut # 9.23 (H) 2.1 - 9.2 x10(3)/mcL    Mono # 0.60 0.1 - 1.3 x10(3)/mcL    Eos # 0.48 0 - 0.9 x10(3)/mcL    Baso # 0.08 <=0.2 x10(3)/mcL    IG# 0.07 (H) 0 - 0.04 x10(3)/mcL    IG% 0.6 %    NRBC% 0.0 %   POCT glucose    Collection Time: 05/22/23  5:23 AM   Result Value Ref Range    POCT Glucose 137 (H) 70 - 110 mg/dL   RT Blood Gas    Collection Time: 05/22/23  6:26 AM   Result Value Ref Range    Sample Type Arterial Blood     Sample site Left Radial Artery     Drawn by sd rrt     pH, Blood gas 7.490 (H) 7.350 - 7.450    pCO2, Blood gas 39.0 35.0 - 45.0 mmHg    pO2, Blood gas 92.0 80.0 - 100.0 mmHg    Potassium, Blood Gas 3.6 3.5 - 5.0 mmol/L    Calcium Level Ionized 1.11 (L) 1.12 - 1.23 mmol/L    TOC2, Blood gas 30.9 mmol/L    Base Excess, Blood gas 5.90 mmol/L    sO2, Blood gas 98.0 %    HCO3, Blood gas 29.7 >=15.0 mmol/L    Allens Test Yes     MODE AC     FIO2, Blood gas 30 %    Mech Vt 450 ml    Mech RR 14 b/min    PEEP 5.0 cmH2O         Assessment/Plan:  RUPERTO-nonoliguric -ATN versus AIN-creatinine is stable  Recent colonic diverticular bleed  Respiratory failure -s/p Lt pleurocentesis-on ventilator  Cholecystitis-s/p percutaneous cholecystostomy tube  MRSA bacteremia-osteomyelitis/psoas abscesses  S/p recent lumbar laminectomy/epidural abscess I&D  Polysubstance abuse   HFrEF-10-15%  Cervical and lumbar spine osteomyelitis/diskitis  Bilateral psoas and paraspinous muscle   Sacral ulcer debrided 5/10  Cirrhosis-untreated hep C  Type 2 diabetes mellitus     Patient remains critically ill on ventilator support and q.6 hours albumin infusion.  Decision has been made for  DNR status which I fully agree with.  He is however significantly volume overloaded so will need to initiate some diuresis today.  Plans for possible extubation once all family members have arrived as per patient's request.  He is currently tolerating his tube feedings.  In my opinion patient condition is irreversible.   We will continue to follow.      Adi Campos MD, [facility]

## 2023-05-22 NOTE — PROGRESS NOTES
[facility] [address]  Neurosurgery  Progress Note    Subjective:     Interval History: No acute events overnight.  Afebrile.  Hemodynamically stable.  Remains intubated ventilated sedated with Precedex. Renal function slightly improved. Awake and alert despite sedation.     History of Present Illness: 62-year-old male with significant history of polysubstance abuse/IV drug abuse, type 2 diabetes mellitus, untreated chronic hep C/cirrhosis.  Patient had a recent hospitalization to [facility] for MRSA bacteremia with MRSA C and L-spine osteomyelitis, diskitis, bilateral psoas and paraspinous muscle abscess as well as lumbar epidural abscess.  Patient was being treated in [facility] with close follow-up with neurosurgery, Infectious Disease.  Hospital stay also was even full for cholecystitis for which he underwent cholecystostomy tube placement.  Patient signed out AMA from [facility] on 05/05, went home, called ambulance and presented to the hospital.  Per chart review patient was being treated with daptomycin and rifampin in [facility].  While hospitalized his urine drug screen was positive for amphetamines and there were concerns that he was using it while hospitalized.  Patient was tachycardic in the ED.  Otherwise hemodynamics stable.  Complained of bilateral lower extremity swelling. Labs significant for leukocytosis, anemia, hyperglycemia. UDS was positive for opiates, amphetamines.  Hospitalist team was consulted for admission. Restarted on IV Daptomycin, Rifampin, Zosyn. Echocardiogram on 5/6 showed normal LV systolic function, EF 50-55%, indeterminate diastolic function, mild RV enlargement, mild MR, PASP 19 mmHg with no mention of vegetations.  Blood cultures from 05/05 for positive for MRSA.  Repeat blood culture from 05/09 negative for 24 hours.  Patient awaiting MRI lumbar spine.  Noted to have LLE pain with B/L LE U/S showing fluid collection in left cough.   Awaiting CT L leg and thigh with contrast.  Also ordered CT chest with contrast to further evaluate right-sided oblong opacity on CXR which is thought to be mass/possible abscess/fissural fluid collection. Underwent debridement for sacral ulcer on 05/10 with wound care being done. Neurosurgery to make further recommendations once MRI lumbar spine is done. Noted to have elevated Cr of 1.78; DC diuretics. Possibly d/t ATN from ABX; consulted Nephrology. Ordered US Retroperitoneum, UA. Cancelled CT chest, CT L thigh/leg due to RUPERTO. Nephrology on board; ordered [id number] given anemia, renal dysfunction, petechial rash to evaluate for TTP.  Patient noted to have hematochezia by nurse on 05/12; consulted GI.  HGB/HCT noted to drop to 6.7/22.0; transfused 2 units PRBCs. NM bleeding scan ordered by GI which was negative. Continued to have hematochezia on 05/13, started on bowel prep for colonoscopy by GI.  Severe cardiomyopathy with marked decreased LVSF (EF 10%), significant decrease over prior transthoracic echocardiogram.  No evidence of endocarditis by CRISTY 05/17/2023  Acute respiratory failure, intubated 05/16/2023, requiring ongoing mechanical ventilation.    Post-Op Info:  Procedure(s) (LRB):  COLONOSCOPY (N/A)   8 Days Post-Op      Medications:  Continuous Infusions:   dexmedeTOMIDine (Precedex) infusion (titrating) 0.8 mcg/kg/hr (05/22/23 0824)    fentanyl Stopped (05/19/23 1000)    NORepinephrine bitartrate-D5W 0.001 mcg/kg/min (05/22/23 0626)    propofoL 20 mcg/kg/min (05/22/23 0537)     Scheduled Meds:   albumin human 25%  12.5 g Intravenous Q6H    collagenase   Topical (Top) BID    DAPTOmycin (CUBICIN) IV (PEDS and ADULTS)  6 mg/kg Intravenous Q24H    famotidine (PF)  20 mg Intravenous Daily    furosemide (LASIX) injection  40 mg Intravenous Q12H    insulin detemir U-100  12 Units Subcutaneous QHS    Lactobacillus acidophilus  1 capsule Per NG/OG Tube TID WM    micafungin (MYCAMINE) IVPB  100 mg Intravenous Q24H     miconazole NITRATE 2 %   Topical (Top) BID    multivitamin  1 tablet Per OG tube Daily    oxyCODONE  5 mg Oral Q6H    rifAMpin (RIFADIN) IVPB  300 mg Intravenous BID    sodium chloride 0.9%  10 mL Intravenous Q6H    thiamine  100 mg Per OG tube Daily    zinc oxide-cod liver oil   Topical (Top) BID     PRN Meds:sodium chloride, sodium chloride, sodium chloride, sodium chloride, sodium chloride, sodium chloride, aluminum-magnesium hydroxide-simethicone, dextrose 10%, dextrose 10%, fentaNYL, fentaNYL, glucagon (human recombinant), glucose, glucose, HYDROmorphone, insulin aspart U-100, loperamide, melatonin, metoprolol, ondansetron, oxyCODONE-acetaminophen, polyethylene glycol, prochlorperazine, senna-docusate 8.6-50 mg, sodium chloride 0.9%, Flushing PICC Protocol **AND** sodium chloride 0.9% **AND** sodium chloride 0.9%, sodium chloride 0.9%, tiZANidine     Review of Systems  Objective:     Weight: 72.6 kg (160 lb)  Body mass index is 25.05 kg/m².  Vital Signs (Most Recent):  Temp: 99.1 °F (37.3 °C) (05/22/23 1200)  Pulse: 89 (05/22/23 1201)  Resp: (!) 34 (05/22/23 1201)  BP: 113/80 (05/22/23 1201)  SpO2: 97 % (05/22/23 1201) Vital Signs (24h Range):  Temp:  [98.1 °F (36.7 °C)-99.1 °F (37.3 °C)] 99.1 °F (37.3 °C)  Pulse:  [68-92] 89  Resp:  [7-38] 34  SpO2:  [80 %-100 %] 97 %  BP: ()/(57-94) 113/80     Date 05/22/23 0700 - 05/23/23 0659   Shift 9672-9509 2572-3682 5683-7994 24 Hour Total   INTAKE   Shift Total(mL/kg)       OUTPUT   Urine(mL/kg/hr) 325   325   Shift Total(mL/kg) 325(4.5)   325(4.5)   Weight (kg) 72.6 72.6 72.6 72.6              Vent Mode: CPAP / PSV  Oxygen Concentration (%):  [30] 30  Resp Rate Total:  [14 br/min-32 br/min] 14 br/min  Vt Set:  [450 mL] 450 mL  PEEP/CPAP:  [5 cmH20] 5 cmH20  Pressure Support:  [10 cmH20] 10 cmH20  Mean Airway Pressure:  [8 cmH20-10 cmH20] 9 cmH20             NG/OG Tube 05/16/23 0300 Center mouth (Active)   Placement Check placement verified by aspirate  "characteristics 05/22/23 1200   Distal Tube Length (cm) 65 05/21/23 0800   Tolerance no signs/symptoms of discomfort 05/22/23 1200   Securement secured to commercial device 05/22/23 1200   Clamp Status/Tolerance unclamped 05/22/23 1200   Suction Setting/Drainage Method suction at the bedside 05/19/23 1600   Insertion Site Appearance no redness, warmth, tenderness, skin breakdown, drainage 05/22/23 1200   Drainage Bile 05/19/23 1600   Feeding Type continuous;by pump 05/22/23 1200   Feeding Action feeding continued 05/22/23 1200   Current Rate (mL/hr) 55 mL/hr 05/21/23 0800   Goal Rate (mL/hr) 55 mL/hr 05/20/23 2000   Intake (mL) 30 mL 05/21/23 1801   Water Bolus (mL) 125 mL 05/22/23 0534   Tube Output(mL)(Include Discarded Residual) 546 mL 05/21/23 1801   Intake (mL) - Formula Tube Feeding 450 05/22/23 0534            Urethral Catheter 05/12/23 1630 Latex 16 Fr. (Active)   $ Awad Insertion Bedside Insertion Performed 05/12/23 1630   Site Assessment Clean;Intact;Dry 05/22/23 1200   Collection Container Standard drainage bag 05/22/23 1200   Securement Method secured to top of thigh w/ adhesive device 05/22/23 1200   Catheter Care Performed yes 05/22/23 1200   Reason for Continuing Urinary Catheterization Critically ill in ICU and requiring hourly monitoring of intake/output 05/22/23 1200   CAUTI Prevention Bundle Securement Device in place with 1" slack 05/21/23 0800   Output (mL) 125 mL 05/22/23 1159            Drain/Device    Right lower flank (Active)   Insertion Site dressing changed;suture(s) intact 05/22/23 1200   Drainage Characteristics/Odor Brown 05/22/23 0800   Drainage Amount Moderate 05/22/23 0800   General Intake (mL) 100 05/20/23 0800   General Output (mL) 80 05/22/23 0534       Neurosurgery Physical Exam  AFVSS  Opens eyes to verbal command  Followed commands in bilateral LE. Able to DF/PF and lift legs off of the bed.  Wound vac in place, functioning  Very little output since Friday    Significant " Labs:  Recent Labs   Lab 05/21/23  0201 05/22/23  0055    139   K 3.5 3.8   CO2 22* 24   BUN 42.2* 45.2*   CREATININE 1.96* 1.74*   CALCIUM 8.2* 8.2*   MG 1.90 2.00     Recent Labs   Lab 05/21/23  0201 05/22/23  0055   WBC 12.82* 11.59*   HGB 8.4* 7.7*   HCT 25.9* 24.7*    179     No results for input(s): LABPT, INR, APTT in the last 48 hours.  Microbiology Results (last 7 days)       Procedure Component Value Units Date/Time    Blood Culture [[id number]]  (Normal) Collected: 05/16/23 0825    Order Status: Completed Specimen: Blood Updated: 05/21/23 1000     CULTURE, BLOOD ([facility]) No Growth at 5 days    Body Fluid Culture [[id number]] Collected: 05/16/23 1359    Order Status: Completed Specimen: Body Fluid from Lung, Left Updated: 05/21/23 0916     Body Fluid Culture Final Report: At 5 days. No growth    Blood Culture [[id number]]  (Normal) Collected: 05/16/23 0656    Order Status: Completed Specimen: Blood Updated: 05/21/23 0800     CULTURE, BLOOD ([facility]) No Growth at 5 days    Wound Culture [[id number]]  (Abnormal) Collected: 05/16/23 1313    Order Status: Completed Specimen: Wound from Lumbar Updated: 05/18/23 1305     Wound Culture Few Candida albicans     Comment: Susceptibility sent to reference lab. Results to follow on separate report.       Narrative:      For sensitivity results refer to [id number].    Respiratory Culture [[id number]]  (Abnormal) Collected: 05/16/23 1158    Order Status: Completed Specimen: Sputum from Transtrachael aspirate Updated: 05/18/23 1013     Respiratory Culture Few Yeast     GRAM STAIN Quality 2+      No bacteria seen    AFB Smear [[id number]] Collected: 05/16/23 1359    Order Status: Completed Specimen: Pleural Fluid from Lung, Left Updated: 05/17/23 0924     AFB Smear No AFB seen (Direct smear only)    Gram Stain [[id number]] Collected: 05/16/23 1359    Order Status: Completed Specimen: Body Fluid from Pleural Fluid, Left Updated: 05/17/23 0729     GRAM STAIN No WBCs, No  bacteria seen    Mycobacteria and Nocardia Culture [[id number]] Collected: 05/16/23 1359    Order Status: Sent Specimen: Body Fluid from Lung, Left Updated: 05/16/23 1643    Fungal Culture [[id number]] Collected: 05/16/23 1359    Order Status: Sent Specimen: Body Fluid from Lung, Left Updated: 05/16/23 1407          Significant Diagnostics:      Assessment/Plan:     Active Diagnoses:    Diagnosis Date Noted POA    PRINCIPAL PROBLEM:  Sepsis [A41.9] 05/05/2023 Yes    Moderate malnutrition [E44.0] 05/17/2023 Yes    Staphylococcus aureus bacteremia [R78.81, B95.61] 05/05/2023 Yes      Problems Resolved During this Admission:     No new recs from our standpoint  MRI reviewed by Dr. Rosa; no plans for further surgery at this time  Noted in chart that patient's family discussed that they are not interested in any further surgical interventions.   Continue wound vac  We will sign off. Please call with any questions.    DILCIA Nolasco  Neurosurgery  [facility] - [facility]

## 2023-05-22 NOTE — PROGRESS NOTES
[facility] [address] ICU  Pulmonary/Critical Care  Progress Note  5/22/2023    Patient Name: Reji Plasencia  MRN: [id number]  Admission Date: 5/5/2023  Code Status: Partial Code      Subjective:     HPI:  63-year-old male with extensive medical comorbidities including diabetes, untreated hepatitis-C, cirrhosis, IV drug use, tobacco use, current MRSA bacteremia and C-spine/L-spine osteomyelitis/diskitis with bilateral psoas and paraspinous muscle abscesses in addition to a lumbar epidural abscess who was originally admitted to [facility] on 05/05 after leaving [place] [facility] after 3 weeks of hospitalization during which he received IV antibiotics (daptomycin/rifampin) and underwent bilateral lumbar laminectomy and foraminotomy with drainage of epidural abscess on 04/21 by Dr. Chang.  Previous hospital stay was also complicated by cholecystitis for which he underwent MRCP and cholecystostomy tube placement (4/19) in addition to paracentesis.  Also during previous hospitalization he had a positive urine drug screen on 04/27 for amphetamines suggesting drug use while hospitalized.       During his stay at [facility] he has been followed by Neurosurgery, Infectious Disease, Gastroenterology, and Nephrology.  Echocardiogram on 05/06 with EF 50-55%, no mention of vegetations.  Has been awaiting MRI lumbar spine and CT of the left lower extremity secondary to fluid collection on ultrasound which was 9 x 4 x 2 cm.  However these imaging modalities have not been able to be performed yet.  He underwent sacral ulcer debridement on 05/10.  Hematochezia on 05/12 resulted in transfusion of 4 units of blood and 1 unit FFP and nuclear medicine bleeding scan revealed radiotracer accumulation with most intense activity in ascending colon prompting colonoscopy which showed pancolonic diverticula without active bleed and anorectal hyperemic mucosa.     He was upgraded to ICU on 5/16 for tenuous respiratory  status, on BiPAP 14/6 with 100% FiO2 maintaining saturations in the low/mid 80s and near hypotension with tachycardia.  His respiratory status further declined requiring intubation/mechanical ventilatory support.    Significant events:  CRITSY:  05/18/2022:  LVSF 10-15% with no signs of intracardiac infection vegetation or thrombus    24hr Interval History:  No acute events overnight.  Afebrile.  Hemodynamically stable.  Remains intubated ventilated sedated with Precedex.  Currently on Levophed at 0.04.  Lab work stable.  Renal function slightly improved.  Remains on tube feeds with free water flushes. Awake and alert despite sedation.    Scheduled Medications:   albumin human 25%  12.5 g Intravenous Q6H    collagenase   Topical (Top) BID    DAPTOmycin (CUBICIN) IV (PEDS and ADULTS)  6 mg/kg Intravenous Q24H    famotidine (PF)  20 mg Intravenous Daily    insulin detemir U-100  12 Units Subcutaneous QHS    Lactobacillus acidophilus  1 capsule Per NG/OG Tube TID WM    micafungin (MYCAMINE) IVPB  100 mg Intravenous Q24H    miconazole NITRATE 2 %   Topical (Top) BID    multivitamin  1 tablet Per OG tube Daily    oxyCODONE  5 mg Oral Q6H    rifAMpin (RIFADIN) IVPB  300 mg Intravenous BID    sodium chloride 0.9%  10 mL Intravenous Q6H    thiamine  100 mg Per OG tube Daily    zinc oxide-cod liver oil   Topical (Top) BID     Continuous Infusions:   dexmedeTOMIDine (Precedex) infusion (titrating) 0.8 mcg/kg/hr (05/22/23 0027)    fentanyl Stopped (05/19/23 1000)    NORepinephrine bitartrate-D5W 0.04 mcg/kg/min (05/22/23 0434)    propofoL 20 mcg/kg/min (05/22/23 0537)     History reviewed. No pertinent past medical history.    Past Surgical History:   Procedure Laterality Date    COLONOSCOPY N/A 5/14/2023    Procedure: COLONOSCOPY;  Surgeon: Shaneka Ayers MD;  Location: [facility] OR;  Service: Gastroenterology;  Laterality: N/A;    DEBRIDEMENT OF SACRAL WOUND N/A 5/10/2023    Procedure: DEBRIDEMENT, WOUND, SACRUM;  Surgeon: Reggie MCMILLAN  MD Maddy;  Location: [facility];  Service: General;  Laterality: N/A;       Objective:     Input/output:    Intake/Output Summary (Last 24 hours) at 5/22/2023 0550  Last data filed at 5/22/2023 0534  Gross per 24 hour   Intake 1958.3 ml   Output 1651 ml   Net 307.3 ml         Vital Signs (Most Recent):  Temp: 98.3 °F (36.8 °C) (05/22/23 0400)  Pulse: 72 (05/22/23 0534)  Resp: (!) 21 (05/22/23 0534)  BP: 101/71 (05/22/23 0534)  SpO2: 100 % (05/22/23 0534)  Body mass index is 25.05 kg/m².  Weight: 72.6 kg (160 lb) Vital Signs (24h Range):  Temp:  [98.1 °F (36.7 °C)-99.6 °F (37.6 °C)] 98.3 °F (36.8 °C)  Pulse:  [] 72  Resp:  [7-38] 21  SpO2:  [80 %-100 %] 100 %  BP: ()/(57-94) 101/71       General:  well-developed, no acute distress, intubated ventilated  HEENT: Normocephalic, atraumatic.  ET tube present  Neck: Supple. No obvious JVD.   Respiratory:  Coarse breath sounds bilateral.  Cardiovascular:  RRR.  No obvious M/G/R. Warm extremities.  Peripheral pulses intact.  + edema.  Gastrointestinal:  Soft, nondistended. bowel sounds present.  Neurologic:  Intubated ventilated sedated on Precedex.  Awakens.  PERRLA.      Lines/Drains/Airways       Peripherally Inserted Central Catheter Line  Duration             PICC Double Lumen 05/10/23 0817 right brachial 11 days              Drain  Duration                  Drain/Device    Right lower flank -- days         Urethral Catheter 05/12/23 1630 Latex 16 Fr. 9 days         NG/OG Tube 05/16/23 0300 Center mouth 6 days              Airway  Duration                  Airway - Non-Surgical 05/16/23 0239 6 days                    Vent:  Vent Mode: A/C (05/22/23 0415)  Set Rate: 14 BPM (05/22/23 0415)  Vt Set: 450 mL (05/22/23 0415)  Pressure Support: 10 cmH20 (05/21/23 1710)  PEEP/CPAP: 5 cmH20 (05/22/23 0415)  Oxygen Concentration (%): 30 (05/22/23 0415)  Peak Airway Pressure: 24 cmH20 (05/22/23 0415)  Total Ve: 6.5 L/m (05/22/23 0415)  F/VT Ratio<105 (RSBI): (!) 36.86  (05/22/23 0415)    ABGs:  No results found for: PH, PO2, PCO2, APV1ZYT      Significant Labs:    Lab Results   Component Value Date    WBC 11.59 (H) 05/22/2023    HGB 7.7 (L) 05/22/2023    HCT 24.7 (L) 05/22/2023    MCV 95.7 (H) 05/22/2023     05/22/2023         Recent Labs   Lab 05/22/23  0055      K 3.8   CO2 24   BUN 45.2*   CREATININE 1.74*   CALCIUM 8.2*   MG 2.00   AST 18   ALT 10   ALKPHOS 59   ALBUMIN 3.0*        Imaging:   Ultrasound abdomen yesterday with cholelithiasis and mild diffuse wall thickening with ascites    Assessment:     Persistent methicillin-resistant Staphylococcus aureus sepsis with C-spine/L-spine osteomyelitis/diskitis, lumbar epidural abscess (post drainage of epidural abscess with bilateral lumbar laminectomy and foraminotomies 04/21/2023 at [facility]), bilateral psoas abscesses.  Candida albicans UTI  Septic shock with hypotension requiring vasopressor - improving   Pleural effusions, with superimposed pulmonary infiltrates s/p left thoracentesis on 05/16 consistent with exudative effusion   Severe cardiomyopathy with marked decreased LVSF (EF 10-15%), significant decrease over prior transthoracic echocardiogram without evidence of endocarditis by CRISTY 05/17/2023  Acute respiratory failure, intubated 05/16/2023, requiring ongoing mechanical ventilation.  Cholecystitis post percutaneous cholecystostomy tube placement at [facility] 04/19/2023.  Biliary drain remains in place until 6/6/23 with cholangiogram prior to removal  Sacral ulcer s/p debridement on 05/10  Hematochezia s/p transfusions/colonoscopy with diverticula on 05/14  Acute kidney injury, nonoliguric  Untreated hepatitis-C with cirrhosis and ascites  History of polysubstance and IV drug abuse up until this hospital stay    Plan:     Continue IV daptomycin, rifampin, and micafungin.  Infectious Disease following.  Leukocytosis stable.  Afebrile  Continue vasopressors to maintain mean arterial pressure of 65 or greater.   Presently on Levophed at 0.04  Wean ventilator to spontaneous breathing trial as tolerated.  Significantly tachypneic yesterday during trial.  Currently requiring Precedex for sedation, wean as tolerated  Palliative care, Nephrology, Neurosurgery, Infectious Disease, and wound care following  Remains on albumin and tube feeds with free water flushes    GI prophylaxis: Pepcid  DVT prophylaxis: SCDs    32 minutes of critical care was time spent personally by me on the following activities: development of treatment plan with patient or surrogate and bedside caregivers, discussions with consultants, evaluation of patient's response to treatment, examination of patient, ordering and performing treatments and interventions, ordering and review of laboratory studies, ordering and review of radiographic studies, pulse oximetry, re-evaluation of patient's condition.  This patient demonstrates a high probability for further clinical decompensation due to ongoing critical illness.  Critical care time did not overlap with that of any other provider or involve time for any procedures.     Cici Nunez MD  Pulmonary/Critical Care

## 2023-05-23 VITALS
BODY MASS INDEX: 25.11 KG/M2 | TEMPERATURE: 100 F | SYSTOLIC BLOOD PRESSURE: 97 MMHG | DIASTOLIC BLOOD PRESSURE: 63 MMHG | WEIGHT: 160 LBS | RESPIRATION RATE: 22 BRPM | HEART RATE: 127 BPM | HEIGHT: 67 IN | OXYGEN SATURATION: 98 %

## 2023-05-23 LAB
ALBUMIN SERPL-MCNC: 3.1 G/DL (ref 3.4–4.8)
ALBUMIN/GLOB SERPL: 0.9 RATIO (ref 1.1–2)
ALP SERPL-CCNC: 75 UNIT/L (ref 40–150)
ALT SERPL-CCNC: 12 UNIT/L (ref 0–55)
AST SERPL-CCNC: 31 UNIT/L (ref 5–34)
BASOPHILS # BLD AUTO: 0.06 X10(3)/MCL
BASOPHILS NFR BLD AUTO: 0.5 %
BILIRUBIN DIRECT+TOT PNL SERPL-MCNC: 1.1 MG/DL
BUN SERPL-MCNC: 45.6 MG/DL (ref 8.4–25.7)
CALCIUM SERPL-MCNC: 8.6 MG/DL (ref 8.8–10)
CHLORIDE SERPL-SCNC: 104 MMOL/L (ref 98–107)
CO2 SERPL-SCNC: 23 MMOL/L (ref 23–31)
CREAT SERPL-MCNC: 1.69 MG/DL (ref 0.73–1.18)
EOSINOPHIL # BLD AUTO: 0.2 X10(3)/MCL (ref 0–0.9)
EOSINOPHIL NFR BLD AUTO: 1.5 %
ERYTHROCYTE [DISTWIDTH] IN BLOOD BY AUTOMATED COUNT: 16 % (ref 11.5–17)
GFR SERPLBLD CREATININE-BSD FMLA CKD-EPI: 46 MLS/MIN/1.73/M2
GLOBULIN SER-MCNC: 3.4 GM/DL (ref 2.4–3.5)
GLUCOSE SERPL-MCNC: 119 MG/DL (ref 82–115)
HCT VFR BLD AUTO: 29.1 % (ref 42–52)
HGB BLD-MCNC: 9.1 G/DL (ref 14–18)
IMM GRANULOCYTES # BLD AUTO: 0.08 X10(3)/MCL (ref 0–0.04)
IMM GRANULOCYTES NFR BLD AUTO: 0.6 %
LYMPHOCYTES # BLD AUTO: 1.19 X10(3)/MCL (ref 0.6–4.6)
LYMPHOCYTES NFR BLD AUTO: 9.1 %
MAGNESIUM SERPL-MCNC: 2 MG/DL (ref 1.6–2.6)
MAYO GENERIC ORDERABLE RESULT: ABNORMAL
MCH RBC QN AUTO: 30.2 PG (ref 27–31)
MCHC RBC AUTO-ENTMCNC: 31.3 G/DL (ref 33–36)
MCV RBC AUTO: 96.7 FL (ref 80–94)
MONOCYTES # BLD AUTO: 0.62 X10(3)/MCL (ref 0.1–1.3)
MONOCYTES NFR BLD AUTO: 4.8 %
NEUTROPHILS # BLD AUTO: 10.87 X10(3)/MCL (ref 2.1–9.2)
NEUTROPHILS NFR BLD AUTO: 83.5 %
NRBC BLD AUTO-RTO: 0 %
PLATELET # BLD AUTO: 160 X10(3)/MCL (ref 130–400)
PMV BLD AUTO: 11.7 FL (ref 7.4–10.4)
POCT GLUCOSE: 133 MG/DL (ref 70–110)
POCT GLUCOSE: 69 MG/DL (ref 70–110)
POTASSIUM SERPL-SCNC: 4.4 MMOL/L (ref 3.5–5.1)
PROT SERPL-MCNC: 6.5 GM/DL (ref 5.8–7.6)
RBC # BLD AUTO: 3.01 X10(6)/MCL (ref 4.7–6.1)
SODIUM SERPL-SCNC: 137 MMOL/L (ref 136–145)
WBC # SPEC AUTO: 13.02 X10(3)/MCL (ref 4.5–11.5)

## 2023-05-23 PROCEDURE — 83735 ASSAY OF MAGNESIUM: CPT | Performed by: STUDENT IN AN ORGANIZED HEALTH CARE EDUCATION/TRAINING PROGRAM

## 2023-05-23 PROCEDURE — A4216 STERILE WATER/SALINE, 10 ML: HCPCS | Performed by: STUDENT IN AN ORGANIZED HEALTH CARE EDUCATION/TRAINING PROGRAM

## 2023-05-23 PROCEDURE — 63600175 PHARM REV CODE 636 W HCPCS: Performed by: INTERNAL MEDICINE

## 2023-05-23 PROCEDURE — 99233 SBSQ HOSP IP/OBS HIGH 50: CPT | Mod: ,,, | Performed by: NURSE PRACTITIONER

## 2023-05-23 PROCEDURE — 85025 COMPLETE CBC W/AUTO DIFF WBC: CPT | Performed by: STUDENT IN AN ORGANIZED HEALTH CARE EDUCATION/TRAINING PROGRAM

## 2023-05-23 PROCEDURE — 99233 PR SUBSEQUENT HOSPITAL CARE,LEVL III: ICD-10-PCS | Mod: ,,, | Performed by: NURSE PRACTITIONER

## 2023-05-23 PROCEDURE — P9047 ALBUMIN (HUMAN), 25%, 50ML: HCPCS | Mod: JG | Performed by: STUDENT IN AN ORGANIZED HEALTH CARE EDUCATION/TRAINING PROGRAM

## 2023-05-23 PROCEDURE — 25000003 PHARM REV CODE 250: Performed by: STUDENT IN AN ORGANIZED HEALTH CARE EDUCATION/TRAINING PROGRAM

## 2023-05-23 PROCEDURE — 63600175 PHARM REV CODE 636 W HCPCS: Mod: JG | Performed by: STUDENT IN AN ORGANIZED HEALTH CARE EDUCATION/TRAINING PROGRAM

## 2023-05-23 PROCEDURE — 80053 COMPREHEN METABOLIC PANEL: CPT | Performed by: STUDENT IN AN ORGANIZED HEALTH CARE EDUCATION/TRAINING PROGRAM

## 2023-05-23 PROCEDURE — 63600175 PHARM REV CODE 636 W HCPCS: Performed by: NURSE PRACTITIONER

## 2023-05-23 PROCEDURE — 25000003 PHARM REV CODE 250: Performed by: INTERNAL MEDICINE

## 2023-05-23 RX ORDER — LORAZEPAM 2 MG/ML
2 INJECTION INTRAMUSCULAR
Status: DISCONTINUED | OUTPATIENT
Start: 2023-05-23 | End: 2023-05-23 | Stop reason: HOSPADM

## 2023-05-23 RX ORDER — HYDROMORPHONE HYDROCHLORIDE 2 MG/ML
2 INJECTION, SOLUTION INTRAMUSCULAR; INTRAVENOUS; SUBCUTANEOUS
Status: DISCONTINUED | OUTPATIENT
Start: 2023-05-23 | End: 2023-05-23 | Stop reason: HOSPADM

## 2023-05-23 RX ADMIN — HYDROMORPHONE HYDROCHLORIDE 1 MG: 2 INJECTION, SOLUTION INTRAMUSCULAR; INTRAVENOUS; SUBCUTANEOUS at 01:05

## 2023-05-23 RX ADMIN — HYDROMORPHONE HYDROCHLORIDE 2 MG: 2 INJECTION, SOLUTION INTRAMUSCULAR; INTRAVENOUS; SUBCUTANEOUS at 08:05

## 2023-05-23 RX ADMIN — LORAZEPAM 2 MG: 2 INJECTION INTRAMUSCULAR; INTRAVENOUS at 11:05

## 2023-05-23 RX ADMIN — ALBUMIN (HUMAN) 12.5 G: 12.5 SOLUTION INTRAVENOUS at 05:05

## 2023-05-23 RX ADMIN — LORAZEPAM 1 MG: 2 INJECTION INTRAMUSCULAR; INTRAVENOUS at 02:05

## 2023-05-23 RX ADMIN — HYDROMORPHONE HYDROCHLORIDE 1 MG: 2 INJECTION, SOLUTION INTRAMUSCULAR; INTRAVENOUS; SUBCUTANEOUS at 05:05

## 2023-05-23 RX ADMIN — HYDROMORPHONE HYDROCHLORIDE 2 MG: 2 INJECTION, SOLUTION INTRAMUSCULAR; INTRAVENOUS; SUBCUTANEOUS at 11:05

## 2023-05-23 RX ADMIN — FUROSEMIDE 40 MG: 10 INJECTION, SOLUTION INTRAMUSCULAR; INTRAVENOUS at 12:05

## 2023-05-23 RX ADMIN — OXYCODONE HYDROCHLORIDE 5 MG: 5 TABLET ORAL at 12:05

## 2023-05-23 RX ADMIN — SODIUM CHLORIDE, PRESERVATIVE FREE 10 ML: 5 INJECTION INTRAVENOUS at 05:05

## 2023-05-23 RX ADMIN — SODIUM CHLORIDE, PRESERVATIVE FREE 10 ML: 5 INJECTION INTRAVENOUS at 12:05

## 2023-05-23 RX ADMIN — MICAFUNGIN SODIUM 100 MG: 100 INJECTION, POWDER, LYOPHILIZED, FOR SOLUTION INTRAVENOUS at 03:05

## 2023-05-23 RX ADMIN — ALBUMIN (HUMAN) 12.5 G: 12.5 SOLUTION INTRAVENOUS at 12:05

## 2023-05-23 RX ADMIN — METOPROLOL TARTRATE 5 MG: 1 INJECTION, SOLUTION INTRAVENOUS at 03:05

## 2023-05-23 NOTE — DISCHARGE SUMMARY
[facility] [address] [place] [address] ICU  Critical Care Medicine  Discharge Summary      Patient Name: Reji Plasencia  MRN: [id number]  Admission Date: 5/5/2023  Hospital Length of Stay: 18 days  Discharge Date and Time:  05/23/2023 11:07 AM  Attending Physician: Medhat Reece Jr., MD, *   Discharging Provider: DIANE Durham  Primary Care Provider: Primary Doctor No  Reason for Admission: Sepsis    HPI:   63-year-old male with extensive medical comorbidities including diabetes, untreated hepatitis-C, cirrhosis, IV drug use, tobacco use, current MRSA bacteremia and 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 Chang.  Previous hospital stay was also complicated by cholecystitis for which he underwent MRCP and cholecystostomy tube placement (4/19) in addition to paracentesis.  Also during previous hospitalization he had a positive urine drug screen on 04/27 for amphetamines suggesting drug use while hospitalized.       During his stay at [facility] he has been followed by Neurosurgery, Infectious Disease, Gastroenterology, and Nephrology.  Echocardiogram on 05/06 with EF 50-55%, no mention of vegetations.  Has been awaiting MRI lumbar spine and CT of the left lower extremity secondary to fluid collection on ultrasound which was 9 x 4 x 2 cm.  However these imaging modalities have not been able to be performed yet.  He underwent sacral ulcer debridement on 05/10.  Hematochezia on 05/12 resulted in transfusion of 4 units of blood and 1 unit FFP and nuclear medicine bleeding scan revealed radiotracer accumulation with most intense  activity in ascending colon prompting colonoscopy which showed pancolonic diverticula without active bleed and anorectal hyperemic mucosa.     He was upgraded to ICU on 5/16 for tenuous respiratory status, on BiPAP 14/6 with 100% FiO2 maintaining saturations in the low/mid 80s and near hypotension with tachycardia.  His respiratory status further declined requiring intubation/mechanical ventilatory support    Procedure(s) (LRB):  COLONOSCOPY (N/A)    Indwelling Lines/Drains at Time of Discharge:   Lines/Drains/Airways     Peripherally Inserted Central Catheter Line  Duration           PICC Double Lumen 05/10/23 0817 right brachial 13 days          Drain  Duration                Drain/Device    Right lower flank -- days         Urethral Catheter 05/12/23 1630 Latex 16 Fr. 10 days              Hospital Course:   No notes on file    Consults (From admission, onward)        Status Ordering Provider     Inpatient consult to Social Work/Case Management  Once        Provider:  (Not yet assigned)    Acknowledged ISSAC CELESTIN     Inpatient consult to Spiritual Care  Once        Provider:  (Not yet assigned)    Completed ISSAC CELESTIN     Inpatient consult to Palliative Care  Once        Provider:  Candelario Washington MD    Completed LINDA JONES JR.     Inpatient consult to Cardiology  Once        Provider:  Aaron Bright MD    Completed MADDIE DUMONT     Inpatient consult to Registered Dietitian/Nutritionist  Once        Provider:  (Not yet assigned)    Completed LINDA JONES JR.     Inpatient consult to Gastroenterology  Once        Provider:  Brooks Canchola MD    Completed INDIA BOWERS     Inpatient consult to Nephrology  Once        Provider:  Zbigniew Cortez MD    Completed INDIA BOWERS     Inpatient consult to General Surgery  Once        Provider:  Reggie Castañeda MD    Completed ASHLEY CHENG     Inpatient consult to Neurosurgery  Once        Provider:  Ramón Rosa MD    Completed  MABEL BUNCH     Inpatient consult to General Surgery  Once        Provider:  Rubens Saravia MD    Completed MABEL BUNCH     Inpatient consult to General Surgery  Once        Provider:  Rubens Saravia MD    Acknowledged REGAN LANTIGUA     Inpatient consult to Infectious Diseases  Once        Provider:  Katlyn Berman MD    Completed JOHNATHAN NEWTON        Significant Labs:  All pertinent labs within the past 24 hours have been reviewed.    Significant Imaging:  I have reviewed all pertinent imaging results/findings within the past 24 hours.    Pending Diagnostic Studies:     Procedure Component Value Units Date/Time    [id number] [place] Titer [[id number]] Collected: 05/12/23 1712    Order Status: Sent Lab Status: In process Updated: 05/12/23 1712    Specimen: Blood     MZOFYI03 Inhibitor Screen [[id number]] Collected: 05/12/23 1712    Order Status: Sent Lab Status: In process Updated: 05/12/23 1712    Specimen: Blood     Ramsay GENERIC ORDERABLE MMLYP (ID: Candida albicans) [[id number]] Collected: 05/16/23 1313    Order Status: Sent Lab Status: In process Updated: 05/18/23 1304    Specimen: Wound from Back         Final Active Diagnoses:    Diagnosis Date Noted POA    PRINCIPAL PROBLEM:  Sepsis [A41.9] 05/05/2023 Yes    Moderate malnutrition [E44.0] 05/17/2023 Yes    Staphylococcus aureus bacteremia [R78.81, B95.61] 05/05/2023 Yes      Problems Resolved During this Admission:       Discharged Condition: stable    Disposition:   Discharge to [facility] with Hospice    Patient Instructions:   No discharge procedures on file.  Medications:  None     DIANE Durham  Critical Care Medicine  [facility] - [facility] ICU

## 2023-05-23 NOTE — NURSING
Nurses Note -- 4 Eyes      5/23/2023   6:07 AM      Skin assessed during: Q Shift Change      [] No Altered Skin Integrity Present    []Prevention Measures Documented      [x] Yes- Altered Skin Integrity Present or Discovered   [] LDA Added if Not in Epic (Describe Wound)   [] New Altered Skin Integrity was Present on Admit and Documented in LDA   [] Wound Image Taken    Wound Care Consulted? Yes    Attending Nurse:  Eber Valladares RN     Second RN/Staff Member:  Cong SCHWARZ RN

## 2023-05-23 NOTE — PROGRESS NOTES
Inpatient Nutrition Assessment    Admit Date: 5/5/2023   Total duration of encounter: 18 days     Nutrition Recommendation/Prescription     Pleasure feeds as tolerated.     Communication of Recommendations: reviewed with nurse    Nutrition Assessment   Malnutrition Assessment/Nutrition-Focused Physical Exam    Malnutrition Context: other (see comments), chronic illness (does not meet criteria)  Malnutrition Level: moderate  Energy Intake (Malnutrition): less than 75% for greater than or equal to 1 month  Weight Loss (Malnutrition): other (see comments) (unable to obtain)  Subcutaneous Fat (Malnutrition): mild depletion  Orbital Region (Subcutaneous Fat Loss): mild depletion  Upper Arm Region (Subcutaneous Fat Loss): mild depletion     Muscle Mass (Malnutrition): mild depletion  Religion Region (Muscle Loss): mild depletion  Clavicle Bone Region (Muscle Loss): mild depletion                    Fluid Accumulation (Malnutrition): moderate (+2, +3 edema)        A minimum of two characteristics is recommended for diagnosis of either severe or non-severe malnutrition.     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 as well as lumbar epidural abscess    Nutrition-Related Medications: furosemide, detemir 12 Units daily, lactobacillus, MVI, thiamine    Calorie Containing IV Medications: no significant kcals from medications at this time    Nutrition-Related Labs:  5/10/23: HGB/HCT: 8.7/27.7   5/12/23: HGB/HCT: 6.7/22.0  5/15/23: H/H 9.0/27.1, Bun 27.7, Crea 2.36, GFR 31, CBG's past 24hrs 113-204  5/17 BUN 28.7, Crea  "2.47, Phos 5.3, GFR 29  5/19 BUN 31.5, Crea 2.24, Glu 144  5/23 BUN 45.6, Crea 1.69, Glu 144    Diet/PN Order: Diet NPO  Oral Supplement Order: none  Tube Feeding Order: none  Appetite/Oral Intake: not applicable/not applicable  Factors Affecting Nutritional Intake:  palliative/comfort care  Food/Buddhist/Cultural Preferences: none reported  Food Allergies: none reported    Skin Integrity: drain/device(s)  Wound(s):      Altered Skin Integrity 05/06/23 1450 medial Sacral spine-Tissue loss description: Full thickness Stage 4 absess    Comments    5/10/23: Pt states eaten little of tray, has been getting 1 meal a day from outside due to poor dentition and difficulty chewing. Agreed to minced and moist diet modification when advanced. Pt reported moderate appetite prior to admission; typical intake 1 meal a day (soup), snacks on chips (lets dissolve in mouth). Denies nausea, vomiting, and diarrhea. BM every other day; solid with some straining. Does not take supplements.      5/12/23: RD attempted three times and patient out of room. Per RN, pt eating 25-50% of tray, focusing on mashable foods. Drinking juice. This afternoon patient now on clears after GI bleed scan pending results. Per EMR "reports of 2 large bowel movements overnight described as " bright red jelly like".    5/15/23: Patient underwent colonoscopy yesterday. Currently tolerating clear liquids diet. Patient wanting to begin solid foods today, discussed patient concerns with RN. Patient wanting to discontinue Boost Breeze supplement, however would like to continue Prosource supplement.     5/17/23: Pt now intubated. Plans for starting tube feeding. Noted no plans for HD and Phos elevated. Will use non-renal formula at this time since it will run at lower rate. Will monitor for need to change to renal formula.     5/19/23: Tube feeding continues, tolerated per RN. Receiving less kcal from meds.     5/23/23: Comfort care initiated. No nutrition " "interventions needed at this time.     Anthropometrics    Height: 5' 7.01" (170.2 cm) Height Method: Stated  Last Weight: 72.6 kg (160 lb) (23 1000) Weight Method: Standard Scale  BMI (Calculated): 25.1  BMI Classification: overweight (BMI 25-29.9)        Ideal Body Weight (IBW), Male: 148.06 lb     % Ideal Body Weight, Male (lb): 108.11 %                 Usual Body Weight (UBW), k.6 kg  % Usual Body Weight: 100.18     Usual Weight Provided By: patient    Wt Readings from Last 5 Encounters:   23 72.6 kg (160 lb)   23 72.6 kg (160 lb)   18 75.7 kg (166 lb 14.2 oz)     Weight Change(s) Since Admission:  Admit Weight: 72.6 kg (160 lb) (23 1300)  : Unable to get updated weight due to patient gone when returned with scale, current weight is self reported  5/15: Unable to get updated weight due to patient preferring to have his bed angled with his legs above his head at this time.   : no new    Estimated Needs    Weight Used For Calorie Calculations: 72.6 kg (160 lb 0.9 oz)  Energy Calorie Requirements (kcal): 1648kcal  Energy Need Method: [place]  Weight Used For Protein Calculations: 72.6 kg (160 lb 0.9 oz)  Protein Requirements: 109-131gm (1.5-1.8g/kg)  Fluid Requirements (mL): 1648ml (1ml/kcal)  Temp (24hrs), Av.4 °F (36.9 °C), Min:97.7 °F (36.5 °C), Max:99.5 °F (37.5 °C)    Vtot (L/Min) for [place] Equation Calculation: 8.4    Enteral Nutrition    Patient not receiving enteral nutrition support at this time.     Parenteral Nutrition    Patient not receiving parenteral nutrition support at this time.    Evaluation of Received Nutrient Intake    Calories: not meeting estimated needs  Protein: not meeting estimated needs    Patient Education    Not applicable.    Nutrition Diagnosis   PES: Malnutrition related to  chronic illness, chronic IV drug use, and chewing problems as evidenced by less than 75% needs met for greater than 1 month, moderate fat depletion, and " moderate muscle depletion. (continues)    Interventions/Goals     Intervention(s): collaboration with other providers  Goal: Meet greater than 75% of nutritional needs by follow-up. (goal discontinued)    Monitoring & Evaluation     Dietitian will monitor food and beverage intake.  Nutrition Risk/Follow-Up: low (follow-up in 5-7 days)   Please consult if re-assessment needed sooner.

## 2023-05-23 NOTE — PLAN OF CARE
Problem: Adult Inpatient Plan of Care  Goal: Plan of Care Review  Outcome: Ongoing, Progressing  Goal: Patient-Specific Goal (Individualized)  Outcome: Ongoing, Progressing  Goal: Absence of Hospital-Acquired Illness or Injury  Outcome: Ongoing, Progressing  Goal: Optimal Comfort and Wellbeing  Outcome: Ongoing, Progressing  Goal: Readiness for Transition of Care  Outcome: Ongoing, Progressing     Problem: Skin Injury Risk Increased  Goal: Skin Health and Integrity  Outcome: Ongoing, Progressing     Problem: Adjustment to Illness (Sepsis/Septic Shock)  Goal: Optimal Coping  Outcome: Ongoing, Progressing     Problem: Bleeding (Sepsis/Septic Shock)  Goal: Absence of Bleeding  Outcome: Ongoing, Progressing     Problem: Glycemic Control Impaired (Sepsis/Septic Shock)  Goal: Blood Glucose Level Within Desired Range  Outcome: Ongoing, Progressing

## 2023-05-23 NOTE — PROGRESS NOTES
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 Chang.  Previous hospital stay was also complicated by cholecystitis for which he underwent MRCP and cholecystostomy tube placement (4/19) in addition to paracentesis.  Also during previous hospitalization he had a positive urine drug screen on 04/27 for amphetamines suggesting drug use while hospitalized.       During his stay at [facility] he has been followed by Neurosurgery, Infectious Disease, Gastroenterology, and Nephrology.  Echocardiogram on 05/06 with EF 50-55%, no mention of vegetations.  Has been awaiting MRI lumbar spine and CT of the left lower extremity secondary to fluid collection on ultrasound which was 9 x 4 x 2 cm.  However these imaging modalities have not been able to be performed yet.  He underwent sacral ulcer debridement on 05/10.  Hematochezia on 05/12 resulted in transfusion of 4 units of blood and 1 unit FFP and nuclear medicine bleeding scan revealed radiotracer accumulation with most intense activity in ascending colon prompting colonoscopy which showed pancolonic diverticula without active bleed and anorectal hyperemic mucosa.     He was upgraded to ICU on 5/16 for tenuous respiratory status,  on BiPAP 14/6 with 100% FiO2 maintaining saturations in the low/mid 80s and near hypotension with tachycardia.  His respiratory status further declined requiring intubation/mechanical ventilatory support.    Significant events:  CRISTY:  05/18/2022:  LVSF 10-15% with no signs of intracardiac infection vegetation or thrombus    24hr Interval History:  No acute events overnight.  Afebrile.  Hemodynamically stable.  Remains intubated ventilated sedated with Precedex.  Currently on Levophed at 0.04.  Lab work stable.  Renal function slightly improved.  Remains on tube feeds with free water flushes. Awake and alert despite sedation.    Scheduled Medications:   albumin human 25%  12.5 g Intravenous Q6H    collagenase   Topical (Top) BID    DAPTOmycin (CUBICIN) IV (PEDS and ADULTS)  6 mg/kg Intravenous Q24H    famotidine (PF)  20 mg Intravenous Daily    furosemide (LASIX) injection  40 mg Intravenous Q12H    insulin detemir U-100  12 Units Subcutaneous QHS    Lactobacillus acidophilus  1 capsule Per NG/OG Tube TID WM    micafungin (MYCAMINE) IVPB  100 mg Intravenous Q24H    miconazole NITRATE 2 %   Topical (Top) BID    multivitamin  1 tablet Per OG tube Daily    oxyCODONE  5 mg Oral Q6H    rifAMpin (RIFADIN) IVPB  300 mg Intravenous BID    sodium chloride 0.9%  10 mL Intravenous Q6H    thiamine  100 mg Per OG tube Daily    zinc oxide-cod liver oil   Topical (Top) BID     Continuous Infusions:   dexmedeTOMIDine (Precedex) infusion (titrating) Stopped (05/23/23 0038)    fentanyl Stopped (05/19/23 1000)    NORepinephrine bitartrate-D5W Stopped (05/22/23 2046)    propofoL 20 mcg/kg/min (05/22/23 0537)     History reviewed. No pertinent past medical history.    Past Surgical History:   Procedure Laterality Date    COLONOSCOPY N/A 5/14/2023    Procedure: COLONOSCOPY;  Surgeon: Shaneka Ayers MD;  Location: [facility] OR;  Service: Gastroenterology;  Laterality: N/A;    DEBRIDEMENT OF SACRAL WOUND N/A 5/10/2023    Procedure: DEBRIDEMENT,  WOUND, SACRUM;  Surgeon: Reggie Castañeda MD;  Location: [facility] OR;  Service: General;  Laterality: N/A;       Objective:     Input/output:    Intake/Output Summary (Last 24 hours) at 5/23/2023 0817  Last data filed at 5/23/2023 0627  Gross per 24 hour   Intake 901.24 ml   Output 4295 ml   Net -3393.76 ml       Vital Signs (Most Recent):  Temp: 99.5 °F (37.5 °C) (05/23/23 0400)  Pulse: (!) 127 (05/23/23 0615)  Resp: (!) 24 (05/23/23 0615)  BP: 98/65 (05/23/23 0615)  SpO2: 100 % (05/23/23 0615)  Body mass index is 25.05 kg/m².  Weight: 72.6 kg (160 lb) Vital Signs (24h Range):  Temp:  [97.7 °F (36.5 °C)-99.5 °F (37.5 °C)] 99.5 °F (37.5 °C)  Pulse:  [] 127  Resp:  [22-42] 24  SpO2:  [85 %-100 %] 100 %  BP: ()/() 98/65       General:  well-developed, no acute distress  HEENT: Normocephalic, atraumatic.    Neck: Supple. No obvious JVD.   Respiratory:  Coarse breath sounds bilateral.  Cardiovascular:  RRR.  No obvious M/G/R. Warm extremities.  Peripheral pulses intact.  + edema.  Gastrointestinal:  Soft, nondistended. bowel sounds present.  Neurologic:  Awakens.  PERRLA.      Lines/Drains/Airways       Peripherally Inserted Central Catheter Line  Duration             PICC Double Lumen 05/10/23 0817 right brachial 13 days              Drain  Duration                  Drain/Device    Right lower flank -- days         Urethral Catheter 05/12/23 1630 Latex 16 Fr. 10 days                    Vent:  Vent Mode: CPAP / PSV (05/22/23 0831)  Set Rate: 14 BPM (05/22/23 0415)  Vt Set: 450 mL (05/22/23 0415)  Pressure Support: 10 cmH20 (05/22/23 0831)  PEEP/CPAP: 5 cmH20 (05/22/23 0831)  Oxygen Concentration (%): 30 (05/22/23 2100)  Peak Airway Pressure: 24 cmH20 (05/22/23 0415)  Total Ve: 6.5 L/m (05/22/23 0415)  F/VT Ratio<105 (RSBI): (!) 36.86 (05/22/23 0415)    ABGs:  No results found for: PH, PO2, PCO2, TDR8OAW      Significant Labs:    Lab Results   Component Value Date    WBC 13.02 (H) 05/23/2023    HGB 9.1 (L)  05/23/2023    HCT 29.1 (L) 05/23/2023    MCV 96.7 (H) 05/23/2023     05/23/2023         Recent Labs   Lab 05/23/23  0042      K 4.4   CO2 23   BUN 45.6*   CREATININE 1.69*   CALCIUM 8.6*   MG 2.00   AST 31   ALT 12   ALKPHOS 75   ALBUMIN 3.1*      Imaging:   Ultrasound abdomen yesterday with cholelithiasis and mild diffuse wall thickening with ascites    Assessment:     MRSA bacteremia with C-spine/L-spine osteomyelitis/diskitis, lumbar epidural abscess, bilateral psoas abscesses  -S/P drainage epidural abscess with bilateral lumbar laminectomy and foraminotomies 04/21/2023 at [facility]   Septic shock due to above  Large bilateral pleural effusions, with superimposed pulmonary infiltrates.    -Left pleural fluid consistent with exudative by LDH, no evidence of empyema.  Severe cardiomyopathy with marked decreased LVSF (EF 10%), significant decrease over prior transthoracic echocardiogram.    -No evidence of endocarditis by CRISTY 05/17/2023  Acute respiratory failure, intubated 05/16/2023, extubated 5/22/2023  Cholecystitis post percutaneous cholecystostomy tube placement at [facility] 04/19/2023, biliary drain remains in place.  Acute kidney injury, nonoliguric  Untreated hepatitis-C with cirrhosis and ascites  History of polysubstance and IV drug abuse up until this hospital stay    Plan:       Palliative care, Nephrology, Neurosurgery, Infectious Disease, and wound care following  Continue antibiotics per ID  Extubated yesterday afternoon, now on oxymask; patient is a DNR  Case management will be working on transferring to hospice facility today; can downgrade to the floor later if unable to find placement today    GI prophylaxis: Pepcid  DVT prophylaxis: SCDs    32 minutes of critical care was time spent personally by me on the following activities: development of treatment plan with patient or surrogate and bedside caregivers, discussions with consultants, evaluation of patient's response to treatment,  examination of patient, ordering and performing treatments and interventions, ordering and review of laboratory studies, ordering and review of radiographic studies, pulse oximetry, re-evaluation of patient's condition.  This patient demonstrates a high probability for further clinical decompensation due to ongoing critical illness.  Critical care time did not overlap with that of any other provider or involve time for any procedures.     Charlene Ruano, FNP  Pulmonary/Critical Care

## 2023-05-23 NOTE — NURSING
Patient accepted to [facility], per [facility], wound vac dc'd and island wet to dry applied over site. PICC and Awad left in. Spoke to Son Rodney regarding transfer.

## 2023-05-23 NOTE — PT/OT/SLP DISCHARGE
Speech Language Pathology Department  Discharge Summary    Patient Name:  Reji Plasencia   MRN:  [id number]    Pt and pts family electing comfort care. SLP to sign off. Please re-consult as warranted.

## 2023-05-23 NOTE — CONSULTS
Consults    Patient Name: Reji Plasencia   MRN: [id number]   Admission Date: 5/5/2023   Hospital Length of Stay: 18   Attending Provider: Medhat Reece Jr., MD, *   Consulting Provider: Patricia CONTRERAS  Reason for Consult: Goals of Care  Primary Care Physician: Primary Doctor No     Principal Problem: Sepsis     Patient information was obtained from relative(s) and ER records.      Final diagnoses:  [R00.0] Tachycardia  [M46.20] Spinal abscess (Primary)  [R78.81] Bacteremia     Assessment/Plan:     I reviewed the patient and family's understanding of the seriousness of the illness and its expected prognosis. We discussed the patient's goals of care and treatment preferences. I clarified current code status. I identified the surrogate decision maker or health care POA. I answered all questions and we formulated a plan including recommendations for symptom management and how to best achieve goals of care.       Advance Care Planning     Date: 05/23/2023    [facility]  I engaged the patient and family in a conversation about advance care planning and we specifically addressed what the goals of care would be moving forward, in light of the patient's change in clinical status, specifically current condition.  We did specifically address the patient's likely prognosis, which is poor.  We explored the patient's values and preferences for future care.  The patient and family endorses that what is most important right now is to focus on comfort and QOL     Accordingly, we have decided that the best plan to meet the patient's goals includes enrolling in hospice care    I did explain the role for hospice care at this stage of the patient's illness, including its ability to help the patient live with the best quality of life possible.  We will be making a hospice referral.       Spoke to patient briefly who reports he continues to have back pain despite regimen.  Informed that I will adjust medications.  Discussed plan--patient  "confirmed that he no longer wants medical treatment in the hospital and wants to be "comfortable."  Discussed transfer to inpatient hospice facility, to which patient was in agreement.      Called son Clyde to discuss my conversation with patient this morning and options.  Son states that he left yesterday because he had difficulty seeing patient in distress.  Discussed with son that patient is much more comfortable and that we will continue to adjust meds accordingly.  Discussed inpatient hospice placement to which son was in agreement and requested [facility], as he has had other family members there.  Informed him I would update medical team.  Offered support and encouraged to call for any questions/concerns.     Updated RN, Dr. Flynn and CM.     Recommendations:     Comfort measures in place.  Hospice referral      History of Present Illness:     Patient was extubated yesterday afternoon and currently on NRB with report of lower back pain.  Patient and family have informed that patient is no longer interested in surgery or medical treatment in the hospital.  I am continuing to follow for assistance with plan of care.       Active Ambulatory Problems     Diagnosis Date Noted    Type 2 diabetes mellitus without complication, without long-term current use of insulin 04/13/2023    Tobacco user 05/16/2023    Osteomyelitis of lumbar spine 04/13/2023    Hepatitis C virus infection 05/16/2023     Resolved Ambulatory Problems     Diagnosis Date Noted    No Resolved Ambulatory Problems     No Additional Past Medical History        Past Surgical History:   Procedure Laterality Date    COLONOSCOPY N/A 5/14/2023    Procedure: COLONOSCOPY;  Surgeon: Shaneka Ayers MD;  Location: [facility] OR;  Service: Gastroenterology;  Laterality: N/A;    DEBRIDEMENT OF SACRAL WOUND N/A 5/10/2023    Procedure: DEBRIDEMENT, WOUND, SACRUM;  Surgeon: Reggie Castañeda MD;  Location: [facility] OR;  Service: General;  Laterality: N/A;        Review of " patient's allergies indicates:  No Known Allergies       Current Facility-Administered Medications:     0.9%  NaCl infusion (for blood administration), , Intravenous, Q24H PRN, Guerrero Alfaro MD    0.9%  NaCl infusion (for blood administration), , Intravenous, Q24H PRN, Fred Dominguez DO, New Bag at 05/12/23 1640    0.9%  NaCl infusion (for blood administration), , Intravenous, Q24H PRN, MARYJO MorrisCNP-BC    0.9%  NaCl infusion (for blood administration), , Intravenous, Q24H PRN, Guerrero Alfaro MD    0.9%  NaCl infusion (for blood administration), , Intravenous, Q24H PRN, Guerrero Alfaro MD    0.9%  NaCl infusion (for blood administration), , Intravenous, Q24H PRN, Guerrero Alfaro MD    albumin human 25% bottle 12.5 g, 12.5 g, Intravenous, Q6H, Cici Nunez MD, Stopped at 05/23/23 0625    aluminum-magnesium hydroxide-simethicone 200-200-20 mg/5 mL suspension 30 mL, 30 mL, Per OG tube, QID PRN, Medhat Reece Jr., MD, FCCP    collagenase ointment, , Topical (Top), BID, Reggie Castañeda MD, Given at 05/22/23 2037    DAPTOmycin (CUBICIN) 435 mg in sodium chloride 0.9% SolP 50 mL IVPB, 6 mg/kg, Intravenous, Q24H, Medhat Reece Jr., MD, FCCP, Stopped at 05/22/23 1823    dexmedetomidine (PRECEDEX) 400mcg/100mL 0.9% NaCL infusion, 0-1.4 mcg/kg/hr, Intravenous, Continuous, Cici Nunez MD, Stopped at 05/23/23 0038    dextrose 10% bolus 125 mL 125 mL, 12.5 g, Intravenous, PRN, TANA LackeyP-BC, Stopped at 05/16/23 1243    dextrose 10% bolus 250 mL 250 mL, 25 g, Intravenous, PRN, MARYJO LackeyCNP-BC    famotidine (PF) injection 20 mg, 20 mg, Intravenous, Daily, Medhat Reece Jr., MD, FCCP, 20 mg at 05/22/23 0816    fentaNYL 2500 mcg in 0.9% sodium chloride 250 mL infusion premix (titrating), 0-200 mcg/hr, Intravenous, Continuous, Cici Nunez MD, Stopped at 05/19/23 1000    fentaNYL injection 25 mcg, 25 mcg, Intravenous, Q2H PRN, Steven Cyr DO, 25 mcg at 05/22/23 1446     fentaNYL injection 50 mcg, 50 mcg, Intravenous, Q2H PRN, Mirta Ryan, JUANA, 50 mcg at 05/20/23 0009    furosemide injection 40 mg, 40 mg, Intravenous, Q12H, Adi Campos MD, 40 mg at 05/23/23 0007    glucagon (human recombinant) injection 1 mg, 1 mg, Intramuscular, PRN, Melissa Mohan, AGACNP-BC    glucose chewable tablet 16 g, 16 g, Per OG tube, PRN, Medhat Reece Jr., MD, FCCP    glucose chewable tablet 24 g, 24 g, Per OG tube, PRN, Medhat Reece Jr., MD, FCCP    glycopyrrolate injection 0.2 mg, 0.2 mg, Intravenous, Q6H PRN, Patricia Cash, JOEYP    HYDROmorphone (PF) injection 2 mg, 2 mg, Intravenous, Q2H PRN, Patricia Cash, DIANE    insulin aspart U-100 injection 1-10 Units, 1-10 Units, Subcutaneous, QID (AC + HS) PRN, Melissa Mohan, [facility], 4 Units at 05/22/23 1145    insulin detemir U-100 injection 12 Units, 12 Units, Subcutaneous, QHS, Darnell Franco MD, 12 Units at 05/22/23 2038    Lactobacillus acidophilus capsule 1 capsule, 1 capsule, Per NG/OG Tube, TID WM, Medhat Reece Jr., MD, FCCP, 1 capsule at 05/22/23 1141    loperamide capsule 2 mg, 2 mg, Per OG tube, QID PRN, Medhat Reece Jr., MD, FCCP    LORazepam injection 2 mg, 2 mg, Intravenous, Q2H PRN, Patricia Cash, JOEYP    melatonin tablet 6 mg, 6 mg, Per OG tube, Nightly PRN, Medhat Reece Jr., MD, FCCP    metoprolol injection 5 mg, 5 mg, Intravenous, Q4H PRN, Cici Nunez MD, 5 mg at 05/23/23 0302    micafungin 100 mg in sodium chloride 0.9 % 100 mL IVPB (MB+), 100 mg, Intravenous, Q24H, Cici Nunez MD, Stopped at 05/23/23 0431    miconazole NITRATE 2 % top powder, , Topical (Top), BID, Reggie Castañeda MD, Given at 05/22/23 2037    multivitamin tablet, 1 tablet, Per OG tube, Daily, Medhat Reece Jr., MD, FCCP, 1 tablet at 05/22/23 0816    NORepinephrine 8 mg in dextrose 5% 250 mL infusion, 0-3 mcg/kg/min, Intravenous, Continuous, Medhat Reece Jr., MD, FCCP, Stopped at 05/22/23 2046    ondansetron injection 4 mg, 4  mg, Intravenous, Q4H PRN, Nery Sepulveda MD, 4 mg at 05/06/23 0434    oxyCODONE immediate release tablet 5 mg, 5 mg, Oral, Q6H, William Flynn MD, 5 mg at 05/23/23 0007    polyethylene glycol packet 17 g, 17 g, Oral, BID PRN, Nery Sepulveda MD    prochlorperazine injection Soln 5 mg, 5 mg, Intravenous, Q6H PRN, Nery Sepulveda MD    propofol (DIPRIVAN) 10 mg/mL infusion, 0-50 mcg/kg/min, Intravenous, Continuous, Cici Nunez MD, Last Rate: 8.7 mL/hr at 05/22/23 0537, 20 mcg/kg/min at 05/22/23 0537    rifAMpin (RIFADIN) 300 mg in dextrose 5 % (D5W) 100 mL IVPB, 300 mg, Intravenous, BID, Pedro Luis Vigil DO, Stopped at 05/22/23 2300    senna-docusate 8.6-50 mg per tablet 2 tablet, 2 tablet, Per OG tube, BID PRN, Medhat Reece Jr., MD, FCCP    sodium chloride 0.9% flush 10 mL, 10 mL, Intravenous, PRN, Nery Sepulveda MD    Flushing PICC Protocol, , , Until Discontinued **AND** sodium chloride 0.9% flush 10 mL, 10 mL, Intravenous, Q6H, 10 mL at 05/23/23 0545 **AND** sodium chloride 0.9% flush 10 mL, 10 mL, Intravenous, PRN, Darnell Franco MD    sodium chloride 0.9% flush 10 mL, 10 mL, Intravenous, PRN, Cici Nunez MD    thiamine tablet 100 mg, 100 mg, Per OG tube, Daily, Medhat Reece Jr., MD, FCCP, 100 mg at 05/22/23 0816    tiZANidine tablet 4 mg, 4 mg, Per OG tube, Q8H PRN, Medhat Reece Jr., MD, FCCP    zinc oxide-cod liver oil 40 % paste, , Topical (Top), BID, Reggie Castañeda MD, Given at 05/22/23 2100     sodium chloride, sodium chloride, sodium chloride, sodium chloride, sodium chloride, sodium chloride, aluminum-magnesium hydroxide-simethicone, dextrose 10%, dextrose 10%, fentaNYL, fentaNYL, glucagon (human recombinant), glucose, glucose, glycopyrrolate, HYDROmorphone, insulin aspart U-100, loperamide, lorazepam, melatonin, metoprolol, ondansetron, polyethylene glycol, prochlorperazine, senna-docusate 8.6-50 mg, sodium chloride 0.9%, Flushing PICC Protocol **AND** sodium chloride 0.9% **AND**  "sodium chloride 0.9%, sodium chloride 0.9%, tiZANidine     History reviewed. No pertinent family history.     Review of Systems   Constitutional:  Positive for fatigue.   Musculoskeletal:  Positive for back pain.          Objective:   BP 98/65   Pulse (!) 127   Temp 99.5 °F (37.5 °C) (Axillary)   Resp (!) 24   Ht 5' 7.01" (1.702 m)   Wt 72.6 kg (160 lb)   SpO2 100%   BMI 25.05 kg/m²      Physical Exam  Constitutional:       Appearance: He is ill-appearing.   HENT:      Head: Normocephalic.      Comments: Temporal wasting  Eyes:      Pupils: Pupils are equal, round, and reactive to light.   Cardiovascular:      Rate and Rhythm: Tachycardia present.   Pulmonary:      Breath sounds: Rhonchi present.   Abdominal:      Palpations: Abdomen is soft.   Musculoskeletal:      Comments: sarcopenia   Skin:     General: Skin is warm.   Neurological:      Mental Status: He is oriented to person, place, and time.         FAMILY CONTACTS:     Review of Symptoms  Review of Symptoms      Symptom Assessment (ESAS 0-10 Scale)  Pain:  0  Dyspnea:  0  Anxiety:  0  Nausea:  0  Depression:  0  Anorexia:  0  Fatigue:  0  Insomnia:  0  Restlessness:  0  Agitation:  0         Psychosocial/Cultural:   See Palliative Psychosocial Note: Yes  **Primary  to Follow**  Palliative Care  Consult: No    Advance Care Planning   Advance Directives:   Do Not Resuscitate Status: Yes      Decision Making:  Family answered questions and Patient answered questions  Goals of Care: What is most important right now is to focus on comfort and QOL . Accordingly, we have decided that the best plan to meet the patient's goals includes pivot to comfort-focused care.        PAINAD: NA    Caregiver burden formerly assessed: Yes        > 50% of 40 min of encounter was spent in chart review, face to face discussion of goals of care, symptom assessment, coordination of care and emotional support.         Patricia CONTRERAS, " [facility]  Palliative Medicine  [facility]

## 2023-05-28 LAB — BACTERIA SPEC CULT: ABNORMAL

## 2023-06-19 LAB — FUNGUS SPEC CULT: NORMAL

## 2023-06-29 LAB — MYCOBACTERIUM SPEC QL CULT: NORMAL

## 2023-08-07 PROBLEM — A41.9 SEPSIS: Status: RESOLVED | Noted: 2023-05-05 | Resolved: 2023-08-07

## 2023-11-14 NOTE — LETTER
----- Message from Loretta Gillette MD sent at 11/13/2023  8:49 AM CST -----  CK isoenzyme not done due to CPK <100.    May 18, 2023    Reji Plasencia  [address] [address]                   [address] [place] [address]  Phone: [phone]   May 18, 2023     Patient: Reji Plasencia   YOB: 1960   Date of Visit: 5/5/2023       To Whom it May Concern:    Reji Plasencia was seen at the hospital on 5/5/2023-5/18/23.     Please excuse Carmelo from any classes or work missed.    If you have any questions or concerns, please don't hesitate to call.    Sincerely,         Ulises Simon RN

## (undated) DEVICE — ELECTRODE PATIENT RETURN DISP

## (undated) DEVICE — SOL IRRI STRL WATER 1000ML

## (undated) DEVICE — ADAPTER DUAL NSL LUER M-M 7FT

## (undated) DEVICE — CRADLE LAMINECTOMY ARM

## (undated) DEVICE — TAPE CURAD SILK ADH 3INX10YD

## (undated) DEVICE — UNDERPAD DISPOSABLE 30X30IN

## (undated) DEVICE — TRAY SKIN SCRUB WET PREMIUM

## (undated) DEVICE — COLLECTION SPECIMEN NEPTUNE

## (undated) DEVICE — KIT SURGICAL COLON .25 1.1OZ

## (undated) DEVICE — GAUZE FLUFF XXLG 36X36 2 PLY

## (undated) DEVICE — PILLOW HEAD REST

## (undated) DEVICE — TIP SUCTION YANKAUER

## (undated) DEVICE — KIT CANIST SUCTION 1200CC